# Patient Record
Sex: MALE | Race: WHITE | NOT HISPANIC OR LATINO
[De-identification: names, ages, dates, MRNs, and addresses within clinical notes are randomized per-mention and may not be internally consistent; named-entity substitution may affect disease eponyms.]

---

## 2017-06-21 ENCOUNTER — APPOINTMENT (OUTPATIENT)
Dept: ORTHOPEDIC SURGERY | Facility: CLINIC | Age: 77
End: 2017-06-21

## 2018-05-21 ENCOUNTER — INPATIENT (INPATIENT)
Facility: HOSPITAL | Age: 78
LOS: 7 days | Discharge: HOME | DRG: 264 | End: 2018-05-29
Attending: HOSPITALIST | Admitting: HOSPITALIST
Payer: MEDICARE

## 2018-05-21 VITALS
SYSTOLIC BLOOD PRESSURE: 147 MMHG | RESPIRATION RATE: 19 BRPM | DIASTOLIC BLOOD PRESSURE: 70 MMHG | HEART RATE: 72 BPM | OXYGEN SATURATION: 97 %

## 2018-05-21 DIAGNOSIS — Z29.9 ENCOUNTER FOR PROPHYLACTIC MEASURES, UNSPECIFIED: ICD-10-CM

## 2018-05-21 DIAGNOSIS — R09.02 HYPOXEMIA: ICD-10-CM

## 2018-05-21 DIAGNOSIS — I10 ESSENTIAL (PRIMARY) HYPERTENSION: ICD-10-CM

## 2018-05-21 DIAGNOSIS — Z96.643 PRESENCE OF ARTIFICIAL HIP JOINT, BILATERAL: Chronic | ICD-10-CM

## 2018-05-21 DIAGNOSIS — R06.09 OTHER FORMS OF DYSPNEA: ICD-10-CM

## 2018-05-21 DIAGNOSIS — Z98.89 OTHER SPECIFIED POSTPROCEDURAL STATES: Chronic | ICD-10-CM

## 2018-05-21 DIAGNOSIS — R06.00 DYSPNEA, UNSPECIFIED: ICD-10-CM

## 2018-05-21 DIAGNOSIS — I48.1 PERSISTENT ATRIAL FIBRILLATION: ICD-10-CM

## 2018-05-21 LAB
ALBUMIN SERPL ELPH-MCNC: 3.2 G/DL — LOW (ref 3.3–5)
ALP SERPL-CCNC: 65 U/L — SIGNIFICANT CHANGE UP (ref 40–120)
ALT FLD-CCNC: 18 U/L — SIGNIFICANT CHANGE UP (ref 10–45)
ANION GAP SERPL CALC-SCNC: 14 MMOL/L — SIGNIFICANT CHANGE UP (ref 5–17)
APTT BLD: 56.4 SEC — HIGH (ref 27.5–37.4)
AST SERPL-CCNC: 30 U/L — SIGNIFICANT CHANGE UP (ref 10–40)
BASOPHILS # BLD AUTO: 0 K/UL — SIGNIFICANT CHANGE UP (ref 0–0.2)
BASOPHILS NFR BLD AUTO: 0.1 % — SIGNIFICANT CHANGE UP (ref 0–2)
BILIRUB SERPL-MCNC: 0.7 MG/DL — SIGNIFICANT CHANGE UP (ref 0.2–1.2)
BUN SERPL-MCNC: 22 MG/DL — SIGNIFICANT CHANGE UP (ref 7–23)
CALCIUM SERPL-MCNC: 9.8 MG/DL — SIGNIFICANT CHANGE UP (ref 8.4–10.5)
CHLORIDE SERPL-SCNC: 92 MMOL/L — LOW (ref 96–108)
CK MB BLD-MCNC: 4.2 % — HIGH (ref 0–3.5)
CK MB CFR SERPL CALC: 3.8 NG/ML — SIGNIFICANT CHANGE UP (ref 0–6.7)
CK SERPL-CCNC: 90 U/L — SIGNIFICANT CHANGE UP (ref 30–200)
CO2 SERPL-SCNC: 29 MMOL/L — SIGNIFICANT CHANGE UP (ref 22–31)
CREAT SERPL-MCNC: 1.16 MG/DL — SIGNIFICANT CHANGE UP (ref 0.5–1.3)
EOSINOPHIL # BLD AUTO: 0.2 K/UL — SIGNIFICANT CHANGE UP (ref 0–0.5)
EOSINOPHIL NFR BLD AUTO: 2 % — SIGNIFICANT CHANGE UP (ref 0–6)
GAS PNL BLDV: SIGNIFICANT CHANGE UP
GLUCOSE SERPL-MCNC: 106 MG/DL — HIGH (ref 70–99)
HCT VFR BLD CALC: 40.2 % — SIGNIFICANT CHANGE UP (ref 39–50)
HGB BLD-MCNC: 13.3 G/DL — SIGNIFICANT CHANGE UP (ref 13–17)
INR BLD: 1.65 RATIO — HIGH (ref 0.88–1.16)
LYMPHOCYTES # BLD AUTO: 1.7 K/UL — SIGNIFICANT CHANGE UP (ref 1–3.3)
LYMPHOCYTES # BLD AUTO: 17.7 % — SIGNIFICANT CHANGE UP (ref 13–44)
MCHC RBC-ENTMCNC: 32.2 PG — SIGNIFICANT CHANGE UP (ref 27–34)
MCHC RBC-ENTMCNC: 33 GM/DL — SIGNIFICANT CHANGE UP (ref 32–36)
MCV RBC AUTO: 97.5 FL — SIGNIFICANT CHANGE UP (ref 80–100)
MONOCYTES # BLD AUTO: 0.9 K/UL — SIGNIFICANT CHANGE UP (ref 0–0.9)
MONOCYTES NFR BLD AUTO: 9.6 % — SIGNIFICANT CHANGE UP (ref 2–14)
NEUTROPHILS # BLD AUTO: 6.8 K/UL — SIGNIFICANT CHANGE UP (ref 1.8–7.4)
NEUTROPHILS NFR BLD AUTO: 70.5 % — SIGNIFICANT CHANGE UP (ref 43–77)
NT-PROBNP SERPL-SCNC: 7432 PG/ML — HIGH (ref 0–300)
PLATELET # BLD AUTO: 355 K/UL — SIGNIFICANT CHANGE UP (ref 150–400)
POTASSIUM SERPL-MCNC: 4.4 MMOL/L — SIGNIFICANT CHANGE UP (ref 3.5–5.3)
POTASSIUM SERPL-SCNC: 4.4 MMOL/L — SIGNIFICANT CHANGE UP (ref 3.5–5.3)
PROT SERPL-MCNC: 8.4 G/DL — HIGH (ref 6–8.3)
PROTHROM AB SERPL-ACNC: 18 SEC — HIGH (ref 9.8–12.7)
RBC # BLD: 4.12 M/UL — LOW (ref 4.2–5.8)
RBC # FLD: 12.4 % — SIGNIFICANT CHANGE UP (ref 10.3–14.5)
SODIUM SERPL-SCNC: 135 MMOL/L — SIGNIFICANT CHANGE UP (ref 135–145)
TROPONIN T SERPL-MCNC: <0.01 NG/ML — SIGNIFICANT CHANGE UP (ref 0–0.06)
WBC # BLD: 9.6 K/UL — SIGNIFICANT CHANGE UP (ref 3.8–10.5)
WBC # FLD AUTO: 9.6 K/UL — SIGNIFICANT CHANGE UP (ref 3.8–10.5)

## 2018-05-21 PROCEDURE — 71045 X-RAY EXAM CHEST 1 VIEW: CPT | Mod: 26

## 2018-05-21 PROCEDURE — 99223 1ST HOSP IP/OBS HIGH 75: CPT | Mod: AI,GC

## 2018-05-21 PROCEDURE — 93010 ELECTROCARDIOGRAM REPORT: CPT

## 2018-05-21 PROCEDURE — 99285 EMERGENCY DEPT VISIT HI MDM: CPT | Mod: 25,GC

## 2018-05-21 RX ORDER — FUROSEMIDE 40 MG
40 TABLET ORAL DAILY
Qty: 0 | Refills: 0 | Status: DISCONTINUED | OUTPATIENT
Start: 2018-05-21 | End: 2018-05-22

## 2018-05-21 RX ORDER — ATENOLOL 25 MG/1
50 TABLET ORAL DAILY
Qty: 0 | Refills: 0 | Status: DISCONTINUED | OUTPATIENT
Start: 2018-05-21 | End: 2018-05-21

## 2018-05-21 RX ORDER — FUROSEMIDE 40 MG
40 TABLET ORAL DAILY
Qty: 0 | Refills: 0 | Status: DISCONTINUED | OUTPATIENT
Start: 2018-05-21 | End: 2018-05-21

## 2018-05-21 RX ORDER — LISINOPRIL 2.5 MG/1
40 TABLET ORAL DAILY
Qty: 0 | Refills: 0 | Status: DISCONTINUED | OUTPATIENT
Start: 2018-05-21 | End: 2018-05-21

## 2018-05-21 RX ORDER — ATENOLOL 25 MG/1
50 TABLET ORAL DAILY
Qty: 0 | Refills: 0 | Status: DISCONTINUED | OUTPATIENT
Start: 2018-05-21 | End: 2018-05-24

## 2018-05-21 RX ORDER — SIMVASTATIN 20 MG/1
40 TABLET, FILM COATED ORAL AT BEDTIME
Qty: 0 | Refills: 0 | Status: DISCONTINUED | OUTPATIENT
Start: 2018-05-21 | End: 2018-05-24

## 2018-05-21 RX ORDER — ASPIRIN/CALCIUM CARB/MAGNESIUM 324 MG
81 TABLET ORAL DAILY
Qty: 0 | Refills: 0 | Status: DISCONTINUED | OUTPATIENT
Start: 2018-05-21 | End: 2018-05-24

## 2018-05-21 RX ORDER — APIXABAN 2.5 MG/1
5 TABLET, FILM COATED ORAL EVERY 12 HOURS
Qty: 0 | Refills: 0 | Status: DISCONTINUED | OUTPATIENT
Start: 2018-05-21 | End: 2018-05-22

## 2018-05-21 RX ADMIN — Medication 81 MILLIGRAM(S): at 19:53

## 2018-05-21 RX ADMIN — SIMVASTATIN 40 MILLIGRAM(S): 20 TABLET, FILM COATED ORAL at 19:53

## 2018-05-21 RX ADMIN — ATENOLOL 50 MILLIGRAM(S): 25 TABLET ORAL at 19:53

## 2018-05-21 RX ADMIN — Medication 1 TABLET(S): at 19:53

## 2018-05-21 RX ADMIN — APIXABAN 5 MILLIGRAM(S): 2.5 TABLET, FILM COATED ORAL at 19:46

## 2018-05-21 NOTE — H&P ADULT - PROBLEM SELECTOR PLAN 3
Atrial fibrillation diagnosed 2014. 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 s/p ablation 4/24/2918  -Currently in sinus rhythm  -continue atenolol  -continue Eliquis Atrial fibrillation diagnosed 2014. 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 s/p ablation 4/24/2918  -Currently in sinus rhythm  -continue atenolol  -continue Eliquis- hold prior to procedures

## 2018-05-21 NOTE — ED ADULT NURSE NOTE - OBJECTIVE STATEMENT
79 yo male sent to ED by MD for evaluation of shortness of breath. Patient recently 79 yo male sent to ED by MD for evaluation of shortness of breath for several weeks. PMH HTN, atrial fibrillation, ablation 3 weeks ago. Over the past month his shortness of breath has been worsening, worse with exertion, associated with dry cough, wheezing, orthopnea. Denies fevers, chest pain, abdominal pain. Distant smoking history. Tried lasix without response.

## 2018-05-21 NOTE — H&P ADULT - ATTENDING COMMENTS
Pt seen and examined 5/21 with medical team. Agree with above resident H&P and have edited where appropriate.

## 2018-05-21 NOTE — H&P ADULT - PROBLEM SELECTOR PLAN 1
amiodarone One month dyspnea associated with hypoxia, orthopnea, and LE edema. TTE 5/17/2018 had LVEF 55%, indeterminate diastolic function. pBNP 7400. Pt received one month of amiodarone  -DDx: organizing pneumonia, sarcoidosis, amiodarone adverse effect, vasculitis. CHF exacerbation less likely  -f/u ESR, CRP, RF, ACE One month dyspnea associated with hypoxia, orthopnea, and LE edema. TTE 5/17/2018 had LVEF 55%, indeterminate diastolic function. pBNP 7400. 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  -DDx: pneumonia, sarcoidosis, amiodarone adverse effect, metastatic disease, vasculitis, pulmonary edema 2/2 CHF exacerbation with diastolic dysfunction  -f/u ESR, CRP, RF, ACE One month dyspnea associated with hypoxia, orthopnea, and LE edema. TTE 5/17/2018 had LVEF 55%, indeterminate diastolic function. pBNP 7400. 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  -CT chest (5/10/2018) showed bilateral airspace consolidation most prominent in the upper lobe, bilateral nodules, mild mediastinal LAD, small-mod R pleural effusion, small L pleural effusion.  -DDx: pneumonia, sarcoidosis, amiodarone adverse effect, metastatic disease, vasculitis, pulmonary edema 2/2 CHF exacerbation with diastolic dysfunction  -f/u ESR, CRP, RF, ACE One month dyspnea associated with hypoxia, orthopnea, and LE edema. TTE 5/17/2018 had LVEF 55%, indeterminate diastolic function. pBNP 7400. 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  -CT chest (5/10/2018) showed bilateral airspace consolidation most prominent in the upper lobe, bilateral nodules, mild mediastinal LAD, small-mod R pleural effusion, small L pleural effusion.  -DDx: pneumonia, sarcoidosis, amiodarone adverse effect, metastatic disease, vasculitis, pulmonary edema 2/2 CHF exacerbation with diastolic dysfunction  -f/u ESR, CRP, MATEO, ACE, HIV, quant gold  -pulmonology to see pt One month dyspnea associated with hypoxia, orthopnea, and LE edema. TTE 5/17/2018 had LVEF 55%, indeterminate diastolic function. pBNP 7400. 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  -CT chest (5/10/2018) showed bilateral airspace consolidation most prominent in the upper lobe, bilateral nodules, mild mediastinal LAD, small-mod R pleural effusion, small L pleural effusion.  -DDx: pneumonia, sarcoidosis, amiodarone adverse effect, metastatic disease, vasculitis, pulmonary edema 2/2 CHF exacerbation with diastolic dysfunction  -f/u ESR, CRP, MATEO, ACE, HIV, quant gold  -pulmonology and cardiology to see pt  -consider inpatient stress test as had one scheduled outpt One month dyspnea associated with hypoxia, orthopnea, and LE edema. TTE 5/17/2018 had LVEF 55%, indeterminate diastolic function. pBNP 7400. 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 "borderline" stress test several years ago per pt.  -CT chest (5/10/2018) showed bilateral airspace consolidation most prominent in the upper lobe, bilateral nodules, mild mediastinal LAD, small-mod R pleural effusion, small L pleural effusion. PFTs with moderate restrictive disease.   -DDx: pneumonia, sarcoidosis, amiodarone adverse effect, metastatic disease, vasculitis, CAD, pulmonary edema 2/2 CHF exacerbation with diastolic dysfunction  -f/u ESR, CRP, MATEO, ACE, HIV, quant gold  -pulmonology and cardiology to see pt; may benefit from bronchoscopy  -consider inpatient stress test as had one scheduled outpt within next several weeks and has risk factors for cad which could be causing dyspnea

## 2018-05-21 NOTE — H&P ADULT - NSHPPHYSICALEXAM_GEN_ALL_CORE
GENERAL: No acute distress, well-developed  HEAD:  Atraumatic, Normocephalic  ENT: EOMI, PERRLA, conjunctiva and sclera clear, Neck supple, No JVD, moist mucosa  CHEST/LUNG: Clear to auscultation bilaterally; No wheeze, equal breath sounds bilaterally, on 2L NC  BACK: No spinal tenderness  HEART: Regular rate and rhythm; No murmurs, rubs, or gallops  ABDOMEN: Soft, Nontender, Nondistended; Bowel sounds present  EXTREMITIES:  Pedal pulses palpable, No clubbing, cyanosis, or edema  PSYCH: Nl behavior, nl affect  NEUROLOGY: AAOx3, non-focal  SKIN: Normal color, No rashes or lesions

## 2018-05-21 NOTE — H&P ADULT - PROBLEM SELECTOR PLAN 2
-continue home atenolol, ramipril, lasix 40mg po daily Presented with hypoxia sat 82% on RA. Does not use oxygen at home, no hx of pulmonary disease  -continue supplemental oxygen to maintain SpO2 >92%  -CXR showing b/l patchy opacities Presented with hypoxia sat 82% on RA. Does not use oxygen at home, no hx of pulmonary disease  -continue supplemental oxygen to maintain SpO2 >92%  -CXR showing b/l patchy opacities  -f/u pulm recs

## 2018-05-21 NOTE — H&P ADULT - PROBLEM SELECTOR PLAN 4
-continue home atenolol, ramipril, lasix 40mg po daily Kidney function at baseline 1.0 - 1.5 (5/2018)  -continue home atenolol, ramipril, lasix 40mg po daily Normotensive. Kidney function at baseline 1.0 - 1.5 (5/2018)  -continue home atenolol, lasix 40mg po daily  -BP soft, hold home ramipril Normotensive. Kidney function at baseline 1.0 - 1.5 (5/2018)  -continue home atenolol, lasix 40mg po daily  -BP borderline, hold home ramipril for now

## 2018-05-21 NOTE — ED PROVIDER NOTE - CARDIAC, MLM
Normal rate, regular rhythm.  Heart sounds S1, S2.  No murmurs, rubs or gallops. No JVD. Peripheral pulses equal throughout

## 2018-05-21 NOTE — H&P ADULT - ASSESSMENT
78 year old man PMH HTN, DLD AFib s/p ablation p/w shortness of breath. 78 year old man PMH HTN, DLD AFib s/p ablation presents with shortness of breath. 78 year old man PMH HTN, DLD AFib s/p ablation presents with shortness of breath, orthopnea, LE edema x 1 month found to have new bilateral airspace infiltrates and nodules associated with mediastinal LAD on CT chest.

## 2018-05-21 NOTE — H&P ADULT - FAMILY HISTORY
Family history of arrhythmia     Type 2 diabetes mellitus     Sibling  Still living? No  Family history of lung cancer, Age at diagnosis: Age Unknown

## 2018-05-21 NOTE — H&P ADULT - NSHPOUTPATIENTPROVIDERS_GEN_ALL_CORE
PMD/cards Dr. Hank Mei, [place]  Pul Dr. Ashish Hanley, [place] PMD/cards Dr. Hank Mei, [facility] Dr. Ashish Hanley, [place]  Pharm: [facility] PMD/cards Dr. Hank Mei, [place]  Pul Dr. Ashish Hanley, [place]  Pharm [facility]

## 2018-05-21 NOTE — H&P ADULT - NSHPREVIEWOFSYSTEMS_GEN_ALL_CORE
CONSTITUTIONAL: No weakness, fevers or chills  EYES/ENT: No visual changes;  No dysphagia  NECK: No pain or stiffness  RESPIRATORY: see HPI  CARDIOVASCULAR: No chest pain or palpitations; +intermittent LE edema  GASTROINTESTINAL: No abdominal or epigastric pain. No nausea, vomiting, or hematemesis; No diarrhea or constipation. No melena or hematochezia.  GENITOURINARY: No hematuria, +chronic intermittent dysuria and incomplete voiding  NEUROLOGICAL: No numbness or weakness  SKIN: No itching, burning, rashes, or lesions   All other review of systems is negative unless indicated above.

## 2018-05-21 NOTE — ED PROVIDER NOTE - ATTENDING CONTRIBUTION TO CARE
I performed a history and physical exam of the patient and discussed their management with the resident and /or advanced care provider. I reviewed the resident and /or ACP's note and agree with the documented findings and plan of care. My medical decision making and observations are found above.  hypoxic, afebrile lungs with rales.

## 2018-05-21 NOTE — ED PROVIDER NOTE - OBJECTIVE STATEMENT
78 year old man PMH HTN, DLD AFib s/p ablation p/w shortness of breath. Ablation 3wk ago for AFib, but says that even prior to this he had shortness of breath. Has been seeing pulm and cardiology for this. Over the past month his shortness of breath has been worsening, worse with exertion, associated with dry cough, wheezing, orthopnea. Denies fevers, chest pain, abdominal pain. Distant smoking history. Tried lasix without response.

## 2018-05-21 NOTE — H&P ADULT - NSHPLABSRESULTS_GEN_ALL_CORE
13.3   9.6   )-----------( 355      ( 21 May 2018 11:42 )             40.2     CBC Full  -  ( 21 May 2018 11:42 )  WBC Count : 9.6 K/uL  Hemoglobin : 13.3 g/dL  Hematocrit : 40.2 %  Platelet Count - Automated : 355 K/uL  Mean Cell Volume : 97.5 fl  Mean Cell Hemoglobin : 32.2 pg  Mean Cell Hemoglobin Concentration : 33.0 gm/dL  Auto Neutrophil # : 6.8 K/uL  Auto Lymphocyte # : 1.7 K/uL  Auto Monocyte # : 0.9 K/uL  Auto Eosinophil # : 0.2 K/uL  Auto Basophil # : 0.0 K/uL  Auto Neutrophil % : 70.5 %  Auto Lymphocyte % : 17.7 %  Auto Monocyte % : 9.6 %  Auto Eosinophil % : 2.0 %  Auto Basophil % : 0.1 %    05-21    135  |  92<L>  |  22  ----------------------------<  106<H>  4.4   |  29  |  1.16    Ca    9.8      21 May 2018 11:42    TPro  8.4<H>  /  Alb  3.2<L>  /  TBili  0.7  /  DBili  x   /  AST  30  /  ALT  18  /  AlkPhos  65  05-21    Creatinine Trend: 1.16<--  LIVER FUNCTIONS - ( 21 May 2018 11:42 )  Alb: 3.2 g/dL / Pro: 8.4 g/dL / ALK PHOS: 65 U/L / ALT: 18 U/L / AST: 30 U/L / GGT: x           PT/INR - ( 21 May 2018 11:42 )   PT: 18.0 sec;   INR: 1.65 ratio         PTT - ( 21 May 2018 11:42 )  PTT:56.4 sec  CARDIAC MARKERS ( 21 May 2018 11:42 )  x     / <0.01 ng/mL / 90 U/L / x     / 3.8 ng/mL            MICROBIOLOGY: 13.3   9.6   )-----------( 355      ( 21 May 2018 11:42 )             40.2     CBC Full  -  ( 21 May 2018 11:42 )  WBC Count : 9.6 K/uL  Hemoglobin : 13.3 g/dL  Hematocrit : 40.2 %  Platelet Count - Automated : 355 K/uL  Mean Cell Volume : 97.5 fl  Mean Cell Hemoglobin : 32.2 pg  Mean Cell Hemoglobin Concentration : 33.0 gm/dL  Auto Neutrophil # : 6.8 K/uL  Auto Lymphocyte # : 1.7 K/uL  Auto Monocyte # : 0.9 K/uL  Auto Eosinophil # : 0.2 K/uL  Auto Basophil # : 0.0 K/uL  Auto Neutrophil % : 70.5 %  Auto Lymphocyte % : 17.7 %  Auto Monocyte % : 9.6 %  Auto Eosinophil % : 2.0 %  Auto Basophil % : 0.1 %    05-21    135  |  92<L>  |  22  ----------------------------<  106<H>  4.4   |  29  |  1.16    Ca    9.8      21 May 2018 11:42    TPro  8.4<H>  /  Alb  3.2<L>  /  TBili  0.7  /  DBili  x   /  AST  30  /  ALT  18  /  AlkPhos  65  05-21    Creatinine Trend: 1.16<--  LIVER FUNCTIONS - ( 21 May 2018 11:42 )  Alb: 3.2 g/dL / Pro: 8.4 g/dL / ALK PHOS: 65 U/L / ALT: 18 U/L / AST: 30 U/L / GGT: x           PT/INR - ( 21 May 2018 11:42 )   PT: 18.0 sec;   INR: 1.65 ratio         PTT - ( 21 May 2018 11:42 )  PTT:56.4 sec  CARDIAC MARKERS ( 21 May 2018 11:42 )  x     / <0.01 ng/mL / 90 U/L / x     / 3.8 ng/mL      MICROBIOLOGY:    IMAGING:  < from: Xray Chest 1 View AP/PA (05.21.18 @ 11:57) >    IMPRESSION:  Patchy perihilar airspace opacities bilaterally which compatible with   pulmonary edema.  < end of copied text >

## 2018-05-21 NOTE — H&P ADULT - HISTORY OF PRESENT ILLNESS
78 year old man PMH HTN, DLD AFib s/p ablation p/w shortness of breath. 78 year old man PMH HTN, HLD, AFib s/p ablation (4/24/2018) p/w shortness of breath. Pt developed progressive dyspnea starting 1 month ago. 78 year old man PMHx HTN, HLD, AFib on Eliquis s/p ablation (4/24/2018) p/w shortness of breath. Pt developed progressive dyspnea, orthopnea, and LE edema starting 1 month ago. Pt saw his cardiologist 3/12 for a checkup who noted pt was back into atrial fibrillation who started him on amiodarone. Amiodarone was discontinued after one month as pt developed dyspnea. Pt walks with a cane at baseline, but was able to climb a flight of stairs without issue. Now he becomes SOB while at rest. Sitting up in a chair improves his SOB. Pt used to sleep flat, but over past month he now sleeps propped up or in a recliner due to difficulty breathing while flat.     Of note, pt took a road trip a couple days prior to admission that was 4 hours each way with 3-4 bathroom breaks each way. He took s trip to [place] 3/2018 where he and his wife developed sneezing and a dry cough. Pt's sneezing resolved but he continues to have daily dry cough.    Had an ablation for atrial fibrillation almost 4 weeks prior to admission.    Denies PND. 78 year old man PMHx HTN, HLD, AFib on Eliquis s/p ablation (4/24/2018) p/w shortness of breath. Pt developed progressive dyspnea, orthopnea, and LE edema starting 1 month ago. Pt saw his cardiologist 3/12 for a checkup who noted the patient was back into atrial fibrillation and started him on amiodarone. Amiodarone was discontinued after one month as pt developed dyspnea. Pt walks with a cane at baseline, but was able to climb a flight of stairs without issue. Now he becomes SOB while at rest. Sitting up in a chair improves his SOB. Pt used to sleep flat, but over past month he now sleeps propped up or in a recliner due to difficulty breathing while flat. CT chest (5/10/2018) showed bilateral airspace consolidation most prominent in the upper lobe, bilateral nodules, mild mediastinal LAD, small-mod R pleural effusion, small L pleural effusion. These are all new findings compared to CT chest from 2/2017. TTE 5 days prior to admission showed LVEF 55%, indeterminate diastolic function, mild segmental hypokinesis, mildly dilated LA, mild aortic insufficiency, mild MR. 5 days PTA, pBNP was 6643, ESR 49, no leukocytosis     Of note, pt took a road trip a couple days prior to admission that was 4 hours each way with 3-4 bathroom breaks each way. He took s trip to [place] 3/2018 where he and his wife developed sneezing and a dry cough. Pt's sneezing resolved but he continues to have daily dry cough. Denies F/C, night sweats, muscle/joint pain, nasal congestion, sore throat.    Had an ablation for atrial fibrillation almost 4 weeks prior to admission.    Denies PND, night sweats, weight loss, hemoptysis, myalgias joint pain. 78 year old man PMHx HTN, HLD, AFib on Eliquis s/p ablation (4/24/2018) p/w shortness of breath. Pt developed progressive dyspnea, orthopnea, and LE edema starting 1 month ago. Pt saw his cardiologist 3/12 for a checkup who noted the patient was back into atrial fibrillation and started him on amiodarone. Amiodarone was discontinued after one month as pt developed dyspnea. Pt walks with a cane at baseline, but was able to climb a flight of stairs without issue. Now he becomes SOB while at rest. Sitting up in a chair improves his SOB. Pt used to sleep flat, but over past month he now sleeps propped up or in a recliner due to difficulty breathing while flat. CT chest (5/10/2018) showed bilateral airspace consolidation most prominent in the upper lobe, bilateral nodules, mild mediastinal LAD, small-mod R pleural effusion, small L pleural effusion. These are all new findings compared to CT chest from 2/2017. TTE 5 days prior to admission showed LVEF 55%, indeterminate diastolic function, mild segmental hypokinesis, mildly dilated LA, mild aortic insufficiency, mild MR. 5 days PTA blood work was remarkable for pBNP 6643, ESR 49, no leukocytosis.     Of note, pt took a road trip a couple days prior to admission that was 4 hours each way with 3-4 bathroom breaks each way. He took s trip to [place] 3/2018 where he and his wife developed sneezing and a dry cough. Pt's sneezing resolved but he continues to have daily dry cough. Denies F/C, night sweats, muscle/joint pain, nasal congestion, sore throat. Denies PND, night sweats, weight loss, hemoptysis, myalgias joint pain.    ED vitals: afebrile, HR 84, /70, RR 25, sat 82% on RA improved to 96% on 2L NC. 79yo man PMHx HTN, HLD, AFib on Eliquis s/p ablation (4/24/2018) p/w shortness of breath. Pt developed progressive dyspnea, orthopnea, and LE edema starting 1 month ago. Pt saw his cardiologist 3/12 for a checkup who noted the patient was back into atrial fibrillation and started him on amiodarone. Amiodarone was discontinued after one month as pt developed dyspnea. Pt walks with a cane at baseline, but was able to climb a flight of stairs without issue. Now he becomes SOB while at rest. Sitting up in a chair improves his SOB. Pt used to sleep flat, but over past month he now sleeps propped up or in a recliner due to difficulty breathing while flat. Dyspnea associated with LE edema so pt was started on lasix 40mg daily by his cardiologist. CT chest (5/10/2018) showed bilateral airspace consolidation most prominent in the upper lobe, bilateral nodules, mild mediastinal LAD, small-mod R pleural effusion, small L pleural effusion. These are all new findings compared to CT chest from 2/2017. TTE 5 days prior to admission showed LVEF 55%, indeterminate diastolic function, mild segmental hypokinesis, mildly dilated LA, mild aortic insufficiency, mild MR. 5 days PTA blood work was remarkable for pBNP 6643, ESR 49, no leukocytosis.     Of note, pt took a road trip a couple days prior to admission that was 4 hours each way with 3-4 bathroom breaks each way. He took s trip to [place] 3/2018 where he and his wife developed sneezing and a dry cough. Pt's sneezing resolved but he continues to have daily dry cough. Denies F/C, night sweats, muscle/joint pain, nasal congestion, sore throat. Denies PND, night sweats, weight loss, hemoptysis, myalgias joint pain.    ED vitals: afebrile, HR 84, /70, RR 25, sat 82% on RA improved to 96% on 2L NC. 77yo man PMHx HTN, HLD, AFib on Eliquis s/p ablation (4/24/2018) p/w shortness of breath. Pt developed progressive dyspnea, orthopnea, and LE edema starting 1 month ago. Pt saw his cardiologist 3/12 for a checkup who noted the patient was back into atrial fibrillation and started him on amiodarone. Amiodarone was discontinued after one month as pt developed dyspnea. Pt walks with a cane at baseline, but was able to climb a flight of stairs without issue. Now he becomes SOB while at rest. Sitting up in a chair improves his SOB. Pt used to sleep flat, but over past month he now sleeps propped up or in a recliner due to difficulty breathing while flat. Dyspnea associated with LE edema so pt was started on lasix 40mg daily by his cardiologist. CT chest (5/10/2018) showed bilateral airspace consolidation most prominent in the upper lobe, bilateral nodules, mild mediastinal LAD, small-mod R pleural effusion, small L pleural effusion. These are all new findings compared to CT chest from 2/2017. TTE 5 days prior to admission showed LVEF 55%, indeterminate diastolic function, mild segmental hypokinesis, mildly dilated LA, mild aortic insufficiency, mild MR. 5 days PTA blood work was remarkable for pBNP 6643, ESR 49, no leukocytosis. Pt saw a pulmonologist for the first time one week ago; PFTs showed a moderate restrictive pattern, no bronchodilator response, mild-mod diffusion impairment that corrected to normal for lung volume. When pt went in for followup today, ox sat was 82% on RA and he was sent to the ED.    Of note, pt took a road trip a couple days prior to admission that was 4 hours each way with 3-4 bathroom breaks each way. He took s trip to [place] 3/2018 where he and his wife developed sneezing and a dry cough. Pt's sneezing resolved but he continues to have daily dry cough. Denies F/C, night sweats, muscle/joint pain, nasal congestion, sore throat. Denies PND, night sweats, weight loss, hemoptysis, myalgias joint pain.    ED vitals: afebrile, HR 84, /70, RR 25, sat 82% on RA improved to 96% on 2L NC. 77yo man PMHx HTN, HLD, AFib on Eliquis s/p ablation (4/24/2018) p/w shortness of breath. Pt developed progressive dyspnea, orthopnea, and LE edema starting 1 month ago. Pt saw his cardiologist 3/12 for a checkup who noted the patient was back into atrial fibrillation and started him on amiodarone. Amiodarone was discontinued after one month as pt developed dyspnea. Pt walks with a cane at baseline, but was able to climb a flight of stairs without issue. Now he becomes SOB while at rest. Sitting up in a chair improves his SOB. Pt used to sleep flat, but over past month he now sleeps propped up or in a recliner due to difficulty breathing while flat. Dyspnea associated with LE edema so pt was started on lasix 40mg daily by his cardiologist. CT chest (5/10/2018) showed bilateral airspace consolidation most prominent in the upper lobe, bilateral nodules, mild mediastinal LAD, small-mod R pleural effusion, small L pleural effusion. These are all new findings compared to CT chest from 2/2017. TTE 5 days prior to admission showed LVEF 55%, indeterminate diastolic function, mild segmental hypokinesis, mildly dilated LA, mild aortic insufficiency, mild MR. Was scheduled for a stress test next week to work up the dyspnea. 5 days PTA blood work was remarkable for pBNP 6643, ESR 49, no leukocytosis. Pt saw a pulmonologist for the first time one week ago; PFTs showed a moderate restrictive pattern, no bronchodilator response, mild-mod diffusion impairment that corrected to normal for lung volume. When pt went in for followup today, ox sat was 82% on RA and he was sent to the ED.    Of note, pt took a road trip a couple days prior to admission that was 4 hours each way with 3-4 bathroom breaks each way. He took s trip to [place] 3/2018 where he and his wife developed sneezing and a dry cough. Pt's sneezing resolved but he continues to have daily dry cough. Denies F/C, night sweats, muscle/joint pain, nasal congestion, sore throat. Denies PND, night sweats, weight loss, hemoptysis, myalgias joint pain.    ED vitals: afebrile, HR 84, /70, RR 25, sat 82% on RA improved to 96% on 2L NC.

## 2018-05-21 NOTE — ED ADULT NURSE NOTE - PMH
Essential hypertension    Hyperlipidemia, unspecified hyperlipidemia type    Obesity, unspecified obesity severity, unspecified obesity type    Persistent atrial fibrillation

## 2018-05-21 NOTE — ED PROVIDER NOTE - MEDICAL DECISION MAKING DETAILS
78M here for hypoxia, shortness of breath, refractory to outpt therapy - will admit for further management and workup 78M here for hypoxia, shortness of breath, refractory to outpt therapy - will admit for further management and workup  Connor: Sent in for admission, Known fluffy infiltrates bilaterally not responding to diuretics.  Will consider advanced imaging and admit.

## 2018-05-22 DIAGNOSIS — N30.90 CYSTITIS, UNSPECIFIED WITHOUT HEMATURIA: ICD-10-CM

## 2018-05-22 DIAGNOSIS — R06.02 SHORTNESS OF BREATH: ICD-10-CM

## 2018-05-22 DIAGNOSIS — I50.33 ACUTE ON CHRONIC DIASTOLIC (CONGESTIVE) HEART FAILURE: ICD-10-CM

## 2018-05-22 DIAGNOSIS — J96.01 ACUTE RESPIRATORY FAILURE WITH HYPOXIA: ICD-10-CM

## 2018-05-22 DIAGNOSIS — R93.8 ABNORMAL FINDINGS ON DIAGNOSTIC IMAGING OF OTHER SPECIFIED BODY STRUCTURES: ICD-10-CM

## 2018-05-22 DIAGNOSIS — I50.30 UNSPECIFIED DIASTOLIC (CONGESTIVE) HEART FAILURE: ICD-10-CM

## 2018-05-22 DIAGNOSIS — I48.91 UNSPECIFIED ATRIAL FIBRILLATION: ICD-10-CM

## 2018-05-22 LAB
ANION GAP SERPL CALC-SCNC: 11 MMOL/L — SIGNIFICANT CHANGE UP (ref 5–17)
APPEARANCE UR: ABNORMAL
BASOPHILS # BLD AUTO: 0.04 K/UL — SIGNIFICANT CHANGE UP (ref 0–0.2)
BASOPHILS NFR BLD AUTO: 0.4 % — SIGNIFICANT CHANGE UP (ref 0–2)
BILIRUB UR-MCNC: NEGATIVE — SIGNIFICANT CHANGE UP
BUN SERPL-MCNC: 21 MG/DL — SIGNIFICANT CHANGE UP (ref 7–23)
CALCIUM SERPL-MCNC: 9.3 MG/DL — SIGNIFICANT CHANGE UP (ref 8.4–10.5)
CHLORIDE SERPL-SCNC: 94 MMOL/L — LOW (ref 96–108)
CO2 SERPL-SCNC: 29 MMOL/L — SIGNIFICANT CHANGE UP (ref 22–31)
COLOR SPEC: YELLOW — SIGNIFICANT CHANGE UP
CREAT SERPL-MCNC: 1.14 MG/DL — SIGNIFICANT CHANGE UP (ref 0.5–1.3)
CRP SERPL-MCNC: 12.9 MG/DL — HIGH (ref 0–0.4)
DIFF PNL FLD: ABNORMAL
EOSINOPHIL # BLD AUTO: 0.31 K/UL — SIGNIFICANT CHANGE UP (ref 0–0.5)
EOSINOPHIL NFR BLD AUTO: 2.8 % — SIGNIFICANT CHANGE UP (ref 0–6)
ERYTHROCYTE [SEDIMENTATION RATE] IN BLOOD: 57 MM/HR — HIGH (ref 0–20)
GLUCOSE SERPL-MCNC: 107 MG/DL — HIGH (ref 70–99)
GLUCOSE UR QL: NEGATIVE MG/DL — SIGNIFICANT CHANGE UP
HCT VFR BLD CALC: 41.2 % — SIGNIFICANT CHANGE UP (ref 39–50)
HGB BLD-MCNC: 13.1 G/DL — SIGNIFICANT CHANGE UP (ref 13–17)
HIV 1+2 AB+HIV1 P24 AG SERPL QL IA: SIGNIFICANT CHANGE UP
IMM GRANULOCYTES NFR BLD AUTO: 0.5 % — SIGNIFICANT CHANGE UP (ref 0–1.5)
KETONES UR-MCNC: NEGATIVE — SIGNIFICANT CHANGE UP
LEUKOCYTE ESTERASE UR-ACNC: ABNORMAL
LYMPHOCYTES # BLD AUTO: 1.61 K/UL — SIGNIFICANT CHANGE UP (ref 1–3.3)
LYMPHOCYTES # BLD AUTO: 14.5 % — SIGNIFICANT CHANGE UP (ref 13–44)
MAGNESIUM SERPL-MCNC: 1.6 MG/DL — SIGNIFICANT CHANGE UP (ref 1.6–2.6)
MCHC RBC-ENTMCNC: 31 PG — SIGNIFICANT CHANGE UP (ref 27–34)
MCHC RBC-ENTMCNC: 31.8 GM/DL — LOW (ref 32–36)
MCV RBC AUTO: 97.6 FL — SIGNIFICANT CHANGE UP (ref 80–100)
MONOCYTES # BLD AUTO: 1.16 K/UL — HIGH (ref 0–0.9)
MONOCYTES NFR BLD AUTO: 10.4 % — SIGNIFICANT CHANGE UP (ref 2–14)
NEUTROPHILS # BLD AUTO: 7.93 K/UL — HIGH (ref 1.8–7.4)
NEUTROPHILS NFR BLD AUTO: 71.4 % — SIGNIFICANT CHANGE UP (ref 43–77)
NITRITE UR-MCNC: POSITIVE
PH UR: 5.5 — SIGNIFICANT CHANGE UP (ref 5–8)
PHOSPHATE SERPL-MCNC: 2.9 MG/DL — SIGNIFICANT CHANGE UP (ref 2.5–4.5)
PLATELET # BLD AUTO: 367 K/UL — SIGNIFICANT CHANGE UP (ref 150–400)
POTASSIUM SERPL-MCNC: 4 MMOL/L — SIGNIFICANT CHANGE UP (ref 3.5–5.3)
POTASSIUM SERPL-SCNC: 4 MMOL/L — SIGNIFICANT CHANGE UP (ref 3.5–5.3)
PROT UR-MCNC: ABNORMAL MG/DL
RBC # BLD: 4.22 M/UL — SIGNIFICANT CHANGE UP (ref 4.2–5.8)
RBC # FLD: 14 % — SIGNIFICANT CHANGE UP (ref 10.3–14.5)
SODIUM SERPL-SCNC: 134 MMOL/L — LOW (ref 135–145)
SP GR SPEC: 1.02 — SIGNIFICANT CHANGE UP (ref 1.01–1.02)
TSH SERPL-MCNC: 1.74 UIU/ML — SIGNIFICANT CHANGE UP (ref 0.27–4.2)
UROBILINOGEN FLD QL: 1 MG/DL — SIGNIFICANT CHANGE UP
WBC # BLD: 11.11 K/UL — HIGH (ref 3.8–10.5)
WBC # FLD AUTO: 11.11 K/UL — HIGH (ref 3.8–10.5)

## 2018-05-22 PROCEDURE — 99233 SBSQ HOSP IP/OBS HIGH 50: CPT | Mod: GC

## 2018-05-22 PROCEDURE — 99223 1ST HOSP IP/OBS HIGH 75: CPT

## 2018-05-22 RX ORDER — FUROSEMIDE 40 MG
20 TABLET ORAL ONCE
Qty: 0 | Refills: 0 | Status: COMPLETED | OUTPATIENT
Start: 2018-05-22 | End: 2018-05-22

## 2018-05-22 RX ORDER — FUROSEMIDE 40 MG
40 TABLET ORAL DAILY
Qty: 0 | Refills: 0 | Status: DISCONTINUED | OUTPATIENT
Start: 2018-05-22 | End: 2018-05-22

## 2018-05-22 RX ORDER — CEFTRIAXONE 500 MG/1
1 INJECTION, POWDER, FOR SOLUTION INTRAMUSCULAR; INTRAVENOUS EVERY 24 HOURS
Qty: 0 | Refills: 0 | Status: DISCONTINUED | OUTPATIENT
Start: 2018-05-22 | End: 2018-05-24

## 2018-05-22 RX ORDER — TAMSULOSIN HYDROCHLORIDE 0.4 MG/1
0.4 CAPSULE ORAL AT BEDTIME
Qty: 0 | Refills: 0 | Status: DISCONTINUED | OUTPATIENT
Start: 2018-05-22 | End: 2018-05-24

## 2018-05-22 RX ORDER — CEFTRIAXONE 500 MG/1
1 INJECTION, POWDER, FOR SOLUTION INTRAMUSCULAR; INTRAVENOUS EVERY 24 HOURS
Qty: 0 | Refills: 0 | Status: DISCONTINUED | OUTPATIENT
Start: 2018-05-22 | End: 2018-05-22

## 2018-05-22 RX ORDER — FUROSEMIDE 40 MG
40 TABLET ORAL DAILY
Qty: 0 | Refills: 0 | Status: DISCONTINUED | OUTPATIENT
Start: 2018-05-23 | End: 2018-05-24

## 2018-05-22 RX ADMIN — TAMSULOSIN HYDROCHLORIDE 0.4 MILLIGRAM(S): 0.4 CAPSULE ORAL at 21:05

## 2018-05-22 RX ADMIN — ATENOLOL 50 MILLIGRAM(S): 25 TABLET ORAL at 14:21

## 2018-05-22 RX ADMIN — Medication 81 MILLIGRAM(S): at 11:08

## 2018-05-22 RX ADMIN — CEFTRIAXONE 100 GRAM(S): 500 INJECTION, POWDER, FOR SOLUTION INTRAMUSCULAR; INTRAVENOUS at 15:41

## 2018-05-22 RX ADMIN — SIMVASTATIN 40 MILLIGRAM(S): 20 TABLET, FILM COATED ORAL at 21:06

## 2018-05-22 RX ADMIN — Medication 40 MILLIGRAM(S): at 07:26

## 2018-05-22 RX ADMIN — Medication 1 TABLET(S): at 11:08

## 2018-05-22 RX ADMIN — Medication 20 MILLIGRAM(S): at 15:41

## 2018-05-22 NOTE — PROGRESS NOTE ADULT - ASSESSMENT
78 year old man PMH HTN, DLD AFib s/p ablation presents with shortness of breath, orthopnea, LE edema x 1 month found to have new bilateral airspace infiltrates and nodules associated with mediastinal LAD on CT chest. 78 year old man PMH HTN, DLD AFib s/p ablation presents with shortness of breath, orthopnea, LE edema x 1 month found to have new bilateral airspace infiltrates and nodules associated with mediastinal LAD on CT chest. Found to have UTI. 78 year old man PMH HTN, DLD AFib s/p ablation presents with shortness of breath, orthopnea, LE edema x 1 month found to have new bilateral airspace infiltrates and nodules associated with mediastinal LAD on CT chest. Found to have UTI

## 2018-05-22 NOTE — PROGRESS NOTE ADULT - ATTENDING COMMENTS
agree with above resident note. transfer to tele, IV diuresis, bronchoscopy planned for tomorrow with Dr. Shukla. Family personally requested and called a second pulmonary opinion (Dr. Saul) without knowledge of team. Recs appreciated, everyone in agreement to pursue bronchoscopy.   +UA and vague UTI symptoms, culture sent. Will treat for now. Concerning for underlying urinary obstruction, ?BPH. Check PVR today.

## 2018-05-22 NOTE — CONSULT NOTE ADULT - ASSESSMENT
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

## 2018-05-22 NOTE — CONSULT NOTE ADULT - PROBLEM SELECTOR RECOMMENDATION 2
-Maintain O2 sat > 90%  -Would check RVP and Legionella  -Bronchoscopy planned for tomorrow - patient off AC and NPO past midnight  -Incentive spirometer and Acapella  -Ambulation/PT

## 2018-05-22 NOTE — CONSULT NOTE ADULT - PROBLEM SELECTOR RECOMMENDATION 5
-this is a diagnosis of exclusion. Although the time frame is a little atypical the acuity of patients symptoms are concerning. Agree with treating for HF exacerbation as patient with LE edema, orthopnea, and HJR and ruling out infection with bronchoscopy. However, if all this is negative would move Amiodarone toxicity up the differential diagnosis and consider treatment with systemic corticosteroids  -Thyroid function tests  -LFTs

## 2018-05-22 NOTE — PROGRESS NOTE ADULT - SUBJECTIVE AND OBJECTIVE BOX
PULMONARY PROGRESS NOTE    CHINA NESBITT  MRN-[id number]    Patient is a 78y old  Male who presents with a chief complaint of SOB (21 May 2018 13:43)      HPI:  -SOB since end of march, prior to march pt was not sob, et was unlimited.  since then his functional status has declined, sob at rest, has occasional dry cough denies sputum/hemoptysis.  About 5 lb weight loss.  Denies fever/chills/night sweats.  No N/V/D.  He believes symptoms started when he was put on amiodarone (only received 1 month of therapy).  No history of TB or TB exposure.  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  Brother  of lung ca (was a heavy smoker).  ROS:   -otherwise negative.    ACTIVE MEDICATION LIST:  MEDICATIONS  (STANDING):  apixaban 5 milliGRAM(s) Oral every 12 hours  aspirin enteric coated 81 milliGRAM(s) Oral daily  ATENolol  Tablet 50 milliGRAM(s) Oral daily  calcium carbonate 1250 mG + Vitamin D (OsCal 500 + D) 1 Tablet(s) Oral daily  furosemide    Tablet 40 milliGRAM(s) Oral daily  simvastatin 40 milliGRAM(s) Oral at bedtime    MEDICATIONS  (PRN):      EXAM:  Vital Signs Last 24 Hrs  T(C): 36.7 (22 May 2018 07:18), Max: 36.8 (21 May 2018 16:21)  T(F): 98 (22 May 2018 07:18), Max: 98.3 (22 May 2018 04:01)  HR: 72 (22 May 2018 07:18) (67 - 84)  BP: 122/68 (22 May 2018 07:18) (100/57 - 147/70)  BP(mean): --  RR: 18 (22 May 2018 07:18) (18 - 25)  SpO2: 93% (22 May 2018 07:18) (82% - 98%)    GENERAL: The patient is awake and alert in no apparent distress.     SKIN: Warm, dry, no rashes    LUNGS: Clear to auscultation without wheezing, rales or rhonchi; respirations unlabored    HEART:S1/S2    ABDOMEN: +BS, Soft, Nontender    EXTREMITIES: No clubbing, cyanosis, edema    LABS/IMGAING: reviewed                        13.1   11.11 )-----------( 367      ( 22 May 2018 09:11 )             41.2   -    134<L>  |  94<L>  |  21  ----------------------------<  107<H>  4.0   |  29  |  1.14    Ca    9.3      22 May 2018 07:11  Phos  2.9       Mg     1.6         TPro  8.4<H>  /  Alb  3.2<L>  /  TBili  0.7  /  DBili  x   /  AST  30  /  ALT  18  /  AlkPhos  65  -    < from: Xray Chest 1 View AP/PA (18 @ 11:57) >  IMPRESSION:    Patchy perihilar airspace opacities bilaterally which compatible with   pulmonary edema.    < end of copied text >    PROBLEM LIST:  78y Male with HEALTH ISSUES - PROBLEM Dx:  Hypoxia  Abnormal chest CT  Persistent atrial fibrillation  Essential hypertension      RECS:  -unclear etiology of findings on CT.  Does sound like a large component of fluid (bnp 7000), would increase lasix today to 40 IV.  Seems early to be amiodarone toxicity.  will plan bronchoscopy for wednesday morning at 10:00.  Please make NPO after midnight, hold eliquis now.  -discussed at length with family, patient and .        Radha Shukla MD  [phone]

## 2018-05-22 NOTE — CONSULT NOTE ADULT - PROBLEM SELECTOR RECOMMENDATION 4
-echocardiogram  -aggressive diuresis with goal to get net negative 1-2 L over next 24 hours  -cardiology followup

## 2018-05-22 NOTE — PROGRESS NOTE ADULT - PROBLEM SELECTOR PLAN 3
Atrial fibrillation diagnosed 2014. 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 s/p ablation 4/24/2918  -Currently in sinus rhythm  -continue atenolol  -continue Eliquis Grossly positive UA. C/o intermittent dysuria and incomplete voiding  -obtain UCx and will start CTX Grossly positive UA. C/o intermittent dysuria and incomplete voiding  -f/u UCx  -start ceftriaxone (5/22 - ) Grossly positive UA. C/o intermittent dysuria and incomplete voiding  -f/u UCx  -start ceftriaxone (5/22 - )  -check POV to rule out obstruction Grossly positive UA. C/o intermittent dysuria and incomplete voiding  -f/u UCx  -start ceftriaxone (5/22 - )  -check POV to rule out obstruction; consider bph

## 2018-05-22 NOTE — PROGRESS NOTE ADULT - PROBLEM SELECTOR PLAN 6
DVT: Eliquis full A/C  Dispo: PT eval, walks with cane Normotensive. Kidney function at baseline 1.0 - 1.5 (5/2018). At home takes lasix 40mg daily, BP soft hold home ramipril  -continue home atenolol  -start lasix 40mg IV daily

## 2018-05-22 NOTE — PROGRESS NOTE ADULT - SUBJECTIVE AND OBJECTIVE BOX
Internal Medicine Progress Note    Linnea Butt, PGY1  Internal Medicine, team 1  Pager [phone] / [phone]  After 7PM on weekdays and 12PM on weekends, please page #[phone]    Patient is a 78y old  Male who presents with a chief complaint of SOB (21 May 2018 13:43)      SUBJECTIVE / OVERNIGHT EVENTS: MARI overnight.     MEDICATIONS  (STANDING):  apixaban 5 milliGRAM(s) Oral every 12 hours  aspirin enteric coated 81 milliGRAM(s) Oral daily  ATENolol  Tablet 50 milliGRAM(s) Oral daily  calcium carbonate 1250 mG + Vitamin D (OsCal 500 + D) 1 Tablet(s) Oral daily  furosemide    Tablet 40 milliGRAM(s) Oral daily  simvastatin 40 milliGRAM(s) Oral at bedtime    MEDICATIONS  (PRN):    Vital Signs Last 24 Hrs  T(C): 36.8 (22 May 2018 04:01), Max: 36.8 (21 May 2018 16:21)  T(F): 98.3 (22 May 2018 04:01), Max: 98.3 (22 May 2018 04:01)  HR: 73 (22 May 2018 04:01) (67 - 84)  BP: 120/75 (22 May 2018 04:01) (100/57 - 147/70)  BP(mean): --  RR: 18 (22 May 2018 04:01) (18 - 25)  SpO2: 95% (22 May 2018 04:01) (82% - 98%)    CAPILLARY BLOOD GLUCOSE        I&O's Summary    21 May 2018 07:01  -  22 May 2018 06:55  --------------------------------------------------------  IN: 240 mL / OUT: 0 mL / NET: 240 mL      PHYSICAL EXAM  GENERAL: NAD, well-developed  HEAD:  Atraumatic, Normocephalic  EYES: EOMI, PERRLA, conjunctiva and sclera clear  NECK: Supple, No JVD  CHEST/LUNG: Clear to auscultation bilaterally; No wheeze, respirations nonlabored  HEART: Regular rate and rhythm; No murmurs, rubs, or gallops  ABDOMEN: Soft, Nontender, Nondistended; Bowel sounds present  EXTREMITIES:  2+ Peripheral Pulses, No clubbing, cyanosis, or edema  NEURO/PSYCH: AAOx3, nonfocal  SKIN: No rashes or lesions      LABS:                        13.3   9.6   )-----------( 355      ( 21 May 2018 11:42 )             40.2     CBC Full  -  ( 21 May 2018 11:42 )  WBC Count : 9.6 K/uL  Hemoglobin : 13.3 g/dL  Hematocrit : 40.2 %  Platelet Count - Automated : 355 K/uL  Mean Cell Volume : 97.5 fl  Mean Cell Hemoglobin : 32.2 pg  Mean Cell Hemoglobin Concentration : 33.0 gm/dL  Auto Neutrophil # : 6.8 K/uL  Auto Lymphocyte # : 1.7 K/uL  Auto Monocyte # : 0.9 K/uL  Auto Eosinophil # : 0.2 K/uL  Auto Basophil # : 0.0 K/uL  Auto Neutrophil % : 70.5 %  Auto Lymphocyte % : 17.7 %  Auto Monocyte % : 9.6 %  Auto Eosinophil % : 2.0 %  Auto Basophil % : 0.1 %        135  |  92<L>  |  22  ----------------------------<  106<H>  4.4   |  29  |  1.16    Ca    9.8      21 May 2018 11:42    TPro  8.4<H>  /  Alb  3.2<L>  /  TBili  0.7  /  DBili  x   /  AST  30  /  ALT  18  /  AlkPhos  65      Creatinine Trend: 1.16<--  LIVER FUNCTIONS - ( 21 May 2018 11:42 )  Alb: 3.2 g/dL / Pro: 8.4 g/dL / ALK PHOS: 65 U/L / ALT: 18 U/L / AST: 30 U/L / GGT: x           PT/INR - ( 21 May 2018 11:42 )   PT: 18.0 sec;   INR: 1.65 ratio         PTT - ( 21 May 2018 11:42 )  PTT:56.4 sec  CARDIAC MARKERS ( 21 May 2018 11:42 )  x     / <0.01 ng/mL / 90 U/L / x     / 3.8 ng/mL        Urinalysis Basic - ( 21 May 2018 23:59 )    Color: Yellow / Appearance: Turbid / S.016 / pH: x  Gluc: x / Ketone: Negative  / Bili: Negative / Urobili: 1.0 mg/dL   Blood: x / Protein: Trace mg/dL / Nitrite: Positive   Leuk Esterase: Large / RBC: 4 /HPF / WBC >720 /HPF   Sq Epi: x / Non Sq Epi: 0 /HPF / Bacteria: TNTC        MICROBIOLOGY:      RADIOLOGY & ADDITIONAL TESTS:     CONSULTS: pulmonology, cards Internal Medicine Progress Note    Linnea Butt, PGY1  Internal Medicine, team 1  Pager [phone] / [phone]  After 7PM on weekdays and 12PM on weekends, please page #[phone]    Patient is a 78y old  Male who presents with a chief complaint of SOB (21 May 2018 13:43)      SUBJECTIVE / OVERNIGHT EVENTS: MARI overnight. UA grossly positive.    MEDICATIONS  (STANDING):  apixaban 5 milliGRAM(s) Oral every 12 hours  aspirin enteric coated 81 milliGRAM(s) Oral daily  ATENolol  Tablet 50 milliGRAM(s) Oral daily  calcium carbonate 1250 mG + Vitamin D (OsCal 500 + D) 1 Tablet(s) Oral daily  furosemide    Tablet 40 milliGRAM(s) Oral daily  simvastatin 40 milliGRAM(s) Oral at bedtime    MEDICATIONS  (PRN):    Vital Signs Last 24 Hrs  T(C): 36.8 (22 May 2018 04:01), Max: 36.8 (21 May 2018 16:21)  T(F): 98.3 (22 May 2018 04:01), Max: 98.3 (22 May 2018 04:01)  HR: 73 (22 May 2018 04:01) (67 - 84)  BP: 120/75 (22 May 2018 04:01) (100/57 - 147/70)  BP(mean): --  RR: 18 (22 May 2018 04:01) (18 - 25)  SpO2: 95% (22 May 2018 04:01) (82% - 98%)    CAPILLARY BLOOD GLUCOSE        I&O's Summary    21 May 2018 07:01  -  22 May 2018 06:55  --------------------------------------------------------  IN: 240 mL / OUT: 0 mL / NET: 240 mL      PHYSICAL EXAM  GENERAL: NAD, well-developed  HEAD:  Atraumatic, Normocephalic  EYES: EOMI, PERRLA, conjunctiva and sclera clear  NECK: Supple, No JVD  CHEST/LUNG: Clear to auscultation bilaterally; No wheeze, respirations nonlabored  HEART: Regular rate and rhythm; No murmurs, rubs, or gallops  ABDOMEN: Soft, Nontender, Nondistended; Bowel sounds present  EXTREMITIES:  2+ Peripheral Pulses, No clubbing, cyanosis, or edema  NEURO/PSYCH: AAOx3, nonfocal  SKIN: No rashes or lesions      LABS:                        13.3   9.6   )-----------( 355      ( 21 May 2018 11:42 )             40.2     CBC Full  -  ( 21 May 2018 11:42 )  WBC Count : 9.6 K/uL  Hemoglobin : 13.3 g/dL  Hematocrit : 40.2 %  Platelet Count - Automated : 355 K/uL  Mean Cell Volume : 97.5 fl  Mean Cell Hemoglobin : 32.2 pg  Mean Cell Hemoglobin Concentration : 33.0 gm/dL  Auto Neutrophil # : 6.8 K/uL  Auto Lymphocyte # : 1.7 K/uL  Auto Monocyte # : 0.9 K/uL  Auto Eosinophil # : 0.2 K/uL  Auto Basophil # : 0.0 K/uL  Auto Neutrophil % : 70.5 %  Auto Lymphocyte % : 17.7 %  Auto Monocyte % : 9.6 %  Auto Eosinophil % : 2.0 %  Auto Basophil % : 0.1 %        135  |  92<L>  |  22  ----------------------------<  106<H>  4.4   |  29  |  1.16    Ca    9.8      21 May 2018 11:42    TPro  8.4<H>  /  Alb  3.2<L>  /  TBili  0.7  /  DBili  x   /  AST  30  /  ALT  18  /  AlkPhos  65      Creatinine Trend: 1.16<--  LIVER FUNCTIONS - ( 21 May 2018 11:42 )  Alb: 3.2 g/dL / Pro: 8.4 g/dL / ALK PHOS: 65 U/L / ALT: 18 U/L / AST: 30 U/L / GGT: x           PT/INR - ( 21 May 2018 11:42 )   PT: 18.0 sec;   INR: 1.65 ratio         PTT - ( 21 May 2018 11:42 )  PTT:56.4 sec  CARDIAC MARKERS ( 21 May 2018 11:42 )  x     / <0.01 ng/mL / 90 U/L / x     / 3.8 ng/mL        Urinalysis Basic - ( 21 May 2018 23:59 )    Color: Yellow / Appearance: Turbid / S.016 / pH: x  Gluc: x / Ketone: Negative  / Bili: Negative / Urobili: 1.0 mg/dL   Blood: x / Protein: Trace mg/dL / Nitrite: Positive   Leuk Esterase: Large / RBC: 4 /HPF / WBC >720 /HPF   Sq Epi: x / Non Sq Epi: 0 /HPF / Bacteria: TNTC        MICROBIOLOGY:      RADIOLOGY & ADDITIONAL TESTS:     CONSULTS: pulmonology, cards Internal Medicine Progress Note    Linnea Butt, PGY1  Internal Medicine, team 1  Pager [phone] / [phone]  After 7PM on weekdays and 12PM on weekends, please page #[phone]    Patient is a 78y old  Male who presents with a chief complaint of SOB (21 May 2018 13:43)      SUBJECTIVE / OVERNIGHT EVENTS: MARI overnight. UA grossly positive. Still requiring O2 NC, denies CP.    MEDICATIONS  (STANDING):  apixaban 5 milliGRAM(s) Oral every 12 hours  aspirin enteric coated 81 milliGRAM(s) Oral daily  ATENolol  Tablet 50 milliGRAM(s) Oral daily  calcium carbonate 1250 mG + Vitamin D (OsCal 500 + D) 1 Tablet(s) Oral daily  furosemide    Tablet 40 milliGRAM(s) Oral daily  simvastatin 40 milliGRAM(s) Oral at bedtime    MEDICATIONS  (PRN):    Vital Signs Last 24 Hrs  T(C): 36.8 (22 May 2018 04:01), Max: 36.8 (21 May 2018 16:21)  T(F): 98.3 (22 May 2018 04:01), Max: 98.3 (22 May 2018 04:01)  HR: 73 (22 May 2018 04:01) (67 - 84)  BP: 120/75 (22 May 2018 04:01) (100/57 - 147/70)  BP(mean): --  RR: 18 (22 May 2018 04:01) (18 - 25)  SpO2: 95% (22 May 2018 04:01) (82% - 98%)    CAPILLARY BLOOD GLUCOSE        I&O's Summary    21 May 2018 07:01  -  22 May 2018 06:55  --------------------------------------------------------  IN: 240 mL / OUT: 0 mL / NET: 240 mL      PHYSICAL EXAM  GENERAL: NAD, well-developed  HEAD:  Atraumatic, Normocephalic  EYES: EOMI, PERRLA, conjunctiva and sclera clear  NECK: Supple, No JVD  CHEST/LUNG: Clear to auscultation bilaterally; No wheeze, respirations nonlabored  HEART: Regular rate and rhythm; No murmurs, rubs, or gallops  ABDOMEN: Soft, Nontender, Nondistended; Bowel sounds present  EXTREMITIES:  2+ Peripheral Pulses, No clubbing, cyanosis, or edema  NEURO/PSYCH: AAOx3, nonfocal  SKIN: No rashes or lesions      LABS:                        13.1   11.11 )-----------( 367      ( 22 May 2018 09:11 )             41.2     CBC Full  -  ( 22 May 2018 09:11 )  WBC Count : 11.11 K/uL  Hemoglobin : 13.1 g/dL  Hematocrit : 41.2 %  Platelet Count - Automated : 367 K/uL  Mean Cell Volume : 97.6 fl  Mean Cell Hemoglobin : 31.0 pg  Mean Cell Hemoglobin Concentration : 31.8 gm/dL  Auto Neutrophil # : 7.93 K/uL  Auto Lymphocyte # : 1.61 K/uL  Auto Monocyte # : 1.16 K/uL  Auto Eosinophil # : 0.31 K/uL  Auto Basophil # : 0.04 K/uL  Auto Neutrophil % : 71.4 %  Auto Lymphocyte % : 14.5 %  Auto Monocyte % : 10.4 %  Auto Eosinophil % : 2.8 %  Auto Basophil % : 0.4 %    05-22    134<L>  |  94<L>  |    ----------------------------<  107<H>  4.0   |  29  |  1.14    Ca    9.3      22 May 2018 07:11  Phos  2.9     05-22  Mg     1.6     -22    TPro  8.4<H>  /  Alb  3.2<L>  /  TBili  0.7  /  DBili  x   /  AST  30  /  ALT  18  /  AlkPhos  65  05-21    Creatinine Trend: 1.14<--, 1.16<--  LIVER FUNCTIONS - ( 21 May 2018 11:42 )  Alb: 3.2 g/dL / Pro: 8.4 g/dL / ALK PHOS: 65 U/L / ALT: 18 U/L / AST: 30 U/L / GGT: x           PT/INR - ( 21 May 2018 11:42 )   PT: 18.0 sec;   INR: 1.65 ratio         PTT - ( 21 May 2018 11:42 )  PTT:56.4 sec  CARDIAC MARKERS ( 21 May 2018 11:42 )  x     / <0.01 ng/mL / 90 U/L / x     / 3.8 ng/mL        Urinalysis Basic - ( 21 May 2018 23:59 )    Color: Yellow / Appearance: Turbid / S.016 / pH: x  Gluc: x / Ketone: Negative  / Bili: Negative / Urobili: 1.0 mg/dL   Blood: x / Protein: Trace mg/dL / Nitrite: Positive   Leuk Esterase: Large / RBC: 4 /HPF / WBC >720 /HPF   Sq Epi: x / Non Sq Epi: 0 /HPF / Bacteria: TNTC      MICROBIOLOGY:  UCx pending    RADIOLOGY & ADDITIONAL TESTS:     CONSULTS: pulmonology, cards Internal Medicine Progress Note    Linnea Butt, PGY1  Internal Medicine, team 1  Pager [phone] / [phone]  After 7PM on weekdays and 12PM on weekends, please page #[phone]    Patient is a 78y old  Male who presents with a chief complaint of SOB (21 May 2018 13:43)      SUBJECTIVE / OVERNIGHT EVENTS: MARI overnight. UA grossly positive. Still requiring O2 NC, denies CP.  denies current dysuria but reports urinary frequency and feeling of incomplete emptying     MEDICATIONS  (STANDING):  apixaban 5 milliGRAM(s) Oral every 12 hours  aspirin enteric coated 81 milliGRAM(s) Oral daily  ATENolol  Tablet 50 milliGRAM(s) Oral daily  calcium carbonate 1250 mG + Vitamin D (OsCal 500 + D) 1 Tablet(s) Oral daily  furosemide    Tablet 40 milliGRAM(s) Oral daily  simvastatin 40 milliGRAM(s) Oral at bedtime    MEDICATIONS  (PRN):    Vital Signs Last 24 Hrs  T(C): 36.8 (22 May 2018 04:01), Max: 36.8 (21 May 2018 16:21)  T(F): 98.3 (22 May 2018 04:01), Max: 98.3 (22 May 2018 04:01)  HR: 73 (22 May 2018 04:01) (67 - 84)  BP: 120/75 (22 May 2018 04:01) (100/57 - 147/70)  BP(mean): --  RR: 18 (22 May 2018 04:01) (18 - 25)  SpO2: 95% (22 May 2018 04:01) (82% - 98%)    CAPILLARY BLOOD GLUCOSE        I&O's Summary    21 May 2018 07:01  -  22 May 2018 06:55  --------------------------------------------------------  IN: 240 mL / OUT: 0 mL / NET: 240 mL      PHYSICAL EXAM  GENERAL: NAD, well-developed  HEAD:  Atraumatic, Normocephalic  EYES: EOMI, PERRLA, conjunctiva and sclera clear  NECK: Supple, No JVD  CHEST/LUNG: Clear to auscultation bilaterally; No wheeze, respirations nonlabored  HEART: Regular rate and rhythm; No murmurs, rubs, or gallops  ABDOMEN: Soft, Nontender, Nondistended; Bowel sounds present  EXTREMITIES:  2+ Peripheral Pulses, No clubbing, cyanosis, or edema  NEURO/PSYCH: AAOx3, nonfocal  SKIN: No rashes or lesions      LABS:                        13.1   11.11 )-----------( 367      ( 22 May 2018 09:11 )             41.2     CBC Full  -  ( 22 May 2018 09:11 )  WBC Count : 11.11 K/uL  Hemoglobin : 13.1 g/dL  Hematocrit : 41.2 %  Platelet Count - Automated : 367 K/uL  Mean Cell Volume : 97.6 fl  Mean Cell Hemoglobin : 31.0 pg  Mean Cell Hemoglobin Concentration : 31.8 gm/dL  Auto Neutrophil # : 7.93 K/uL  Auto Lymphocyte # : 1.61 K/uL  Auto Monocyte # : 1.16 K/uL  Auto Eosinophil # : 0.31 K/uL  Auto Basophil # : 0.04 K/uL  Auto Neutrophil % : 71.4 %  Auto Lymphocyte % : 14.5 %  Auto Monocyte % : 10.4 %  Auto Eosinophil % : 2.8 %  Auto Basophil % : 0.4 %    05-22    134<L>  |  94<L>  |  21  ----------------------------<  107<H>  4.0   |  29  |  1.14    Ca    9.3      22 May 2018 07:11  Phos  2.9     05-22  Mg     1.6     05-22    TPro  8.4<H>  /  Alb  3.2<L>  /  TBili  0.7  /  DBili  x   /  AST  30  /  ALT  18  /  AlkPhos  65  05-21    Creatinine Trend: 1.14<--, 1.16<--  LIVER FUNCTIONS - ( 21 May 2018 11:42 )  Alb: 3.2 g/dL / Pro: 8.4 g/dL / ALK PHOS: 65 U/L / ALT: 18 U/L / AST: 30 U/L / GGT: x           PT/INR - ( 21 May 2018 11:42 )   PT: 18.0 sec;   INR: 1.65 ratio         PTT - ( 21 May 2018 11:42 )  PTT:56.4 sec  CARDIAC MARKERS ( 21 May 2018 11:42 )  x     / <0.01 ng/mL / 90 U/L / x     / 3.8 ng/mL        Urinalysis Basic - ( 21 May 2018 23:59 )    Color: Yellow / Appearance: Turbid / S.016 / pH: x  Gluc: x / Ketone: Negative  / Bili: Negative / Urobili: 1.0 mg/dL   Blood: x / Protein: Trace mg/dL / Nitrite: Positive   Leuk Esterase: Large / RBC: 4 /HPF / WBC >720 /HPF   Sq Epi: x / Non Sq Epi: 0 /HPF / Bacteria: TNTC      MICROBIOLOGY:  UCx pending    RADIOLOGY & ADDITIONAL TESTS:     CONSULTS: pulmonology, cards

## 2018-05-22 NOTE — CONSULT NOTE ADULT - PROBLEM SELECTOR RECOMMENDATION 7
-DVT proph - off therapeutic AC for planned procedure  -GI proph   -Maintain O2 sat > 90%  -Incentive spirometer  -Acapella  -PT

## 2018-05-22 NOTE — PROGRESS NOTE ADULT - PROBLEM SELECTOR PLAN 1
One month dyspnea associated with hypoxia, orthopnea, and LE edema. TTE 5/17/2018 had LVEF 55%, indeterminate diastolic function. pBNP 7400. 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  -CT chest (5/10/2018) showed bilateral airspace consolidation most prominent in the upper lobe, bilateral nodules, mild mediastinal LAD, small-mod R pleural effusion, small L pleural effusion.  -DDx: pneumonia, sarcoidosis, amiodarone adverse effect, metastatic disease, vasculitis, pulmonary edema 2/2 CHF exacerbation with diastolic dysfunction  -f/u ESR, CRP, MATEO, ACE, HIV, quant gold  -pulmonology and cardiology to see pt  -consider inpatient stress test as had one scheduled outpt  -If spikes fever or develops leukocytosis, will initiate antibiotics One month dyspnea associated with hypoxia, orthopnea, and LE edema. TTE 5/17/2018 had LVEF 55%, indeterminate diastolic function. pBNP 7400. 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  -CT chest (5/10/2018) showed bilateral airspace consolidation most prominent in the upper lobe, bilateral nodules, mild mediastinal LAD, small-mod R pleural effusion, small L pleural effusion.  -DDx: pneumonia, sarcoidosis, amiodarone adverse effect, metastatic disease, vasculitis, pulmonary edema 2/2 CHF exacerbation with diastolic dysfunction  -f/u ESR, CRP, MATEO, ACE, HIV, quant gold  -Appreciate pulmonology and cardiology recs  -Bronchoscopy scheduled for 5/23, NPO at midnight, holding Eliquis  -Will obtain stess test after bronchoscopy  -If spikes fever or develops leukocytosis, will initiate antibiotics One month dyspnea associated with hypoxia, orthopnea, and LE edema. TTE 5/17/2018 had LVEF 55%, with diastolic dysfunction. PFTs showed restrictive pattern. pBNP 7400. 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  -CT chest (5/10/2018) showed bilateral airspace consolidation most prominent in the upper lobe, bilateral nodules, mild mediastinal LAD, small-mod R pleural effusion, small L pleural effusion.  -DDx: pneumonia, sarcoidosis, amiodarone adverse effect, metastatic disease, vasculitis, pulmonary edema 2/2 CHF exacerbation with diastolic dysfunction  -f/u ESR, CRP, MATEO, ACE, HIV, quant gold  -Appreciate pulmonology and cardiology recs  -Bronchoscopy scheduled for 5/23, NPO at midnight, holding Eliquis  -Will obtain stess test after bronchoscopy  -If spikes fever or develops leukocytosis, will initiate antibiotics One month dyspnea associated with hypoxia, orthopnea, and LE edema. TTE 5/17/2018 had LVEF 55%, with diastolic dysfunction. PFTs showed restrictive pattern. pBNP 7400. 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  -CT chest (5/10/2018) showed bilateral airspace consolidation most prominent in the upper lobe, bilateral nodules, mild mediastinal LAD, small-mod R pleural effusion, small L pleural effusion.  -DDx: pneumonia, sarcoidosis, amiodarone adverse effect, metastatic disease, vasculitis, pulmonary edema 2/2 CHF exacerbation with diastolic dysfunction. dyspea from interrmitent arrythmia also a possibility but less likely given NSR on EKG and recent afib ablation. consider occupational exposures as well   -f/u ESR, CRP, MATEO, ACE, HIV, quant gold  -Appreciate pulmonology and cardiology recs  -Bronchoscopy scheduled for 5/23, NPO at midnight, holding Eliquis  -Will obtain stess test after bronchoscopy; cad on differential but less likely  -If spikes fever or develops leukocytosis, will initiate antibiotics  -transfer to [facility] for monitoring   -IV lasix 40 daily

## 2018-05-22 NOTE — PROGRESS NOTE ADULT - PROBLEM SELECTOR PLAN 4
Normotensive. Kidney function at baseline 1.0 - 1.5 (5/2018)  -continue home atenolol, lasix 40mg po daily  -BP soft, hold home ramipril Atrial fibrillation diagnosed 2014. 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 s/p ablation 4/24/2918  -Currently in sinus rhythm  -continue atenolol  -continue Eliquis Atrial fibrillation diagnosed 2014. 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 s/p ablation 4/24/2918. Pt has loop recorder and went into paroxysmal AF after ablation.  -Currently in sinus rhythm  -continue atenolol  -continue Eliquis  -monitor on telemetry TTE 5/2018 showed LVEF 55%, diastolic dysfunction  -continue atenolol, holding ACEI for soft BP  -lasix 40mg IV daily

## 2018-05-22 NOTE — PROGRESS NOTE ADULT - PROBLEM SELECTOR PLAN 5
DVT: Eliquis full A/C  Dispo: PT eval, walks with cane Normotensive. Kidney function at baseline 1.0 - 1.5 (5/2018)  -continue home atenolol, lasix 40mg po daily  -BP soft, hold home ramipril Atrial fibrillation diagnosed 2014. 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 s/p ablation 4/24/2918. Pt has loop recorder and went into paroxysmal AF after ablation.  -Currently in sinus rhythm  -continue atenolol, holding Eliquis for bronch  -monitor on telemetry

## 2018-05-22 NOTE — PROGRESS NOTE ADULT - PROBLEM SELECTOR PLAN 2
Presented with hypoxia sat 82% on RA. Does not use oxygen at home, no hx of pulmonary disease  -continue supplemental oxygen to maintain SpO2 >92%  -CXR showing b/l patchy opacities  -f/u pulm recs Presented with hypoxia sat 82% on RA. Does not use oxygen at home, no hx of pulmonary disease except 60 pack year history and restrictive PFTs.  -continue supplemental oxygen to maintain SpO2 >92%  -CXR showing b/l patchy opacities  -bronchoscopy tomorrow Presented with hypoxia sat 82% on RA. Does not use oxygen at home, no hx of pulmonary disease except 60 pack year history and restrictive PFTs.  -continue supplemental oxygen to maintain SpO2 >92%  -CXR showing b/l patchy opacities  -bronchoscopy tomorrow  diuresis

## 2018-05-22 NOTE — CONSULT NOTE ADULT - PROBLEM SELECTOR RECOMMENDATION 9
-requiring supplemental oxygen - maintain O2 sat > 90%  -evaluation of hypoxemic respiratory failure underway   -agree with Dr. Shukla's assessment - would continue aggressive IV diuresis with goal to have patient net negative 1-2L and concurrent workup for other causes of dyspnea including planned bronchoscopy with biopsy

## 2018-05-23 ENCOUNTER — TRANSCRIPTION ENCOUNTER (OUTPATIENT)
Age: 78
End: 2018-05-23

## 2018-05-23 DIAGNOSIS — R33.9 RETENTION OF URINE, UNSPECIFIED: ICD-10-CM

## 2018-05-23 LAB
ACE SERPL-CCNC: 16 U/L — SIGNIFICANT CHANGE UP (ref 14–82)
ALBUMIN SERPL ELPH-MCNC: 2.9 G/DL — LOW (ref 3.3–5)
ALP SERPL-CCNC: 56 U/L — SIGNIFICANT CHANGE UP (ref 40–120)
ALT FLD-CCNC: 16 U/L — SIGNIFICANT CHANGE UP (ref 10–45)
ANA PAT FLD IF-IMP: ABNORMAL
ANA TITR SER: ABNORMAL
ANION GAP SERPL CALC-SCNC: 13 MMOL/L — SIGNIFICANT CHANGE UP (ref 5–17)
APTT BLD: 44.2 SEC — HIGH (ref 27.5–37.4)
AST SERPL-CCNC: 23 U/L — SIGNIFICANT CHANGE UP (ref 10–40)
BASOPHILS # BLD AUTO: 0.02 K/UL — SIGNIFICANT CHANGE UP (ref 0–0.2)
BASOPHILS NFR BLD AUTO: 0.2 % — SIGNIFICANT CHANGE UP (ref 0–2)
BILIRUB DIRECT SERPL-MCNC: 0.1 MG/DL — SIGNIFICANT CHANGE UP (ref 0–0.2)
BILIRUB INDIRECT FLD-MCNC: 0.5 MG/DL — SIGNIFICANT CHANGE UP (ref 0.2–1)
BILIRUB SERPL-MCNC: 0.6 MG/DL — SIGNIFICANT CHANGE UP (ref 0.2–1.2)
BUN SERPL-MCNC: 19 MG/DL — SIGNIFICANT CHANGE UP (ref 7–23)
CALCIUM SERPL-MCNC: 9.2 MG/DL — SIGNIFICANT CHANGE UP (ref 8.4–10.5)
CHLORIDE SERPL-SCNC: 91 MMOL/L — LOW (ref 96–108)
CK MB CFR SERPL CALC: 2 NG/ML — SIGNIFICANT CHANGE UP (ref 0–6.7)
CK SERPL-CCNC: 53 U/L — SIGNIFICANT CHANGE UP (ref 30–200)
CO2 SERPL-SCNC: 29 MMOL/L — SIGNIFICANT CHANGE UP (ref 22–31)
CREAT SERPL-MCNC: 1 MG/DL — SIGNIFICANT CHANGE UP (ref 0.5–1.3)
EOSINOPHIL # BLD AUTO: 0.27 K/UL — SIGNIFICANT CHANGE UP (ref 0–0.5)
EOSINOPHIL NFR BLD AUTO: 2.8 % — SIGNIFICANT CHANGE UP (ref 0–6)
GLUCOSE SERPL-MCNC: 113 MG/DL — HIGH (ref 70–99)
HCT VFR BLD CALC: 37.6 % — LOW (ref 39–50)
HGB BLD-MCNC: 12.5 G/DL — LOW (ref 13–17)
IMM GRANULOCYTES NFR BLD AUTO: 0.5 % — SIGNIFICANT CHANGE UP (ref 0–1.5)
INR BLD: 1.22 RATIO — HIGH (ref 0.88–1.16)
LYMPHOCYTES # BLD AUTO: 1.58 K/UL — SIGNIFICANT CHANGE UP (ref 1–3.3)
LYMPHOCYTES # BLD AUTO: 16.3 % — SIGNIFICANT CHANGE UP (ref 13–44)
M TB TUBERC IFN-G BLD QL: 0 IU/ML — SIGNIFICANT CHANGE UP
M TB TUBERC IFN-G BLD QL: 0.04 IU/ML — SIGNIFICANT CHANGE UP
M TB TUBERC IFN-G BLD QL: NEGATIVE — SIGNIFICANT CHANGE UP
MAGNESIUM SERPL-MCNC: 1.7 MG/DL — SIGNIFICANT CHANGE UP (ref 1.6–2.6)
MCHC RBC-ENTMCNC: 31.9 PG — SIGNIFICANT CHANGE UP (ref 27–34)
MCHC RBC-ENTMCNC: 33.2 GM/DL — SIGNIFICANT CHANGE UP (ref 32–36)
MCV RBC AUTO: 95.9 FL — SIGNIFICANT CHANGE UP (ref 80–100)
MITOGEN IGNF BCKGRD COR BLD-ACNC: 0.66 IU/ML — SIGNIFICANT CHANGE UP
MONOCYTES # BLD AUTO: 1.23 K/UL — HIGH (ref 0–0.9)
MONOCYTES NFR BLD AUTO: 12.7 % — SIGNIFICANT CHANGE UP (ref 2–14)
NEUTROPHILS # BLD AUTO: 6.57 K/UL — SIGNIFICANT CHANGE UP (ref 1.8–7.4)
NEUTROPHILS NFR BLD AUTO: 67.5 % — SIGNIFICANT CHANGE UP (ref 43–77)
PHOSPHATE SERPL-MCNC: 2.7 MG/DL — SIGNIFICANT CHANGE UP (ref 2.5–4.5)
PLATELET # BLD AUTO: 311 K/UL — SIGNIFICANT CHANGE UP (ref 150–400)
POTASSIUM SERPL-MCNC: 3.5 MMOL/L — SIGNIFICANT CHANGE UP (ref 3.5–5.3)
POTASSIUM SERPL-SCNC: 3.5 MMOL/L — SIGNIFICANT CHANGE UP (ref 3.5–5.3)
PROT SERPL-MCNC: 7.6 G/DL — SIGNIFICANT CHANGE UP (ref 6–8.3)
PROTHROM AB SERPL-ACNC: 13.8 SEC — HIGH (ref 10–13.1)
RBC # BLD: 3.92 M/UL — LOW (ref 4.2–5.8)
RBC # FLD: 14.1 % — SIGNIFICANT CHANGE UP (ref 10.3–14.5)
SODIUM SERPL-SCNC: 133 MMOL/L — LOW (ref 135–145)
TROPONIN T SERPL-MCNC: <0.01 NG/ML — SIGNIFICANT CHANGE UP (ref 0–0.06)
WBC # BLD: 9.72 K/UL — SIGNIFICANT CHANGE UP (ref 3.8–10.5)
WBC # FLD AUTO: 9.72 K/UL — SIGNIFICANT CHANGE UP (ref 3.8–10.5)

## 2018-05-23 PROCEDURE — 93456 R HRT CORONARY ARTERY ANGIO: CPT | Mod: 26

## 2018-05-23 PROCEDURE — 99152 MOD SED SAME PHYS/QHP 5/>YRS: CPT

## 2018-05-23 PROCEDURE — 99222 1ST HOSP IP/OBS MODERATE 55: CPT

## 2018-05-23 PROCEDURE — 99233 SBSQ HOSP IP/OBS HIGH 50: CPT

## 2018-05-23 PROCEDURE — 76937 US GUIDE VASCULAR ACCESS: CPT | Mod: 26

## 2018-05-23 PROCEDURE — 99233 SBSQ HOSP IP/OBS HIGH 50: CPT | Mod: GC

## 2018-05-23 RX ORDER — LISINOPRIL 2.5 MG/1
10 TABLET ORAL DAILY
Qty: 0 | Refills: 0 | Status: DISCONTINUED | OUTPATIENT
Start: 2018-05-23 | End: 2018-05-24

## 2018-05-23 RX ADMIN — SIMVASTATIN 40 MILLIGRAM(S): 20 TABLET, FILM COATED ORAL at 21:20

## 2018-05-23 RX ADMIN — Medication 1 TABLET(S): at 12:23

## 2018-05-23 RX ADMIN — TAMSULOSIN HYDROCHLORIDE 0.4 MILLIGRAM(S): 0.4 CAPSULE ORAL at 21:20

## 2018-05-23 RX ADMIN — ATENOLOL 50 MILLIGRAM(S): 25 TABLET ORAL at 12:23

## 2018-05-23 RX ADMIN — CEFTRIAXONE 100 GRAM(S): 500 INJECTION, POWDER, FOR SOLUTION INTRAMUSCULAR; INTRAVENOUS at 17:06

## 2018-05-23 RX ADMIN — Medication 81 MILLIGRAM(S): at 12:23

## 2018-05-23 NOTE — PROGRESS NOTE ADULT - PROBLEM SELECTOR PLAN 3
Grossly positive UA. C/o intermittent dysuria, urinary frequency, and incomplete voiding  -f/u UCx  -continue ceftriaxone (5/22 - ) E coli UTI. C/o intermittent dysuria, urinary frequency, and incomplete voiding  -f/u sensitivities  -continue ceftriaxone (5/22 - )

## 2018-05-23 NOTE — PROGRESS NOTE ADULT - PROBLEM SELECTOR PLAN 8
DVT: Eliquis full A/C  Dispo: PT eval, walks with cane DVT: Eliquis full A/C on hold for heart cath  Dispo: PT trina ongoing, walks with cane

## 2018-05-23 NOTE — PROGRESS NOTE ADULT - ASSESSMENT
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

## 2018-05-23 NOTE — PROGRESS NOTE ADULT - SUBJECTIVE AND OBJECTIVE BOX
Internal Medicine Progress Note    Linnea Butt, PGY1  Internal Medicine, team 1  Pager [phone] / [phone]  After 7PM on weekdays and 12PM on weekends, please page #[phone]    Patient is a 78y old  Male who presents with a chief complaint of SOB (21 May 2018 13:43)      SUBJECTIVE / OVERNIGHT EVENTS:  bladder scan, straight cath 400cc removed. On RA, SpO2 78% improved to high 90s on NC.    MEDICATIONS  (STANDING):  aspirin enteric coated 81 milliGRAM(s) Oral daily  ATENolol  Tablet 50 milliGRAM(s) Oral daily  calcium carbonate 1250 mG + Vitamin D (OsCal 500 + D) 1 Tablet(s) Oral daily  cefTRIAXone   IVPB 1 Gram(s) IV Intermittent every 24 hours  furosemide   Injectable 40 milliGRAM(s) IV Push daily  simvastatin 40 milliGRAM(s) Oral at bedtime  tamsulosin 0.4 milliGRAM(s) Oral at bedtime    MEDICATIONS  (PRN):      Vital Signs Last 24 Hrs  T(C): 36.8 (23 May 2018 04:58), Max: 37.2 (22 May 2018 20:27)  T(F): 98.2 (23 May 2018 04:58), Max: 99 (22 May 2018 20:27)  HR: 70 (23 May 2018 04:58) (70 - 88)  BP: 115/63 (23 May 2018 04:58) (106/54 - 130/90)  BP(mean): --  RR: 22 (23 May 2018 04:58) (18 - 22)  SpO2: 95% (23 May 2018 04:58) (78% - 95%)    CAPILLARY BLOOD GLUCOSE      I&O's Summary    21 May 2018 07:01  -  22 May 2018 07:00  --------------------------------------------------------  IN: 240 mL / OUT: 0 mL / NET: 240 mL    22 May 2018 07:01  -  23 May 2018 06:41  --------------------------------------------------------  IN: 480 mL / OUT: 2400 mL / NET: -1920 mL        PHYSICAL EXAM  GENERAL: NAD, well-developed  HEAD:  Atraumatic, Normocephalic  EYES: EOMI, PERRLA, conjunctiva and sclera clear  NECK: Supple, No JVD  CHEST/LUNG: Clear to auscultation bilaterally; No wheeze, respirations nonlabored  HEART: Regular rate and rhythm; No murmurs, rubs, or gallops  ABDOMEN: Soft, Nontender, Nondistended; Bowel sounds present  EXTREMITIES:  2+ Peripheral Pulses, No clubbing, cyanosis, or edema  NEURO/PSYCH: AAOx3, nonfocal  SKIN: No rashes or lesions      LABS:       Urinalysis Basic - ( 21 May 2018 23:59 )    Color: Yellow / Appearance: Turbid / S.016 / pH: x  Gluc: x / Ketone: Negative  / Bili: Negative / Urobili: 1.0 mg/dL   Blood: x / Protein: Trace mg/dL / Nitrite: Positive   Leuk Esterase: Large / RBC: 4 /HPF / WBC >720 /HPF   Sq Epi: x / Non Sq Epi: 0 /HPF / Bacteria: TNTC        MICROBIOLOGY:    RADIOLOGY & ADDITIONAL TESTS:     CONSULTS: pulmonology, cards Internal Medicine Progress Note    Linnea Butt, PGY1  Internal Medicine, team 1  Pager [phone] / [phone]  After 7PM on weekdays and 12PM on weekends, please page #[phone]    Patient is a 78y old  Male who presents with a chief complaint of SOB (21 May 2018 13:43)      SUBJECTIVE / OVERNIGHT EVENTS:  bladder scan, straight cath 400cc removed, started on Flomax. On RA, SpO2 78% improved to high 90s on NC. Pt denies SOB, CP, abd pain, F/C.    MEDICATIONS  (STANDING):  aspirin enteric coated 81 milliGRAM(s) Oral daily  ATENolol  Tablet 50 milliGRAM(s) Oral daily  calcium carbonate 1250 mG + Vitamin D (OsCal 500 + D) 1 Tablet(s) Oral daily  cefTRIAXone   IVPB 1 Gram(s) IV Intermittent every 24 hours  furosemide   Injectable 40 milliGRAM(s) IV Push daily  lisinopril 10 milliGRAM(s) Oral daily  simvastatin 40 milliGRAM(s) Oral at bedtime  tamsulosin 0.4 milliGRAM(s) Oral at bedtime    MEDICATIONS  (PRN):      Vital Signs Last 24 Hrs  T(C): 36.7 (23 May 2018 13:55), Max: 37.2 (22 May 2018 20:27)  T(F): 98 (23 May 2018 13:55), Max: 99 (22 May 2018 20:27)  HR: 63 (23 May 2018 13:55) (56 - 82)  BP: 108/67 (23 May 2018 13:55) (93/56 - 130/90)  BP(mean): --  RR: 18 (23 May 2018 13:55) (18 - 22)  SpO2: 98% (23 May 2018 13:55) (78% - 98%)    CAPILLARY BLOOD GLUCOSE          I&O's Summary    22 May 2018 07:  -  23 May 2018 07:00  --------------------------------------------------------  IN: 480 mL / OUT: 2400 mL / NET: -1920 mL    23 May 2018 07:01  -  23 May 2018 14:42  --------------------------------------------------------  IN: 360 mL / OUT: 325 mL / NET: 35 mL      PHYSICAL EXAM  GENERAL: NAD, well-developed  HEAD:  Atraumatic, Normocephalic  EYES: EOMI, PERRLA, conjunctiva and sclera clear  NECK: Supple, No JVD  CHEST/LUNG: Clear to auscultation bilaterally; No wheeze, respirations nonlabored, nasal cannula   HEART: Regular rate and rhythm; No murmurs, rubs, or gallops  ABDOMEN: Soft, Nontender, Nondistended; Bowel sounds present, no suprapubic tenderness  EXTREMITIES:  2+ Peripheral Pulses, No clubbing, cyanosis, or edema  NEURO/PSYCH: AAOx3, nonfocal  SKIN: No rashes or lesions      LABS:                         12.5   9.72  )-----------( 311      ( 23 May 2018 08:19 )             37.6     CBC Full  -  ( 23 May 2018 08:19 )  WBC Count : 9.72 K/uL  Hemoglobin : 12.5 g/dL  Hematocrit : 37.6 %  Platelet Count - Automated : 311 K/uL  Mean Cell Volume : 95.9 fl  Mean Cell Hemoglobin : 31.9 pg  Mean Cell Hemoglobin Concentration : 33.2 gm/dL  Auto Neutrophil # : 6.57 K/uL  Auto Lymphocyte # : 1.58 K/uL  Auto Monocyte # : 1.23 K/uL  Auto Eosinophil # : 0.27 K/uL  Auto Basophil # : 0.02 K/uL  Auto Neutrophil % : 67.5 %  Auto Lymphocyte % : 16.3 %  Auto Monocyte % : 12.7 %  Auto Eosinophil % : 2.8 %  Auto Basophil % : 0.2 %    05-23    133<L>  |  91<L>  |  19  ----------------------------<  113<H>  3.5   |  29  |  1.00    Ca    9.2      23 May 2018 07:09  Phos  2.7     05-23  Mg     1.7     05-    TPro  7.6  /  Alb  2.9<L>  /  TBili  0.6  /  DBili  0.1  /  AST  23  /  ALT  16  /  AlkPhos  56  05-23    Creatinine Trend: 1.00<--, 1.14<--, 1.16<--  LIVER FUNCTIONS - ( 23 May 2018 07:09 )  Alb: 2.9 g/dL / Pro: 7.6 g/dL / ALK PHOS: 56 U/L / ALT: 16 U/L / AST: 23 U/L / GGT: x           PT/INR - ( 23 May 2018 09:42 )   PT: 13.8 sec;   INR: 1.22 ratio         PTT - ( 23 May 2018 09:42 )  PTT:44.2 sec  CARDIAC MARKERS ( 23 May 2018 07:09 )  x     / <0.01 ng/mL / 53 U/L / x     / 2.0 ng/mL        Urinalysis Basic - ( 21 May 2018 23:59 )    Color: Yellow / Appearance: Turbid / S.016 / pH: x  Gluc: x / Ketone: Negative  / Bili: Negative / Urobili: 1.0 mg/dL   Blood: x / Protein: Trace mg/dL / Nitrite: Positive   Leuk Esterase: Large / RBC: 4 /HPF / WBC >720 /HPF   Sq Epi: x / Non Sq Epi: 0 /HPF / Bacteria: TNTC        MICROBIOLOGY:    Culture - Urine (collected 22 May 2018 13:52)  Source: .Urine Clean Catch (Midstream)  Preliminary Report (23 May 2018 08:22):    >100,000 CFU/ml Escherichia coli      RADIOLOGY & ADDITIONAL TESTS:     CONSULTS: pulmonology, cards Internal Medicine Progress Note    Linnea Butt, PGY1  Internal Medicine, team 1  Pager [phone] / [phone]  After 7PM on weekdays and 12PM on weekends, please page #[phone]    Patient is a 78y old  Male who presents with a chief complaint of SOB (21 May 2018 13:43)      SUBJECTIVE / OVERNIGHT EVENTS:  bladder scan, straight cath 400cc removed, started on Flomax. On RA, SpO2 78% improved to 95%s on NC. Pt denies SOB, CP, abd pain, F/C.    MEDICATIONS  (STANDING):  aspirin enteric coated 81 milliGRAM(s) Oral daily  ATENolol  Tablet 50 milliGRAM(s) Oral daily  calcium carbonate 1250 mG + Vitamin D (OsCal 500 + D) 1 Tablet(s) Oral daily  cefTRIAXone   IVPB 1 Gram(s) IV Intermittent every 24 hours  furosemide   Injectable 40 milliGRAM(s) IV Push daily  lisinopril 10 milliGRAM(s) Oral daily  simvastatin 40 milliGRAM(s) Oral at bedtime  tamsulosin 0.4 milliGRAM(s) Oral at bedtime    MEDICATIONS  (PRN):      Vital Signs Last 24 Hrs  T(C): 36.7 (23 May 2018 13:55), Max: 37.2 (22 May 2018 20:27)  T(F): 98 (23 May 2018 13:55), Max: 99 (22 May 2018 20:27)  HR: 63 (23 May 2018 13:55) (56 - 82)  BP: 108/67 (23 May 2018 13:55) (93/56 - 130/90)  BP(mean): --  RR: 18 (23 May 2018 13:55) (18 - 22)  SpO2: 98% (23 May 2018 13:55) (78% - 98%)    CAPILLARY BLOOD GLUCOSE          I&O's Summary    22 May 2018 07:  -  23 May 2018 07:00  --------------------------------------------------------  IN: 480 mL / OUT: 2400 mL / NET: -1920 mL    23 May 2018 07:01  -  23 May 2018 14:42  --------------------------------------------------------  IN: 360 mL / OUT: 325 mL / NET: 35 mL      PHYSICAL EXAM  GENERAL: NAD, well-developed  HEAD:  Atraumatic, Normocephalic  EYES: EOMI, PERRLA, conjunctiva and sclera clear  NECK: Supple, No JVD  CHEST/LUNG: Clear to auscultation bilaterally; No wheeze, respirations nonlabored, nasal cannula   HEART: Regular rate and rhythm; No murmurs, rubs, or gallops  ABDOMEN: Soft, Nontender, Nondistended; Bowel sounds present, no suprapubic tenderness  EXTREMITIES:  2+ Peripheral Pulses, No clubbing, cyanosis, or edema  NEURO/PSYCH: AAOx3, nonfocal  SKIN: No rashes or lesions      LABS:                         12.5   9.72  )-----------( 311      ( 23 May 2018 08:19 )             37.6     CBC Full  -  ( 23 May 2018 08:19 )  WBC Count : 9.72 K/uL  Hemoglobin : 12.5 g/dL  Hematocrit : 37.6 %  Platelet Count - Automated : 311 K/uL  Mean Cell Volume : 95.9 fl  Mean Cell Hemoglobin : 31.9 pg  Mean Cell Hemoglobin Concentration : 33.2 gm/dL  Auto Neutrophil # : 6.57 K/uL  Auto Lymphocyte # : 1.58 K/uL  Auto Monocyte # : 1.23 K/uL  Auto Eosinophil # : 0.27 K/uL  Auto Basophil # : 0.02 K/uL  Auto Neutrophil % : 67.5 %  Auto Lymphocyte % : 16.3 %  Auto Monocyte % : 12.7 %  Auto Eosinophil % : 2.8 %  Auto Basophil % : 0.2 %    05-23    133<L>  |  91<L>  |  19  ----------------------------<  113<H>  3.5   |  29  |  1.00    Ca    9.2      23 May 2018 07:09  Phos  2.7     05-23  Mg     1.7     05-    TPro  7.6  /  Alb  2.9<L>  /  TBili  0.6  /  DBili  0.1  /  AST  23  /  ALT  16  /  AlkPhos  56  05-23    Creatinine Trend: 1.00<--, 1.14<--, 1.16<--  LIVER FUNCTIONS - ( 23 May 2018 07:09 )  Alb: 2.9 g/dL / Pro: 7.6 g/dL / ALK PHOS: 56 U/L / ALT: 16 U/L / AST: 23 U/L / GGT: x           PT/INR - ( 23 May 2018 09:42 )   PT: 13.8 sec;   INR: 1.22 ratio         PTT - ( 23 May 2018 09:42 )  PTT:44.2 sec  CARDIAC MARKERS ( 23 May 2018 07:09 )  x     / <0.01 ng/mL / 53 U/L / x     / 2.0 ng/mL        Urinalysis Basic - ( 21 May 2018 23:59 )    Color: Yellow / Appearance: Turbid / S.016 / pH: x  Gluc: x / Ketone: Negative  / Bili: Negative / Urobili: 1.0 mg/dL   Blood: x / Protein: Trace mg/dL / Nitrite: Positive   Leuk Esterase: Large / RBC: 4 /HPF / WBC >720 /HPF   Sq Epi: x / Non Sq Epi: 0 /HPF / Bacteria: TNTC        MICROBIOLOGY:    Culture - Urine (collected 22 May 2018 13:52)  Source: .Urine Clean Catch (Midstream)  Preliminary Report (23 May 2018 08:22):    >100,000 CFU/ml Escherichia coli      RADIOLOGY & ADDITIONAL TESTS:     CONSULTS: pulmonology, cards

## 2018-05-23 NOTE — CONSULT NOTE ADULT - ASSESSMENT
78M scheduled for Left VATS and wedge biopsy tomorrow    -Discussed extensively with patient and wife  -agreeable to surgery tomorrow  -son will be here early, wife will arrive at 10:30am  -consent obtained  -NPO pMN  -full set of labs in am  -discussed with Dr. Cazares

## 2018-05-23 NOTE — PROGRESS NOTE ADULT - PROBLEM SELECTOR PLAN 1
unclear absolute etiology here--could still represent CHF vs amio toxicity--less likely PNA (non immunocompromised host)

## 2018-05-23 NOTE — CONSULT NOTE ADULT - ATTENDING COMMENTS
CT scan reviewed from [facility] - bibasilar consolidation - on left worse in left upper lobe and sup segment. will plan for biopsy of left upper and  lower lobe. patient aware. will have chest tube post procedure. specimens will be sent for path/micro. update within 5-7 days.
I agree with Dr. Shukla's management plan and approach. Patient for bronchoscopy with biopsy tomorrow.    Dr. Saul aware.

## 2018-05-23 NOTE — PROGRESS NOTE ADULT - SUBJECTIVE AND OBJECTIVE BOX
CHIEF COMPLAINT:    Interval Events:    REVIEW OF SYSTEMS:  Constitutional: No fevers or chills. No weight loss. No fatigue or generalized malaise.  Eyes: No itching or discharge from the eyes  ENT: No ear pain. No ear discharge. No nasal congestion. No post nasal drip. No epistaxis. No throat pain. No sore throat. No difficulty swallowing.   CV: No chest pain. No palpitations. No lightheadedness or dizziness.   Resp: No dyspnea at rest. No dyspnea on exertion. No orthopnea. No wheezing. No cough. No stridor. No sputum production. No chest pain with respiration.  GI: No nausea. No vomiting. No diarrhea.  MSK: No joint pain or pain in any extremities  Integumentary: No skin lesions. No pedal edema.  Neurological: No gross motor weakness. No sensory changes.  [ ] All other systems negative  [ ] Unable to assess ROS because ________    OBJECTIVE:  ICU Vital Signs Last 24 Hrs  T(C): 37.2 (22 May 2018 20:27), Max: 37.2 (22 May 2018 20:27)  T(F): 99 (22 May 2018 20:27), Max: 99 (22 May 2018 20:27)  HR: 82 (22 May 2018 20:27) (70 - 88)  BP: 130/90 (22 May 2018 20:27) (106/54 - 130/90)  BP(mean): --  ABP: --  ABP(mean): --  RR: 22 (23 May 2018 04:58) (18 - 22)  SpO2: 94% (22 May 2018 20:27) (78% - 95%)         @ :  -   @ 07:00  --------------------------------------------------------  IN: 240 mL / OUT: 0 mL / NET: 240 mL     @ 07:01  -   @ 05:13  --------------------------------------------------------  IN: 480 mL / OUT: 1900 mL / NET: -1420 mL      CAPILLARY BLOOD GLUCOSE          PHYSICAL EXAM:  General: Awake, alert, oriented X 3.   HEENT: Atraumatic, normocephalic.                 Mallampatti Grade                 No nasal congestion.                No tonsillar or pharyngeal exudates.  Lymph Nodes: No palpable lymphadenopathy  Neck: No JVD. No carotid bruit.   Respiratory: Normal chest expansion                         Normal percussion                         Normal and equal air entry                         No wheeze, rhonchi or rales.  Cardiovascular: S1 S2 normal. No murmurs, rubs or gallops.   Abdomen: Soft, non-tender, non-distended. No organomegaly.  Extremities: Warm to touch. Peripheral pulse palpable. No pedal edema.   Skin: No rashes or skin lesions  Neurological: Motor and sensory examination equal and normal in all four extremities.  Psychiatry: Appropriate mood and affect.    HOSPITAL MEDICATIONS:  MEDICATIONS  (STANDING):  aspirin enteric coated 81 milliGRAM(s) Oral daily  ATENolol  Tablet 50 milliGRAM(s) Oral daily  calcium carbonate 1250 mG + Vitamin D (OsCal 500 + D) 1 Tablet(s) Oral daily  cefTRIAXone   IVPB 1 Gram(s) IV Intermittent every 24 hours  furosemide   Injectable 40 milliGRAM(s) IV Push daily  simvastatin 40 milliGRAM(s) Oral at bedtime  tamsulosin 0.4 milliGRAM(s) Oral at bedtime    MEDICATIONS  (PRN):      LABS:                        13.1   11.11 )-----------( 367      ( 22 May 2018 09:11 )             41.2     05-    134<L>  |  94<L>  |  21  ----------------------------<  107<H>  4.0   |  29  |  1.14    Ca    9.3      22 May 2018 07:11  Phos  2.9     -  Mg     1.6     -    TPro  8.4<H>  /  Alb  3.2<L>  /  TBili  0.7  /  DBili  x   /  AST  30  /  ALT  18  /  AlkPhos  65  05-21    PT/INR - ( 21 May 2018 11:42 )   PT: 18.0 sec;   INR: 1.65 ratio         PTT - ( 21 May 2018 11:42 )  PTT:56.4 sec  Urinalysis Basic - ( 21 May 2018 23:59 )    Color: Yellow / Appearance: Turbid / S.016 / pH: x  Gluc: x / Ketone: Negative  / Bili: Negative / Urobili: 1.0 mg/dL   Blood: x / Protein: Trace mg/dL / Nitrite: Positive   Leuk Esterase: Large / RBC: 4 /HPF / WBC >720 /HPF   Sq Epi: x / Non Sq Epi: 0 /HPF / Bacteria: TNTC        Venous Blood Gas:   @ 11:42  7.44/47///  VBG Lactate: 1.3    < from: Xray Chest 1 View AP/PA (18 @ 11:57) >  INTERPRETATION:  CLINICAL INFORMATION: Shortness of breath.    TECHNIQUE: AP view of the chest from 2018 11:57 AM.    COMPARISON: None available.    FINDINGS:     A loop recorder overlies the left chest.    Patchy perihilar airspace opacities bilaterally. No pleural effusions or   pneumothoraces.    Cardiomegaly.    Degenerative changes of the spine.    IMPRESSION:    Patchy perihilar airspace opacities bilaterally which compatible with   pulmonary edema.    < end of copied text >    MICROBIOLOGY:     RADIOLOGY:  [ ] Reviewed and interpreted by me    Point of Care Ultrasound Findings:    PFT:    EKG: CHIEF COMPLAINT: generally ok--no cough--poor sleep---sob on exertion    Interval Events: 2nd opinion pulmonary    REVIEW OF SYSTEMS:  Constitutional: No fevers or chills. No weight loss. No fatigue or generalized malaise.  Eyes: No itching or discharge from the eyes  ENT: No ear pain. No ear discharge. No nasal congestion. No post nasal drip. No epistaxis. No throat pain. No sore throat. No difficulty swallowing.   CV: No chest pain. No palpitations. No lightheadedness or dizziness.   Resp: No dyspnea at rest. + dyspnea on exertion. No orthopnea. No wheezing. No cough. No stridor. No sputum production. No chest pain with respiration.  GI: No nausea. No vomiting. No diarrhea.  MSK: No joint pain or pain in any extremities  Integumentary: No skin lesions. + pedal edema.  Neurological: No gross motor weakness. No sensory changes.  [+ ] All other systems negative  [ ] Unable to assess ROS because ________    OBJECTIVE:  ICU Vital Signs Last 24 Hrs  T(C): 37.2 (22 May 2018 20:27), Max: 37.2 (22 May 2018 20:27)  T(F): 99 (22 May 2018 20:27), Max: 99 (22 May 2018 20:27)  HR: 82 (22 May 2018 20:27) (70 - 88)  BP: 130/90 (22 May 2018 20:27) (106/54 - 130/90)  BP(mean): --  ABP: --  ABP(mean): --  RR: 22 (23 May 2018 04:58) (18 - 22)  SpO2: 94% (22 May 2018 20:27) (78% - 95%)         @ 07: @ 07:00  --------------------------------------------------------  IN: 240 mL / OUT: 0 mL / NET: 240 mL     @ 07:01  23 @ 05:13  --------------------------------------------------------  IN: 480 mL / OUT: 1900 mL / NET: -1420 mL      CAPILLARY BLOOD GLUCOSE          PHYSICAL EXAM: NAD in chair  General: Awake, alert, oriented X 3.   HEENT: Atraumatic, normocephalic.                 Mallampatti Grade 3                No nasal congestion.                No tonsillar or pharyngeal exudates.  Lymph Nodes: No palpable lymphadenopathy  Neck: No JVD. No carotid bruit.   Respiratory: Normal chest expansion                         Normal percussion                         Normal and equal air entry                         No wheeze, rhonchi except inspiratory bibasilar rales.  Cardiovascular: S1 S2 normal. No murmurs, rubs or gallops.   Abdomen: Soft, non-tender, non-distended. No organomegaly.  Extremities: Warm to touch. Peripheral pulse palpable. + RLE pedal edema.   Skin: No rashes or skin lesions  Neurological: Motor and sensory examination equal and normal in all four extremities.  Psychiatry: Appropriate mood and affect.    HOSPITAL MEDICATIONS:  MEDICATIONS  (STANDING):  aspirin enteric coated 81 milliGRAM(s) Oral daily  ATENolol  Tablet 50 milliGRAM(s) Oral daily  calcium carbonate 1250 mG + Vitamin D (OsCal 500 + D) 1 Tablet(s) Oral daily  cefTRIAXone   IVPB 1 Gram(s) IV Intermittent every 24 hours  furosemide   Injectable 40 milliGRAM(s) IV Push daily  simvastatin 40 milliGRAM(s) Oral at bedtime  tamsulosin 0.4 milliGRAM(s) Oral at bedtime    MEDICATIONS  (PRN):      LABS:                        13.1   11.11 )-----------( 367      ( 22 May 2018 09:11 )             41.2     -    134<L>  |  94<L>  |  21  ----------------------------<  107<H>  4.0   |  29  |  1.14    Ca    9.3      22 May 2018 07:11  Phos  2.9       Mg     1.6         TPro  8.4<H>  /  Alb  3.2<L>  /  TBili  0.7  /  DBili  x   /  AST  30  /  ALT  18  /  AlkPhos  65      PT/INR - ( 21 May 2018 11:42 )   PT: 18.0 sec;   INR: 1.65 ratio         PTT - ( 21 May 2018 11:42 )  PTT:56.4 sec  Urinalysis Basic - ( 21 May 2018 23:59 )    Color: Yellow / Appearance: Turbid / S.016 / pH: x  Gluc: x / Ketone: Negative  / Bili: Negative / Urobili: 1.0 mg/dL   Blood: x / Protein: Trace mg/dL / Nitrite: Positive   Leuk Esterase: Large / RBC: 4 /HPF / WBC >720 /HPF   Sq Epi: x / Non Sq Epi: 0 /HPF / Bacteria: TNTC        Venous Blood Gas:   @ 11:42  7.44/47/17/32/18  VBG Lactate: 1.3    < from: Xray Chest 1 View AP/PA (18 @ 11:57) >  INTERPRETATION:  CLINICAL INFORMATION: Shortness of breath.    TECHNIQUE: AP view of the chest from 2018 11:57 AM.    COMPARISON: None available.    FINDINGS:     A loop recorder overlies the left chest.    Patchy perihilar airspace opacities bilaterally. No pleural effusions or   pneumothoraces.    Cardiomegaly.    Degenerative changes of the spine.    IMPRESSION:    Patchy perihilar airspace opacities bilaterally which compatible with   pulmonary edema.    < end of copied text >    MICROBIOLOGY:     RADIOLOGY:  [ ] Reviewed and interpreted by me    Point of Care Ultrasound Findings:    PFT:    EKG:

## 2018-05-23 NOTE — PROGRESS NOTE ADULT - PROBLEM SELECTOR PLAN 1
One month dyspnea associated with hypoxia, orthopnea, and LE edema. TTE 5/17/2018 had LVEF 55%, with diastolic dysfunction. PFTs showed restrictive pattern. pBNP 7400. 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  -CT chest (5/10/2018) showed bilateral airspace consolidation most prominent in the upper lobe, bilateral nodules, mild mediastinal LAD, small-mod R pleural effusion, small L pleural effusion.  -DDx: pneumonia, sarcoidosis, amiodarone adverse effect, metastatic disease, vasculitis, pulmonary edema 2/2 CHF exacerbation with diastolic dysfunction. dyspnea from intermittent arrythmia also a possibility but less likely given NSR on EKG and recent afib ablation. consider occupational exposures as well   -f/u [facility]ATE[facility], ACE, quant gold  -Appreciate pulmonology and cardiology recs  -Bronchoscopy scheduled for today, NPO at midnight, holding Eliquis  -Will obtain stess test after bronchoscopy; cad on differential but less likely  -If spikes fever or develops leukocytosis, will consider initiating antibiotics  -transfer to [facility] for monitoring   -IV lasix 40 daily One month dyspnea associated with hypoxia, orthopnea, and LE edema. TTE 5/17/2018 had LVEF 55%, with diastolic dysfunction. PFTs showed restrictive pattern. pBNP 7400. 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  -CT chest (5/10/2018) showed bilateral airspace consolidation most prominent in the upper lobe, bilateral nodules, mild mediastinal LAD, small-mod R pleural effusion, small L pleural effusion.  -DDx: pneumonia, sarcoidosis, amiodarone adverse effect, metastatic disease, vasculitis, pulmonary edema 2/2 CHF exacerbation with diastolic dysfunction. dyspnea from intermittent arrythmia also a possibility but less likely given NSR on EKG and recent afib ablation. consider occupational exposures as well   -f/u [facility], ACE, quant gold  -Appreciate pulmonology and cardiology recs  -Bronchoscopy with biopsy scheduled for today, NPO at midnight, holding Eliquis  -Will obtain stress test after bronchoscopy; CAD on differential but less likely  -If spikes fever or develops leukocytosis, will consider initiating antibiotics  -transfer to [facility] for monitoring   -IV lasix 40 daily One month dyspnea associated with hypoxia, orthopnea, and LE edema. TTE 5/17/2018 had LVEF 55%, with diastolic dysfunction. PFTs showed restrictive pattern. pBNP 7400. 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  -CT chest (5/10/2018) showed bilateral airspace consolidation most prominent in the upper lobe, bilateral nodules, mild mediastinal LAD, small-mod R pleural effusion, small L pleural effusion.  -DDx: pneumonia, sarcoidosis, amiodarone adverse effect, metastatic disease, vasculitis, pulmonary edema 2/2 CHF exacerbation with diastolic dysfunction. dyspnea from intermittent arrythmia also a possibility but less likely given NSR on EKG and recent afib ablation. consider occupational exposures as well   -f/u [facility], ACE, quant gold  -Appreciate pulmonology and cardiology recs  -Bronchoscopy with biopsy scheduled for today, NPO at midnight, holding Eliquis  -Today LHC/RHC to evaluate pulmonary pressures (will delay PCI unless necessary so pt can have lung biopsy). Pulmonary pressures to dictate bronchoscopy vs VATs lung biopsy  -Eliquis last given 5/21 evening  -If spikes fever or develops leukocytosis, will consider initiating antibiotics  -Monitor on tele  -IV lasix 40 daily One month dyspnea associated with hypoxia, orthopnea, and LE edema. TTE 5/17/2018 had LVEF 55%, with diastolic dysfunction. PFTs showed restrictive pattern. pBNP 7400. 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  -CT chest (5/10/2018) showed bilateral airspace consolidation most prominent in the upper lobe, bilateral nodules, mild mediastinal LAD, small-mod R pleural effusion, small L pleural effusion.  -DDx: pneumonia, sarcoidosis, amiodarone adverse effect, metastatic disease, vasculitis, pulmonary edema 2/2 CHF exacerbation with diastolic dysfunction. dyspnea from intermittent arrythmia also a possibility but less likely given NSR on EKG and recent afib ablation. consider occupational exposures as well   -f/u MATEO, ACE, quant gold  -Appreciate pulmonology and cardiology recs  -Today LHC/RHC to evaluate pulmonary pressures (will delay PCI unless necessary so pt can have lung biopsy). Pulmonary pressures to dictate bronchoscopy vs VATs lung biopsy  -Eliquis last given 5/21 evening  -If spikes fever or develops leukocytosis, will consider initiating antibiotics (on ceftriaxone already for uti)  -Monitor on tele  -IV lasix 40 daily One month dyspnea associated with hypoxia, orthopnea, and LE edema. TTE 5/17/2018 had LVEF 55%, with diastolic dysfunction. PFTs showed restrictive pattern. pBNP 7400. 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  -CT chest (5/10/2018) showed bilateral airspace consolidation most prominent in the upper lobe, bilateral nodules, mild mediastinal LAD, small-mod R pleural effusion, small L pleural effusion.  -DDx: ILD, pneumonia, sarcoidosis, amiodarone toxicity, metastatic disease, vasculitis, pulmonary edema 2/2 CHF exacerbation with diastolic dysfunction. dyspnea from intermittent arrythmia also a possibility but less likely given NSR on EKG and recent afib ablation. Consider occupational exposures as well   -f/u MATE[facility], ACE, quant gold  -Appreciate pulmonology and cardiology recs  -Today LHC/RHC to evaluate pulmonary pressures (will delay PCI unless necessary so pt can have lung biopsy). Pulmonary pressures to dictate bronchoscopy vs VATs lung biopsy  -Eliquis last given 5/21 evening  -If spikes fever or develops leukocytosis, will consider initiating antibiotics (on ceftriaxone already for uti)  -Monitor on tele  -IV lasix 40 daily

## 2018-05-23 NOTE — PROGRESS NOTE ADULT - ATTENDING COMMENTS
agree with above resident note. family called second pulmonary opinion Dr. Saul- planning for RHC/LHC today to assess pulmonary pressures, evidence of CHF as well as CAD prior to proceeding with bronchoscopy or open lung biopsy to diagnose pulmonary disease. 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  pt now on telemetry, diuresing well, remaining in sinus rhythm. continue nasal cannula as needed  mild hyponatremia, may be 2/2 volume overload. cont to monitor on diuresis.   Ecoli UTI, cont ceftriaxone, and monitor PVR. if recurrent urinary retention will place hess and check renal sono. started flomax in pt. will refer to urology on discharge.

## 2018-05-23 NOTE — PROGRESS NOTE ADULT - PROBLEM SELECTOR PLAN 7
Normotensive. Kidney function at baseline 1.0 - 1.5 (5/2018). At home takes lasix 40mg daily  -continue home atenolol  -lasix 40mg IV daily  -start lisinopril at reduced dose 10mg daily Normotensive. Kidney function at baseline 1.0 - 1.5 (5/2018). At home takes lasix 40mg daily  -continue home atenolol  -lasix 40mg IV daily  -lisinopril 10mg daily, home equivalent would be 40mg daily

## 2018-05-23 NOTE — PROGRESS NOTE ADULT - ASSESSMENT
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

## 2018-05-23 NOTE — PROGRESS NOTE ADULT - SUBJECTIVE AND OBJECTIVE BOX
Patient is a 78y old  Male who presents with a chief complaint of SOB (21 May 2018 13:43)    Pt resting comfortably S/P cardiac cath   NO CP or SOB lying flat  Family at bedside    Allergies:   No Known Allergies      MEDICATIONS  (STANDING):  aspirin enteric coated 81 milliGRAM(s) Oral daily  ATENolol  Tablet 50 milliGRAM(s) Oral daily  calcium carbonate 1250 mG + Vitamin D (OsCal 500 + D) 1 Tablet(s) Oral daily  cefTRIAXone   IVPB 1 Gram(s) IV Intermittent every 24 hours  furosemide   Injectable 40 milliGRAM(s) IV Push daily  lisinopril 10 milliGRAM(s) Oral daily  simvastatin 40 milliGRAM(s) Oral at bedtime  tamsulosin 0.4 milliGRAM(s) Oral at bedtime    MEDICATIONS  (PRN):      ROS:   N/C    Daily     Daily Weight in k.6 (23 May 2018 08:38)  Drug Dosing Weight  Height (cm): 177.8 (21 May 2018 16:21)  Weight (kg): 97 (21 May 2018 16:21)  BMI (kg/m2): 30.7 (21 May 2018 16:21)  BSA (m2): 2.15 (21 May 2018 16:21)  Vital Signs Last 24 Hrs  T(C): 36.7 (23 May 2018 13:55), Max: 37.2 (22 May 2018 20:27)  T(F): 98 (23 May 2018 13:55), Max: 99 (22 May 2018 20:27)  HR: 63 (23 May 2018 13:55) (56 - 82)  BP: 108/67 (23 May 2018 13:55) (93/56 - 130/90)  BP(mean): --  RR: 18 (23 May 2018 13:55) (18 - 22)  SpO2: 98% (23 May 2018 13:55) (94% - 98%)    I&O's Summary    22 May 2018 07:  -  23 May 2018 07:00  --------------------------------------------------------  IN: 480 mL / OUT: 2400 mL / NET: -1920 mL    23 May 2018 07:01  -  23 May 2018 17:20  --------------------------------------------------------  IN: 360 mL / OUT: 325 mL / NET: 35 mL        PHYSICAL EXAM:    General Appearance:    Comfortable, AAO X 3, in no distress.   HEENT:                       Atraumatic, PERRLA, EOMI, conjunctiva clear.   Neck:                          Supple, no adenopathy, no thyromegaly, no JVD, no carotid bruit  Back:                          Symmetric, no CV angle fullness or tenderness, no soft tissue tenderness.  Chest:                         Clear to auscultation, no wheezes, rales or rhonchi, symmetric air entry, no tachypnea, retractions or cyanosis  Heart:                          S1, S2 normal, no gallop, no murmur.  Abdomen:                    Soft, non-tender, bowel sounds active. No palpable masses.   Extremities:                 no cyanosis, no edema, no joint swelling. Peripheral pulses normal  Skin:                           Skin color, texture normal. No rashes   Neurologic:                  Alert and oriented X3, cranial nerves intact, sensory and motor normal.      INTERPRETATION OF TELEMETRY:    ECG:< from: 12 Lead ECG (18 @ 11:31) >  Ventricular Rate 75 BPM    Atrial Rate 75 BPM    P-R Interval 178 ms    QRS Duration 104 ms     ms    QTc 471 ms    R Axis 9 degrees    T Axis 86 degrees    Diagnosis Line SINUS RHYTHM WITH PREMATURE ATRIAL COMPLEXES  NONSPECIFIC ST ABNORMALITY  ABNORMAL ECG    < end of copied text >      < from: Xray Chest 1 View AP/PA (18 @ 11:57) >  EXAM:  XR CHEST AP OR PA 1V                            PROCEDURE DATE:  2018            INTERPRETATION:  CLINICAL INFORMATION: Shortness of breath.    TECHNIQUE: AP view of the chest from 2018 11:57 AM.    COMPARISON: None available.    FINDINGS:     A loop recorder overlies the left chest.    Patchy perihilar airspace opacities bilaterally. No pleural effusions or   pneumothoraces.    Cardiomegaly.    Degenerative changes of the spine.    IMPRESSION:    Patchy perihilar airspace opacities bilaterally which compatible with   pulmonary edema.                CÉSAR ESPINOSA M.D., RADIOLOGY RESIDENT  This document has been electronically signed.  LUIS M MOTTA M.D., ATTENDING RADIOLOGIST  This document has been electronically signed. May 21 2018 12:27PM    < end of copied text >      LABS:                        12.5   9.72  )-----------( 311      ( 23 May 2018 08:19 )             37.6         133<L>  |  91<L>  |  19  ----------------------------<  113<H>  3.5   |  29  |  1.00    Ca    9.2      23 May 2018 07:09  Phos  2.7       Mg     1.7         TPro  7.6  /  Alb  2.9<L>  /  TBili  0.6  /  DBili  0.1  /  AST  23  /  ALT  16  /  AlkPhos  56   @ 07:09  Trop-I --  CK     53  CK MB  --     @ 11:42  Trop-I --  CK     90  CK MB  --    Pro BNP  7432  @ 11:42  D Dimer  --  @ 11:42    PT/INR - ( 23 May 2018 09:42 )   PT: 13.8 sec;   INR: 1.22 ratio         PTT - ( 23 May 2018 09:42 )  PTT:44.2 sec  Urinalysis Basic - ( 21 May 2018 23:59 )    Color: Yellow / Appearance: Turbid / S.016 / pH: x  Gluc: x / Ketone: Negative  / Bili: Negative / Urobili: 1.0 mg/dL   Blood: x / Protein: Trace mg/dL / Nitrite: Positive   Leuk Esterase: Large / RBC: 4 /HPF / WBC >720 /HPF   Sq Epi: x / Non Sq Epi: 0 /HPF / Bacteria: TNTC            RADIOLOGY & ADDITIONAL STUDIES:    HEALTH ISSUES - PROBLEM Dx:  Urinary retention: Urinary retention  Atrial fibrillation, unspecified type: Atrial fibrillation, unspecified type  Acute on chronic diastolic congestive heart failure: Acute on chronic diastolic congestive heart failure  Abnormal CT of the chest: Abnormal CT of the chest  Shortness of breath: Shortness of breath  Acute hypoxemic respiratory failure: Acute hypoxemic respiratory failure  Diastolic CHF: Diastolic CHF  Cystitis: Cystitis  Dyspnea: Dyspnea  Prophylactic measure: Prophylactic measure  Hypoxia: Hypoxia  Persistent atrial fibrillation: Persistent atrial fibrillation  Essential hypertension: Essential hypertension  Dyspnea on exertion: Dyspnea on exertion

## 2018-05-23 NOTE — PROGRESS NOTE ADULT - PROBLEM SELECTOR PLAN 5
TTE 5/2018 showed LVEF 55%, diastolic dysfunction. Presented with pBNP 7400 so possible fluid overload component to presenting symptoms.  -continue atenolol, holding ACEI for soft BP  -lasix 40mg IV daily TTE 5/2018 showed LVEF 55%, diastolic dysfunction. Presented with pBNP 7400 so possible mild diastolic HF exacerbation contributing to presenting symptoms.  -Home dose ramipril equivalent lisinopril 40mg daily  -lasix 40mg IV daily, lisinopril 10mg daily, atenolol

## 2018-05-23 NOTE — PROGRESS NOTE ADULT - SUBJECTIVE AND OBJECTIVE BOX
PULMONARY PROGRESS NOTE    CHINA NESBITT  MRN-[id number]    Patient is a 78y old  Male who presents with a chief complaint of SOB (21 May 2018 13:43)      HPI:  appreciate second pulmonary opinion (called by family).  Plan has changed to cardiac cath prior to bronchoscopy or open lung biopsy.  patient feeling well this am, son and wife at bedside.  just walked with PT and desat to 83% on O2.  no cough/cp.    MEDICATIONS  (STANDING):  aspirin enteric coated 81 milliGRAM(s) Oral daily  ATENolol  Tablet 50 milliGRAM(s) Oral daily  calcium carbonate 1250 mG + Vitamin D (OsCal 500 + D) 1 Tablet(s) Oral daily  cefTRIAXone   IVPB 1 Gram(s) IV Intermittent every 24 hours  furosemide   Injectable 40 milliGRAM(s) IV Push daily  lisinopril 10 milliGRAM(s) Oral daily  simvastatin 40 milliGRAM(s) Oral at bedtime  tamsulosin 0.4 milliGRAM(s) Oral at bedtime    MEDICATIONS  (PRN):        EXAM:  Vital Signs Last 24 Hrs  T(C): 36.8 (23 May 2018 04:58), Max: 37.2 (22 May 2018 20:27)  T(F): 98.2 (23 May 2018 04:58), Max: 99 (22 May 2018 20:27)  HR: 56 (23 May 2018 11:03) (56 - 82)  BP: 93/56 (23 May 2018 11:03) (93/56 - 130/90)  BP(mean): --  RR: 22 (23 May 2018 04:58) (18 - 22)  SpO2: 98% (23 May 2018 10:37) (78% - 98%)    GENERAL: The patient is awake and alert in no apparent distress.     SKIN: Warm, dry,    LUNGS: bilat exp wheeze.    HEART:S1/S2    ABDOMEN: +BS, Soft, Nontender      LABS/IMGAING: reviewed                                   12.5   9.72  )-----------( 311      ( 23 May 2018 08:19 )             37.6   05-23    133<L>  |  91<L>  |  19  ----------------------------<  113<H>  3.5   |  29  |  1.00    Ca    9.2      23 May 2018 07:09  Phos  2.7     05-23  Mg     1.7     05-23    TPro  7.6  /  Alb  2.9<L>  /  TBili  0.6  /  DBili  0.1  /  AST  23  /  ALT  16  /  AlkPhos  56  05-23    < from: Xray Chest 1 View AP/PA (05.21.18 @ 11:57) >  IMPRESSION:    Patchy perihilar airspace opacities bilaterally which compatible with   pulmonary edema.    < end of copied text >    PROBLEM LIST:  78y Male with HEALTH ISSUES - PROBLEM Dx:  Hypoxia  Abnormal chest CT  Persistent atrial fibrillation  Essential hypertension      RECS:  -unclear etiology of findings on CT.  fluid? amio toxicity? other ILD? infection?  -continue diuresis, cardiac cath first then may proceed with either bronch or open lung biopsy depending on findings.    - had very lengthy discussion with son this morning about above plan.      Radha Shukla MD  [phone]

## 2018-05-23 NOTE — PROGRESS NOTE ADULT - ASSESSMENT
78 year old man PMH HTN, DLD AFib s/p ablation presents with shortness of breath, orthopnea, LE edema x 1 month found to have new bilateral airspace infiltrates and nodules associated with mediastinal LAD on CT chest. Found to have UTI 78 year old man PMH HTN, HLD, AFib s/p ablation (4/24/2018) presents with shortness of breath, orthopnea, LE edema x 1 month found to have new bilateral airspace infiltrates and nodules associated with mediastinal LAD on CT chest. Found to have UTI 78 year old man PMH HTN, HLD, AFib s/p ablation (4/24/2018) presents with shortness of breath, orthopnea, LE edema x 1 month found to have new bilateral airspace infiltrates and nodules associated with mediastinal LAD on CT chest. Found to have E Coli UTI.

## 2018-05-23 NOTE — PHYSICAL THERAPY INITIAL EVALUATION ADULT - PLANNED THERAPY INTERVENTIONS, PT EVAL
gait training/balance training/GOAL: Stair Negotiation Training: Patient will be able to negotiate up & down 1 flight of stairs with bilateral rails, step to gait pattern, in 2 weeks./strengthening/transfer training/bed mobility training

## 2018-05-23 NOTE — PROGRESS NOTE ADULT - PROBLEM SELECTOR PLAN 2
Presented with hypoxia sat 82% on RA, still hypoxic 78% on RA. Does not use oxygen at home, no hx of pulmonary disease except 60 pack year history and restrictive PFTs.  -continue supplemental oxygen to maintain SpO2 >92%  -CXR showing b/l patchy opacities  -bronchoscopy, diuresis Presented with hypoxia sat 82% on RA, still hypoxic 78% on RA. Does not use oxygen at home, no hx of pulmonary disease except 60 pack year history and restrictive PFTs.  -continue supplemental oxygen to maintain SpO2 >92%  -bronchoscopy, diuresis Presented with hypoxia sat 82% on RA, still hypoxic 78% on RA. Does not use oxygen at home, no hx of pulmonary disease except 60 pack year history and restrictive PFTs.  -continue supplemental oxygen to maintain SpO2 >92%  -diuresis  -RHC today Presented with hypoxia sat 82% on RA, still hypoxic 78% on RA. Does not use oxygen at home, no hx of pulmonary disease except 60 pack year history and restrictive PFTs.  -continue supplemental oxygen to maintain SpO2 >92%  -diuresis  -RHC/LHC today

## 2018-05-23 NOTE — PROGRESS NOTE ADULT - PROBLEM SELECTOR PLAN 4
can consider RHC even to preceed bronch in effort to differentiate between CHF and other etiologies.

## 2018-05-23 NOTE — PROGRESS NOTE ADULT - PROBLEM SELECTOR PLAN 6
Atrial fibrillation diagnosed 2014. 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 s/p ablation (4/24). Pt has loop recorder and has gone into paroxysmal AF after ablation.  -Currently in sinus rhythm  -continue atenolol, holding Eliquis for bronch  -monitor on telemetry Atrial fibrillation diagnosed 2014. 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 s/p ablation (4/24). Pt has loop recorder and has gone into paroxysmal AF after ablation.  -Currently in sinus rhythm, monitor on tele  -continue atenolol, holding Eliquis for heart cath

## 2018-05-23 NOTE — PROGRESS NOTE ADULT - ATTENDING COMMENTS
I agree with Dr. Shukla's management plan and approach. Patient for bronchoscopy with biopsy tomorrow.    Dr. Saul aware. as above-  Though I agree w/ dx bronch here I would 1st perform a RHC to r/o CHF here; If pressures are normal then can comfortably proceed w/ bronch w/ less risk of bleeding etc.   goal is not to commit pt to long course of prednisone if can avoid it.      Lele Saul MD-Pulmonary   [phone]

## 2018-05-23 NOTE — PROGRESS NOTE ADULT - PROBLEM SELECTOR PLAN 4
PVR straight cath 400cc. Suspect BPH in elderly man which likely caused acute cystitis.  -continue flomax  -renal US  -consider urology consult PVR straight cath 400cc. Suspect BPH in elderly man which likely caused acute cystitis.  -continue flomax  -consider renal US  -consider urology consult PVR straight cath 400cc. Suspect BPH in elderly man which likely caused acute cystitis. Cystitis likely exacerbating urinary retention  -continue flomax  -consider renal US if develops RASHEEDA or retention does not improve  -PVR today

## 2018-05-23 NOTE — PROGRESS NOTE ADULT - PROBLEM SELECTOR PLAN 5
need to r/o CHF before committing to long course of steroids--would proceed w/ RHC then bronch -?infection

## 2018-05-24 ENCOUNTER — RESULT REVIEW (OUTPATIENT)
Age: 78
End: 2018-05-24

## 2018-05-24 LAB
-  AMIKACIN: SIGNIFICANT CHANGE UP
-  AMOXICILLIN/CLAVULANIC ACID: SIGNIFICANT CHANGE UP
-  AMPICILLIN/SULBACTAM: SIGNIFICANT CHANGE UP
-  AMPICILLIN: SIGNIFICANT CHANGE UP
-  AZTREONAM: SIGNIFICANT CHANGE UP
-  CEFAZOLIN: SIGNIFICANT CHANGE UP
-  CEFEPIME: SIGNIFICANT CHANGE UP
-  CEFOXITIN: SIGNIFICANT CHANGE UP
-  CEFTRIAXONE: SIGNIFICANT CHANGE UP
-  CIPROFLOXACIN: SIGNIFICANT CHANGE UP
-  ERTAPENEM: SIGNIFICANT CHANGE UP
-  GENTAMICIN: SIGNIFICANT CHANGE UP
-  IMIPENEM: SIGNIFICANT CHANGE UP
-  LEVOFLOXACIN: SIGNIFICANT CHANGE UP
-  MEROPENEM: SIGNIFICANT CHANGE UP
-  NITROFURANTOIN: SIGNIFICANT CHANGE UP
-  PIPERACILLIN/TAZOBACTAM: SIGNIFICANT CHANGE UP
-  TIGECYCLINE: SIGNIFICANT CHANGE UP
-  TOBRAMYCIN: SIGNIFICANT CHANGE UP
-  TRIMETHOPRIM/SULFAMETHOXAZOLE: SIGNIFICANT CHANGE UP
ANION GAP SERPL CALC-SCNC: 11 MMOL/L — SIGNIFICANT CHANGE UP (ref 5–17)
APTT BLD: 45.9 SEC — HIGH (ref 27.5–37.4)
BASOPHILS # BLD AUTO: 0.02 K/UL — SIGNIFICANT CHANGE UP (ref 0–0.2)
BASOPHILS NFR BLD AUTO: 0.2 % — SIGNIFICANT CHANGE UP (ref 0–2)
BLD GP AB SCN SERPL QL: NEGATIVE — SIGNIFICANT CHANGE UP
BUN SERPL-MCNC: 17 MG/DL — SIGNIFICANT CHANGE UP (ref 7–23)
CALCIUM SERPL-MCNC: 8.7 MG/DL — SIGNIFICANT CHANGE UP (ref 8.4–10.5)
CHLORIDE SERPL-SCNC: 92 MMOL/L — LOW (ref 96–108)
CO2 SERPL-SCNC: 32 MMOL/L — HIGH (ref 22–31)
CREAT SERPL-MCNC: 0.95 MG/DL — SIGNIFICANT CHANGE UP (ref 0.5–1.3)
CULTURE RESULTS: SIGNIFICANT CHANGE UP
EOSINOPHIL # BLD AUTO: 0.42 K/UL — SIGNIFICANT CHANGE UP (ref 0–0.5)
EOSINOPHIL NFR BLD AUTO: 4.6 % — SIGNIFICANT CHANGE UP (ref 0–6)
GAS PNL BLDA: SIGNIFICANT CHANGE UP
GLUCOSE SERPL-MCNC: 111 MG/DL — HIGH (ref 70–99)
HCT VFR BLD CALC: 37.5 % — LOW (ref 39–50)
HGB BLD-MCNC: 12 G/DL — LOW (ref 13–17)
IMM GRANULOCYTES NFR BLD AUTO: 0.6 % — SIGNIFICANT CHANGE UP (ref 0–1.5)
INR BLD: 1.11 RATIO — SIGNIFICANT CHANGE UP (ref 0.88–1.16)
LYMPHOCYTES # BLD AUTO: 1.62 K/UL — SIGNIFICANT CHANGE UP (ref 1–3.3)
LYMPHOCYTES # BLD AUTO: 17.9 % — SIGNIFICANT CHANGE UP (ref 13–44)
MAGNESIUM SERPL-MCNC: 1.9 MG/DL — SIGNIFICANT CHANGE UP (ref 1.6–2.6)
MCHC RBC-ENTMCNC: 31.1 PG — SIGNIFICANT CHANGE UP (ref 27–34)
MCHC RBC-ENTMCNC: 32 GM/DL — SIGNIFICANT CHANGE UP (ref 32–36)
MCV RBC AUTO: 97.2 FL — SIGNIFICANT CHANGE UP (ref 80–100)
METHOD TYPE: SIGNIFICANT CHANGE UP
MONOCYTES # BLD AUTO: 1.07 K/UL — HIGH (ref 0–0.9)
MONOCYTES NFR BLD AUTO: 11.8 % — SIGNIFICANT CHANGE UP (ref 2–14)
NEUTROPHILS # BLD AUTO: 5.89 K/UL — SIGNIFICANT CHANGE UP (ref 1.8–7.4)
NEUTROPHILS NFR BLD AUTO: 64.9 % — SIGNIFICANT CHANGE UP (ref 43–77)
ORGANISM # SPEC MICROSCOPIC CNT: SIGNIFICANT CHANGE UP
ORGANISM # SPEC MICROSCOPIC CNT: SIGNIFICANT CHANGE UP
PHOSPHATE SERPL-MCNC: 2.8 MG/DL — SIGNIFICANT CHANGE UP (ref 2.5–4.5)
PLATELET # BLD AUTO: 330 K/UL — SIGNIFICANT CHANGE UP (ref 150–400)
POTASSIUM SERPL-MCNC: 3.3 MMOL/L — LOW (ref 3.5–5.3)
POTASSIUM SERPL-SCNC: 3.3 MMOL/L — LOW (ref 3.5–5.3)
PROTHROM AB SERPL-ACNC: 12.6 SEC — SIGNIFICANT CHANGE UP (ref 10–13.1)
RBC # BLD: 3.86 M/UL — LOW (ref 4.2–5.8)
RBC # FLD: 14 % — SIGNIFICANT CHANGE UP (ref 10.3–14.5)
RH IG SCN BLD-IMP: POSITIVE — SIGNIFICANT CHANGE UP
SODIUM SERPL-SCNC: 135 MMOL/L — SIGNIFICANT CHANGE UP (ref 135–145)
SPECIMEN SOURCE: SIGNIFICANT CHANGE UP
WBC # BLD: 9.07 K/UL — SIGNIFICANT CHANGE UP (ref 3.8–10.5)
WBC # FLD AUTO: 9.07 K/UL — SIGNIFICANT CHANGE UP (ref 3.8–10.5)

## 2018-05-24 PROCEDURE — 32667 THORACOSCOPY W/W RESECT ADDL: CPT

## 2018-05-24 PROCEDURE — 71045 X-RAY EXAM CHEST 1 VIEW: CPT | Mod: 26

## 2018-05-24 PROCEDURE — 88312 SPECIAL STAINS GROUP 1: CPT | Mod: 26

## 2018-05-24 PROCEDURE — 88305 TISSUE EXAM BY PATHOLOGIST: CPT | Mod: 26

## 2018-05-24 PROCEDURE — 32666 THORACOSCOPY W/WEDGE RESECT: CPT

## 2018-05-24 PROCEDURE — 88112 CYTOPATH CELL ENHANCE TECH: CPT | Mod: 26

## 2018-05-24 PROCEDURE — 32551 INSERTION OF CHEST TUBE: CPT | Mod: 59

## 2018-05-24 PROCEDURE — 99233 SBSQ HOSP IP/OBS HIGH 50: CPT | Mod: GC

## 2018-05-24 PROCEDURE — 88307 TISSUE EXAM BY PATHOLOGIST: CPT | Mod: 26

## 2018-05-24 PROCEDURE — 99232 SBSQ HOSP IP/OBS MODERATE 35: CPT

## 2018-05-24 RX ORDER — ACETAMINOPHEN 500 MG
650 TABLET ORAL EVERY 6 HOURS
Qty: 0 | Refills: 0 | Status: DISCONTINUED | OUTPATIENT
Start: 2018-05-24 | End: 2018-05-29

## 2018-05-24 RX ORDER — ATENOLOL 25 MG/1
50 TABLET ORAL DAILY
Qty: 0 | Refills: 0 | Status: DISCONTINUED | OUTPATIENT
Start: 2018-05-24 | End: 2018-05-29

## 2018-05-24 RX ORDER — HYDROMORPHONE HYDROCHLORIDE 2 MG/ML
0.25 INJECTION INTRAMUSCULAR; INTRAVENOUS; SUBCUTANEOUS
Qty: 0 | Refills: 0 | Status: DISCONTINUED | OUTPATIENT
Start: 2018-05-24 | End: 2018-05-24

## 2018-05-24 RX ORDER — LISINOPRIL 2.5 MG/1
10 TABLET ORAL DAILY
Qty: 0 | Refills: 0 | Status: DISCONTINUED | OUTPATIENT
Start: 2018-05-24 | End: 2018-05-29

## 2018-05-24 RX ORDER — FUROSEMIDE 40 MG
40 TABLET ORAL DAILY
Qty: 0 | Refills: 0 | Status: CANCELLED | OUTPATIENT
Start: 2018-05-25 | End: 2018-05-24

## 2018-05-24 RX ORDER — SIMVASTATIN 20 MG/1
40 TABLET, FILM COATED ORAL AT BEDTIME
Qty: 0 | Refills: 0 | Status: DISCONTINUED | OUTPATIENT
Start: 2018-05-24 | End: 2018-05-29

## 2018-05-24 RX ORDER — SODIUM CHLORIDE 9 MG/ML
1000 INJECTION INTRAMUSCULAR; INTRAVENOUS; SUBCUTANEOUS
Qty: 0 | Refills: 0 | Status: COMPLETED | OUTPATIENT
Start: 2018-05-24 | End: 2018-05-25

## 2018-05-24 RX ORDER — ASPIRIN/CALCIUM CARB/MAGNESIUM 324 MG
81 TABLET ORAL DAILY
Qty: 0 | Refills: 0 | Status: DISCONTINUED | OUTPATIENT
Start: 2018-05-24 | End: 2018-05-26

## 2018-05-24 RX ORDER — OXYCODONE AND ACETAMINOPHEN 5; 325 MG/1; MG/1
1 TABLET ORAL EVERY 4 HOURS
Qty: 0 | Refills: 0 | Status: DISCONTINUED | OUTPATIENT
Start: 2018-05-24 | End: 2018-05-29

## 2018-05-24 RX ORDER — OXYCODONE AND ACETAMINOPHEN 5; 325 MG/1; MG/1
2 TABLET ORAL EVERY 4 HOURS
Qty: 0 | Refills: 0 | Status: DISCONTINUED | OUTPATIENT
Start: 2018-05-24 | End: 2018-05-29

## 2018-05-24 RX ORDER — POTASSIUM CHLORIDE 20 MEQ
40 PACKET (EA) ORAL EVERY 4 HOURS
Qty: 0 | Refills: 0 | Status: DISCONTINUED | OUTPATIENT
Start: 2018-05-24 | End: 2018-05-24

## 2018-05-24 RX ORDER — TAMSULOSIN HYDROCHLORIDE 0.4 MG/1
0.4 CAPSULE ORAL AT BEDTIME
Qty: 0 | Refills: 0 | Status: DISCONTINUED | OUTPATIENT
Start: 2018-05-24 | End: 2018-05-29

## 2018-05-24 RX ORDER — CIPROFLOXACIN LACTATE 400MG/40ML
500 VIAL (ML) INTRAVENOUS EVERY 12 HOURS
Qty: 0 | Refills: 0 | Status: DISCONTINUED | OUTPATIENT
Start: 2018-05-24 | End: 2018-05-29

## 2018-05-24 RX ADMIN — SIMVASTATIN 40 MILLIGRAM(S): 20 TABLET, FILM COATED ORAL at 21:40

## 2018-05-24 RX ADMIN — Medication 1 TABLET(S): at 21:40

## 2018-05-24 RX ADMIN — Medication 40 MILLIEQUIVALENT(S): at 09:24

## 2018-05-24 RX ADMIN — ATENOLOL 50 MILLIGRAM(S): 25 TABLET ORAL at 05:41

## 2018-05-24 RX ADMIN — OXYCODONE AND ACETAMINOPHEN 1 TABLET(S): 5; 325 TABLET ORAL at 22:19

## 2018-05-24 RX ADMIN — HYDROMORPHONE HYDROCHLORIDE 0.25 MILLIGRAM(S): 2 INJECTION INTRAMUSCULAR; INTRAVENOUS; SUBCUTANEOUS at 18:54

## 2018-05-24 RX ADMIN — TAMSULOSIN HYDROCHLORIDE 0.4 MILLIGRAM(S): 0.4 CAPSULE ORAL at 21:40

## 2018-05-24 RX ADMIN — Medication 40 MILLIGRAM(S): at 05:41

## 2018-05-24 RX ADMIN — OXYCODONE AND ACETAMINOPHEN 1 TABLET(S): 5; 325 TABLET ORAL at 23:00

## 2018-05-24 RX ADMIN — HYDROMORPHONE HYDROCHLORIDE 0.25 MILLIGRAM(S): 2 INJECTION INTRAMUSCULAR; INTRAVENOUS; SUBCUTANEOUS at 19:15

## 2018-05-24 RX ADMIN — Medication 500 MILLIGRAM(S): at 21:40

## 2018-05-24 NOTE — PROGRESS NOTE ADULT - SUBJECTIVE AND OBJECTIVE BOX
CHIEF COMPLAINT:    Interval Events:    REVIEW OF SYSTEMS:  Constitutional: No fevers or chills. No weight loss. No fatigue or generalized malaise.  Eyes: No itching or discharge from the eyes  ENT: No ear pain. No ear discharge. No nasal congestion. No post nasal drip. No epistaxis. No throat pain. No sore throat. No difficulty swallowing.   CV: No chest pain. No palpitations. No lightheadedness or dizziness.   Resp: No dyspnea at rest. No dyspnea on exertion. No orthopnea. No wheezing. No cough. No stridor. No sputum production. No chest pain with respiration.  GI: No nausea. No vomiting. No diarrhea.  MSK: No joint pain or pain in any extremities  Integumentary: No skin lesions. No pedal edema.  Neurological: No gross motor weakness. No sensory changes.  [ ] All other systems negative  [ ] Unable to assess ROS because ________    OBJECTIVE:  ICU Vital Signs Last 24 Hrs  T(C): 36.6 (24 May 2018 04:42), Max: 36.7 (23 May 2018 13:55)  T(F): 97.9 (24 May 2018 04:42), Max: 98.1 (23 May 2018 21:06)  HR: 75 (24 May 2018 04:42) (56 - 86)  BP: 115/59 (24 May 2018 04:42) (93/56 - 122/71)  BP(mean): --  ABP: --  ABP(mean): --  RR: 20 (24 May 2018 04:42) (18 - 20)  SpO2: 96% (24 May 2018 04:42) (91% - 98%)        05-22 @ 07:01  -  05-23 @ 07:00  --------------------------------------------------------  IN: 480 mL / OUT: 2400 mL / NET: -1920 mL    05-23 @ 07:01  -  05-24 @ 05:03  --------------------------------------------------------  IN: 660 mL / OUT: 1295 mL / NET: -635 mL      CAPILLARY BLOOD GLUCOSE          PHYSICAL EXAM:  General: Awake, alert, oriented X 3.   HEENT: Atraumatic, normocephalic.                 Mallampatti Grade                 No nasal congestion.                No tonsillar or pharyngeal exudates.  Lymph Nodes: No palpable lymphadenopathy  Neck: No JVD. No carotid bruit.   Respiratory: Normal chest expansion                         Normal percussion                         Normal and equal air entry                         No wheeze, rhonchi or rales.  Cardiovascular: S1 S2 normal. No murmurs, rubs or gallops.   Abdomen: Soft, non-tender, non-distended. No organomegaly.  Extremities: Warm to touch. Peripheral pulse palpable. No pedal edema.   Skin: No rashes or skin lesions  Neurological: Motor and sensory examination equal and normal in all four extremities.  Psychiatry: Appropriate mood and affect.    HOSPITAL MEDICATIONS:  MEDICATIONS  (STANDING):  aspirin enteric coated 81 milliGRAM(s) Oral daily  ATENolol  Tablet 50 milliGRAM(s) Oral daily  calcium carbonate 1250 mG + Vitamin D (OsCal 500 + D) 1 Tablet(s) Oral daily  cefTRIAXone   IVPB 1 Gram(s) IV Intermittent every 24 hours  furosemide   Injectable 40 milliGRAM(s) IV Push daily  lisinopril 10 milliGRAM(s) Oral daily  simvastatin 40 milliGRAM(s) Oral at bedtime  tamsulosin 0.4 milliGRAM(s) Oral at bedtime    MEDICATIONS  (PRN):      LABS:                        12.5   9.72  )-----------( 311      ( 23 May 2018 08:19 )             37.6     05-23    133<L>  |  91<L>  |  19  ----------------------------<  113<H>  3.5   |  29  |  1.00    Ca    9.2      23 May 2018 07:09  Phos  2.7     05-23  Mg     1.7     05-23    TPro  7.6  /  Alb  2.9<L>  /  TBili  0.6  /  DBili  0.1  /  AST  23  /  ALT  16  /  AlkPhos  56  05-23    PT/INR - ( 23 May 2018 09:42 )   PT: 13.8 sec;   INR: 1.22 ratio         PTT - ( 23 May 2018 09:42 )  PTT:44.2 sec          MICROBIOLOGY:     RADIOLOGY:  [ ] Reviewed and interpreted by me    Point of Care Ultrasound Findings:    PFT:    EKG: CHIEF COMPLAINT: fatigued--mild cough; mild sob on exertion    Interval Events: R/L card cath done    REVIEW OF SYSTEMS:  Constitutional: No fevers or chills. No weight loss. + fatigue or generalized malaise.  Eyes: No itching or discharge from the eyes  ENT: No ear pain. No ear discharge. No nasal congestion. No post nasal drip. No epistaxis. No throat pain. No sore throat. No difficulty swallowing.   CV: No chest pain. No palpitations. No lightheadedness or dizziness.   Resp: No dyspnea at rest. + dyspnea on exertion. No orthopnea. No wheezing. No cough. No stridor. No sputum production. No chest pain with respiration.  GI: No nausea. No vomiting. No diarrhea.  MSK: No joint pain or pain in any extremities  Integumentary: No skin lesions. No pedal edema.  Neurological: No gross motor weakness. No sensory changes.  [+ ] All other systems negative  [ ] Unable to assess ROS because ________    OBJECTIVE:  ICU Vital Signs Last 24 Hrs  T(C): 36.6 (24 May 2018 04:42), Max: 36.7 (23 May 2018 13:55)  T(F): 97.9 (24 May 2018 04:42), Max: 98.1 (23 May 2018 21:06)  HR: 75 (24 May 2018 04:42) (56 - 86)  BP: 115/59 (24 May 2018 04:42) (93/56 - 122/71)  BP(mean): --  ABP: --  ABP(mean): --  RR: 20 (24 May 2018 04:42) (18 - 20)  SpO2: 96% (24 May 2018 04:42) (91% - 98%)        05-22 @ 07:01  -  05-23 @ 07:00  --------------------------------------------------------  IN: 480 mL / OUT: 2400 mL / NET: -1920 mL    05-23 @ 07:01  -  05-24 @ 05:03  --------------------------------------------------------  IN: 660 mL / OUT: 1295 mL / NET: -635 mL      CAPILLARY BLOOD GLUCOSE          PHYSICAL EXAM: NAD in bed on O2  General: Awake, alert, oriented X 3.   HEENT: Atraumatic, normocephalic.                 Mallampatti Grade 3                No nasal congestion.                No tonsillar or pharyngeal exudates.  Lymph Nodes: No palpable lymphadenopathy  Neck: No JVD. No carotid bruit.   Respiratory: Normal chest expansion                         Normal percussion                         Normal and equal air entry                         mild exp wheeze,no rhonchi but + basilar rales.  Cardiovascular: S1 S2 normal. No murmurs, rubs or gallops.   Abdomen: Soft, non-tender, non-distended. No organomegaly.  Extremities: Warm to touch. Peripheral pulse palpable. No pedal edema. RUE lipoma  Skin: No rashes or skin lesions  Neurological: Motor and sensory examination equal and normal in all four extremities.  Psychiatry: Appropriate mood and affect.    HOSPITAL MEDICATIONS:  MEDICATIONS  (STANDING):  aspirin enteric coated 81 milliGRAM(s) Oral daily  ATENolol  Tablet 50 milliGRAM(s) Oral daily  calcium carbonate 1250 mG + Vitamin D (OsCal 500 + D) 1 Tablet(s) Oral daily  cefTRIAXone   IVPB 1 Gram(s) IV Intermittent every 24 hours  furosemide   Injectable 40 milliGRAM(s) IV Push daily  lisinopril 10 milliGRAM(s) Oral daily  simvastatin 40 milliGRAM(s) Oral at bedtime  tamsulosin 0.4 milliGRAM(s) Oral at bedtime    MEDICATIONS  (PRN):      LABS:                        12.5   9.72  )-----------( 311      ( 23 May 2018 08:19 )             37.6     05-23    133<L>  |  91<L>  |  19  ----------------------------<  113<H>  3.5   |  29  |  1.00    Ca    9.2      23 May 2018 07:09  Phos  2.7     05-23  Mg     1.7     05-23    TPro  7.6  /  Alb  2.9<L>  /  TBili  0.6  /  DBili  0.1  /  AST  23  /  ALT  16  /  AlkPhos  56  05-23    PT/INR - ( 23 May 2018 09:42 )   PT: 13.8 sec;   INR: 1.22 ratio         PTT - ( 23 May 2018 09:42 )  PTT:44.2 sec          MICROBIOLOGY:     RADIOLOGY:  [ ] Reviewed and interpreted by me    Point of Care Ultrasound Findings:    PFT:    EKG:

## 2018-05-24 NOTE — PROGRESS NOTE ADULT - PROBLEM SELECTOR PLAN 4
can consider RHC even to preceed bronch in effort to differentiate between CHF and other etiologies. s/p  R/L card cath--negative at this point

## 2018-05-24 NOTE — PROGRESS NOTE ADULT - PROBLEM SELECTOR PLAN 5
TTE 5/2018 showed LVEF 55%, diastolic dysfunction. Presented with pBNP 7400 so possible mild diastolic HF exacerbation contributing to presenting symptoms.  -Home dose ramipril equivalent lisinopril 40mg daily  -lasix 40mg IV daily, lisinopril 10mg daily, atenolol TTE 5/2018 showed LVEF 55%, diastolic dysfunction. Presented with pBNP 7400 so possible mild diastolic HF exacerbation contributing to presenting symptoms.  -Home dose ramipril equivalent lisinopril 40mg daily  -lasix 40mg po daily, lisinopril 10mg, atenolol. Spaced out in day TTE 5/2018 showed LVEF 55%, diastolic dysfunction. Presented with pBNP 7400 so possible mild diastolic HF exacerbation contributing to presenting symptoms.  -Home dose ramipril equivalent lisinopril 10mg daily  -lasix 40mg po daily, lisinopril 10mg, atenolol. Spaced out in day

## 2018-05-24 NOTE — CONSULT NOTE ADULT - SUBJECTIVE AND OBJECTIVE BOX
Patient is a 78y old  Male who presents with a chief complaint of SOB (21 May 2018 13:43)      HPI:    History of Present Illness: 	  79yo man PMHx HTN, HLD, AFib on Eliquis s/p ablation (2018) p/w shortness of breath. Pt developed progressive dyspnea, orthopnea, and LE edema starting 1 month ago. Pt saw his cardiologist 3/12 for a checkup who noted the patient was back into atrial fibrillation and started him on amiodarone. Amiodarone was discontinued after one month as pt developed dyspnea. Pt walks with a cane at baseline, but was able to climb a flight of stairs without issue. Now he becomes SOB while at rest. Sitting up in a chair improves his SOB. Pt used to sleep flat, but over past month he now sleeps propped up or in a recliner due to difficulty breathing while flat. Dyspnea associated with LE edema so pt was started on lasix 40mg daily by his cardiologist. CT chest (5/10/2018) showed bilateral airspace consolidation most prominent in the upper lobe, bilateral nodules, mild mediastinal LAD, small-mod R pleural effusion, small L pleural effusion. These are all new findings compared to CT chest from 2017. TTE 5 days prior to admission showed LVEF 55%, indeterminate diastolic function, mild segmental hypokinesis, mildly dilated LA, mild aortic insufficiency, mild MR. Was scheduled for a stress test next week to work up the dyspnea. 5 days PTA blood work was remarkable for pBNP 6643, ESR 49, no leukocytosis. Pt saw a pulmonologist for the first time one week ago; PFTs showed a moderate restrictive pattern, no bronchodilator response, mild-mod diffusion impairment that corrected to normal for lung volume. When pt went in for followup today, ox sat was 82% on RA and he was sent to the ED. EP interrogation only 38 minutes of afib since ablation  PAST MEDICAL & SURGICAL HISTORY:  Obesity, unspecified obesity severity, unspecified obesity type  Hyperlipidemia, unspecified hyperlipidemia type  Essential hypertension  Persistent atrial fibrillation  H/O bilateral hip replacements  H/O arthroscopy of knee      Allergies    No Known Allergies    Intolerances          MEDICATIONS  (STANDING):  apixaban 5 milliGRAM(s) Oral every 12 hours  aspirin enteric coated 81 milliGRAM(s) Oral daily  ATENolol  Tablet 50 milliGRAM(s) Oral daily  calcium carbonate 1250 mG + Vitamin D (OsCal 500 + D) 1 Tablet(s) Oral daily  furosemide    Tablet 40 milliGRAM(s) Oral daily  simvastatin 40 milliGRAM(s) Oral at bedtime    MEDICATIONS  (PRN):      SH    FAMILY HISTORY:  Family history of lung cancer (Sibling)  Type 2 diabetes mellitus  Family history of arrhythmia            Vital Signs Last 24 Hrs  T(C): 36.7 (22 May 2018 07:18), Max: 36.8 (21 May 2018 16:21)  T(F): 98 (22 May 2018 07:18), Max: 98.3 (22 May 2018 04:01)  HR: 72 (22 May 2018 07:18) (67 - 84)  BP: 122/68 (22 May 2018 07:18) (100/57 - 147/70)  BP(mean): --  RR: 18 (22 May 2018 07:18) (18 - 25)  SpO2: 93% (22 May 2018 07:18) (82% - 98%)  Daily Height in cm: 177.8 (21 May 2018 16:21)    Daily   I&O's Detail    21 May 2018 07:01  -  22 May 2018 07:00  --------------------------------------------------------  IN:    Oral Fluid: 240 mL  Total IN: 240 mL    OUT:  Total OUT: 0 mL    Total NET: 240 mL          Physical Exam  Neck without JVD at 60  Lungs dec bs at bases otherwise clear  cor s1s2 2/6 sys m llsb  Abd soft  Ext 2 + edema  LABS  PT/INR - ( 21 May 2018 11:42 )   PT: 18.0 sec;   INR: 1.65 ratio         PTT - ( 21 May 2018 11:42 )  PTT:56.4 sec  Urinalysis Basic - ( 21 May 2018 23:59 )    Color: Yellow / Appearance: Turbid / S.016 / pH: x  Gluc: x / Ketone: Negative  / Bili: Negative / Urobili: 1.0 mg/dL   Blood: x / Protein: Trace mg/dL / Nitrite: Positive   Leuk Esterase: Large / RBC: 4 /HPF / WBC >720 /HPF   Sq Epi: x / Non Sq Epi: 0 /HPF / Bacteria: TNTC      CARDIAC MARKERS ( 21 May 2018 11:42 )  x     / <0.01 ng/mL / 90 U/L / x     / 3.8 ng/mL      CBC Full  -  ( 22 May 2018 09:11 )  WBC Count : 11.11 K/uL  Hemoglobin : 13.1 g/dL  Hematocrit : 41.2 %  Platelet Count - Automated : 367 K/uL  Mean Cell Volume : 97.6 fl  Mean Cell Hemoglobin : 31.0 pg  Mean Cell Hemoglobin Concentration : 31.8 gm/dL  Auto Neutrophil # : 7.93 K/uL  Auto Lymphocyte # : 1.61 K/uL  Auto Monocyte # : 1.16 K/uL  Auto Eosinophil # : 0.31 K/uL  Auto Basophil # : 0.04 K/uL  Auto Neutrophil % : 71.4 %  Auto Lymphocyte % : 14.5 %  Auto Monocyte % : 10.4 %  Auto Eosinophil % : 2.8 %  Auto Basophil % : 0.4 %        134<L>  |  94<L>  |  21  ----------------------------<  107<H>  4.0   |  29  |  1.14    Ca    9.3      22 May 2018 07:11  Phos  2.9     -  Mg     1.6     -    TPro  8.4<H>  /  Alb  3.2<L>  /  TBili  0.7  /  DBili  x   /  AST  30  /  ALT  18  /  AlkPhos  65  -    ECG:  < from: 12 Lead ECG (18 @ 11:31) >    Ventricular Rate 75 BPM    Atrial Rate 75 BPM    P-R Interval 178 ms    QRS Duration 104 ms     ms    QTc 471 ms    R Axis 9 degrees    T Axis 86 degrees    Diagnosis Line SINUS RHYTHM WITH PREMATURE ATRIAL COMPLEXES  NONSPECIFIC ST ABNORMALITY  ABNORMAL ECG        RADIOLOGY & ADDITIONAL STUDIES:  < from: Xray Chest 1 View AP/PA (18 @ 11:57) >    EXAM:  XR CHEST AP OR PA 1V                            PROCEDURE DATE:  2018            INTERPRETATION:  CLINICAL INFORMATION: Shortness of breath.    TECHNIQUE: AP view of the chest from 2018 11:57 AM.    COMPARISON: None available.    FINDINGS:     A loop recorder overlies the left chest.    Patchy perihilar airspace opacities bilaterally. No pleural effusions or   pneumothoraces.    Cardiomegaly.    Degenerative changes of the spine.    IMPRESSION:    Patchy perihilar airspace opacities bilaterally which compatible with   pulmonary edema.            Assessment and Plan  79 yo male HTN, HFpEF, PAF s/p recent afib ablation , had been on low dose amiodarone for one month prior presents with progressive sob and hypoxia.  CT chest revealed bialateral consolidations most prominent in upper lobes.  ISAURA nl overall systolic fx with mild segmental hypokinesis.  SOB likely multifactorial from acute diastolic heart failure and a concomitant  pulmonary etiology  Hold eliquis prior to scheduled bronchoscopy  change lasix to 40mg IVP daily and reevaluate daily  Transfer to telemetry to evaluate if patient having recurrent afib or other arrhythmias  serial troponins  Pharm stress test when stable
Patient is a 78y old  Male who presents with a chief complaint of SOB (21 May 2018 13:43)      HPI:  Patient is a 78y Male with history of HTN, HLD, PAF S/P Ablation admitted with increasing SOB. He was found to have abnormal CXR, CT chest. TTE as outpatient with regional A. Underwent cath yesterday with non-obstructive CAD and normal right heart pressures by report. He feels less SOB today. Had LE swelling now resolved. Denies increasing cough or fevers.  Had been on amiodarone for 3 weeks per patient.     PAST MEDICAL & SURGICAL HISTORY:  Obesity, unspecified obesity severity, unspecified obesity type  Hyperlipidemia, unspecified hyperlipidemia type  Essential hypertension  Persistent atrial fibrillation  H/O bilateral hip replacements  H/O arthroscopy of knee    Allergies    No Known Allergies    Intolerances      MEDICATIONS  (STANDING):  aspirin enteric coated 81 milliGRAM(s) Oral daily  ATENolol  Tablet 50 milliGRAM(s) Oral daily  calcium carbonate 1250 mG + Vitamin D (OsCal 500 + D) 1 Tablet(s) Oral daily  cefTRIAXone   IVPB 1 Gram(s) IV Intermittent every 24 hours  furosemide   Injectable 40 milliGRAM(s) IV Push daily  lisinopril 10 milliGRAM(s) Oral daily  simvastatin 40 milliGRAM(s) Oral at bedtime  tamsulosin 0.4 milliGRAM(s) Oral at bedtime    MEDICATIONS  (PRN):    FAMILY HISTORY:  Family history of lung cancer (Sibling)  Type 2 diabetes mellitus  Family history of arrhythmia      ROS:  General: Denies weight loss, fevers, rash, decreased hearing  Cardiac: Denies chest pain,  orthopnea, PND, claudication, , snoring, daytime somnolence, palpitations, syncope  Resp: Denies , sputum, wheezing, hemoptysis  GI: Denies change in bowel habits, diarrhea, weight loss, melena, tarry stools, nausea, vomiting, jaundice, abdominal pain, dysphagia  : Denies dysuria, nocturia, hematuria  Neuro: Denies tinnitus, headache, visual changes, weakness, dizziness or vertigo  Musculoskeletal: Denies neck pain back pain joint pain.  Skin: Denies rash, itching, dryness.  Endocrine: Denies polydipsia, polyuria  Psychiatric: Denies depression, anxiety  All other review of systems is negative unless indicated above.    PHYSICAL EXAM:  Vital Signs Last 24 Hrs  T(C): 36.6 (24 May 2018 04:42), Max: 36.7 (23 May 2018 13:55)  T(F): 97.9 (24 May 2018 04:42), Max: 98.1 (23 May 2018 21:06)  HR: 75 (24 May 2018 04:42) (56 - 86)  BP: 115/59 (24 May 2018 04:42) (93/56 - 122/71)  BP(mean): --  RR: 20 (24 May 2018 04:42) (18 - 20)  SpO2: 96% (24 May 2018 04:42) (91% - 98%)  I&O's Summary    22 May 2018 07:01  -  23 May 2018 07:00  --------------------------------------------------------  IN: 480 mL / OUT: 2400 mL / NET: -1920 mL    23 May 2018 07:01  -  24 May 2018 06:44  --------------------------------------------------------  IN: 660 mL / OUT: 1495 mL / NET: -835 mL        General Appearance: 	 Alert, cooperative, no distress  HEENT: normocephalic, atraumatic  Neck: no JVD,  carotid 2+  bilaterally without bruits  Lungs:  crackles bilaterally 1/2  Cor:  pmi 5th ICS MCL, regular rate and rhythm, S1 normal intensity, S2 normal intensity, no gallops, murmurs or rubs  Abdomen: soft, non-tender; bowel sounds normal; no masses,  no organomegaly  Extremities: without cyanosis, clubbing or edema  Vasc: 2-+ PT and DP pulses; LE varicosities  Neurologic: A&O x 3 (time, place, person). Symmetric strength; limited exam    Telemetry: NSR with PACs and PVCs    LABS:                        12.5   9.72  )-----------( 311      ( 23 May 2018 08:19 )             37.6     05-23    133<L>  |  91<L>  |  19  ----------------------------<  113<H>  3.5   |  29  |  1.00    Ca    9.2      23 May 2018 07:09  Phos  2.7       Mg     1.7         TPro  7.6  /  Alb  2.9<L>  /  TBili  0.6  /  DBili  0.1  /  AST  23  /  ALT  16  /  AlkPhos  56          CAPILLARY BLOOD GLUCOSE        PT/INR - ( 23 May 2018 09:42 )   PT: 13.8 sec;   INR: 1.22 ratio         PTT - ( 23 May 2018 09:42 )  PTT:44.2 sec      Radiology< from: Xray Chest 1 View AP/PA (18 @ 11:57) >    EXAM:  XR CHEST AP OR PA 1V                            PROCEDURE DATE:  2018            INTERPRETATION:  CLINICAL INFORMATION: Shortness of breath.    TECHNIQUE: AP view of the chest from 2018 11:57 AM.    COMPARISON: None available.    FINDINGS:     A loop recorder overlies the left chest.    Patchy perihilar airspace opacities bilaterally. No pleural effusions or   pneumothoraces.    Cardiomegaly.    Degenerative changes of the spine.    IMPRESSION:    Patchy perihilar airspace opacities bilaterally which compatible with   pulmonary edema.    < end of copied text >  /Imaging:      Felice SERRANO< from: Cardiac Cath Lab - Adult (18 @ 15:37) >  [facility]  Department of Cardiology  [address] [place] [address]  [phone]  Cath Lab Report -- Comprehensive Report  Patient: CHINA NESBITT  Study date: 2018  Account number: [id number]  MR number: [id number]  : 1940  Gender: Male  Race: W  Case Physician(s):  Iain Call M.D.  Fellow:  KLEBER Alston D.O.  Referring Physician:  Hank Mei M.D.  INDICATIONS: Acute systolic congestive heart failure.  HISTORY: Historyincludes nonischemic cardiomyopathy. The patient has  hypertension and medication-treated dyslipidemia.  PROCEDURE:  --  Right heart catheterization.  --  Left coronary angiography.  --  Right coronary angiography.  --  Sonosite - Diagnostic.  TECHNIQUE: The risks and alternatives of the procedures and conscious  sedation were explained to the patient and informed consent was obtained.  Cardiac catheterization performed electively.  Local anesthetic given. Right femoral artery access. Right femoral vein  access. Right heart catheterization. The procedure was performed utilizing  a catheter. Left coronary artery angiography. The vessel was injected  utilizing a catheter. Right coronary artery angiography. The vessel was  injected utilizing a catheter. Sonosite - Diagnostic. RADIATION EXPOSURE:  5.7 min.  CONTRAST GIVEN: Omnipaque 35 ml.  MEDICATIONS GIVEN: Fentanyl, 25 mcg, IV. Midazolam, 1 mg, IV.  CORONARY VESSELS: The coronary circulation is left dominant.  LM:   --  LM: Angiography showed minor luminal irregularities with no flow  limiting lesions.  LAD:   --  LAD: Angiography showed minor luminal irregularities with no  flow limiting lesions.  CX:   --  Circumflex: Angiography showed minor luminal irregularities with  no flow limiting lesions.  RCA:   --  RCA: Angiography showed minor luminal irregularities with no  flow limiting lesions.  COMPLICATIONS: There were no complications.  DIAGNOSTIC RECOMMENDATIONS: The patient should continue with the present  medications.  Prepared and signed by  Iain Call M.D.  Signed 2018 17:35:01  HEMODYNAMIC TABLES  Pressures:  Baseline  Pressures:  - HR: 67  Pressures:  - Rhythm:  Pressures:  -- Pulmonary Artery (S/D/M): 49/16/28  Pressures:  -- Pulmonary Capillary Wedge: 18/13  Pressures:  -- Right Atrium (a/v/M): 11/7/6  Pressures:  -- Right Ventricle (s/edp): 47/10/--  O2 Sats:  Baseline  O2 Sats:  - HR: 67  O2 Sats:  - Rhythm:  O2 Sats:  -- AO: 11.1/92.1/13.9  O2 Sats:  -- PA: 11.1/16.5/2.49  Outputs:  Baseline  Outputs:  -- CALCULATIONS[age]  Outputs:  -- CALCULATIONS: Body Surface Area: 2.15  Outputs:  -- CALCULATIONS: Height in cm: 178.00  Outputs:  -- CALCULATIONS: Sex: Male  Outputs:  -- CALCULATIONS: Weight in k.00  Outputs:  -- OUTPUTS: Blood Oxygen Difference: 11.41  Outputs:  -- OUTPUTS: CO by Yung: 2.52  Outputs:  -- OUTPUTS: Yung cardiac index: 1.17  Outputs:  -- OUTPUTS: O2 consumption: 288.10  Outputs:  -- OUTPUTS: Vo2 Indexed: 134.04  Outputs:  -- RESISTANCES: Pulmonary vascular index (dsc): 1021.44  Outputs:  -- RESISTANCES: Pulmonary vascular index (Wood Units): 12.77  Outputs:  -- RESISTANCES: Pulmonary vascular resistance (dsc): 475.24  Outputs:  -- RESISTANCES: Pulmonary vascular resistance (Wood Units): 5.94  Outputs:  -- RESISTANCES: Right ventricular stroke work: 11.27  Outputs:  -- RESISTANCES: Right ventricular stroke work index: 5.25  Outputs:  -- RESISTANCES: Total pulmonary index (dsc): 1906.68  Outputs:  -- RESISTANCES: Total pulmonary index (Wood Units): 23.84  Outputs:  -- RESISTANCES: Total pulmonary resistance (dsc): 887.11  Outputs:  -- RESISTANCES: Total pulmonary resistance (Wood Units): 11.09  Outputs:  -- SHUNTS: Qs Indexed: 1.17  Outputs:  -- SHUNTS: Systemic flow: 2.52    < end of copied text >  [facility]  [phone]
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 He has had persistent Afib for which he was on multiple different medications and then had an attempted cardioversion. He was then placed on Amiodarone 200mg daily. About 2 days after starting the Amiodarone he started developing progressive dyspnea. He stopped the Amio after about 3 weeks total. He has recently traveled to [place] (by plane) and had a car ride to [place] (3-4 hours each way). He denies any fevers or chills.    He has been evaluated by Dr. Hanley and Dr. Shukla for his pulmonary symptoms. He had a CT chest done at [facility] earlier this month that had significant consolidations and did not look like heart failure by report. (I have not been able to see these images to this point). He has also had orthopnea, LE edema, palpitations, and increased difficulty with his respiratory status to the point that he is SOB after speaking a few words. He has been placed on diuretics with minimal improvement.     In the hospital he has had a CXR which shows bilateral infiltrates. He has been noted to be hypoxemic 80s requiring supplemental oxygen. He is urinating frequently. He is not walking much.    He is now on IV diuretics. He is scheduled for a bronchoscopy tomorrow with Dr. Shukla.      ====================BACKGROUND INFORMATION============================    FAMILY HISTORY: FAMILY HISTORY:  Family history of lung cancer (Sibling)  Type 2 diabetes mellitus  Family history of arrhythmia      PAST MEDICAL AND SURGICAL HISTORY: PAST MEDICAL & SURGICAL HISTORY:  Obesity, unspecified obesity severity, unspecified obesity type  Hyperlipidemia, unspecified hyperlipidemia type  Essential hypertension  Persistent atrial fibrillation  H/O bilateral hip replacements  H/O arthroscopy of knee      ALLERGIES:Allergies:  No Known Allergies    HOME MEDICATIONS: reviewed     OUTPATIENT PULMONARY DOCTOR: Dr. Hanley    SOCIAL HISTORY  TOBACCO USE: Former   ALCOHOL: Social  DRUGS: Denied   MARITAL STATUS:    EMPLOYMENT: Retired - contractor  EXPOSURES: Limited dust and asbestos exposure  RECENT TRAVELS: [place] by flight, [id number] by car  CODE STATUS: Full code     ====================REVIEW OF SYSTEMS=====================================  CONSTITUTIONAL: Weak, SOB  CARDIOVASCULAR: Denies chest pain, intermittent palpitations  PULMONARY: Dry cough, SOB, No hemoptysis  GASTROINTESTINAL: Denies nausea, vomiting, or abdominal pain  [x] ALL OTHER REVIEW OF SYSTEMS ARE NEGATIVE   [] UNABLE TO OBTAIN REVIEW OF SYSTEMS DUE TO ______________      ====================PHYSICAL EXAM=========================================    VITALS: ICU Vital Signs Last 24 Hrs  T(C): 36.3 (22 May 2018 15:54), Max: 36.8 (22 May 2018 04:01)  T(F): 97.4 (22 May 2018 15:54), Max: 98.3 (22 May 2018 04:01)  HR: 70 (22 May 2018 15:54) (70 - 88)  BP: 115/70 (22 May 2018 15:54) (106/54 - 130/69)  RR: 18 (22 May 2018 14:17) (18 - 18)  SpO2: 95% (22 May 2018 15:55) (78% - 95%)      INTAKE and OUTPUT: I&O's Summary    21 May 2018 07:01  -  22 May 2018 07:00  --------------------------------------------------------  IN: 240 mL / OUT: 0 mL / NET: 240 mL    22 May 2018 07:01  -  22 May 2018 16:28  --------------------------------------------------------  IN: 240 mL / OUT: 0 mL / NET: 240 mL        WEIGHT: Daily     Daily     GLUCOSE: CAPILLARY BLOOD GLUCOSE      VENTILATOR SETTINGS:  N/A    GENERAL: AAOx3, Moderate dyspnea, sitting at incline  HEENT: Normocephalic, atraumatic, MMM, nares clear, trachea midline  NECK: large neck, +HJR  LYMPH NODES: no palpable supraclavicular LAD  CARDIOVASCULAR: RRR, s1S2  PULMONARY: Mild tachypnea, grossly CTA bilaterally, no wheeze or rhonchi  ABDOMEN: soft, obese, NT, ND, +BS  SKIN: warm and dry  EXTREMITIES: LE edema, no clubbing or cyanosis  NEUROLOGIC: nonfocal exam, moves all extremities  PSYCHIATRIC: calm    ====================MEDICATIONS===========================================  MEDICATIONS  (STANDING):  aspirin enteric coated 81 milliGRAM(s) Oral daily  ATENolol  Tablet 50 milliGRAM(s) Oral daily  calcium carbonate 1250 mG + Vitamin D (OsCal 500 + D) 1 Tablet(s) Oral daily  cefTRIAXone   IVPB 1 Gram(s) IV Intermittent every 24 hours  simvastatin 40 milliGRAM(s) Oral at bedtime      MEDICATIONS  (PRN):      ====================LABORATORY RESULTS====================================  CBC Full  -  ( 22 May 2018 09:11 )  WBC Count : 11.11 K/uL  Hemoglobin : 13.1 g/dL  Hematocrit : 41.2 %  Platelet Count - Automated : 367 K/uL  Mean Cell Volume : 97.6 fl  Mean Cell Hemoglobin : 31.0 pg  Mean Cell Hemoglobin Concentration : 31.8 gm/dL  Auto Neutrophil # : 7.93 K/uL  Auto Lymphocyte # : 1.61 K/uL  Auto Monocyte # : 1.16 K/uL  Auto Eosinophil # : 0.31 K/uL  Auto Basophil # : 0.04 K/uL  Auto Neutrophil % : 71.4 %  Auto Lymphocyte % : 14.5 %  Auto Monocyte % : 10.4 %  Auto Eosinophil % : 2.8 %  Auto Basophil % : 0.4 %                                    05-22    134<L>  |  94<L>  |  21  ----------------------------<  107<H>  4.0   |  29  |  1.14    Ca    9.3      22 May 2018 07:11  Phos  2.9     05-22  Mg     1.6     05-22    TPro  8.4<H>  /  Alb  3.2<L>  /  TBili  0.7  /  DBili  x   /  AST  30  /  ALT  18  /  AlkPhos  65  05-21        PT/INR - ( 21 May 2018 11:42 )   PT: 18.0 sec;   INR: 1.65 ratio         PTT - ( 21 May 2018 11:42 )  PTT:56.4 sec    Creatinine Trend: 1.14<--, 1.16<--      ====================RADIOLOGY and ECHOCARDIOGRAPHY=======================    CXR: < from: Xray Chest 1 View AP/PA (05.21.18 @ 11:57) >  IMPRESSION:  Patchy perihilar airspace opacities bilaterally which compatible with pulmonary edema.    < end of copied text >    CT CHEST: BY REPORT - IMAGES NOT REVIEWED - CT chest (5/10/2018) showed bilateral airspace consolidation most prominent in the upper lobe, bilateral nodules, mild mediastinal LAD, small-mod R pleural effusion, small L pleural effusion.    ECHOCARDIOGRAPHY: < from: Transesophageal Echocardiogram (12.09.16 @ 10:54) >  Conclusions:  1. Moderately dilated left atrium.  LA volume index = 42 cc/m2.   Spontaneous echo contrast seen.   No left atrial  or left atrial appendage thrombus.   Decreased left atrial appendage velocities noted.  2. Mild concentric left ventricular hypertrophy.  3. Normal left ventricular systolic function. No segmental wall motion abnormalities.  4. Agitated saline injection and color flow Doppler demonstrate no evidence of a patent foramen ovale.  < end of copied text >
Thoracic Surgery Consult  Consulting surgical team: Thoracic [id number]  Consulting attending: Dr. Cazares    HPI:  77yo man PMHx HTN, HLD, AFib on Eliquis s/p ablation (2018) p/w shortness of breath. Pt developed progressive dyspnea, orthopnea, and LE edema starting 1 month ago. Pt saw his cardiologist 3/12 for a checkup who noted the patient was back into atrial fibrillation and started him on amiodarone. Amiodarone was discontinued after one month as pt developed dyspnea. Pt walks with a cane at baseline, but was able to climb a flight of stairs without issue. Now he becomes SOB while at rest. Sitting up in a chair improves his SOB. Pt used to sleep flat, but over past month he now sleeps propped up or in a recliner due to difficulty breathing while flat. Dyspnea associated with LE edema so pt was started on lasix 40mg daily by his cardiologist. CT chest (5/10/2018) showed bilateral airspace consolidation most prominent in the upper lobe, bilateral nodules, mild mediastinal LAD, small-mod R pleural effusion, small L pleural effusion. These are all new findings compared to CT chest from 2017. TTE 5 days prior to admission showed LVEF 55%, indeterminate diastolic function, mild segmental hypokinesis, mildly dilated LA, mild aortic insufficiency, mild MR. Was scheduled for a stress test next week to work up the dyspnea. 5 days PTA blood work was remarkable for pBNP 6643, ESR 49, no leukocytosis. Pt saw a pulmonologist for the first time one week ago; PFTs showed a moderate restrictive pattern, no bronchodilator response, mild-mod diffusion impairment that corrected to normal for lung volume. When pt went in for followup today, ox sat was 82% on RA and he was sent to the ED.    Of note, pt took a road trip a couple days prior to admission that was 4 hours each way with 3-4 bathroom breaks each way. He took s trip to [place] 3/2018 where he and his wife developed sneezing and a dry cough. Pt's sneezing resolved but he continues to have daily dry cough. Denies F/C, night sweats, muscle/joint pain, nasal congestion, sore throat. Denies PND, night sweats, weight loss, hemoptysis, myalgias joint pain.    ED vitals: afebrile, HR 84, /70, RR 25, sat 82% on RA improved to 96% on 2L NC. (21 May 2018 13:43)      PAST MEDICAL HISTORY:  Obesity, unspecified obesity severity, unspecified obesity type  Hyperlipidemia, unspecified hyperlipidemia type  Essential hypertension  Persistent atrial fibrillation      PAST SURGICAL HISTORY:  H/O bilateral hip replacements  H/O arthroscopy of knee      MEDICATIONS:  aspirin enteric coated 81 milliGRAM(s) Oral daily  ATENolol  Tablet 50 milliGRAM(s) Oral daily  calcium carbonate 1250 mG + Vitamin D (OsCal 500 + D) 1 Tablet(s) Oral daily  cefTRIAXone   IVPB 1 Gram(s) IV Intermittent every 24 hours  furosemide   Injectable 40 milliGRAM(s) IV Push daily  lisinopril 10 milliGRAM(s) Oral daily  simvastatin 40 milliGRAM(s) Oral at bedtime  tamsulosin 0.4 milliGRAM(s) Oral at bedtime      ALLERGIES:  No Known Allergies      VITALS & I/Os:  Vital Signs Last 24 Hrs  T(C): 36.7 (23 May 2018 13:55), Max: 37.2 (22 May 2018 20:27)  T(F): 98 (23 May 2018 13:55), Max: 99 (22 May 2018 20:27)  HR: 76 (23 May 2018 18:03) (56 - 82)  BP: 119/72 (23 May 2018 18:03) (93/56 - 130/90)  BP(mean): --  RR: 18 (23 May 2018 13:55) (18 - 22)  SpO2: 98% (23 May 2018 13:55) (94% - 98%)    I&O's Summary    22 May 2018 07:01  -  23 May 2018 07:00  --------------------------------------------------------  IN: 480 mL / OUT: 2400 mL / NET: -1920 mL    23 May 2018 07:01  -  23 May 2018 19:55  --------------------------------------------------------  IN: 660 mL / OUT: 325 mL / NET: 335 mL        PHYSICAL EXAM:  General: No acute distress  Respiratory: Nonlabored  Cardiovascular: RRR  Abdominal: Soft, nondistended, nontender. No rebound or guarding. No organomegaly, no palpable mass.  Extremities: Warm    LABS:                        12.5   9.72  )-----------( 311      ( 23 May 2018 08:19 )             37.6     05-    133<L>  |  91<L>  |  19  ----------------------------<  113<H>  3.5   |  29  |  1.00    Ca    9.2      23 May 2018 07:09  Phos  2.7       Mg     1.7         TPro  7.6  /  Alb  2.9<L>  /  TBili  0.6  /  DBili  0.1  /  AST  23  /  ALT  16  /  AlkPhos  56  05    Lactate:    PT/INR - ( 23 May 2018 09:42 )   PT: 13.8 sec;   INR: 1.22 ratio         PTT - ( 23 May 2018 09:42 )  PTT:44.2 sec    CARDIAC MARKERS ( 23 May 2018 07:09 )  x     / <0.01 ng/mL / 53 U/L / x     / 2.0 ng/mL        Urinalysis Basic - ( 21 May 2018 23:59 )    Color: Yellow / Appearance: Turbid / S.016 / pH: x  Gluc: x / Ketone: Negative  / Bili: Negative / Urobili: 1.0 mg/dL   Blood: x / Protein: Trace mg/dL / Nitrite: Positive   Leuk Esterase: Large / RBC: 4 /HPF / WBC >720 /HPF   Sq Epi: x / Non Sq Epi: 0 /HPF / Bacteria: TNTC        IMAGING:

## 2018-05-24 NOTE — PROGRESS NOTE ADULT - PROBLEM SELECTOR PLAN 6
Atrial fibrillation diagnosed 2014. 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 s/p ablation (4/24). Pt has loop recorder and has gone into paroxysmal AF after ablation.  -Currently in sinus rhythm, monitor on tele  -continue atenolol, holding Eliquis for heart cath

## 2018-05-24 NOTE — BRIEF OPERATIVE NOTE - OPERATION/FINDINGS
Left VATS with wedge resection x 2 (1 upper lobe and 1 lower lobe), Specimens sent for culture and pathology.  28 Brazilian chest tube left in place

## 2018-05-24 NOTE — PROGRESS NOTE ADULT - PROBLEM SELECTOR PLAN 2
Presented with hypoxia sat 82% on RA, still hypoxic 78% on RA. Does not use oxygen at home, no hx of pulmonary disease except 60 pack year history and restrictive PFTs.  -continue supplemental oxygen to maintain SpO2 >92%  -diuresis  -RHC/LHC today Presented with hypoxia sat 82% on RA, still hypoxic 78% on RA. Does not use oxygen at home, no hx of pulmonary disease except 60 pack year history and restrictive PFTs.  -continue supplemental oxygen to maintain SpO2 >92%  -diuresis  -lung biopsy today Presented with hypoxia sat 82% on RA, still hypoxic 78% on RA. Does not use oxygen at home, no hx of pulmonary disease except 60 pack year history and restrictive PFTs.  -continue supplemental oxygen to maintain SpO2 >92%  -s/p diuresis  -lung biopsy today

## 2018-05-24 NOTE — PROGRESS NOTE ADULT - ATTENDING COMMENTS
as above-  Though I agree w/ dx bronch here I would 1st perform a RHC to r/o CHF here; If pressures are normal then can comfortably proceed w/ bronch w/ less risk of bleeding etc.   goal is not to commit pt to long course of prednisone if can avoid it.      Lele Saul MD-Pulmonary   [phone] as above-  s/p both LHC and RHC to no evidence of CHF here; case d/w Dr. Mei and Son and Dr. Hanley--will need VATS tissue bx for definitive DX prior to initiating immunosuppressive rx.  CTS evaln this am (family to bring in CT scan)        Lele Saul MD-Pulmonary   [phone]

## 2018-05-24 NOTE — PROGRESS NOTE ADULT - PROBLEM SELECTOR PLAN 3
E coli UTI. C/o intermittent dysuria, urinary frequency, and incomplete voiding  -f/u sensitivities  -continue ceftriaxone (5/22 - ) C/o intermittent dysuria, urinary frequency, and incomplete voiding  -pansensitive E coli UTI  -continue ceftriaxone (5/22 - ) C/o intermittent dysuria, urinary frequency, and incomplete voiding  -pansensitive E coli UTI  -continue ceftriaxone (5/22 - 5/23), switch to ciprofloxacin

## 2018-05-24 NOTE — CONSULT NOTE ADULT - ASSESSMENT
78y Male with history of HTN, HLD, PAF S/P Ablation admitted with increasing SOB. Cardiac cath findings noted. Suspect component of acute on chronic diastolic CHF. Improved with lasix. No significant CAD and remains NSR post ablation off amiodarone for now. CT and CXR findings may be more c/w primary pulmonary process. Amiodarone use has been short term but can be associated with acute toxicity as well.     Rec:  Agree with plans for lung biopsy.  Hold eliquis for biopsy  Continue atenolol and lisinopril.  Can change lasix to po  Follow up TTE    Will discuss with son.

## 2018-05-24 NOTE — PROGRESS NOTE ADULT - PROBLEM SELECTOR PLAN 7
Normotensive. Kidney function at baseline 1.0 - 1.5 (5/2018). At home takes lasix 40mg daily  -continue home atenolol  -lasix 40mg IV daily  -lisinopril 10mg daily, home equivalent would be 40mg daily Normotensive. Kidney function at baseline 1.0 - 1.5 (5/2018). At home takes lasix 40mg daily  -atenolol  -lasix 40mg IV daily  -lisinopril 10mg daily, home equivalent would be 40mg daily Normotensive. Kidney function at baseline 1.0 - 1.5 (5/2018). Home equivalent ramipril is lisinopril 40mg daily  -atenolol, lasix 40 po, lisinopril 10mg

## 2018-05-24 NOTE — CHART NOTE - NSCHARTNOTEFT_GEN_A_CORE
Informed that patient's pulmonary care will be managed by  going forward.  will sign off the case.  Please reconsult as needed.

## 2018-05-24 NOTE — PROGRESS NOTE ADULT - SUBJECTIVE AND OBJECTIVE BOX
Internal Medicine Progress Note    Linnea Btut, PGY1  Internal Medicine, team 1  Pager [phone] / [phone]  After 7PM on weekdays and 12PM on weekends, please page #[phone]    Patient is a 78y old  Male who presents with a chief complaint of SOB (21 May 2018 13:43)      SUBJECTIVE / OVERNIGHT EVENTS:     MEDICATIONS  (STANDING):  aspirin enteric coated 81 milliGRAM(s) Oral daily  ATENolol  Tablet 50 milliGRAM(s) Oral daily  calcium carbonate 1250 mG + Vitamin D (OsCal 500 + D) 1 Tablet(s) Oral daily  cefTRIAXone   IVPB 1 Gram(s) IV Intermittent every 24 hours  furosemide   Injectable 40 milliGRAM(s) IV Push daily  lisinopril 10 milliGRAM(s) Oral daily  simvastatin 40 milliGRAM(s) Oral at bedtime  tamsulosin 0.4 milliGRAM(s) Oral at bedtime    MEDICATIONS  (PRN):      Vital Signs Last 24 Hrs  T(C): 36.6 (24 May 2018 04:42), Max: 36.7 (23 May 2018 13:55)  T(F): 97.9 (24 May 2018 04:42), Max: 98.1 (23 May 2018 21:06)  HR: 75 (24 May 2018 04:42) (56 - 86)  BP: 115/59 (24 May 2018 04:42) (93/56 - 122/71)  BP(mean): --  RR: 20 (24 May 2018 04:42) (18 - 20)  SpO2: 96% (24 May 2018 04:42) (91% - 98%)    CAPILLARY BLOOD GLUCOSE          I&O's Summary    23 May 2018 07:01  -  24 May 2018 07:00  --------------------------------------------------------  IN: 660 mL / OUT: 1495 mL / NET: -835 mL      PHYSICAL EXAM  GENERAL: NAD, well-developed  HEAD:  Atraumatic, Normocephalic  EYES: EOMI, PERRLA, conjunctiva and sclera clear  NECK: Supple, No JVD  CHEST/LUNG: Clear to auscultation bilaterally; No wheeze, respirations nonlabored, nasal cannula   HEART: Regular rate and rhythm; No murmurs, rubs, or gallops  ABDOMEN: Soft, Nontender, Nondistended; Bowel sounds present, no suprapubic tenderness  EXTREMITIES:  2+ Peripheral Pulses, No clubbing, cyanosis, or edema  NEURO/PSYCH: AAOx3, nonfocal  SKIN: No rashes or lesions      LABS:                        12.5   9.72  )-----------( 311      ( 23 May 2018 08:19 )             37.6     CBC Full  -  ( 23 May 2018 08:19 )  WBC Count : 9.72 K/uL  Hemoglobin : 12.5 g/dL  Hematocrit : 37.6 %  Platelet Count - Automated : 311 K/uL  Mean Cell Volume : 95.9 fl  Mean Cell Hemoglobin : 31.9 pg  Mean Cell Hemoglobin Concentration : 33.2 gm/dL  Auto Neutrophil # : 6.57 K/uL  Auto Lymphocyte # : 1.58 K/uL  Auto Monocyte # : 1.23 K/uL  Auto Eosinophil # : 0.27 K/uL  Auto Basophil # : 0.02 K/uL  Auto Neutrophil % : 67.5 %  Auto Lymphocyte % : 16.3 %  Auto Monocyte % : 12.7 %  Auto Eosinophil % : 2.8 %  Auto Basophil % : 0.2 %    05-23    133<L>  |  91<L>  |  19  ----------------------------<  113<H>  3.5   |  29  |  1.00    Ca    9.2      23 May 2018 07:09  Phos  2.7     05-23  Mg     1.7     05-23    TPro  7.6  /  Alb  2.9<L>  /  TBili  0.6  /  DBili  0.1  /  AST  23  /  ALT  16  /  AlkPhos  56  05-23    Creatinine Trend: 1.00<--, 1.14<--, 1.16<--  LIVER FUNCTIONS - ( 23 May 2018 07:09 )  Alb: 2.9 g/dL / Pro: 7.6 g/dL / ALK PHOS: 56 U/L / ALT: 16 U/L / AST: 23 U/L / GGT: x           PT/INR - ( 23 May 2018 09:42 )   PT: 13.8 sec;   INR: 1.22 ratio         PTT - ( 23 May 2018 09:42 )  PTT:44.2 sec  CARDIAC MARKERS ( 23 May 2018 07:09 )  x     / <0.01 ng/mL / 53 U/L / x     / 2.0 ng/mL        MICROBIOLOGY:    Culture - Urine (collected 22 May 2018 13:52)  Source: .Urine Clean Catch (Midstream)  Preliminary Report (23 May 2018 08:22):    >100,000 CFU/ml Escherichia coli        RADIOLOGY & ADDITIONAL TESTS:  < from: Cardiac Cath Lab - Adult (05.23.18 @ 15:37) >  CORONARY VESSELS: The coronary circulation is left dominant.  LM:   --  LM: Angiography showed minor luminal irregularities with no flow  limiting lesions.  LAD:   --  LAD: Angiography showed minor luminal irregularities with no  flow limiting lesions.  CX:   --  Circumflex: Angiography showed minor luminal irregularities with  no flow limiting lesions.  RCA:   --  RCA: Angiography showed minor luminal irregularities with no  flow limiting lesions.    HEMODYNAMIC TABLES  Pressures:  Baseline  Pressures:  - HR: 67  Pressures:  - Rhythm:  Pressures:  -- Pulmonary Artery (S/D/M): 49/16/28  Pressures:  -- Pulmonary Capillary Wedge: 18/23/13  Pressures:  -- Right Atrium (a/v/M): 11/7/6  Pressures:  -- Right Ventricle (s/edp): 47/10/--         CONSULTS: pulmonology, cards Internal Medicine Progress Note    Linnea Butt, PGY1  Internal Medicine, team 1  Pager [phone] / [phone]  After 7PM on weekdays and 12PM on weekends, please page #[phone]    Patient is a 78y old  Male who presents with a chief complaint of SOB (21 May 2018 13:43)      SUBJECTIVE / OVERNIGHT EVENTS: PVR this AM 19. Pt has no complaints. Had a normal BM this morning. Still has feeling of incomplete emptying, but that is chronic. Denies dysuria, CP, SOB, GRIMALDO. Wife brought CD of CT chest. Heart cath yesterday, no flow limiting lesions.    MEDICATIONS  (STANDING):  aspirin enteric coated 81 milliGRAM(s) Oral daily  ATENolol  Tablet 50 milliGRAM(s) Oral daily  calcium carbonate 1250 mG + Vitamin D (OsCal 500 + D) 1 Tablet(s) Oral daily  cefTRIAXone   IVPB 1 Gram(s) IV Intermittent every 24 hours  furosemide   Injectable 40 milliGRAM(s) IV Push daily  lisinopril 10 milliGRAM(s) Oral daily  simvastatin 40 milliGRAM(s) Oral at bedtime  tamsulosin 0.4 milliGRAM(s) Oral at bedtime    MEDICATIONS  (PRN):      Vital Signs Last 24 Hrs  T(C): 36.6 (24 May 2018 04:42), Max: 36.7 (23 May 2018 13:55)  T(F): 97.9 (24 May 2018 04:42), Max: 98.1 (23 May 2018 21:06)  HR: 75 (24 May 2018 04:42) (56 - 86)  BP: 115/59 (24 May 2018 04:42) (93/56 - 122/71)  BP(mean): --  RR: 20 (24 May 2018 04:42) (18 - 20)  SpO2: 96% (24 May 2018 04:42) (91% - 98%)    CAPILLARY BLOOD GLUCOSE        I&O's Summary    23 May 2018 07:01  -  24 May 2018 07:00  --------------------------------------------------------  IN: 660 mL / OUT: 1495 mL / NET: -835 mL      PHYSICAL EXAM  GENERAL: NAD, well-developed  HEAD:  Atraumatic, Normocephalic  EYES: EOMI, PERRLA, conjunctiva and sclera clear  NECK: Supple, No JVD  CHEST/LUNG: Clear to auscultation bilaterally; No wheeze, respirations nonlabored, nasal cannula   HEART: Regular rate and rhythm; No murmurs, rubs, or gallops  ABDOMEN: Soft, Nontender, Nondistended; Bowel sounds present  EXTREMITIES:  2+ Peripheral Pulses, No clubbing, cyanosis, or edema  NEURO/PSYCH: AAOx3, nonfocal  SKIN: No rashes or lesions      LABS:                        12.0   9.07  )-----------( 330      ( 24 May 2018 07:52 )             37.5     CBC Full  -  ( 24 May 2018 07:52 )  WBC Count : 9.07 K/uL  Hemoglobin : 12.0 g/dL  Hematocrit : 37.5 %  Platelet Count - Automated : 330 K/uL  Mean Cell Volume : 97.2 fl  Mean Cell Hemoglobin : 31.1 pg  Mean Cell Hemoglobin Concentration : 32.0 gm/dL  Auto Neutrophil # : 5.89 K/uL  Auto Lymphocyte # : 1.62 K/uL  Auto Monocyte # : 1.07 K/uL  Auto Eosinophil # : 0.42 K/uL  Auto Basophil # : 0.02 K/uL  Auto Neutrophil % : 64.9 %  Auto Lymphocyte % : 17.9 %  Auto Monocyte % : 11.8 %  Auto Eosinophil % : 4.6 %  Auto Basophil % : 0.2 %    05-24    135  |  92<L>  |  17  ----------------------------<  111<H>  3.3<L>   |  32<H>  |  0.95    Ca    8.7      24 May 2018 06:41  Phos  2.8     05-24  Mg     1.9     05-24    TPro  7.6  /  Alb  2.9<L>  /  TBili  0.6  /  DBili  0.1  /  AST  23  /  ALT  16  /  AlkPhos  56  05-23    Creatinine Trend: 0.95<--, 1.00<--, 1.14<--, 1.16<--  LIVER FUNCTIONS - ( 23 May 2018 07:09 )  Alb: 2.9 g/dL / Pro: 7.6 g/dL / ALK PHOS: 56 U/L / ALT: 16 U/L / AST: 23 U/L / GGT: x           PT/INR - ( 24 May 2018 07:58 )   PT: 12.6 sec;   INR: 1.11 ratio         PTT - ( 24 May 2018 07:58 )  PTT:45.9 sec  CARDIAC MARKERS ( 23 May 2018 07:09 )  x     / <0.01 ng/mL / 53 U/L / x     / 2.0 ng/mL        MICROBIOLOGY:    Culture - Urine (collected 22 May 2018 13:52)  Source: .Urine Clean Catch (Midstream)  Final Report (24 May 2018 08:53):    >100,000 CFU/ml Escherichia coli  Organism: Escherichia coli (24 May 2018 08:53)      RADIOLOGY & ADDITIONAL TESTS:  < from: Cardiac Cath Lab - Adult (05.23.18 @ 15:37) >  CORONARY VESSELS: The coronary circulation is left dominant.  LM:   --  LM: Angiography showed minor luminal irregularities with no flow  limiting lesions.  LAD:   --  LAD: Angiography showed minor luminal irregularities with no  flow limiting lesions.  CX:   --  Circumflex: Angiography showed minor luminal irregularities with  no flow limiting lesions.  RCA:   --  RCA: Angiography showed minor luminal irregularities with no  flow limiting lesions.    HEMODYNAMIC TABLES  Pressures:  Baseline  Pressures:  - HR: 67  Pressures:  - Rhythm:  Pressures:  -- Pulmonary Artery (S/D/M): 49/16/28  Pressures:  -- Pulmonary Capillary Wedge: 18/23/13  Pressures:  -- Right Atrium (a/v/M): 11/7/6  Pressures:  -- Right Ventricle (s/edp): 47/10/--         CONSULTS: pulmonology, cards Internal Medicine Progress Note    Linnea Butt, PGY1  Internal Medicine, team 1  Pager [phone] / [phone]  After 7PM on weekdays and 12PM on weekends, please page #[phone]    Patient is a 78y old  Male who presents with a chief complaint of SOB (21 May 2018 13:43)      SUBJECTIVE / OVERNIGHT EVENTS: PVR this AM 19. Pt has no complaints. Had a normal BM this morning. Still has feeling of incomplete emptying, but that is chronic. Denies dysuria, CP, SOB, GRIMALDO. Wife brought CD of CT chest. Heart cath yesterday, no flow limiting lesions, no pulmonary HTN.    MEDICATIONS  (STANDING):  aspirin enteric coated 81 milliGRAM(s) Oral daily  ATENolol  Tablet 50 milliGRAM(s) Oral daily  calcium carbonate 1250 mG + Vitamin D (OsCal 500 + D) 1 Tablet(s) Oral daily  cefTRIAXone   IVPB 1 Gram(s) IV Intermittent every 24 hours  furosemide   Injectable 40 milliGRAM(s) IV Push daily  lisinopril 10 milliGRAM(s) Oral daily  simvastatin 40 milliGRAM(s) Oral at bedtime  tamsulosin 0.4 milliGRAM(s) Oral at bedtime    MEDICATIONS  (PRN):      Vital Signs Last 24 Hrs  T(C): 36.6 (24 May 2018 04:42), Max: 36.7 (23 May 2018 13:55)  T(F): 97.9 (24 May 2018 04:42), Max: 98.1 (23 May 2018 21:06)  HR: 75 (24 May 2018 04:42) (56 - 86)  BP: 115/59 (24 May 2018 04:42) (93/56 - 122/71)  BP(mean): --  RR: 20 (24 May 2018 04:42) (18 - 20)  SpO2: 96% (24 May 2018 04:42) (91% - 98%)    CAPILLARY BLOOD GLUCOSE        I&O's Summary    23 May 2018 07:01  -  24 May 2018 07:00  --------------------------------------------------------  IN: 660 mL / OUT: 1495 mL / NET: -835 mL      PHYSICAL EXAM  GENERAL: NAD, well-developed  HEAD:  Atraumatic, Normocephalic  EYES: EOMI, PERRLA, conjunctiva and sclera clear  NECK: Supple, No JVD  CHEST/LUNG: Clear to auscultation bilaterally; No wheeze, respirations nonlabored, nasal cannula   HEART: Regular rate and rhythm; No murmurs, rubs, or gallops  ABDOMEN: Soft, Nontender, Nondistended; Bowel sounds present  EXTREMITIES:  2+ Peripheral Pulses, No clubbing, cyanosis, or edema  NEURO/PSYCH: AAOx3, nonfocal  SKIN: No rashes or lesions      LABS:                        12.0   9.07  )-----------( 330      ( 24 May 2018 07:52 )             37.5     CBC Full  -  ( 24 May 2018 07:52 )  WBC Count : 9.07 K/uL  Hemoglobin : 12.0 g/dL  Hematocrit : 37.5 %  Platelet Count - Automated : 330 K/uL  Mean Cell Volume : 97.2 fl  Mean Cell Hemoglobin : 31.1 pg  Mean Cell Hemoglobin Concentration : 32.0 gm/dL  Auto Neutrophil # : 5.89 K/uL  Auto Lymphocyte # : 1.62 K/uL  Auto Monocyte # : 1.07 K/uL  Auto Eosinophil # : 0.42 K/uL  Auto Basophil # : 0.02 K/uL  Auto Neutrophil % : 64.9 %  Auto Lymphocyte % : 17.9 %  Auto Monocyte % : 11.8 %  Auto Eosinophil % : 4.6 %  Auto Basophil % : 0.2 %    05-24    135  |  92<L>  |  17  ----------------------------<  111<H>  3.3<L>   |  32<H>  |  0.95    Ca    8.7      24 May 2018 06:41  Phos  2.8     05-24  Mg     1.9     05-24    TPro  7.6  /  Alb  2.9<L>  /  TBili  0.6  /  DBili  0.1  /  AST  23  /  ALT  16  /  AlkPhos  56  05-23    Creatinine Trend: 0.95<--, 1.00<--, 1.14<--, 1.16<--  LIVER FUNCTIONS - ( 23 May 2018 07:09 )  Alb: 2.9 g/dL / Pro: 7.6 g/dL / ALK PHOS: 56 U/L / ALT: 16 U/L / AST: 23 U/L / GGT: x           PT/INR - ( 24 May 2018 07:58 )   PT: 12.6 sec;   INR: 1.11 ratio         PTT - ( 24 May 2018 07:58 )  PTT:45.9 sec  CARDIAC MARKERS ( 23 May 2018 07:09 )  x     / <0.01 ng/mL / 53 U/L / x     / 2.0 ng/mL        MICROBIOLOGY:    Culture - Urine (collected 22 May 2018 13:52)  Source: .Urine Clean Catch (Midstream)  Final Report (24 May 2018 08:53):    >100,000 CFU/ml Escherichia coli  Organism: Escherichia coli (24 May 2018 08:53)      RADIOLOGY & ADDITIONAL TESTS:  < from: Cardiac Cath Lab - Adult (05.23.18 @ 15:37) >  CORONARY VESSELS: The coronary circulation is left dominant.  LM:   --  LM: Angiography showed minor luminal irregularities with no flow  limiting lesions.  LAD:   --  LAD: Angiography showed minor luminal irregularities with no  flow limiting lesions.  CX:   --  Circumflex: Angiography showed minor luminal irregularities with  no flow limiting lesions.  RCA:   --  RCA: Angiography showed minor luminal irregularities with no  flow limiting lesions.    HEMODYNAMIC TABLES  Pressures:  Baseline  Pressures:  - HR: 67  Pressures:  - Rhythm:  Pressures:  -- Pulmonary Artery (S/D/M): 49/16/28  Pressures:  -- Pulmonary Capillary Wedge: 18/23/13  Pressures:  -- Right Atrium (a/v/M): 11/7/6  Pressures:  -- Right Ventricle (s/edp): 47/10/--         CONSULTS: pulmonology, cards Internal Medicine Progress Note    Linnea Butt, PGY1  Internal Medicine, team 1  Pager [phone] / [phone]  After 7PM on weekdays and 12PM on weekends, please page #[phone]    Patient is a 78y old  Male who presents with a chief complaint of SOB (21 May 2018 13:43)      SUBJECTIVE / OVERNIGHT EVENTS: PVR this AM 19. Pt has no complaints. Had a normal BM this morning. Still has feeling of incomplete emptying, but that is chronic. Denies dysuria, CP, SOB, GRIMALDO. Wife brought CD of CT chest. Heart cath yesterday, no flow limiting lesions, no pulmonary HTN.    MEDICATIONS  (STANDING):  aspirin enteric coated 81 milliGRAM(s) Oral daily  ATENolol  Tablet 50 milliGRAM(s) Oral daily  calcium carbonate 1250 mG + Vitamin D (OsCal 500 + D) 1 Tablet(s) Oral daily  cefTRIAXone   IVPB 1 Gram(s) IV Intermittent every 24 hours  furosemide   Injectable 40 milliGRAM(s) IV Push daily  lisinopril 10 milliGRAM(s) Oral daily  simvastatin 40 milliGRAM(s) Oral at bedtime  tamsulosin 0.4 milliGRAM(s) Oral at bedtime    MEDICATIONS  (PRN):    Vital Signs Last 24 Hrs  T(C): 36.6 (24 May 2018 04:42), Max: 36.7 (23 May 2018 13:55)  T(F): 97.9 (24 May 2018 04:42), Max: 98.1 (23 May 2018 21:06)  HR: 75 (24 May 2018 04:42) (56 - 86)  BP: 115/59 (24 May 2018 04:42) (93/56 - 122/71)  BP(mean): --  RR: 20 (24 May 2018 04:42) (18 - 20)  SpO2: 96% (24 May 2018 04:42) (91% - 98%)    CAPILLARY BLOOD GLUCOSE        I&O's Summary    23 May 2018 07:01  -  24 May 2018 07:00  --------------------------------------------------------  IN: 660 mL / OUT: 1495 mL / NET: -835 mL      PHYSICAL EXAM  GENERAL: NAD, well-developed  HEAD:  Atraumatic, Normocephalic  EYES: EOMI, PERRLA, conjunctiva and sclera clear  NECK: Supple, No JVD  CHEST/LUNG: Clear to auscultation bilaterally; No wheeze, respirations nonlabored, nasal cannula   HEART: Regular rate and rhythm; No murmurs, rubs, or gallops  ABDOMEN: Soft, Nontender, Nondistended; Bowel sounds present  EXTREMITIES:  2+ Peripheral Pulses, No clubbing, cyanosis, or edema  NEURO/PSYCH: AAOx3, nonfocal  SKIN: No rashes or lesions      LABS:                        12.0   9.07  )-----------( 330      ( 24 May 2018 07:52 )             37.5     CBC Full  -  ( 24 May 2018 07:52 )  WBC Count : 9.07 K/uL  Hemoglobin : 12.0 g/dL  Hematocrit : 37.5 %  Platelet Count - Automated : 330 K/uL  Mean Cell Volume : 97.2 fl  Mean Cell Hemoglobin : 31.1 pg  Mean Cell Hemoglobin Concentration : 32.0 gm/dL  Auto Neutrophil # : 5.89 K/uL  Auto Lymphocyte # : 1.62 K/uL  Auto Monocyte # : 1.07 K/uL  Auto Eosinophil # : 0.42 K/uL  Auto Basophil # : 0.02 K/uL  Auto Neutrophil % : 64.9 %  Auto Lymphocyte % : 17.9 %  Auto Monocyte % : 11.8 %  Auto Eosinophil % : 4.6 %  Auto Basophil % : 0.2 %    05-24    135  |  92<L>  |  17  ----------------------------<  111<H>  3.3<L>   |  32<H>  |  0.95    Ca    8.7      24 May 2018 06:41  Phos  2.8     05-24  Mg     1.9     05-24    TPro  7.6  /  Alb  2.9<L>  /  TBili  0.6  /  DBili  0.1  /  AST  23  /  ALT  16  /  AlkPhos  56  05-23    Creatinine Trend: 0.95<--, 1.00<--, 1.14<--, 1.16<--  LIVER FUNCTIONS - ( 23 May 2018 07:09 )  Alb: 2.9 g/dL / Pro: 7.6 g/dL / ALK PHOS: 56 U/L / ALT: 16 U/L / AST: 23 U/L / GGT: x           PT/INR - ( 24 May 2018 07:58 )   PT: 12.6 sec;   INR: 1.11 ratio         PTT - ( 24 May 2018 07:58 )  PTT:45.9 sec  CARDIAC MARKERS ( 23 May 2018 07:09 )  x     / <0.01 ng/mL / 53 U/L / x     / 2.0 ng/mL        MICROBIOLOGY:    Culture - Urine (collected 22 May 2018 13:52)  Source: .Urine Clean Catch (Midstream)  Final Report (24 May 2018 08:53):    >100,000 CFU/ml Escherichia coli  Organism: Escherichia coli (24 May 2018 08:53)      RADIOLOGY & ADDITIONAL TESTS:  < from: Cardiac Cath Lab - Adult (05.23.18 @ 15:37) >  CORONARY VESSELS: The coronary circulation is left dominant.  LM:   --  LM: Angiography showed minor luminal irregularities with no flow  limiting lesions.  LAD:   --  LAD: Angiography showed minor luminal irregularities with no  flow limiting lesions.  CX:   --  Circumflex: Angiography showed minor luminal irregularities with  no flow limiting lesions.  RCA:   --  RCA: Angiography showed minor luminal irregularities with no  flow limiting lesions.    HEMODYNAMIC TABLES  Pressures:  Baseline  Pressures:  - HR: 67  Pressures:  - Rhythm:  Pressures:  -- Pulmonary Artery (S/D/M): 49/16/28  Pressures:  -- Pulmonary Capillary Wedge: 18/23/13  Pressures:  -- Right Atrium (a/v/M): 11/7/6  Pressures:  -- Right Ventricle (s/edp): 47/10/--         CONSULTS: pulmonology, cards, CT surgery

## 2018-05-24 NOTE — PROGRESS NOTE ADULT - PROBLEM SELECTOR PLAN 2
CHF/?interstitial process-?amio toxicity CHF/?interstitial process-?amio toxicity--cards evaln negative--for CTS evaln today

## 2018-05-24 NOTE — CONSULT NOTE ADULT - CONSULT REASON
Cardiology consult
Pulmonary Evaluation - 2nd Opinion    (Evaluation on behalf of Dr. Lele Saul)
abnormal CT
s/p afib ablation HFpEF, pulmonary consolidation

## 2018-05-24 NOTE — PROGRESS NOTE ADULT - ASSESSMENT
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

## 2018-05-24 NOTE — PROGRESS NOTE ADULT - ASSESSMENT
Pt with pulmonary process accounting for his SOB and hypoxia - ? related to AMIO exposure  HFPEF - mild  Parox AFIB in RSR  Mild luminal CAD    Stable cardiac status with RX    For CT guided lung BX today  Off OAC for procedure

## 2018-05-24 NOTE — PROGRESS NOTE ADULT - PROBLEM SELECTOR PLAN 5
need to r/o CHF before committing to long course of steroids--would proceed w/ RHC then bronch -?infection card cath to r/o CHF negative and will need VATS bx to confirm DX before committing to long course of steroids--

## 2018-05-24 NOTE — PROGRESS NOTE ADULT - PROBLEM SELECTOR PLAN 4
PVR straight cath 400cc. Suspect BPH in elderly man which likely caused acute cystitis. Cystitis likely exacerbating urinary retention  -continue flomax  -consider renal US if develops RASHEEDA or retention does not improve  -PVR today PVR straight cath 400cc. Suspect BPH in elderly man which likely caused acute cystitis. Cystitis likely exacerbating urinary retention  -continue flomax  -consider renal US if develops RASHEEDA or retention does not improve  -PVR today 19 PVR straight cath 400cc. Suspect BPH in elderly man which likely caused acute cystitis.  -continue flomax  -consider renal US if develops RASHEEDA or retention does not improve  -PVR today 19. Cystitis exacerbating likely underlying BPH

## 2018-05-24 NOTE — BRIEF OPERATIVE NOTE - PROCEDURE
<<-----Click on this checkbox to enter Procedure VATS (video-assisted thoracoscopic surgery)  05/24/2018  wedge resection x2  Active  AMATTIA

## 2018-05-24 NOTE — PROGRESS NOTE ADULT - PROBLEM SELECTOR PLAN 1
One month dyspnea associated with hypoxia, orthopnea, and LE edema. TTE 5/17/2018 had LVEF 55%, with diastolic dysfunction. PFTs showed restrictive pattern. pBNP 7400. 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  -CT chest (5/10/2018) showed bilateral airspace consolidation most prominent in the upper lobe, bilateral nodules, mild mediastinal LAD, small-mod R pleural effusion, small L pleural effusion.  -DDx: ILD, pneumonia, sarcoidosis, amiodarone toxicity, metastatic disease, vasculitis, pulmonary edema 2/2 CHF exacerbation with diastolic dysfunction. dyspnea from intermittent arrythmia also a possibility but less likely given NSR on EKG and recent afib ablation. Consider occupational exposures as well   -f/u MATEO, ACE, quant gold  -Appreciate pulmonology and cardiology recs  -Today LHC/RHC to evaluate pulmonary pressures (will delay PCI unless necessary so pt can have lung biopsy). Pulmonary pressures to dictate bronchoscopy vs VATs lung biopsy  -Eliquis last given 5/21 evening  -If spikes fever or develops leukocytosis, will consider initiating antibiotics (on ceftriaxone already for uti)  -Monitor on tele  -IV lasix 40 daily One month dyspnea associated with hypoxia, orthopnea, and LE edema. TTE 5/17/2018 had LVEF 55%, with diastolic dysfunction. PFTs showed restrictive pattern. pBNP 7400. 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  -CT chest (5/10/2018) showed bilateral airspace consolidation most prominent in the upper lobe, bilateral nodules, enlarged right paratracheal, small-mod R pleural effusion, small L pleural effusion.  -DDx: ILD, pneumonia, sarcoidosis, amiodarone toxicity, metastatic disease, vasculitis, pulmonary edema 2/2 CHF exacerbation with diastolic dysfunction. dyspnea from intermittent arrythmia also a possibility but less likely given NSR on EKG and recent afib ablation. Consider occupational exposures as well   -f/u MATEO, ACE, quant gold  -Appreciate pulmonology and cardiology recs  -Today LHC/RHC to evaluate pulmonary pressures (will delay PCI unless necessary so pt can have lung biopsy). Pulmonary pressures to dictate bronchoscopy vs VATs lung biopsy  -Eliquis last given 5/21 evening  -If spikes fever or develops leukocytosis, will consider initiating antibiotics (on ceftriaxone already for uti)  -Monitor on tele  -IV lasix 40 daily One month dyspnea associated with hypoxia, orthopnea, and LE edema. TTE 5/17/2018 had LVEF 55%, with diastolic dysfunction, no pulm HTN. PFTs showed restrictive pattern. pBNP 7400. 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  -CT chest (5/10/2018) showed bilateral airspace consolidation most prominent in the upper lobe, bilateral nodules, enlarged right paratracheal, small-mod R pleural effusion, small L pleural effusion.  -DDx: ILD, pneumonia, sarcoidosis, amiodarone toxicity, metastatic disease, vasculitis, pulmonary edema 2/2 CHF exacerbation with diastolic dysfunction. dyspnea from intermittent arrythmia also a possibility but less likely given NSR on EKG and recent afib ablation. Consider occupational exposures as well   -MATEO, HIV, quant gold negative. MATEO positive  -Appreciate pulmonology and cardiology recs  -LHC/RHC nonobstructing lesions, no pulmonary HTN. CT-guided lung biopsy today  -Eliquis last given 5/21 evening  -If spikes fever or develops leukocytosis, will consider broadening from CTX  -Monitor on tele  -IV lasix 40 daily, maintain net -1 to 2 L One month dyspnea associated with hypoxia, orthopnea, and LE edema. TTE 5/17/2018 had LVEF 55%, with diastolic dysfunction, no pulm HTN. PFTs showed restrictive pattern. pBNP 7400. 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  -CT chest (5/10/2018) showed bilateral airspace consolidation most prominent in the upper lobe, bilateral nodules, enlarged right paratracheal, small-mod R pleural effusion, small L pleural effusion.  -DDx: ILD, pneumonia, sarcoidosis, amiodarone toxicity, metastatic disease, vasculitis, pulmonary edema 2/2 CHF exacerbation with diastolic dysfunction. dyspnea from intermittent arrythmia also a possibility but less likely given NSR on EKG and recent afib ablation. Consider occupational exposures as well   -MATEO, HIV, quant gold negative. MATEO positive  -Appreciate pulmonology and cardiology recs  -LHC/RHC nonobstructing lesions, no pulmonary HTN. CT-guided lung biopsy today  -Eliquis last given 5/21 evening  -If spikes fever or develops leukocytosis, will consider broadening from CTX  -Monitor on tele  -s/p lasix IV diuresis One month dyspnea associated with hypoxia, orthopnea, and LE edema. TTE 5/17/2018 had LVEF 55%, with diastolic dysfunction, no pulm HTN. PFTs showed restrictive pattern. pBNP 7400. 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  -CT chest (5/10/2018) showed bilateral airspace consolidation most prominent in the upper lobe, bilateral nodules, enlarged right paratracheal, small-mod R pleural effusion, small L pleural effusion.  -DDx: ILD, pneumonia, sarcoidosis, amiodarone toxicity, metastatic disease, vasculitis, pulmonary edema 2/2 CHF exacerbation with diastolic dysfunction. dyspnea from intermittent arrythmia also a possibility but less likely given NSR on EKG and recent afib ablation. Consider occupational exposures as well   -MATEO, HIV, quant gold negative. MATEO positive  -Appreciate pulmonology and cardiology recs  -LHC/RHC nonobstructing lesions, no pulmonary HTN. CT-guided lung biopsy today  -Eliquis last given 5/21 evening  -If spikes fever or develops leukocytosis, will consider broadening from CTX  -Monitor on tele  -s/p lasix IV diuresis  -f/u lung biopsy path One month dyspnea associated with hypoxia, orthopnea, and LE edema. TTE 5/17/2018 had LVEF 55%, with diastolic dysfunction, no pulm HTN. PFTs showed restrictive pattern. pBNP 7400. 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  -CT chest (5/10/2018) showed bilateral airspace consolidation most prominent in the upper lobe, bilateral nodules, mild mediastinal LAD, small-mod R pleural effusion, small L pleural effusion.  -DDx: ILD, pneumonia, sarcoidosis, amiodarone toxicity, metastatic disease, vasculitis, pulmonary edema 2/2 CHF exacerbation with diastolic dysfunction. dyspnea from intermittent arrythmia also a possibility but less likely given NSR on EKG and recent afib ablation. Consider occupational exposures as well   -MATEO, HIV, quant gold negative. MATEO positive  -Appreciate pulmonology and cardiology recs  -LHC/RHC nonobstructing lesions, no pulmonary HTN. CT-guided lung biopsy today  -Eliquis last given 5/21 evening  -If spikes fever or develops leukocytosis, will consider broadening from CTX  -Monitor on tele  -s/p lasix IV diuresis  -f/u lung biopsy path One month dyspnea associated with hypoxia, orthopnea, and LE edema. TTE 5/17/2018 had LVEF 55%, with diastolic dysfunction, no pulm HTN. PFTs showed restrictive pattern. pBNP 7400. 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  -CT chest (5/10/2018) showed bilateral airspace consolidation most prominent in the upper lobe, bilateral nodules, mild mediastinal LAD, small-mod R pleural effusion, small L pleural effusion.  -DDx: ILD, pneumonia, sarcoidosis, amiodarone toxicity, metastatic disease, vasculitis, pulmonary edema 2/2 CHF exacerbation with diastolic dysfunction. dyspnea from intermittent arrythmia also a possibility but less likely given NSR on EKG and recent afib ablation. Consider occupational exposures as well   -MATEO, HIV, quant gold negative. MATEO positive  -Appreciate pulmonology and cardiology recs  -LHC/RHC nonobstructing lesions, no pulmonary HTN. VATs today for lung biopsy  -Eliquis last given 5/21 evening  -Monitor on tele  -s/p lasix IV diuresis, transition back to po today  -f/u lung biopsy path

## 2018-05-24 NOTE — PROGRESS NOTE ADULT - ATTENDING COMMENTS
s/p VATs today with wedge resection x 2. Will follow up final operative reports and pathology taken in OR. Pt now with chest tube.  d/c iv lasix today. cont tele. follow up cards, pulm, CT surg recs.

## 2018-05-24 NOTE — PROGRESS NOTE ADULT - PROBLEM SELECTOR PLAN 8
DVT: Eliquis full A/C on hold for heart cath  Dispo: PT trina ongoing, walks with cane DVT: Eliquis full A/C on hold for lung biopsy  Dispo: PT eval ongoing, walks with cane

## 2018-05-24 NOTE — PROGRESS NOTE ADULT - SUBJECTIVE AND OBJECTIVE BOX
Patient is a 78y old  Male who presents with a chief complaint of SOB (21 May 2018 13:43)    Pt feels well today  NO CP or SOB  Lying flat without distress    Dr. Carlos's consult is appreciated  Pulmonary follow up is appreciated    CURRENT CARDIAC WORKUP:       Cardiac Cath:  < from: Cardiac Cath Lab - Adult (18 @ 15:37) >  [facility]  Department of Cardiology  [address] [place] [address]  [phone]  Cath Lab Report -- Comprehensive Report  Patient: CHINA NESBITT  Study date: 2018  Account number: [id number]  MR number: [id number]  : 1940  Gender: Male  Race: W  Case Physician(s):  Iain Call M.D.  Fellow:  KLEBER Alston D.O.  Referring Physician:  Hank Mei M.D.  INDICATIONS: Acute systolic congestive heart failure.  HISTORY: Historyincludes nonischemic cardiomyopathy. The patient has  hypertension and medication-treated dyslipidemia.  PROCEDURE:  --  Right heart catheterization.  --  Left coronary angiography.  --  Right coronary angiography.  --  Sonosite - Diagnostic.  TECHNIQUE: The risks and alternatives of the procedures and conscious  sedation were explained to the patient and informed consent was obtained.  Cardiac catheterization performed electively.  Local anesthetic given. Right femoral artery access. Right femoral vein  access. Right heart catheterization. The procedure was performed utilizing  a catheter. Left coronary artery angiography. The vessel was injected  utilizing a catheter. Right coronary artery angiography. The vessel was  injected utilizing a catheter. Sonosite - Diagnostic. RADIATION EXPOSURE:  5.7 min.  CONTRAST GIVEN: Omnipaque 35 ml.  MEDICATIONS GIVEN: Fentanyl, 25 mcg, IV. Midazolam, 1 mg, IV.  CORONARY VESSELS: The coronary circulation is left dominant.  LM:   --  LM: Angiography showed minor luminal irregularities with no flow  limiting lesions.  LAD:   --  LAD: Angiography showed minor luminal irregularities with no  flow limiting lesions.  CX:   --  Circumflex: Angiography showed minor luminal irregularities with  no flow limiting lesions.  RCA:   --  RCA: Angiography showed minor luminal irregularities with no  flow limiting lesions.  COMPLICATIONS: There were no complications.  DIAGNOSTIC RECOMMENDATIONS: The patient should continue with the present  medications.  Prepared and signed by  Iain Call M.D.  Signed 2018 17:35:01  HEMODYNAMIC TABLES  Pressures:  Baseline  Pressures:  - HR: 67  Pressures:  - Rhythm:  Pressures:  -- Pulmonary Artery (S/D/M): 49/28  Pressures:  -- Pulmonary Capillary Wedge:   Pressures:  -- Right Atrium (a/v/M): /  Pressures:  -- Right Ventricle (s/edp): 47/10/--  O2 Sats:  Baseline  O2 Sats:  - HR: 67  O2 Sats:  - Rhythm:  O2 Sats:  -- AO: 11.1/92.1/13.9  O2 Sats:  -- PA: 11.1/16.5/2.49  Outputs:  Baseline  Outputs:  -- CALCULATIONS[age]  Outputs:  -- CALCULATIONS: Body Surface Area: 2.15  Outputs:  -- CALCULATIONS: Height in cm: 178.00  Outputs:  -- CALCULATIONS: Sex: Male  Outputs:  -- CALCULATIONS: Weight in k.00  Outputs:  -- OUTPUTS: Blood Oxygen Difference: 11.41  Outputs:  -- OUTPUTS: CO by Yung: 2.52  Outputs:  -- OUTPUTS: Yung cardiac index: 1.17  Outputs:  -- OUTPUTS: O2 consumption: 288.10  Outputs:  -- OUTPUTS: Vo2 Indexed: 134.04  Outputs:  -- RESISTANCES: Pulmonary vascular index (dsc): 1021.44  Outputs:  -- RESISTANCES: Pulmonary vascular index (Wood Units): 12.77  Outputs:  -- RESISTANCES: Pulmonary vascular resistance (dsc): 475.24  Outputs:  -- RESISTANCES: Pulmonary vascular resistance (Wood Units): 5.94  Outputs:  -- RESISTANCES: Right ventricular stroke work: 11.27  Outputs:  -- RESISTANCES: Right ventricular stroke work index: 5.25  Outputs:  -- RESISTANCES: Total pulmonary index (dsc): 1906.68  Outputs:  -- RESISTANCES: Total pulmonary index (Wood Units): 23.84  Outputs:  -- RESISTANCES: Total pulmonary resistance (dsc): 887.11  Outputs:  -- RESISTANCES: Total pulmonary resistance (Wood Units): 11.09  Outputs:  -- SHUNTS: Qs Indexed: 1.17  Outputs:  -- SHUNTS: Systemic flow: 2.52    < end of copied text >    Allergies:   No Known Allergies      MEDICATIONS  (STANDING):  aspirin enteric coated 81 milliGRAM(s) Oral daily  ATENolol  Tablet 50 milliGRAM(s) Oral daily  calcium carbonate 1250 mG + Vitamin D (OsCal 500 + D) 1 Tablet(s) Oral daily  cefTRIAXone   IVPB 1 Gram(s) IV Intermittent every 24 hours  lisinopril 10 milliGRAM(s) Oral daily  potassium chloride    Tablet ER 40 milliEquivalent(s) Oral every 4 hours  simvastatin 40 milliGRAM(s) Oral at bedtime  tamsulosin 0.4 milliGRAM(s) Oral at bedtime    MEDICATIONS  (PRN):      ROS:     N/C    Daily     Daily Weight in k (24 May 2018 07:44)  Drug Dosing Weight  Height (cm): 177.8 (21 May 2018 16:21)  Weight (kg): 97 (21 May 2018 16:21)  BMI (kg/m2): 30.7 (21 May 2018 16:21)  BSA (m2): 2.15 (21 May 2018 16:21)  Vital Signs Last 24 Hrs  T(C): 36.6 (24 May 2018 04:42), Max: 36.7 (23 May 2018 13:55)  T(F): 97.9 (24 May 2018 04:42), Max: 98.1 (23 May 2018 21:06)  HR: 75 (24 May 2018 04:42) (56 - 86)  BP: 90/52 (24 May 2018 09:32) (88/48 - 122/71)  BP(mean): --  RR: 20 (24 May 2018 04:42) (18 - 20)  SpO2: 96% (24 May 2018 04:42) (91% - 98%)    I&O's Summary    23 May 2018 07:  -  24 May 2018 07:00  --------------------------------------------------------  IN: 660 mL / OUT: 1495 mL / NET: -835 mL    24 May 2018 07:01  -  24 May 2018 10:35  --------------------------------------------------------  IN: 0 mL / OUT: 0 mL / NET: 0 mL        PHYSICAL EXAM:    General Appearance:    Comfortable, AAO X 3, in no distress.   HEENT:                       Atraumatic, PERRLA, EOMI, conjunctiva clear.   Neck:                          Supple, no adenopathy, no thyromegaly, no JVD, no carotid bruit  Back:                          Symmetric, no CV angle fullness or tenderness, no soft tissue tenderness.  Chest:                         Clear to auscultation, no wheezes, rales or rhonchi, symmetric air entry, no tachypnea, retractions or cyanosis  Heart:                          S1, S2 normal, no gallop, no murmur.  Abdomen:                    Soft, non-tender, bowel sounds active. No palpable masses.   Extremities:                 no cyanosis, no edema, no joint swelling. Peripheral pulses normal  Skin:                           Skin color, texture normal. No rashes   Neurologic:                  Alert and oriented X3, cranial nerves intact, sensory and motor normal.      INTERPRETATION OF TELEMETRY:    ECG:  < from: 12 Lead ECG (18 @ 11:31) >  Ventricular Rate 75 BPM    Atrial Rate 75 BPM    P-R Interval 178 ms    QRS Duration 104 ms     ms    QTc 471 ms    R Axis 9 degrees    T Axis 86 degrees    Diagnosis Line SINUS RHYTHM WITH PREMATURE ATRIAL COMPLEXES  NONSPECIFIC ST ABNORMALITY  ABNORMAL ECG    < end of copied text >      LABS:                        12.0   9.07  )-----------( 330      ( 24 May 2018 07:52 )             37.5         135  |  92<L>  |  17  ----------------------------<  111<H>  3.3<L>   |  32<H>  |  0.95    Ca    8.7      24 May 2018 06:41  Phos  2.8       Mg     1.9         TPro  7.6  /  Alb  2.9<L>  /  TBili  0.6  /  DBili  0.1  /  AST  23  /  ALT  16  /  AlkPhos  56   @ 07:09  Trop-I --  CK     53  CK MB  --     @ 11:42  Trop-I --  CK     90  CK MB  --    Pro BNP  7432  @ 11:42  D Dimer  --  @ 11:42    PT/INR - ( 24 May 2018 07:58 )   PT: 12.6 sec;   INR: 1.11 ratio         PTT - ( 24 May 2018 07:58 )  PTT:45.9 sec          RADIOLOGY & ADDITIONAL STUDIES:    HEALTH ISSUES - PROBLEM Dx:  Urinary retention: Urinary retention  Atrial fibrillation, unspecified type: Atrial fibrillation, unspecified type  Acute on chronic diastolic congestive heart failure: Acute on chronic diastolic congestive heart failure  Abnormal CT of the chest: Abnormal CT of the chest  Shortness of breath: Shortness of breath  Acute hypoxemic respiratory failure: Acute hypoxemic respiratory failure  Diastolic CHF: Diastolic CHF  Cystitis: Cystitis  Dyspnea: Dyspnea  Prophylactic measure: Prophylactic measure  Hypoxia: Hypoxia  Persistent atrial fibrillation: Persistent atrial fibrillation  Essential hypertension: Essential hypertension  Dyspnea on exertion: Dyspnea on exertion

## 2018-05-25 DIAGNOSIS — J90 PLEURAL EFFUSION, NOT ELSEWHERE CLASSIFIED: ICD-10-CM

## 2018-05-25 LAB
ANION GAP SERPL CALC-SCNC: 11 MMOL/L — SIGNIFICANT CHANGE UP (ref 5–17)
BASOPHILS # BLD AUTO: 0.01 K/UL — SIGNIFICANT CHANGE UP (ref 0–0.2)
BASOPHILS NFR BLD AUTO: 0.1 % — SIGNIFICANT CHANGE UP (ref 0–2)
BUN SERPL-MCNC: 19 MG/DL — SIGNIFICANT CHANGE UP (ref 7–23)
CALCIUM SERPL-MCNC: 8.7 MG/DL — SIGNIFICANT CHANGE UP (ref 8.4–10.5)
CHLORIDE SERPL-SCNC: 93 MMOL/L — LOW (ref 96–108)
CO2 SERPL-SCNC: 32 MMOL/L — HIGH (ref 22–31)
CREAT SERPL-MCNC: 0.89 MG/DL — SIGNIFICANT CHANGE UP (ref 0.5–1.3)
EOSINOPHIL # BLD AUTO: 0.15 K/UL — SIGNIFICANT CHANGE UP (ref 0–0.5)
EOSINOPHIL NFR BLD AUTO: 1.6 % — SIGNIFICANT CHANGE UP (ref 0–6)
GLUCOSE SERPL-MCNC: 118 MG/DL — HIGH (ref 70–99)
GRAM STN FLD: SIGNIFICANT CHANGE UP
GRAM STN FLD: SIGNIFICANT CHANGE UP
HCT VFR BLD CALC: 37.9 % — LOW (ref 39–50)
HGB BLD-MCNC: 12 G/DL — LOW (ref 13–17)
IMM GRANULOCYTES NFR BLD AUTO: 0.3 % — SIGNIFICANT CHANGE UP (ref 0–1.5)
LYMPHOCYTES # BLD AUTO: 1.17 K/UL — SIGNIFICANT CHANGE UP (ref 1–3.3)
LYMPHOCYTES # BLD AUTO: 12.8 % — LOW (ref 13–44)
MAGNESIUM SERPL-MCNC: 2 MG/DL — SIGNIFICANT CHANGE UP (ref 1.6–2.6)
MCHC RBC-ENTMCNC: 30.8 PG — SIGNIFICANT CHANGE UP (ref 27–34)
MCHC RBC-ENTMCNC: 31.7 GM/DL — LOW (ref 32–36)
MCV RBC AUTO: 97.4 FL — SIGNIFICANT CHANGE UP (ref 80–100)
MONOCYTES # BLD AUTO: 0.98 K/UL — HIGH (ref 0–0.9)
MONOCYTES NFR BLD AUTO: 10.7 % — SIGNIFICANT CHANGE UP (ref 2–14)
NEUTROPHILS # BLD AUTO: 6.81 K/UL — SIGNIFICANT CHANGE UP (ref 1.8–7.4)
NEUTROPHILS NFR BLD AUTO: 74.5 % — SIGNIFICANT CHANGE UP (ref 43–77)
PHOSPHATE SERPL-MCNC: 3 MG/DL — SIGNIFICANT CHANGE UP (ref 2.5–4.5)
PLATELET # BLD AUTO: 301 K/UL — SIGNIFICANT CHANGE UP (ref 150–400)
POTASSIUM SERPL-MCNC: 3.9 MMOL/L — SIGNIFICANT CHANGE UP (ref 3.5–5.3)
POTASSIUM SERPL-SCNC: 3.9 MMOL/L — SIGNIFICANT CHANGE UP (ref 3.5–5.3)
RBC # BLD: 3.89 M/UL — LOW (ref 4.2–5.8)
RBC # FLD: 14.1 % — SIGNIFICANT CHANGE UP (ref 10.3–14.5)
SODIUM SERPL-SCNC: 136 MMOL/L — SIGNIFICANT CHANGE UP (ref 135–145)
SPECIMEN SOURCE: SIGNIFICANT CHANGE UP
SPECIMEN SOURCE: SIGNIFICANT CHANGE UP
WBC # BLD: 9.15 K/UL — SIGNIFICANT CHANGE UP (ref 3.8–10.5)
WBC # FLD AUTO: 9.15 K/UL — SIGNIFICANT CHANGE UP (ref 3.8–10.5)

## 2018-05-25 PROCEDURE — 99232 SBSQ HOSP IP/OBS MODERATE 35: CPT

## 2018-05-25 PROCEDURE — 71045 X-RAY EXAM CHEST 1 VIEW: CPT | Mod: 26,77

## 2018-05-25 PROCEDURE — 99233 SBSQ HOSP IP/OBS HIGH 50: CPT | Mod: GC

## 2018-05-25 PROCEDURE — 99231 SBSQ HOSP IP/OBS SF/LOW 25: CPT

## 2018-05-25 PROCEDURE — 71045 X-RAY EXAM CHEST 1 VIEW: CPT | Mod: 26

## 2018-05-25 RX ORDER — SENNA PLUS 8.6 MG/1
2 TABLET ORAL AT BEDTIME
Qty: 0 | Refills: 0 | Status: DISCONTINUED | OUTPATIENT
Start: 2018-05-25 | End: 2018-05-29

## 2018-05-25 RX ORDER — DOCUSATE SODIUM 100 MG
100 CAPSULE ORAL THREE TIMES A DAY
Qty: 0 | Refills: 0 | Status: DISCONTINUED | OUTPATIENT
Start: 2018-05-25 | End: 2018-05-29

## 2018-05-25 RX ORDER — FUROSEMIDE 40 MG
40 TABLET ORAL DAILY
Qty: 0 | Refills: 0 | Status: DISCONTINUED | OUTPATIENT
Start: 2018-05-25 | End: 2018-05-29

## 2018-05-25 RX ORDER — PANTOPRAZOLE SODIUM 20 MG/1
40 TABLET, DELAYED RELEASE ORAL
Qty: 0 | Refills: 0 | Status: DISCONTINUED | OUTPATIENT
Start: 2018-05-25 | End: 2018-05-29

## 2018-05-25 RX ORDER — FUROSEMIDE 40 MG
40 TABLET ORAL DAILY
Qty: 0 | Refills: 0 | Status: DISCONTINUED | OUTPATIENT
Start: 2018-05-25 | End: 2018-05-25

## 2018-05-25 RX ORDER — CALCIUM GLUCONATE 100 MG/ML
1 VIAL (ML) INTRAVENOUS ONCE
Qty: 0 | Refills: 0 | Status: DISCONTINUED | OUTPATIENT
Start: 2018-05-25 | End: 2018-05-25

## 2018-05-25 RX ORDER — ENOXAPARIN SODIUM 100 MG/ML
40 INJECTION SUBCUTANEOUS EVERY 24 HOURS
Qty: 0 | Refills: 0 | Status: DISCONTINUED | OUTPATIENT
Start: 2018-05-25 | End: 2018-05-26

## 2018-05-25 RX ADMIN — LISINOPRIL 10 MILLIGRAM(S): 2.5 TABLET ORAL at 05:50

## 2018-05-25 RX ADMIN — SIMVASTATIN 40 MILLIGRAM(S): 20 TABLET, FILM COATED ORAL at 20:19

## 2018-05-25 RX ADMIN — Medication 1 TABLET(S): at 13:35

## 2018-05-25 RX ADMIN — TAMSULOSIN HYDROCHLORIDE 0.4 MILLIGRAM(S): 0.4 CAPSULE ORAL at 20:19

## 2018-05-25 RX ADMIN — ATENOLOL 50 MILLIGRAM(S): 25 TABLET ORAL at 05:50

## 2018-05-25 RX ADMIN — SENNA PLUS 2 TABLET(S): 8.6 TABLET ORAL at 22:51

## 2018-05-25 RX ADMIN — Medication 81 MILLIGRAM(S): at 13:35

## 2018-05-25 RX ADMIN — SODIUM CHLORIDE 50 MILLILITER(S): 9 INJECTION INTRAMUSCULAR; INTRAVENOUS; SUBCUTANEOUS at 03:00

## 2018-05-25 RX ADMIN — Medication 650 MILLIGRAM(S): at 13:34

## 2018-05-25 RX ADMIN — Medication 60 MILLIGRAM(S): at 13:35

## 2018-05-25 RX ADMIN — Medication 500 MILLIGRAM(S): at 20:19

## 2018-05-25 RX ADMIN — PANTOPRAZOLE SODIUM 40 MILLIGRAM(S): 20 TABLET, DELAYED RELEASE ORAL at 13:35

## 2018-05-25 RX ADMIN — ENOXAPARIN SODIUM 40 MILLIGRAM(S): 100 INJECTION SUBCUTANEOUS at 20:19

## 2018-05-25 RX ADMIN — Medication 500 MILLIGRAM(S): at 05:50

## 2018-05-25 RX ADMIN — Medication 100 MILLIGRAM(S): at 22:51

## 2018-05-25 RX ADMIN — Medication 650 MILLIGRAM(S): at 20:21

## 2018-05-25 RX ADMIN — Medication 650 MILLIGRAM(S): at 21:09

## 2018-05-25 NOTE — PROGRESS NOTE ADULT - SUBJECTIVE AND OBJECTIVE BOX
Patient is a 78y old  Male who presents with a chief complaint of SOB (21 May 2018 13:43)    He underwent VATS yesterday. Ames placed yesterday as well.  He denies c/o chest pain or palpitations. Less SOB overall.     Allergies    No Known Allergies    Intolerances      MEDICATIONS  (STANDING):  aspirin enteric coated 81 milliGRAM(s) Oral daily  ATENolol  Tablet 50 milliGRAM(s) Oral daily  calcium carbonate 1250 mG + Vitamin D (OsCal 500 + D) 1 Tablet(s) Oral daily  ciprofloxacin     Tablet 500 milliGRAM(s) Oral every 12 hours  lisinopril 10 milliGRAM(s) Oral daily  simvastatin 40 milliGRAM(s) Oral at bedtime  tamsulosin 0.4 milliGRAM(s) Oral at bedtime    MEDICATIONS  (PRN):  acetaminophen   Tablet. 650 milliGRAM(s) Oral every 6 hours PRN Mild Pain (1 - 3)  oxyCODONE    5 mG/acetaminophen 325 mG 1 Tablet(s) Oral every 4 hours PRN Moderate Pain (4 - 6)  oxyCODONE    5 mG/acetaminophen 325 mG 2 Tablet(s) Oral every 4 hours PRN Severe Pain (7 - 10)      PHYSICAL EXAM:  Vital Signs Last 24 Hrs  T(C): 36.5 (25 May 2018 04:46), Max: 36.7 (24 May 2018 20:38)  T(F): 97.7 (25 May 2018 04:46), Max: 98 (24 May 2018 20:38)  HR: 75 (25 May 2018 04:46) (56 - 78)  BP: 133/67 (25 May 2018 04:46) (88/48 - 133/67)  BP(mean): 74 (24 May 2018 20:00) (69 - 87)  RR: 20 (25 May 2018 04:46) (16 - 28)  SpO2: 98% (25 May 2018 04:46) (94% - 100%)  Daily     Daily Weight in k.3 (25 May 2018 04:46)  I&O's Summary    24 May 2018 07:01  -  25 May 2018 07:00  --------------------------------------------------------  IN: 520 mL / OUT: 890 mL / NET: -370 mL    General Appearance: 	 Alert, cooperative, no distress  HEENT: normocephalic, atraumatic  Neck: no JVD,  carotid 2+  bilaterally without bruits  Lungs:  crackles bilaterally 1/2; decreased BS left base  Cor:  pmi 5th ICS MCL, regular rate and rhythm, S1 normal intensity, S2 normal intensity, no gallops, murmurs or rubs  Abdomen: soft, non-tender; bowel sounds normal; no masses,  no organomegaly  Extremities: without cyanosis, clubbing or edema  Vasc: 2-+ PT and DP pulses; LE varicosities  Neurologic: A&O x 3 (time, place, person). Symmetric strength; limited exam      Labs:  CBC Full  -  ( 24 May 2018 07:52 )  WBC Count : 9.07 K/uL  Hemoglobin : 12.0 g/dL  Hematocrit : 37.5 %  Platelet Count - Automated : 330 K/uL  Mean Cell Volume : 97.2 fl  Mean Cell Hemoglobin : 31.1 pg  Mean Cell Hemoglobin Concentration : 32.0 gm/dL  Auto Neutrophil # : 5.89 K/uL  Auto Lymphocyte # : 1.62 K/uL  Auto Monocyte # : 1.07 K/uL  Auto Eosinophil # : 0.42 K/uL  Auto Basophil # : 0.02 K/uL  Auto Neutrophil % : 64.9 %  Auto Lymphocyte % : 17.9 %  Auto Monocyte % : 11.8 %  Auto Eosinophil % : 4.6 %  Auto Basophil % : 0.2 %        135  |  92<L>  |  17  ----------------------------<  111<H>  3.3<L>   |  32<H>  |  0.95    Ca    8.7      24 May 2018 06:41  Phos  2.8     -  Mg     1.9     -    TPro  7.6  /  Alb  2.9<L>  /  TBili  0.6  /  DBili  0.1  /  AST  23  /  ALT  16  /  AlkPhos  56  05-23    CARDIAC MARKERS ( 23 May 2018 07:09 )  x     / <0.01 ng/mL / 53 U/L / x     / 2.0 ng/mL      PT/INR - ( 24 May 2018 07:58 )   PT: 12.6 sec;   INR: 1.11 ratio         PTT - ( 24 May 2018 07:58 )  PTT:45.9 sec                  Felice Carlos MD [facility]  [phone]

## 2018-05-25 NOTE — PROGRESS NOTE ADULT - PROBLEM SELECTOR PLAN 7
Normotensive. Kidney function at baseline 1.0 - 1.5 (5/2018). Home equivalent ramipril is lisinopril 40mg daily  -atenolol, lasix 40 po, lisinopril 10mg

## 2018-05-25 NOTE — PROGRESS NOTE ADULT - SUBJECTIVE AND OBJECTIVE BOX
CHIEF COMPLAINT:    Interval Events:    REVIEW OF SYSTEMS:  Constitutional: No fevers or chills. No weight loss. No fatigue or generalized malaise.  Eyes: No itching or discharge from the eyes  ENT: No ear pain. No ear discharge. No nasal congestion. No post nasal drip. No epistaxis. No throat pain. No sore throat. No difficulty swallowing.   CV: No chest pain. No palpitations. No lightheadedness or dizziness.   Resp: No dyspnea at rest. No dyspnea on exertion. No orthopnea. No wheezing. No cough. No stridor. No sputum production. No chest pain with respiration.  GI: No nausea. No vomiting. No diarrhea.  MSK: No joint pain or pain in any extremities  Integumentary: No skin lesions. No pedal edema.  Neurological: No gross motor weakness. No sensory changes.  [ ] All other systems negative  [ ] Unable to assess ROS because ________    OBJECTIVE:  ICU Vital Signs Last 24 Hrs  T(C): 36.7 (24 May 2018 20:38), Max: 36.7 (24 May 2018 20:38)  T(F): 98 (24 May 2018 20:38), Max: 98 (24 May 2018 20:38)  HR: 78 (24 May 2018 20:38) (56 - 78)  BP: 118/60 (24 May 2018 20:38) (88/48 - 130/62)  BP(mean): 74 (24 May 2018 20:00) (69 - 87)  ABP: 122/62 (24 May 2018 19:30) (97/37 - 130/69)  ABP(mean): 78 (24 May 2018 19:30) (57 - 83)  RR: 19 (24 May 2018 20:38) (16 - 28)  SpO2: 97% (24 May 2018 20:38) (94% - 100%)        05-23 @ 07:01  -  05-24 @ 07:00  --------------------------------------------------------  IN: 660 mL / OUT: 1495 mL / NET: -835 mL    05-24 @ 07:01  -  05-25 @ 04:53  --------------------------------------------------------  IN: 520 mL / OUT: 660 mL / NET: -140 mL      CAPILLARY BLOOD GLUCOSE          PHYSICAL EXAM:  General: Awake, alert, oriented X 3.   HEENT: Atraumatic, normocephalic.                 Mallampatti Grade                 No nasal congestion.                No tonsillar or pharyngeal exudates.  Lymph Nodes: No palpable lymphadenopathy  Neck: No JVD. No carotid bruit.   Respiratory: Normal chest expansion                         Normal percussion                         Normal and equal air entry                         No wheeze, rhonchi or rales.  Cardiovascular: S1 S2 normal. No murmurs, rubs or gallops.   Abdomen: Soft, non-tender, non-distended. No organomegaly.  Extremities: Warm to touch. Peripheral pulse palpable. No pedal edema.   Skin: No rashes or skin lesions  Neurological: Motor and sensory examination equal and normal in all four extremities.  Psychiatry: Appropriate mood and affect.    HOSPITAL MEDICATIONS:  MEDICATIONS  (STANDING):  aspirin enteric coated 81 milliGRAM(s) Oral daily  ATENolol  Tablet 50 milliGRAM(s) Oral daily  calcium carbonate 1250 mG + Vitamin D (OsCal 500 + D) 1 Tablet(s) Oral daily  ciprofloxacin     Tablet 500 milliGRAM(s) Oral every 12 hours  lisinopril 10 milliGRAM(s) Oral daily  simvastatin 40 milliGRAM(s) Oral at bedtime  tamsulosin 0.4 milliGRAM(s) Oral at bedtime    MEDICATIONS  (PRN):  acetaminophen   Tablet. 650 milliGRAM(s) Oral every 6 hours PRN Mild Pain (1 - 3)  oxyCODONE    5 mG/acetaminophen 325 mG 1 Tablet(s) Oral every 4 hours PRN Moderate Pain (4 - 6)  oxyCODONE    5 mG/acetaminophen 325 mG 2 Tablet(s) Oral every 4 hours PRN Severe Pain (7 - 10)      LABS:                        12.0   9.07  )-----------( 330      ( 24 May 2018 07:52 )             37.5     05-24    135  |  92<L>  |  17  ----------------------------<  111<H>  3.3<L>   |  32<H>  |  0.95    Ca    8.7      24 May 2018 06:41  Phos  2.8     05-24  Mg     1.9     05-24    TPro  7.6  /  Alb  2.9<L>  /  TBili  0.6  /  DBili  0.1  /  AST  23  /  ALT  16  /  AlkPhos  56  05-23    PT/INR - ( 24 May 2018 07:58 )   PT: 12.6 sec;   INR: 1.11 ratio         PTT - ( 24 May 2018 07:58 )  PTT:45.9 sec    Arterial Blood Gas:  05-24 @ 14:51  7.36/58/77/32/93/5.7  ABG lactate: --        MICROBIOLOGY:     RADIOLOGY:  [ ] Reviewed and interpreted by me    Point of Care Ultrasound Findings:    PFT:    EKG: CHIEF COMPLAINT: f/up for sob/pneumonitis-post op; pain at surg site-some sob--minimal cough-no hemoptysis    Interval Events: vats-bx    REVIEW OF SYSTEMS:  Constitutional: No fevers or chills. No weight loss. + fatigue or generalized malaise.  Eyes: No itching or discharge from the eyes  ENT: No ear pain. No ear discharge. No nasal congestion. No post nasal drip. No epistaxis. No throat pain. No sore throat. No difficulty swallowing.   CV: No chest pain. No palpitations. No lightheadedness or dizziness.   Resp: No dyspnea at rest. + dyspnea on exertion. No orthopnea. No wheezing. No cough. No stridor. No sputum production. + left chest pain with respiration.  GI: No nausea. No vomiting. No diarrhea.  MSK: No joint pain or pain in any extremities  Integumentary: No skin lesions. No pedal edema.  Neurological: No gross motor weakness. No sensory changes.  [+ ] All other systems negative  [ ] Unable to assess ROS because ________    OBJECTIVE:  ICU Vital Signs Last 24 Hrs  T(C): 36.7 (24 May 2018 20:38), Max: 36.7 (24 May 2018 20:38)  T(F): 98 (24 May 2018 20:38), Max: 98 (24 May 2018 20:38)  HR: 78 (24 May 2018 20:38) (56 - 78)  BP: 118/60 (24 May 2018 20:38) (88/48 - 130/62)  BP(mean): 74 (24 May 2018 20:00) (69 - 87)  ABP: 122/62 (24 May 2018 19:30) (97/37 - 130/69)  ABP(mean): 78 (24 May 2018 19:30) (57 - 83)  RR: 19 (24 May 2018 20:38) (16 - 28)  SpO2: 97% (24 May 2018 20:38) (94% - 100%)        05-23 @ 07:01  -  05-24 @ 07:00  --------------------------------------------------------  IN: 660 mL / OUT: 1495 mL / NET: -835 mL    05-24 @ 07:01  - 05-25 @ 04:53  --------------------------------------------------------  IN: 520 mL / OUT: 660 mL / NET: -140 mL      CAPILLARY BLOOD GLUCOSE          PHYSICAL EXAM: NAD in bed-on O2  General: Awake, alert, oriented X 3.   HEENT: Atraumatic, normocephalic.                 Mallampatti Grade 3                No nasal congestion.                No tonsillar or pharyngeal exudates.  Lymph Nodes: No palpable lymphadenopathy  Neck: No JVD. No carotid bruit.   Respiratory: abnormal chest expansion-left side (left CT)                         Normal percussion                         Normal and equal air entry                         No wheeze, rhonchi or rales.  Cardiovascular: S1 S2 normal. + murmurs,no rubs or gallops.   Abdomen: Soft, non-tender, non-distended. No organomegaly.  Extremities: Warm to touch. Peripheral pulse palpable. No pedal edema.   Skin: No rashes or skin lesions  Neurological: Motor and sensory examination equal and normal in all four extremities.  Psychiatry: Appropriate mood and affect.    HOSPITAL MEDICATIONS:  MEDICATIONS  (STANDING):  aspirin enteric coated 81 milliGRAM(s) Oral daily  ATENolol  Tablet 50 milliGRAM(s) Oral daily  calcium carbonate 1250 mG + Vitamin D (OsCal 500 + D) 1 Tablet(s) Oral daily  ciprofloxacin     Tablet 500 milliGRAM(s) Oral every 12 hours  lisinopril 10 milliGRAM(s) Oral daily  simvastatin 40 milliGRAM(s) Oral at bedtime  tamsulosin 0.4 milliGRAM(s) Oral at bedtime    MEDICATIONS  (PRN):  acetaminophen   Tablet. 650 milliGRAM(s) Oral every 6 hours PRN Mild Pain (1 - 3)  oxyCODONE    5 mG/acetaminophen 325 mG 1 Tablet(s) Oral every 4 hours PRN Moderate Pain (4 - 6)  oxyCODONE    5 mG/acetaminophen 325 mG 2 Tablet(s) Oral every 4 hours PRN Severe Pain (7 - 10)      LABS:                        12.0   9.07  )-----------( 330      ( 24 May 2018 07:52 )             37.5     05-24    135  |  92<L>  |  17  ----------------------------<  111<H>  3.3<L>   |  32<H>  |  0.95    Ca    8.7      24 May 2018 06:41  Phos  2.8     05-24  Mg     1.9     05-24    TPro  7.6  /  Alb  2.9<L>  /  TBili  0.6  /  DBili  0.1  /  AST  23  /  ALT  16  /  AlkPhos  56  05-23    PT/INR - ( 24 May 2018 07:58 )   PT: 12.6 sec;   INR: 1.11 ratio         PTT - ( 24 May 2018 07:58 )  PTT:45.9 sec    Arterial Blood Gas:  05-24 @ 14:51  7.36/58/77/32/93/5.7  ABG lactate: --        MICROBIOLOGY:     RADIOLOGY:  [ ] Reviewed and interpreted by me    Point of Care Ultrasound Findings:    PFT:    EKG:

## 2018-05-25 NOTE — PROGRESS NOTE ADULT - ASSESSMENT
78 year old man PMH HTN, HLD, AFib s/p ablation (4/24/2018) presents with shortness of breath, orthopnea, LE edema x 1 month found to have new bilateral airspace infiltrates and nodules most prominent in the upper lobes, associated with mediastinal LAD on CT chest. Found to have E Coli UTI. 78 year old man PMH HTN, HLD, AFib s/p ablation (4/24/2018) presents with shortness of breath, orthopnea, LE edema x 1 month found to have new bilateral airspace infiltrates and nodules most prominent in the upper lobes, associated with mediastinal LAD on CT chest s/p L VATS with wedge resection (5/24, upper and lower lobes), left chest tube in place. Found to have E Coli UTI.

## 2018-05-25 NOTE — PROGRESS NOTE ADULT - SUBJECTIVE AND OBJECTIVE BOX
Called by primary team to straight cath pt due to bladder scan of 450cc.  Pt refusing straight cath at this time, stating that he had just voided and does not need it.  Primary team aware.

## 2018-05-25 NOTE — PROGRESS NOTE ADULT - SUBJECTIVE AND OBJECTIVE BOX
Subjective: Pt. had VATS procedure yesterday without complications.  Initial tissue results with many PMN's and no organisms. Pt. feels well  and is not SOB at rest. He actually had been on amiodarone since Nov/Dec   of 2016 and was stopped in early March of 2018 when he had recurrent AF.  He was then sent for ablation at [facility].    MEDICATIONS  (STANDING):  aspirin enteric coated 81 milliGRAM(s) Oral daily  ATENolol  Tablet 50 milliGRAM(s) Oral daily  calcium carbonate 1250 mG + Vitamin D (OsCal 500 + D) 1 Tablet(s) Oral daily  calcium gluconate IVPB 1 Gram(s) IV Intermittent once  ciprofloxacin     Tablet 500 milliGRAM(s) Oral every 12 hours  furosemide    Tablet 40 milliGRAM(s) Oral daily  lisinopril 10 milliGRAM(s) Oral daily  predniSONE   Tablet 60 milliGRAM(s) Oral daily  simvastatin 40 milliGRAM(s) Oral at bedtime  tamsulosin 0.4 milliGRAM(s) Oral at bedtime  trimethoprim  160 mG/sulfamethoxazole 800 mG 1 Tablet(s) Oral <User Schedule>    MEDICATIONS  (PRN):  acetaminophen   Tablet. 650 milliGRAM(s) Oral every 6 hours PRN Mild Pain (1 - 3)  oxyCODONE    5 mG/acetaminophen 325 mG 1 Tablet(s) Oral every 4 hours PRN Moderate Pain (4 - 6)  oxyCODONE    5 mG/acetaminophen 325 mG 2 Tablet(s) Oral every 4 hours PRN Severe Pain (7 - 10)      Vital Signs Last 24 Hrs  T(C): 36.5 (25 May 2018 04:46), Max: 36.7 (24 May 2018 20:38)  T(F): 97.7 (25 May 2018 04:46), Max: 98 (24 May 2018 20:38)  HR: 75 (25 May 2018 04:46) (56 - 78)  BP: 133/67 (25 May 2018 04:46) (88/48 - 133/67)  BP(mean): 74 (24 May 2018 20:00) (69 - 87)  RR: 20 (25 May 2018 04:46) (16 - 28)  SpO2: 98% (25 May 2018 04:46) (94% - 100%)    PHYSICAL EXAM:    Constitutional: WD, WN in NAD  Neck: no JVD  Respiratory: gerhard ronchi, CT in place draining serosanguinous fluid  Cardiovascular: RRR, 1/6 CHLOE  Extremities: trace gerhard edema  Neurological: grossly nonfocal    TELEMETRY: RSR with PVC's and short runs of bigeminy    ECG:      LABS:                        12.0   9.15  )-----------( 301      ( 25 May 2018 07:46 )             37.9     05-25    136  |  93<L>  |  19  ----------------------------<  118<H>  3.9   |  32<H>  |  0.89    Ca    8.7      25 May 2018 06:42  Phos  3.0     05-25  Mg     2.0     05-25          PT/INR - ( 24 May 2018 07:58 )   PT: 12.6 sec;   INR: 1.11 ratio         PTT - ( 24 May 2018 07:58 )  PTT:45.9 sec    I&O's Summary    24 May 2018 07:01  -  25 May 2018 07:00  --------------------------------------------------------  IN: 520 mL / OUT: 890 mL / NET: -370 mL    25 May 2018 07:01  -  25 May 2018 09:02  --------------------------------------------------------  IN: 240 mL / OUT: 0 mL / NET: 240 mL      BNP  RADIOLOGY & ADDITIONAL STUDIES:    IMPRESSION:  Dyspnea likely related to pulmonary process- ? amio toxicity  PAF  HTN  HLD  BPH    RECOMMENDATIONS:  Continue current meds- now on high dose prednisone  f/u results of tissue biopsies  Thoracic surgery f/u regarding CT removal  Pulm f/u with Dr. Saul Subjective: Pt. had VATS procedure yesterday without complications.  Initial tissue results with many PMN's and no organisms. Pt. feels well  and is not SOB at rest. He actually had been on amiodarone since Nov/Dec   of 2016 and was stopped in early March of 2018 when he had recurrent AF.  He was then sent for ablation at [facility].    MEDICATIONS  (STANDING):  aspirin enteric coated 81 milliGRAM(s) Oral daily  ATENolol  Tablet 50 milliGRAM(s) Oral daily  calcium carbonate 1250 mG + Vitamin D (OsCal 500 + D) 1 Tablet(s) Oral daily  calcium gluconate IVPB 1 Gram(s) IV Intermittent once  ciprofloxacin     Tablet 500 milliGRAM(s) Oral every 12 hours  furosemide    Tablet 40 milliGRAM(s) Oral daily  lisinopril 10 milliGRAM(s) Oral daily  predniSONE   Tablet 60 milliGRAM(s) Oral daily  simvastatin 40 milliGRAM(s) Oral at bedtime  tamsulosin 0.4 milliGRAM(s) Oral at bedtime  trimethoprim  160 mG/sulfamethoxazole 800 mG 1 Tablet(s) Oral <User Schedule>    MEDICATIONS  (PRN):  acetaminophen   Tablet. 650 milliGRAM(s) Oral every 6 hours PRN Mild Pain (1 - 3)  oxyCODONE    5 mG/acetaminophen 325 mG 1 Tablet(s) Oral every 4 hours PRN Moderate Pain (4 - 6)  oxyCODONE    5 mG/acetaminophen 325 mG 2 Tablet(s) Oral every 4 hours PRN Severe Pain (7 - 10)      Vital Signs Last 24 Hrs  T(C): 36.5 (25 May 2018 04:46), Max: 36.7 (24 May 2018 20:38)  T(F): 97.7 (25 May 2018 04:46), Max: 98 (24 May 2018 20:38)  HR: 75 (25 May 2018 04:46) (56 - 78)  BP: 133/67 (25 May 2018 04:46) (88/48 - 133/67)  BP(mean): 74 (24 May 2018 20:00) (69 - 87)  RR: 20 (25 May 2018 04:46) (16 - 28)  SpO2: 98% (25 May 2018 04:46) (94% - 100%)    PHYSICAL EXAM:    Constitutional: WD, WN in NAD  Neck: no JVD  Respiratory: gerhard ronchi, CT in place draining serosanguinous fluid  Cardiovascular: RRR, 1/6 CHLOE  Extremities: trace gerhard edema  Neurological: grossly nonfocal    TELEMETRY: RSR with PVC's and short runs of bigeminy    ECG:      LABS:                        12.0   9.15  )-----------( 301      ( 25 May 2018 07:46 )             37.9     05-25    136  |  93<L>  |  19  ----------------------------<  118<H>  3.9   |  32<H>  |  0.89    Ca    8.7      25 May 2018 06:42  Phos  3.0     05-25  Mg     2.0     05-25          PT/INR - ( 24 May 2018 07:58 )   PT: 12.6 sec;   INR: 1.11 ratio         PTT - ( 24 May 2018 07:58 )  PTT:45.9 sec    I&O's Summary    24 May 2018 07:01  -  25 May 2018 07:00  --------------------------------------------------------  IN: 520 mL / OUT: 890 mL / NET: -370 mL    25 May 2018 07:01  -  25 May 2018 09:02  --------------------------------------------------------  IN: 240 mL / OUT: 0 mL / NET: 240 mL      BNP  RADIOLOGY & ADDITIONAL STUDIES:    IMPRESSION:  Dyspnea likely related to pulmonary process- ? amio toxicity  PAF  HTN  HLD  BPH    RECOMMENDATIONS:  Continue current meds- now on high dose prednisone  f/u results of tissue biopsies  Thoracic surgery f/u regarding CT removal  Pulm f/u with Dr. Saul  f/u TTE results  Restart Eliquis when ok with thoracic surgery

## 2018-05-25 NOTE — PROGRESS NOTE ADULT - SUBJECTIVE AND OBJECTIVE BOX
Seen and examined. Resting comfortable. No acute events overnight. Denies short of breath.   VITAL SIGNS      Vital Signs Last 24 Hrs  T(C): 36.3 (-25-18 @ 13:20), Max: 36.7 (--18 @ 20:38)  T(F): 97.4 (-25-18 @ 13:20), Max: 98 (05-24-18 @ 20:38)  HR: 63 (-18 @ 13:20) (63 - 78)  BP: 91/48 (-25-18 @ 13:20) (91/48 - 133/67)  RR: 19 (-18 @ 13:20) (19 - 25)  SpO2: 95% (-18 @ 13:20) (95% - 100%)             @ 07:01  -   @ 07:00  --------------------------------------------------------  IN: 520 mL / OUT: 890 mL / NET: -370 mL     @ 07:01  -   @ 17:55  --------------------------------------------------------  IN: 240 mL / OUT: 475 mL / NET: -235 mL       Daily     Daily Weight in k.3 (25 May 2018 04:46)  Admit Wt: Drug Dosing Weight  Height (cm): 177.8 (21 May 2018 16:21)  Weight (kg): 97 (21 May 2018 16:21)  BMI (kg/m2): 30.7 (21 May 2018 16:21)  BSA (m2): 2.15 (21 May 2018 16:21)      CAPILLARY BLOOD GLUCOSE              MEDICATIONS  acetaminophen   Tablet. 650 milliGRAM(s) Oral every 6 hours PRN  aspirin enteric coated 81 milliGRAM(s) Oral daily  ATENolol  Tablet 50 milliGRAM(s) Oral daily  calcium carbonate 1250 mG + Vitamin D (OsCal 500 + D) 1 Tablet(s) Oral daily  ciprofloxacin     Tablet 500 milliGRAM(s) Oral every 12 hours  docusate sodium 100 milliGRAM(s) Oral three times a day  enoxaparin Injectable 40 milliGRAM(s) SubCutaneous every 24 hours  furosemide    Tablet 40 milliGRAM(s) Oral daily  lisinopril 10 milliGRAM(s) Oral daily  oxyCODONE    5 mG/acetaminophen 325 mG 1 Tablet(s) Oral every 4 hours PRN  oxyCODONE    5 mG/acetaminophen 325 mG 2 Tablet(s) Oral every 4 hours PRN  pantoprazole    Tablet 40 milliGRAM(s) Oral before breakfast  predniSONE   Tablet 60 milliGRAM(s) Oral daily  senna 2 Tablet(s) Oral at bedtime  simvastatin 40 milliGRAM(s) Oral at bedtime  tamsulosin 0.4 milliGRAM(s) Oral at bedtime  trimethoprim  160 mG/sulfamethoxazole 800 mG 1 Tablet(s) Oral <User Schedule>      PHYSICAL EXAM    Subjective:   Neurology: alert and oriented x 3, nonfocal, no gross deficits  CV : s1s1 reg no MRGS  Lungs: CTA, equal chest expansion  Drains: left pl chest tube to clws no leak no crepitus  output 400ml/24h serous sang drainage   Abdomen: soft, nontender, nondistended, positive bowel sounds,   :    voids  Extremities:    noedema,  +distal pulses present  b/l , no calf tenderness b/l , warm and perfused b/l    CXR: < from: Xray Chest 1 View- PORTABLE-Urgent (18 @ 11:20) >  Diffuse lung opacities. Left chest tube. Trace left apical pneumothorax.    < end of copied text >  < from: Xray Chest 1 View- PORTABLE-Urgent (18 @ 11:20) >  Diffuse lung opacities. Left chest tube. Trace left apical pneumothorax.    < end of copied text >      LABS      136  |  93<L>  |  19  ----------------------------<  118<H>  3.9   |  32<H>  |  0.89    Ca    8.7      25 May 2018 06:42  Phos  3.0       Mg     2.0                                        12.0   9.15  )-----------( 301      ( 25 May 2018 07:46 )             37.9          PT/INR - ( 24 May 2018 07:58 )   PT: 12.6 sec;   INR: 1.11 ratio         PTT - ( 24 May 2018 07:58 )  PTT:45.9 sec         PAST MEDICAL & SURGICAL HISTORY:  Obesity, unspecified obesity severity, unspecified obesity type  Hyperlipidemia, unspecified hyperlipidemia type  Essential hypertension  Persistent atrial fibrillation  H/O bilateral hip replacements  H/O arthroscopy of knee        Outpatient follow up: Dr Cazares

## 2018-05-25 NOTE — PROGRESS NOTE ADULT - PROBLEM SELECTOR PLAN 1
unclear absolute etiology here--could still represent CHF vs amio toxicity--less likely PNA (non immunocompromised host) unclear absolute etiology here--could pneumonitis possibly amio toxicity--less likely PNA (non immunocompromised host)

## 2018-05-25 NOTE — PROGRESS NOTE ADULT - PROBLEM SELECTOR PLAN 1
One month dyspnea associated with hypoxia, orthopnea, and LE edema. TTE 5/17/2018 had LVEF 55%, with diastolic dysfunction, no pulm HTN. PFTs showed restrictive pattern. pBNP 7400. 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  -CT chest (5/10/2018) showed bilateral airspace consolidation most prominent in the upper lobe, bilateral nodules, mild mediastinal LAD, small-mod R pleural effusion, small L pleural effusion.  -DDx: ILD, pneumonia, sarcoidosis, amiodarone toxicity, metastatic disease, vasculitis, pulmonary edema 2/2 CHF exacerbation with diastolic dysfunction. dyspnea from intermittent arrythmia also a possibility but less likely given NSR on EKG and recent afib ablation. Consider occupational exposures as well   -MATEO, HIV, quant gold negative. MATEO positive  -Appreciate pulmonology and cardiology recs  -LHC/RHC nonobstructing lesions, no pulmonary HTN. VATs today for lung biopsy  -Eliquis last given 5/21 evening  -Monitor on tele  -s/p lasix IV diuresis, transition back to po today  -f/u lung biopsy path One month dyspnea associated with hypoxia, orthopnea, and LE edema. TTE 5/17/2018 had LVEF 55%, with diastolic dysfunction, no pulm HTN. PFTs showed restrictive pattern. pBNP 7400. 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 DDx: ILD, pneumonia, sarcoidosis, amiodarone toxicity, metastatic disease, vasculitis, pulmonary edema 2/2 CHF exacerbation with diastolic dysfunction. dyspnea from intermittent arrythmia also a possibility but less likely given NSR on EKG and recent afib ablation. Consider occupational exposures as well  -CT chest (5/10/2018) showed bilateral airspace consolidation most prominent in the upper lobe, bilateral nodules, mild mediastinal LAD, small-mod R pleural effusion, small L pleural effusion.  -s/p L VATS with wedge resection (5/24, upper and lower lobes), left chest tube in place to low suction  -MATEO, HIV, quant gold negative. MATEO positive  -Appreciate pulmonology and cardiology recs  -f/u when to restart Eliquis  -Monitor on tele, lasix 40po daily  -f/u lung biopsy path/cytology. Many PMNs, no organisms seen  -start prednisone 60mg daily, Bactrim ppx One month dyspnea associated with hypoxia, orthopnea, and LE edema. TTE 5/17/2018 had LVEF 55%, with diastolic dysfunction, no pulm HTN. PFTs showed restrictive pattern. pBNP 7400. 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 DDx: ILD, pneumonia, sarcoidosis, amiodarone toxicity, metastatic disease, vasculitis, pulmonary edema 2/2 CHF exacerbation with diastolic dysfunction. dyspnea from intermittent arrythmia also a possibility but less likely given NSR on EKG and recent afib ablation. Consider occupational exposures as well  -CT chest (5/10/2018) showed bilateral airspace consolidation most prominent in the upper lobe, bilateral nodules, mild mediastinal LAD, small-mod R pleural effusion, small L pleural effusion.  -s/p L VATS with wedge resection (5/24, upper and lower lobes), left chest tube in place to low suction  -HIV, quant gold negative. MATEO positive  -Appreciate pulmonology and cardiology recs  -f/u when to restart Eliquis  -Monitor on tele, lasix 40po daily  -f/u lung biopsy path/cytology. Many PMNs, no organisms seen  -start prednisone 60mg daily, Bactrim ppx

## 2018-05-25 NOTE — PROGRESS NOTE ADULT - PROBLEM SELECTOR PLAN 2
Presented with hypoxia sat 82% on RA, still hypoxic 78% on RA. Does not use oxygen at home, no hx of pulmonary disease except 60 pack year history and restrictive PFTs.  -continue supplemental oxygen to maintain SpO2 >92%  -s/p diuresis  -lung biopsy today Presented with hypoxia sat 82% on RA, still hypoxic 78% on RA. Does not use oxygen at home, no hx of pulmonary disease except 60 pack year history and restrictive PFTs.  -continue supplemental oxygen to maintain SpO2 >92%  -lasix oral  -f/u lung biopsy path

## 2018-05-25 NOTE — PROGRESS NOTE ADULT - PROBLEM SELECTOR PLAN 4
PVR straight cath 400cc. Suspect BPH in elderly man which likely caused acute cystitis.  -continue flomax  -consider renal US if develops RASHEEDA or retention does not improve  -PVR today 19. Cystitis exacerbating likely underlying BPH PVR straight cath 400cc. Suspect BPH in elderly man which likely caused acute cystitis.  -continue flomax  -consider renal US if develops RASHEEDA or retention does not improve  -Bladder scan elevated, will attempt straight cath

## 2018-05-25 NOTE — PROGRESS NOTE ADULT - PROBLEM SELECTOR PLAN 5
card cath to r/o CHF negative and will need VATS bx to confirm DX before committing to long course of steroids-- card cath to r/o CHF negative and now s/p VATS bx to confirm DX before committing to long course of steroids--await path.

## 2018-05-25 NOTE — PROVIDER CONTACT NOTE (CHANGE IN STATUS NOTIFICATION) - SITUATION
Pt. was due to void s/p VATS but is unable to void after multiple tries. Bladder scanner shows ~450cc urine in the bladder.

## 2018-05-25 NOTE — PROGRESS NOTE ADULT - ASSESSMENT
78y Male with history of HTN, HLD, PAF S/P Ablation admitted with increasing SOB. Cardiac cath findings mild CAD. Component of acute on chronic diastolic CHF improved with lasix. Remains NSR post ablation off amiodarone for now. CT and CXR findings more c/w primary pulmonary process. Amiodarone use has been short term but can be associated with acute toxicity as well. He is S/P VATS  left wedge resection with Dr Cazares  on 5/24/18 5/25  left ct placed on water seal

## 2018-05-25 NOTE — PROGRESS NOTE ADULT - SUBJECTIVE AND OBJECTIVE BOX
Internal Medicine Progress Note    Linnea Butt, PGY1  Internal Medicine, team 1  Pager [phone] / [phone]  After 7PM on weekdays and 12PM on weekends, please page #[phone]    Patient is a 78y old  Male who presents with a chief complaint of SOB (21 May 2018 13:43)      SUBJECTIVE / OVERNIGHT EVENTS: Underwent left VATS with wedge resection x 2 (upper and lower lobe) by CT surgery. Pt felt SOB after, CXR no pneumothorax, dyspnea now resolved. Left chest tube to low suction, 500cc serosanguinous fluid overnight output. Bladder scan overnight 925. Pt would urinate 100cc at a time. Multiple attempts to straight cath unsuccessful so urology PA called, but pt refused his assistance for coude catheter. Repeat bladder scan 450. Sat 90s on 4L NC. Pt denies SOB, CP, abd pain, F/C.    MEDICATIONS  (STANDING):  aspirin enteric coated 81 milliGRAM(s) Oral daily  ATENolol  Tablet 50 milliGRAM(s) Oral daily  calcium carbonate 1250 mG + Vitamin D (OsCal 500 + D) 1 Tablet(s) Oral daily  ciprofloxacin     Tablet 500 milliGRAM(s) Oral every 12 hours  furosemide    Tablet 40 milliGRAM(s) Oral daily  lisinopril 10 milliGRAM(s) Oral daily  simvastatin 40 milliGRAM(s) Oral at bedtime  tamsulosin 0.4 milliGRAM(s) Oral at bedtime    MEDICATIONS  (PRN):  acetaminophen   Tablet. 650 milliGRAM(s) Oral every 6 hours PRN Mild Pain (1 - 3)  oxyCODONE    5 mG/acetaminophen 325 mG 1 Tablet(s) Oral every 4 hours PRN Moderate Pain (4 - 6)  oxyCODONE    5 mG/acetaminophen 325 mG 2 Tablet(s) Oral every 4 hours PRN Severe Pain (7 - 10)      Vital Signs Last 24 Hrs  T(C): 36.5 (25 May 2018 04:46), Max: 36.7 (24 May 2018 20:38)  T(F): 97.7 (25 May 2018 04:46), Max: 98 (24 May 2018 20:38)  HR: 75 (25 May 2018 04:46) (56 - 78)  BP: 133/67 (25 May 2018 04:46) (88/48 - 133/67)  BP(mean): 74 (24 May 2018 20:00) (69 - 87)  RR: 20 (25 May 2018 04:46) (16 - 28)  SpO2: 98% (25 May 2018 04:46) (94% - 100%)    CAPILLARY BLOOD GLUCOSE          I&O's Summary    24 May 2018 07:01  -  25 May 2018 07:00  --------------------------------------------------------  IN: 520 mL / OUT: 890 mL / NET: -370 mL      PHYSICAL EXAM  GENERAL: NAD, well-developed  HEAD:  Atraumatic, Normocephalic  EYES: EOMI, PERRLA, conjunctiva and sclera clear  NECK: Supple, No JVD  CHEST/LUNG: Breath sounds symmetric, respirations nonlabored. Clear to auscultation except left middle lobe a rub is present and coarse breath sounds JEREMY.  HEART: Regular rate and rhythm; No murmurs, rubs, or gallops  ABDOMEN: Soft, Nontender, Nondistended; Bowel sounds present  EXTREMITIES:  2+ Peripheral Pulses, No clubbing, cyanosis, or edema  NEURO/PSYCH: AAOx3, nonfocal  SKIN: No rashes or lesions      LABS:                          12.0   9.07  )-----------( 330      ( 24 May 2018 07:52 )             37.5     CBC Full  -  ( 24 May 2018 07:52 )  WBC Count : 9.07 K/uL  Hemoglobin : 12.0 g/dL  Hematocrit : 37.5 %  Platelet Count - Automated : 330 K/uL  Mean Cell Volume : 97.2 fl  Mean Cell Hemoglobin : 31.1 pg  Mean Cell Hemoglobin Concentration : 32.0 gm/dL  Auto Neutrophil # : 5.89 K/uL  Auto Lymphocyte # : 1.62 K/uL  Auto Monocyte # : 1.07 K/uL  Auto Eosinophil # : 0.42 K/uL  Auto Basophil # : 0.02 K/uL  Auto Neutrophil % : 64.9 %  Auto Lymphocyte % : 17.9 %  Auto Monocyte % : 11.8 %  Auto Eosinophil % : 4.6 %  Auto Basophil % : 0.2 %    05-25    136  |  93<L>  |  19  ----------------------------<  118<H>  3.9   |  32<H>  |  0.89    Ca    8.7      25 May 2018 06:42  Phos  3.0     05-25  Mg     2.0     05-25      Creatinine Trend: 0.89<--, 0.95<--, 1.00<--, 1.14<--, 1.16<--    PT/INR - ( 24 May 2018 07:58 )   PT: 12.6 sec;   INR: 1.11 ratio         PTT - ( 24 May 2018 07:58 )  PTT:45.9 sec      ABG - ( 24 May 2018 14:51 )  pH, Arterial: 7.36  pH, Blood: x     /  pCO2: 58    /  pO2: 77    / HCO3: 32    / Base Excess: 5.7   /  SaO2: 93              MICROBIOLOGY:    Culture - Tissue with Gram Stain (collected 25 May 2018 00:50)  Source: .Tissue Other, left upper lobe wedge  Gram Stain (25 May 2018 02:55):    Numerous polymorphonuclear leukocytes seen per low power field    No organisms seen    Culture - Tissue with Gram Stain (collected 25 May 2018 00:50)  Source: .Tissue Other, #2 left lower lobe  Gram Stain (25 May 2018 02:55):    Moderate polymorphonuclear leukocytes seen per low power field    No organisms seen    Culture - Urine (collected 22 May 2018 13:52)  Source: .Urine Clean Catch (Midstream)  Final Report (24 May 2018 08:53):    >100,000 CFU/ml Escherichia coli  Organism: Escherichia coli (24 May 2018 08:53)  Organism: Escherichia coli (24 May 2018 08:53)      RADIOLOGY & ADDITIONAL TESTS:  < from: Cardiac Cath Lab - Adult (05.23.18 @ 15:37) >  CORONARY VESSELS: The coronary circulation is left dominant.  LM:   --  LM: Angiography showed minor luminal irregularities with no flow  limiting lesions.  LAD:   --  LAD: Angiography showed minor luminal irregularities with no  flow limiting lesions.  CX:   --  Circumflex: Angiography showed minor luminal irregularities with  no flow limiting lesions.  RCA:   --  RCA: Angiography showed minor luminal irregularities with no  flow limiting lesions.    HEMODYNAMIC TABLES  Pressures:  Baseline  Pressures:  - HR: 67  Pressures:  - Rhythm:  Pressures:  -- Pulmonary Artery (S/D/M): 49/16/28  Pressures:  -- Pulmonary Capillary Wedge: 18/23/13  Pressures:  -- Right Atrium (a/v/M): 11/7/6  Pressures:  -- Right Ventricle (s/edp): 47/10/--         CONSULTS: pulmonology, cards, CT surgery Internal Medicine Progress Note    Linnea Butt, PGY1  Internal Medicine, team 1  Pager [phone] / [phone]  After 7PM on weekdays and 12PM on weekends, please page #[phone]    Patient is a 78y old  Male who presents with a chief complaint of SOB (21 May 2018 13:43)      SUBJECTIVE / OVERNIGHT EVENTS: Underwent left VATS with wedge resection x 2 (upper and lower lobe) by CT surgery. Pt felt SOB after, CXR no pneumothorax, dyspnea now resolved. Left chest tube to low suction, 500cc serosanguinous fluid overnight output. Bladder scan overnight 925. Pt would urinate 100cc at a time. Multiple attempts to straight cath unsuccessful so urology PA called, but pt refused his assistance for coude catheter. Repeat bladder scan 450. Sat 90s on 4L NC. Pt denies SOB, CP, abd pain, F/C. On tele SR 70-90, PVCx, some trigeminy.    MEDICATIONS  (STANDING):  aspirin enteric coated 81 milliGRAM(s) Oral daily  ATENolol  Tablet 50 milliGRAM(s) Oral daily  calcium carbonate 1250 mG + Vitamin D (OsCal 500 + D) 1 Tablet(s) Oral daily  ciprofloxacin     Tablet 500 milliGRAM(s) Oral every 12 hours  furosemide    Tablet 40 milliGRAM(s) Oral daily  lisinopril 10 milliGRAM(s) Oral daily  simvastatin 40 milliGRAM(s) Oral at bedtime  tamsulosin 0.4 milliGRAM(s) Oral at bedtime    MEDICATIONS  (PRN):  acetaminophen   Tablet. 650 milliGRAM(s) Oral every 6 hours PRN Mild Pain (1 - 3)  oxyCODONE    5 mG/acetaminophen 325 mG 1 Tablet(s) Oral every 4 hours PRN Moderate Pain (4 - 6)  oxyCODONE    5 mG/acetaminophen 325 mG 2 Tablet(s) Oral every 4 hours PRN Severe Pain (7 - 10)      Vital Signs Last 24 Hrs  T(C): 36.5 (25 May 2018 04:46), Max: 36.7 (24 May 2018 20:38)  T(F): 97.7 (25 May 2018 04:46), Max: 98 (24 May 2018 20:38)  HR: 75 (25 May 2018 04:46) (56 - 78)  BP: 133/67 (25 May 2018 04:46) (88/48 - 133/67)  BP(mean): 74 (24 May 2018 20:00) (69 - 87)  RR: 20 (25 May 2018 04:46) (16 - 28)  SpO2: 98% (25 May 2018 04:46) (94% - 100%)    CAPILLARY BLOOD GLUCOSE          I&O's Summary    24 May 2018 07:01  -  25 May 2018 07:00  --------------------------------------------------------  IN: 520 mL / OUT: 890 mL / NET: -370 mL      PHYSICAL EXAM  GENERAL: NAD, well-developed  HEAD:  Atraumatic, Normocephalic  EYES: EOMI, PERRLA, conjunctiva and sclera clear  NECK: Supple, No JVD  CHEST/LUNG: Breath sounds symmetric, respirations nonlabored. Clear to auscultation except left middle lobe a rub is present and coarse breath sounds JEREMY.  HEART: Regular rate and rhythm; No murmurs, rubs, or gallops  ABDOMEN: Soft, Nontender, Nondistended; Bowel sounds present  EXTREMITIES:  2+ Peripheral Pulses, No clubbing, cyanosis, or edema  NEURO/PSYCH: AAOx3, nonfocal  SKIN: No rashes or lesions      LABS:                          12.0   9.07  )-----------( 330      ( 24 May 2018 07:52 )             37.5     CBC Full  -  ( 24 May 2018 07:52 )  WBC Count : 9.07 K/uL  Hemoglobin : 12.0 g/dL  Hematocrit : 37.5 %  Platelet Count - Automated : 330 K/uL  Mean Cell Volume : 97.2 fl  Mean Cell Hemoglobin : 31.1 pg  Mean Cell Hemoglobin Concentration : 32.0 gm/dL  Auto Neutrophil # : 5.89 K/uL  Auto Lymphocyte # : 1.62 K/uL  Auto Monocyte # : 1.07 K/uL  Auto Eosinophil # : 0.42 K/uL  Auto Basophil # : 0.02 K/uL  Auto Neutrophil % : 64.9 %  Auto Lymphocyte % : 17.9 %  Auto Monocyte % : 11.8 %  Auto Eosinophil % : 4.6 %  Auto Basophil % : 0.2 %    05-25    136  |  93<L>  |  19  ----------------------------<  118<H>  3.9   |  32<H>  |  0.89    Ca    8.7      25 May 2018 06:42  Phos  3.0     05-25  Mg     2.0     05-25      Creatinine Trend: 0.89<--, 0.95<--, 1.00<--, 1.14<--, 1.16<--    PT/INR - ( 24 May 2018 07:58 )   PT: 12.6 sec;   INR: 1.11 ratio         PTT - ( 24 May 2018 07:58 )  PTT:45.9 sec      ABG - ( 24 May 2018 14:51 )  pH, Arterial: 7.36  pH, Blood: x     /  pCO2: 58    /  pO2: 77    / HCO3: 32    / Base Excess: 5.7   /  SaO2: 93              MICROBIOLOGY:    Culture - Tissue with Gram Stain (collected 25 May 2018 00:50)  Source: .Tissue Other, left upper lobe wedge  Gram Stain (25 May 2018 02:55):    Numerous polymorphonuclear leukocytes seen per low power field    No organisms seen    Culture - Tissue with Gram Stain (collected 25 May 2018 00:50)  Source: .Tissue Other, #2 left lower lobe  Gram Stain (25 May 2018 02:55):    Moderate polymorphonuclear leukocytes seen per low power field    No organisms seen    Culture - Urine (collected 22 May 2018 13:52)  Source: .Urine Clean Catch (Midstream)  Final Report (24 May 2018 08:53):    >100,000 CFU/ml Escherichia coli  Organism: Escherichia coli (24 May 2018 08:53)  Organism: Escherichia coli (24 May 2018 08:53)      RADIOLOGY & ADDITIONAL TESTS:  < from: Cardiac Cath Lab - Adult (05.23.18 @ 15:37) >  CORONARY VESSELS: The coronary circulation is left dominant.  LM:   --  LM: Angiography showed minor luminal irregularities with no flow  limiting lesions.  LAD:   --  LAD: Angiography showed minor luminal irregularities with no  flow limiting lesions.  CX:   --  Circumflex: Angiography showed minor luminal irregularities with  no flow limiting lesions.  RCA:   --  RCA: Angiography showed minor luminal irregularities with no  flow limiting lesions.    HEMODYNAMIC TABLES  Pressures:  Baseline  Pressures:  - HR: 67  Pressures:  - Rhythm:  Pressures:  -- Pulmonary Artery (S/D/M): 49/16/28  Pressures:  -- Pulmonary Capillary Wedge: 18/23/13  Pressures:  -- Right Atrium (a/v/M): 11/7/6  Pressures:  -- Right Ventricle (s/edp): 47/10/--         CONSULTS: pulmonology, cards, CT surgery Internal Medicine Progress Note    Linnea Butt, PGY1  Internal Medicine, team 1  Pager [phone] / [phone]  After 7PM on weekdays and 12PM on weekends, please page #[phone]    Patient is a 78y old  Male who presents with a chief complaint of SOB (21 May 2018 13:43)      SUBJECTIVE / OVERNIGHT EVENTS: Underwent left VATS with wedge resection x 2 (upper and lower lobe) by CT surgery. Pt felt SOB after, CXR no pneumothorax, dyspnea now resolved. Left chest tube to low suction, 500cc serosanguinous fluid overnight output. Bladder scan overnight 925. Pt would urinate 100cc at a time. Multiple attempts to straight cath unsuccessful so urology PA called, but pt refused his assistance for coude catheter. Repeat bladder scan 450. Sat 90s on 4L NC. Pt denies SOB, CP, abd pain, F/C. On tele SR 70-90, PVCx, some trigeminy. CT chest from OSH uploaded to PACs and CD given back to wife today.    MEDICATIONS  (STANDING):  aspirin enteric coated 81 milliGRAM(s) Oral daily  ATENolol  Tablet 50 milliGRAM(s) Oral daily  calcium carbonate 1250 mG + Vitamin D (OsCal 500 + D) 1 Tablet(s) Oral daily  ciprofloxacin     Tablet 500 milliGRAM(s) Oral every 12 hours  furosemide    Tablet 40 milliGRAM(s) Oral daily  lisinopril 10 milliGRAM(s) Oral daily  simvastatin 40 milliGRAM(s) Oral at bedtime  tamsulosin 0.4 milliGRAM(s) Oral at bedtime    MEDICATIONS  (PRN):  acetaminophen   Tablet. 650 milliGRAM(s) Oral every 6 hours PRN Mild Pain (1 - 3)  oxyCODONE    5 mG/acetaminophen 325 mG 1 Tablet(s) Oral every 4 hours PRN Moderate Pain (4 - 6)  oxyCODONE    5 mG/acetaminophen 325 mG 2 Tablet(s) Oral every 4 hours PRN Severe Pain (7 - 10)      Vital Signs Last 24 Hrs  T(C): 36.5 (25 May 2018 04:46), Max: 36.7 (24 May 2018 20:38)  T(F): 97.7 (25 May 2018 04:46), Max: 98 (24 May 2018 20:38)  HR: 75 (25 May 2018 04:46) (56 - 78)  BP: 133/67 (25 May 2018 04:46) (88/48 - 133/67)  BP(mean): 74 (24 May 2018 20:00) (69 - 87)  RR: 20 (25 May 2018 04:46) (16 - 28)  SpO2: 98% (25 May 2018 04:46) (94% - 100%)    CAPILLARY BLOOD GLUCOSE          I&O's Summary    24 May 2018 07:01  -  25 May 2018 07:00  --------------------------------------------------------  IN: 520 mL / OUT: 890 mL / NET: -370 mL      PHYSICAL EXAM  GENERAL: NAD, well-developed  HEAD:  Atraumatic, Normocephalic  EYES: EOMI, PERRLA, conjunctiva and sclera clear  NECK: Supple, No JVD  CHEST/LUNG: Breath sounds symmetric, respirations nonlabored. Clear to auscultation except left middle lobe a rub is present and coarse breath sounds JEREMY.  HEART: Regular rate and rhythm; No murmurs, rubs, or gallops  ABDOMEN: Soft, Nontender, Nondistended; Bowel sounds present  EXTREMITIES:  2+ Peripheral Pulses, No clubbing, cyanosis, or edema  NEURO/PSYCH: AAOx3, nonfocal  SKIN: No rashes or lesions      LABS:                          12.0   9.07  )-----------( 330      ( 24 May 2018 07:52 )             37.5     CBC Full  -  ( 24 May 2018 07:52 )  WBC Count : 9.07 K/uL  Hemoglobin : 12.0 g/dL  Hematocrit : 37.5 %  Platelet Count - Automated : 330 K/uL  Mean Cell Volume : 97.2 fl  Mean Cell Hemoglobin : 31.1 pg  Mean Cell Hemoglobin Concentration : 32.0 gm/dL  Auto Neutrophil # : 5.89 K/uL  Auto Lymphocyte # : 1.62 K/uL  Auto Monocyte # : 1.07 K/uL  Auto Eosinophil # : 0.42 K/uL  Auto Basophil # : 0.02 K/uL  Auto Neutrophil % : 64.9 %  Auto Lymphocyte % : 17.9 %  Auto Monocyte % : 11.8 %  Auto Eosinophil % : 4.6 %  Auto Basophil % : 0.2 %    05-25    136  |  93<L>  |  19  ----------------------------<  118<H>  3.9   |  32<H>  |  0.89    Ca    8.7      25 May 2018 06:42  Phos  3.0     05-25  Mg     2.0     05-25      Creatinine Trend: 0.89<--, 0.95<--, 1.00<--, 1.14<--, 1.16<--    PT/INR - ( 24 May 2018 07:58 )   PT: 12.6 sec;   INR: 1.11 ratio         PTT - ( 24 May 2018 07:58 )  PTT:45.9 sec      ABG - ( 24 May 2018 14:51 )  pH, Arterial: 7.36  pH, Blood: x     /  pCO2: 58    /  pO2: 77    / HCO3: 32    / Base Excess: 5.7   /  SaO2: 93              MICROBIOLOGY:    Culture - Tissue with Gram Stain (collected 25 May 2018 00:50)  Source: .Tissue Other, left upper lobe wedge  Gram Stain (25 May 2018 02:55):    Numerous polymorphonuclear leukocytes seen per low power field    No organisms seen    Culture - Tissue with Gram Stain (collected 25 May 2018 00:50)  Source: .Tissue Other, #2 left lower lobe  Gram Stain (25 May 2018 02:55):    Moderate polymorphonuclear leukocytes seen per low power field    No organisms seen    Culture - Urine (collected 22 May 2018 13:52)  Source: .Urine Clean Catch (Midstream)  Final Report (24 May 2018 08:53):    >100,000 CFU/ml Escherichia coli  Organism: Escherichia coli (24 May 2018 08:53)  Organism: Escherichia coli (24 May 2018 08:53)      RADIOLOGY & ADDITIONAL TESTS:  < from: Cardiac Cath Lab - Adult (05.23.18 @ 15:37) >  CORONARY VESSELS: The coronary circulation is left dominant.  LM:   --  LM: Angiography showed minor luminal irregularities with no flow  limiting lesions.  LAD:   --  LAD: Angiography showed minor luminal irregularities with no  flow limiting lesions.  CX:   --  Circumflex: Angiography showed minor luminal irregularities with  no flow limiting lesions.  RCA:   --  RCA: Angiography showed minor luminal irregularities with no  flow limiting lesions.    HEMODYNAMIC TABLES  Pressures:  Baseline  Pressures:  - HR: 67  Pressures:  - Rhythm:  Pressures:  -- Pulmonary Artery (S/D/M): 49/16/28  Pressures:  -- Pulmonary Capillary Wedge: 18/23/13  Pressures:  -- Right Atrium (a/v/M): 11/7/6  Pressures:  -- Right Ventricle (s/edp): 47/10/--         CONSULTS: pulmonology, cards, CT surgery

## 2018-05-25 NOTE — PROGRESS NOTE ADULT - PROBLEM SELECTOR PLAN 2
CHF/?interstitial process-?amio toxicity--cards evaln negative--for CTS evaln today CHF/?interstitial process-?amio toxicity--cards evaln negative--for CTS bx 5/24  path pending

## 2018-05-25 NOTE — PROGRESS NOTE ADULT - PROBLEM SELECTOR PLAN 6
Atrial fibrillation diagnosed 2014. 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 s/p ablation (4/24). Pt has loop recorder and has gone into paroxysmal AF after ablation.  -Currently in sinus rhythm, monitor on tele  -continue atenolol, holding Eliquis for heart cath Atrial fibrillation diagnosed 2014. 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 s/p ablation (4/24). Pt has loop recorder and has gone into paroxysmal AF after ablation.  -Currently in sinus rhythm, monitor on tele  -continue atenolol, holding Eliquis

## 2018-05-25 NOTE — PROGRESS NOTE ADULT - ASSESSMENT
78y Male with history of HTN, HLD, PAF S/P Ablation admitted with increasing SOB. Cardiac cath findings mild CAD. Component of acute on chronic diastolic CHF improved with lasix. Remains NSR post ablation off amiodarone for now. CT and CXR findings more c/w primary pulmonary process. Amiodarone use has been short term but can be associated with acute toxicity as well. He is S/P VATS.     Rec:  Chest tube as per thoracic surgery.  Ames and voiding trial per urology  Continue atenolol and lisinopril.  Resume lasix po  Follow up TTE  DVT prophylaxis and increase activity as able.  Resume eliquis with recent AF ablation 4/2018 when OK with surgery. Can stop aspirin.

## 2018-05-25 NOTE — PROGRESS NOTE ADULT - SUBJECTIVE AND OBJECTIVE BOX
ANESTHESIA POSTOP CHECK    78y Male POSTOP DAY 1 S/P     Vital Signs Last 24 Hrs  T(C): 36.3 (25 May 2018 13:20), Max: 36.7 (24 May 2018 20:38)  T(F): 97.4 (25 May 2018 13:20), Max: 98 (24 May 2018 20:38)  HR: 63 (25 May 2018 13:20) (56 - 78)  BP: 91/48 (25 May 2018 13:20) (91/48 - 133/67)  BP(mean): 74 (24 May 2018 20:00) (69 - 87)  RR: 19 (25 May 2018 13:20) (16 - 28)  SpO2: 95% (25 May 2018 13:20) (94% - 100%)  I&O's Summary    24 May 2018 07:01  -  25 May 2018 07:00  --------------------------------------------------------  IN: 520 mL / OUT: 890 mL / NET: -370 mL    25 May 2018 07:01  -  25 May 2018 13:49  --------------------------------------------------------  IN: 240 mL / OUT: 75 mL / NET: 165 mL        [x ] NO APPARENT ANESTHESIA COMPLICATIONS      Comments:

## 2018-05-25 NOTE — PROGRESS NOTE ADULT - PROBLEM SELECTOR PLAN 5
TTE 5/2018 showed LVEF 55%, diastolic dysfunction. Presented with pBNP 7400 so possible mild diastolic HF exacerbation contributing to presenting symptoms.  -Home dose ramipril equivalent lisinopril 10mg daily  -lasix 40mg po daily, lisinopril 10mg, atenolol. Spaced out in day TTE 5/2018 showed LVEF 55%, diastolic dysfunction. Presented with pBNP 7400 so possible mild diastolic HF exacerbation contributing to presenting symptoms. Home dose ramipril equivalent lisinopril 40mg daily  -lasix 40mg po daily, lisinopril 10mg, atenolol. Spaced out in day

## 2018-05-25 NOTE — PROGRESS NOTE ADULT - ATTENDING COMMENTS
as above-  s/p both LHC and RHC to no evidence of CHF here; case d/w Dr. Mei and Son and Dr. Hanley--will need VATS tissue bx for definitive DX prior to initiating immunosuppressive rx.  CTS evaln this am (family to bring in CT scan)        Lele Saul MD-Pulmonary   [phone] as above-  s/p both LHC and RHC to no evidence of CHF here; now s/p VATS tissue bx for definitive DX prior to initiating immunosuppressive rx.  Await pathology --can initiate steroids at 60mg per day along w/bactrim ds 1 tab M/W/F (no allergy noted)  CTS follow up for chest tube management        Lele Saul MD-Pulmonary   [phone]

## 2018-05-25 NOTE — PROVIDER CONTACT NOTE (CHANGE IN STATUS NOTIFICATION) - ACTION/TREATMENT ORDERED:
Md ordered to Straight cath patient.   Update: Unable to straight cath x2 attempts due to resistance. MD updated regarding issue, urology called. Pt. urinated about ~100cc urine on own.

## 2018-05-25 NOTE — PROGRESS NOTE ADULT - PROBLEM SELECTOR PLAN 8
DVT: Eliquis full A/C on hold for lung biopsy  Dispo: PT eval ongoing, walks with cane DVT: holding Eliquis  Dispo: PT eval ongoing, walks with cane DVT: holding Eliquis until CT surgery follow up  Dispo: PT eval ongoing, walks with cane

## 2018-05-25 NOTE — PROGRESS NOTE ADULT - PROBLEM SELECTOR PLAN 3
C/o intermittent dysuria, urinary frequency, and incomplete voiding  -pansensitive E coli UTI  -continue ceftriaxone (5/22 - 5/23), switch to ciprofloxacin C/o intermittent dysuria, urinary frequency, and incomplete voiding  -pansensitive E coli UTI  -continue ceftriaxone (5/22 - 5/23), switch to ciprofloxacin (5/24 - )

## 2018-05-25 NOTE — PROGRESS NOTE ADULT - ASSESSMENT
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

## 2018-05-26 PROCEDURE — 99233 SBSQ HOSP IP/OBS HIGH 50: CPT | Mod: GC

## 2018-05-26 PROCEDURE — 71045 X-RAY EXAM CHEST 1 VIEW: CPT | Mod: 26

## 2018-05-26 PROCEDURE — 71045 X-RAY EXAM CHEST 1 VIEW: CPT | Mod: 26,77

## 2018-05-26 RX ORDER — POLYETHYLENE GLYCOL 3350 17 G/17G
17 POWDER, FOR SOLUTION ORAL DAILY
Qty: 0 | Refills: 0 | Status: DISCONTINUED | OUTPATIENT
Start: 2018-05-26 | End: 2018-05-29

## 2018-05-26 RX ORDER — ASPIRIN/CALCIUM CARB/MAGNESIUM 324 MG
81 TABLET ORAL DAILY
Qty: 0 | Refills: 0 | Status: DISCONTINUED | OUTPATIENT
Start: 2018-05-26 | End: 2018-05-26

## 2018-05-26 RX ORDER — APIXABAN 2.5 MG/1
5 TABLET, FILM COATED ORAL EVERY 12 HOURS
Qty: 0 | Refills: 0 | Status: DISCONTINUED | OUTPATIENT
Start: 2018-05-26 | End: 2018-05-29

## 2018-05-26 RX ADMIN — SENNA PLUS 2 TABLET(S): 8.6 TABLET ORAL at 21:20

## 2018-05-26 RX ADMIN — Medication 60 MILLIGRAM(S): at 05:31

## 2018-05-26 RX ADMIN — Medication 500 MILLIGRAM(S): at 18:24

## 2018-05-26 RX ADMIN — SIMVASTATIN 40 MILLIGRAM(S): 20 TABLET, FILM COATED ORAL at 21:20

## 2018-05-26 RX ADMIN — Medication 500 MILLIGRAM(S): at 05:31

## 2018-05-26 RX ADMIN — LISINOPRIL 10 MILLIGRAM(S): 2.5 TABLET ORAL at 05:32

## 2018-05-26 RX ADMIN — TAMSULOSIN HYDROCHLORIDE 0.4 MILLIGRAM(S): 0.4 CAPSULE ORAL at 21:20

## 2018-05-26 RX ADMIN — Medication 81 MILLIGRAM(S): at 12:39

## 2018-05-26 RX ADMIN — POLYETHYLENE GLYCOL 3350 17 GRAM(S): 17 POWDER, FOR SOLUTION ORAL at 12:38

## 2018-05-26 RX ADMIN — Medication 1 TABLET(S): at 12:39

## 2018-05-26 RX ADMIN — Medication 100 MILLIGRAM(S): at 12:39

## 2018-05-26 RX ADMIN — PANTOPRAZOLE SODIUM 40 MILLIGRAM(S): 20 TABLET, DELAYED RELEASE ORAL at 05:31

## 2018-05-26 RX ADMIN — Medication 100 MILLIGRAM(S): at 05:31

## 2018-05-26 RX ADMIN — Medication 40 MILLIGRAM(S): at 05:31

## 2018-05-26 RX ADMIN — Medication 100 MILLIGRAM(S): at 21:20

## 2018-05-26 RX ADMIN — ATENOLOL 50 MILLIGRAM(S): 25 TABLET ORAL at 05:31

## 2018-05-26 RX ADMIN — APIXABAN 5 MILLIGRAM(S): 2.5 TABLET, FILM COATED ORAL at 18:24

## 2018-05-26 NOTE — PROGRESS NOTE ADULT - SUBJECTIVE AND OBJECTIVE BOX
Internal Medicine Progress Note    Linnea Butt, PGY1  Internal Medicine, team 1  Pager [phone] / [phone]  After 7PM on weekdays and 12PM on weekends, please page #[phone]    Patient is a 78y old  Male who presents with a chief complaint of SOB (21 May 2018 13:43)      SUBJECTIVE / OVERNIGHT EVENTS: Pt has no complaints. On RA for few minutes, desat to 85%, improved to 94% on 3L NC. Pt reports he's been using the incentive spirometer throughout the day. Denies CP, wheeze, abd pain.    MEDICATIONS  (STANDING):  aspirin enteric coated 81 milliGRAM(s) Oral daily  ATENolol  Tablet 50 milliGRAM(s) Oral daily  calcium carbonate 1250 mG + Vitamin D (OsCal 500 + D) 1 Tablet(s) Oral daily  ciprofloxacin     Tablet 500 milliGRAM(s) Oral every 12 hours  docusate sodium 100 milliGRAM(s) Oral three times a day  enoxaparin Injectable 40 milliGRAM(s) SubCutaneous every 24 hours  furosemide    Tablet 40 milliGRAM(s) Oral daily  lisinopril 10 milliGRAM(s) Oral daily  pantoprazole    Tablet 40 milliGRAM(s) Oral before breakfast  predniSONE   Tablet 60 milliGRAM(s) Oral daily  senna 2 Tablet(s) Oral at bedtime  simvastatin 40 milliGRAM(s) Oral at bedtime  tamsulosin 0.4 milliGRAM(s) Oral at bedtime  trimethoprim  160 mG/sulfamethoxazole 800 mG 1 Tablet(s) Oral <User Schedule>    MEDICATIONS  (PRN):  acetaminophen   Tablet. 650 milliGRAM(s) Oral every 6 hours PRN Mild Pain (1 - 3)  oxyCODONE    5 mG/acetaminophen 325 mG 1 Tablet(s) Oral every 4 hours PRN Moderate Pain (4 - 6)  oxyCODONE    5 mG/acetaminophen 325 mG 2 Tablet(s) Oral every 4 hours PRN Severe Pain (7 - 10)      Vital Signs Last 24 Hrs  T(C): 36.8 (25 May 2018 21:05), Max: 36.8 (25 May 2018 21:05)  T(F): 98.3 (25 May 2018 21:05), Max: 98.3 (25 May 2018 21:05)  HR: 72 (25 May 2018 21:05) (63 - 72)  BP: 124/73 (25 May 2018 21:05) (91/48 - 124/73)  BP(mean): --  RR: 19 (25 May 2018 21:05) (19 - 19)  SpO2: 95% (25 May 2018 21:05) (95% - 95%)    CAPILLARY BLOOD GLUCOSE    I&O's Summary    25 May 2018 07:01  -  26 May 2018 07:00  --------------------------------------------------------  IN: 600 mL / OUT: 2480 mL / NET: -1880 mL      PHYSICAL EXAM  GENERAL: NAD, well-developed  HEAD:  Atraumatic  EYES: EOMI, PERRLA, conjunctiva and sclera clear  NECK: Supple, No JVD  CHEST/LUNG: Breath sounds symmetric, respirations nonlabored, CTAB b/l. Left chest tube with serosanguinous fluid, no surrounding erythema, mildly tender  HEART: Regular rate and rhythm; No murmurs, rubs, or gallops  ABDOMEN: Soft, Nontender, Nondistended; Bowel sounds present  EXTREMITIES:  2+ Peripheral Pulses, No clubbing, cyanosis, or edema  NEURO/PSYCH: AAOx3, nonfocal  SKIN: No rashes or lesions      LABS:  no AM labs        MICROBIOLOGY:  Culture - Tissue with Gram Stain (collected 25 May 2018 00:50)  Source: .Tissue Other, left upper lobe wedge  Gram Stain (25 May 2018 02:55):    Numerous polymorphonuclear leukocytes seen per low power field    No organisms seen  Preliminary Report (25 May 2018 20:32):    No growth    Culture - Tissue with Gram Stain (collected 25 May 2018 00:50)  Source: .Tissue Other, #2 left lower lobe  Gram Stain (25 May 2018 02:55):    Moderate polymorphonuclear leukocytes seen per low power field    No organisms seen  Preliminary Report (25 May 2018 20:32):    No growth      Culture - Urine (collected 22 May 2018 13:52)  Source: .Urine Clean Catch (Midstream)  Final Report (24 May 2018 08:53):    >100,000 CFU/ml Escherichia coli  Organism: Escherichia coli (24 May 2018 08:53)  Organism: Escherichia coli (24 May 2018 08:53)      RADIOLOGY & ADDITIONAL TESTS:  < from: Cardiac Cath Lab - Adult (05.23.18 @ 15:37) >  CORONARY VESSELS: The coronary circulation is left dominant.  LM:   --  LM: Angiography showed minor luminal irregularities with no flow  limiting lesions.  LAD:   --  LAD: Angiography showed minor luminal irregularities with no  flow limiting lesions.  CX:   --  Circumflex: Angiography showed minor luminal irregularities with  no flow limiting lesions.  RCA:   --  RCA: Angiography showed minor luminal irregularities with no  flow limiting lesions.    HEMODYNAMIC TABLES  Pressures:  Baseline  Pressures:  - HR: 67  Pressures:  - Rhythm:  Pressures:  -- Pulmonary Artery (S/D/M): 49/16/28  Pressures:  -- Pulmonary Capillary Wedge: 18/23/13  Pressures:  -- Right Atrium (a/v/M): 11/7/6  Pressures:  -- Right Ventricle (s/edp): 47/10/--         CONSULTS: pulmonology, cards, CT surgery Internal Medicine Progress Note    Linnea Butt, PGY1  Internal Medicine, team 1  Pager [phone] / [phone]  After 7PM on weekdays and 12PM on weekends, please page #[phone]    Patient is a 78y old  Male who presents with a chief complaint of SOB (21 May 2018 13:43)      SUBJECTIVE / OVERNIGHT EVENTS: Pt has no complaints. On RA for few minutes, desat to 85%, improved to 94% on 3L NC. Pt reports he's been using the incentive spirometer throughout the day. Denies CP, wheeze, abd pain.    MEDICATIONS  (STANDING):  aspirin enteric coated 81 milliGRAM(s) Oral daily  ATENolol  Tablet 50 milliGRAM(s) Oral daily  calcium carbonate 1250 mG + Vitamin D (OsCal 500 + D) 1 Tablet(s) Oral daily  ciprofloxacin     Tablet 500 milliGRAM(s) Oral every 12 hours  docusate sodium 100 milliGRAM(s) Oral three times a day  enoxaparin Injectable 40 milliGRAM(s) SubCutaneous every 24 hours  furosemide    Tablet 40 milliGRAM(s) Oral daily  lisinopril 10 milliGRAM(s) Oral daily  pantoprazole    Tablet 40 milliGRAM(s) Oral before breakfast  predniSONE   Tablet 60 milliGRAM(s) Oral daily  senna 2 Tablet(s) Oral at bedtime  simvastatin 40 milliGRAM(s) Oral at bedtime  tamsulosin 0.4 milliGRAM(s) Oral at bedtime  trimethoprim  160 mG/sulfamethoxazole 800 mG 1 Tablet(s) Oral <User Schedule>    MEDICATIONS  (PRN):  acetaminophen   Tablet. 650 milliGRAM(s) Oral every 6 hours PRN Mild Pain (1 - 3)  oxyCODONE    5 mG/acetaminophen 325 mG 1 Tablet(s) Oral every 4 hours PRN Moderate Pain (4 - 6)  oxyCODONE    5 mG/acetaminophen 325 mG 2 Tablet(s) Oral every 4 hours PRN Severe Pain (7 - 10)      Vital Signs Last 24 Hrs  T(C): 36.8 (25 May 2018 21:05), Max: 36.8 (25 May 2018 21:05)  T(F): 98.3 (25 May 2018 21:05), Max: 98.3 (25 May 2018 21:05)  HR: 72 (25 May 2018 21:05) (63 - 72)  BP: 124/73 (25 May 2018 21:05) (91/48 - 124/73)  BP(mean): --  RR: 19 (25 May 2018 21:05) (19 - 19)  SpO2: 95% (25 May 2018 21:05) (95% - 95%)    CAPILLARY BLOOD GLUCOSE    I&O's Summary    25 May 2018 07:01  -  26 May 2018 07:00  --------------------------------------------------------  IN: 600 mL / OUT: 2480 mL / NET: -1880 mL      PHYSICAL EXAM  GENERAL: NAD, well-developed  HEAD:  Atraumatic  EYES: EOMI, PERRLA, conjunctiva and sclera clear  NECK: Supple, No JVD  CHEST/LUNG: Breath sounds symmetric, respirations nonlabored, CTAB b/l. Left chest tube with serosanguinous fluid, no surrounding erythema, mildly tender  HEART: Regular rate and rhythm; No murmurs, rubs, or gallops  ABDOMEN: Soft, Nontender, Nondistended; Bowel sounds present  EXTREMITIES:  2+ Peripheral Pulses, No clubbing, cyanosis, or edema  NEURO/PSYCH: AAOx3, nonfocal  SKIN: No rashes or lesions      LABS:  no AM labs        MICROBIOLOGY:  Culture - Tissue with Gram Stain (collected 25 May 2018 00:50)  Source: .Tissue Other, left upper lobe wedge  Gram Stain (25 May 2018 02:55):    Numerous polymorphonuclear leukocytes seen per low power field    No organisms seen  Preliminary Report (25 May 2018 20:32):    No growth    Culture - Tissue with Gram Stain (collected 25 May 2018 00:50)  Source: .Tissue Other, #2 left lower lobe  Gram Stain (25 May 2018 02:55):    Moderate polymorphonuclear leukocytes seen per low power field    No organisms seen  Preliminary Report (25 May 2018 20:32):    No growth      Culture - Urine (collected 22 May 2018 13:52)  Source: .Urine Clean Catch (Midstream)  Final Report (24 May 2018 08:53):    >100,000 CFU/ml Escherichia coli  Organism: Escherichia coli (24 May 2018 08:53)  Organism: Escherichia coli (24 May 2018 08:53)      RADIOLOGY & ADDITIONAL TESTS:  < from: Xray Chest 1 View- PORTABLE-Urgent (05.25.18 @ 11:20) >  IMPRESSION:  Diffuse lung opacities. Left chest tube. Trace left apical pneumothorax.  < end of copied text >      < from: Cardiac Cath Lab - Adult (05.23.18 @ 15:37) >  CORONARY VESSELS: The coronary circulation is left dominant.  LM:   --  LM: Angiography showed minor luminal irregularities with no flow  limiting lesions.  LAD:   --  LAD: Angiography showed minor luminal irregularities with no  flow limiting lesions.  CX:   --  Circumflex: Angiography showed minor luminal irregularities with  no flow limiting lesions.  RCA:   --  RCA: Angiography showed minor luminal irregularities with no  flow limiting lesions.    HEMODYNAMIC TABLES  Pressures:  Baseline  Pressures:  - HR: 67  Pressures:  - Rhythm:  Pressures:  -- Pulmonary Artery (S/D/M): 49/16/28  Pressures:  -- Pulmonary Capillary Wedge: 18/23/13  Pressures:  -- Right Atrium (a/v/M): 11/7/6  Pressures:  -- Right Ventricle (s/edp): 47/10/--         CONSULTS: pulmonology, cards, CT surgery Internal Medicine Progress Note    Linnea Butt, PGY1  Internal Medicine, team 1  Pager [phone] / [phone]  After 7PM on weekdays and 12PM on weekends, please page #[phone]    Patient is a 78y old  Male who presents with a chief complaint of SOB (21 May 2018 13:43)      SUBJECTIVE / OVERNIGHT EVENTS: Pt has no complaints. On RA for few minutes, desat to 85%, improved to 94% on 3L NC. Pt reports he's been using the incentive spirometer throughout the day. Denies CP, wheeze, abd pain. On tele: SR, no ectopy.    MEDICATIONS  (STANDING):  aspirin enteric coated 81 milliGRAM(s) Oral daily  ATENolol  Tablet 50 milliGRAM(s) Oral daily  calcium carbonate 1250 mG + Vitamin D (OsCal 500 + D) 1 Tablet(s) Oral daily  ciprofloxacin     Tablet 500 milliGRAM(s) Oral every 12 hours  docusate sodium 100 milliGRAM(s) Oral three times a day  enoxaparin Injectable 40 milliGRAM(s) SubCutaneous every 24 hours  furosemide    Tablet 40 milliGRAM(s) Oral daily  lisinopril 10 milliGRAM(s) Oral daily  pantoprazole    Tablet 40 milliGRAM(s) Oral before breakfast  predniSONE   Tablet 60 milliGRAM(s) Oral daily  senna 2 Tablet(s) Oral at bedtime  simvastatin 40 milliGRAM(s) Oral at bedtime  tamsulosin 0.4 milliGRAM(s) Oral at bedtime  trimethoprim  160 mG/sulfamethoxazole 800 mG 1 Tablet(s) Oral <User Schedule>    MEDICATIONS  (PRN):  acetaminophen   Tablet. 650 milliGRAM(s) Oral every 6 hours PRN Mild Pain (1 - 3)  oxyCODONE    5 mG/acetaminophen 325 mG 1 Tablet(s) Oral every 4 hours PRN Moderate Pain (4 - 6)  oxyCODONE    5 mG/acetaminophen 325 mG 2 Tablet(s) Oral every 4 hours PRN Severe Pain (7 - 10)      Vital Signs Last 24 Hrs  T(C): 36.8 (25 May 2018 21:05), Max: 36.8 (25 May 2018 21:05)  T(F): 98.3 (25 May 2018 21:05), Max: 98.3 (25 May 2018 21:05)  HR: 72 (25 May 2018 21:05) (63 - 72)  BP: 124/73 (25 May 2018 21:05) (91/48 - 124/73)  BP(mean): --  RR: 19 (25 May 2018 21:05) (19 - 19)  SpO2: 95% (25 May 2018 21:05) (95% - 95%)    CAPILLARY BLOOD GLUCOSE    I&O's Summary    25 May 2018 07:01  -  26 May 2018 07:00  --------------------------------------------------------  IN: 600 mL / OUT: 2480 mL / NET: -1880 mL      PHYSICAL EXAM  GENERAL: NAD, well-developed  HEAD:  Atraumatic  EYES: EOMI, PERRLA, conjunctiva and sclera clear  NECK: Supple, No JVD  CHEST/LUNG: Breath sounds symmetric, respirations nonlabored, CTAB b/l. Left chest tube with serosanguinous fluid, no surrounding erythema, mildly tender  HEART: Regular rate and rhythm; No murmurs, rubs, or gallops  ABDOMEN: Soft, Nontender, Nondistended; Bowel sounds present  EXTREMITIES:  2+ Peripheral Pulses, No clubbing, cyanosis, or edema  NEURO/PSYCH: AAOx3, nonfocal  SKIN: No rashes or lesions      LABS:  no AM labs        MICROBIOLOGY:  Culture - Tissue with Gram Stain (collected 25 May 2018 00:50)  Source: .Tissue Other, left upper lobe wedge  Gram Stain (25 May 2018 02:55):    Numerous polymorphonuclear leukocytes seen per low power field    No organisms seen  Preliminary Report (25 May 2018 20:32):    No growth    Culture - Tissue with Gram Stain (collected 25 May 2018 00:50)  Source: .Tissue Other, #2 left lower lobe  Gram Stain (25 May 2018 02:55):    Moderate polymorphonuclear leukocytes seen per low power field    No organisms seen  Preliminary Report (25 May 2018 20:32):    No growth      Culture - Urine (collected 22 May 2018 13:52)  Source: .Urine Clean Catch (Midstream)  Final Report (24 May 2018 08:53):    >100,000 CFU/ml Escherichia coli  Organism: Escherichia coli (24 May 2018 08:53)  Organism: Escherichia coli (24 May 2018 08:53)      RADIOLOGY & ADDITIONAL TESTS:  < from: Xray Chest 1 View- PORTABLE-Urgent (05.25.18 @ 11:20) >  IMPRESSION:  Diffuse lung opacities. Left chest tube. Trace left apical pneumothorax.  < end of copied text >      < from: Cardiac Cath Lab - Adult (05.23.18 @ 15:37) >  CORONARY VESSELS: The coronary circulation is left dominant.  LM:   --  LM: Angiography showed minor luminal irregularities with no flow  limiting lesions.  LAD:   --  LAD: Angiography showed minor luminal irregularities with no  flow limiting lesions.  CX:   --  Circumflex: Angiography showed minor luminal irregularities with  no flow limiting lesions.  RCA:   --  RCA: Angiography showed minor luminal irregularities with no  flow limiting lesions.    HEMODYNAMIC TABLES  Pressures:  Baseline  Pressures:  - HR: 67  Pressures:  - Rhythm:  Pressures:  -- Pulmonary Artery (S/D/M): 49/16/28  Pressures:  -- Pulmonary Capillary Wedge: 18/23/13  Pressures:  -- Right Atrium (a/v/M): 11/7/6  Pressures:  -- Right Ventricle (s/edp): 47/10/--         CONSULTS: pulmonology, cards, CT surgery Internal Medicine Progress Note    Linnea Butt, PGY1  Internal Medicine, team 1  Pager [phone] / [phone]  After 7PM on weekdays and 12PM on weekends, please page #[phone]    Patient is a 78y old  Male who presents with a chief complaint of SOB (21 May 2018 13:43)      SUBJECTIVE / OVERNIGHT EVENTS: Pt has no complaints. On RA for few minutes, desat to 85%, improved to 94% on 3L NC. Pt reports he's been using the incentive spirometer throughout the day. Denies CP, wheeze, abd pain. On tele: SR, no ectopy. Left chest tube minimal output, removed today.    MEDICATIONS  (STANDING):  aspirin enteric coated 81 milliGRAM(s) Oral daily  ATENolol  Tablet 50 milliGRAM(s) Oral daily  calcium carbonate 1250 mG + Vitamin D (OsCal 500 + D) 1 Tablet(s) Oral daily  ciprofloxacin     Tablet 500 milliGRAM(s) Oral every 12 hours  docusate sodium 100 milliGRAM(s) Oral three times a day  enoxaparin Injectable 40 milliGRAM(s) SubCutaneous every 24 hours  furosemide    Tablet 40 milliGRAM(s) Oral daily  lisinopril 10 milliGRAM(s) Oral daily  pantoprazole    Tablet 40 milliGRAM(s) Oral before breakfast  predniSONE   Tablet 60 milliGRAM(s) Oral daily  senna 2 Tablet(s) Oral at bedtime  simvastatin 40 milliGRAM(s) Oral at bedtime  tamsulosin 0.4 milliGRAM(s) Oral at bedtime  trimethoprim  160 mG/sulfamethoxazole 800 mG 1 Tablet(s) Oral <User Schedule>    MEDICATIONS  (PRN):  acetaminophen   Tablet. 650 milliGRAM(s) Oral every 6 hours PRN Mild Pain (1 - 3)  oxyCODONE    5 mG/acetaminophen 325 mG 1 Tablet(s) Oral every 4 hours PRN Moderate Pain (4 - 6)  oxyCODONE    5 mG/acetaminophen 325 mG 2 Tablet(s) Oral every 4 hours PRN Severe Pain (7 - 10)      Vital Signs Last 24 Hrs  T(C): 36.8 (25 May 2018 21:05), Max: 36.8 (25 May 2018 21:05)  T(F): 98.3 (25 May 2018 21:05), Max: 98.3 (25 May 2018 21:05)  HR: 72 (25 May 2018 21:05) (63 - 72)  BP: 124/73 (25 May 2018 21:05) (91/48 - 124/73)  BP(mean): --  RR: 19 (25 May 2018 21:05) (19 - 19)  SpO2: 95% (25 May 2018 21:05) (95% - 95%)    CAPILLARY BLOOD GLUCOSE    I&O's Summary    25 May 2018 07:01  -  26 May 2018 07:00  --------------------------------------------------------  IN: 600 mL / OUT: 2480 mL / NET: -1880 mL      PHYSICAL EXAM  GENERAL: NAD, well-developed  HEAD:  Atraumatic  EYES: EOMI, PERRLA, conjunctiva and sclera clear  NECK: Supple, No JVD  CHEST/LUNG: Breath sounds symmetric, respirations nonlabored, CTAB b/l. Left chest tube with serosanguinous fluid, no surrounding erythema, mildly tender  HEART: Regular rate and rhythm; No murmurs, rubs, or gallops  ABDOMEN: Soft, Nontender, Nondistended; Bowel sounds present  EXTREMITIES:  2+ Peripheral Pulses, No clubbing, cyanosis, or edema  NEURO/PSYCH: AAOx3, nonfocal  SKIN: No rashes or lesions      LABS:  no AM labs        MICROBIOLOGY:  Culture - Tissue with Gram Stain (collected 25 May 2018 00:50)  Source: .Tissue Other, left upper lobe wedge  Gram Stain (25 May 2018 02:55):    Numerous polymorphonuclear leukocytes seen per low power field    No organisms seen  Preliminary Report (25 May 2018 20:32):    No growth    Culture - Tissue with Gram Stain (collected 25 May 2018 00:50)  Source: .Tissue Other, #2 left lower lobe  Gram Stain (25 May 2018 02:55):    Moderate polymorphonuclear leukocytes seen per low power field    No organisms seen  Preliminary Report (25 May 2018 20:32):    No growth      Culture - Urine (collected 22 May 2018 13:52)  Source: .Urine Clean Catch (Midstream)  Final Report (24 May 2018 08:53):    >100,000 CFU/ml Escherichia coli  Organism: Escherichia coli (24 May 2018 08:53)  Organism: Escherichia coli (24 May 2018 08:53)      RADIOLOGY & ADDITIONAL TESTS:  < from: Xray Chest 1 View- PORTABLE-Urgent (05.25.18 @ 11:20) >  IMPRESSION:  Diffuse lung opacities. Left chest tube. Trace left apical pneumothorax.  < end of copied text >      < from: Cardiac Cath Lab - Adult (05.23.18 @ 15:37) >  CORONARY VESSELS: The coronary circulation is left dominant.  LM:   --  LM: Angiography showed minor luminal irregularities with no flow  limiting lesions.  LAD:   --  LAD: Angiography showed minor luminal irregularities with no  flow limiting lesions.  CX:   --  Circumflex: Angiography showed minor luminal irregularities with  no flow limiting lesions.  RCA:   --  RCA: Angiography showed minor luminal irregularities with no  flow limiting lesions.    HEMODYNAMIC TABLES  Pressures:  Baseline  Pressures:  - HR: 67  Pressures:  - Rhythm:  Pressures:  -- Pulmonary Artery (S/D/M): 49/16/28  Pressures:  -- Pulmonary Capillary Wedge: 18/23/13  Pressures:  -- Right Atrium (a/v/M): 11/7/6  Pressures:  -- Right Ventricle (s/edp): 47/10/--         CONSULTS: pulmonology, cards, CT surgery

## 2018-05-26 NOTE — PROGRESS NOTE ADULT - ATTENDING COMMENTS
PT SEEN AND EXAMINED ---AS ABOVE      s/p both LHC and RHC to no evidence of CHF here; now s/p VATS tissue bx for definitive DX prior to initiating immunosuppressive rx.  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

## 2018-05-26 NOTE — PROGRESS NOTE ADULT - PROBLEM SELECTOR PLAN 1
One month dyspnea associated with hypoxia, orthopnea, and LE edema. TTE 5/17/2018 had LVEF 55%, with diastolic dysfunction, no pulm HTN. PFTs showed restrictive pattern. pBNP 7400. 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 DDx: ILD, pneumonia, sarcoidosis, amiodarone toxicity, metastatic disease, vasculitis, pulmonary edema 2/2 CHF exacerbation with diastolic dysfunction. dyspnea from intermittent arrythmia also a possibility but less likely given NSR on EKG and recent afib ablation. Consider occupational exposures as well  -CT chest (5/10/2018) showed bilateral airspace consolidation most prominent in the upper lobe, bilateral nodules, mild mediastinal LAD, small-mod R pleural effusion, small L pleural effusion.  -s/p L VATS with wedge resection (5/24, upper and lower lobes), left chest tube in place to low suction  -HIV, quant gold negative. MATEO positive  -Appreciate pulmonology and cardiology recs  -f/u when to restart Eliquis  -Monitor on tele, lasix 40po daily  -f/u lung biopsy path/cytology. Many PMNs, no organisms seen  -start prednisone 60mg daily, Bactrim ppx One month dyspnea associated with hypoxia, orthopnea, and LE edema. TTE 5/17/2018 had LVEF 55%, with diastolic dysfunction, no pulm HTN. PFTs showed restrictive pattern. pBNP 7400. 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 60 pack year smoking history. DDx: Amiodarone toxicity, ILD, sarcoidosis, metastatic disease, vasculitis, pulmonary edema 2/2 CHF exacerbation with diastolic dysfunction. Dyspnea from intermittent arrythmia also a possibility but less likely given NSR on EKG and recent afib ablation. Consider occupational exposures as well.  -CT chest (5/10/2018) showed bilateral airspace consolidation most prominent in the upper lobe, bilateral nodules, mild mediastinal LAD, small-mod R pleural effusion, small L pleural effusion.  -s/p L VATS with wedge resection (5/24, upper and lower lobes), left chest tube in place to low suction  -HIV, quant gold negative. MATEO positive  -Appreciate pulmonology and cardiology recs  -Hold Eliquis while chest tube, once restart will d/c ASA. Daily CXR  -Monitor on tele, lasix 40po daily  -f/u lung biopsy path/cytology. Many PMNs, no organisms seen  -prednisone 60mg daily, Bactrim ppx One month dyspnea associated with hypoxia, orthopnea, and LE edema. TTE 5/17/2018 had LVEF 55%, with diastolic dysfunction, no pulm HTN. PFTs showed restrictive pattern. pBNP 7400. 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 60 pack year smoking history. DDx: Amiodarone toxicity, ILD, sarcoidosis, metastatic disease, vasculitis, pulmonary edema 2/2 CHF exacerbation with diastolic dysfunction. Dyspnea from intermittent arrythmia also a possibility but less likely given NSR on EKG and recent afib ablation. Consider occupational exposures as well.  -CT chest (5/10/2018) showed bilateral airspace consolidation most prominent in the upper lobe, bilateral nodules, mild mediastinal LAD, small-mod R pleural effusion, small L pleural effusion.  -s/p L VATS with wedge resection (5/24, upper and lower lobes), left chest tube in place to low suction  -HIV, quant gold negative. MATEO positive  -Appreciate pulmonology and cardiology recs  -Hold Eliquis while chest tube, once restart will d/c ASA. Daily CXR  -Monitor on tele, lasix 40po daily  -prednisone 60mg daily, Bactrim ppx  -f/u lung biopsy path/cytology. Many PMNs, no organisms seen  -f/u when chest tube out One month dyspnea associated with hypoxia, orthopnea, and LE edema. TTE 5/17/2018 had LVEF 55%, with diastolic dysfunction, no pulm HTN. PFTs showed restrictive pattern. pBNP 7400. 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 60 pack year smoking history. DDx: Amiodarone toxicity, ILD, sarcoidosis, metastatic disease, vasculitis, pulmonary edema 2/2 CHF exacerbation with diastolic dysfunction. Dyspnea from intermittent arrythmia also a possibility but less likely given NSR on EKG and recent afib ablation. Consider occupational exposures as well.  -CT chest (5/10/2018) showed bilateral airspace consolidation most prominent in the upper lobe, bilateral nodules, mild mediastinal LAD, small-mod R pleural effusion, small L pleural effusion.  -Cardiac cath (5/23) no flow limiting lesions, right sided pressures unremarkable  -s/p L VATS with wedge resection (5/24, upper and lower lobes), left chest tube with minimal output, removed today  -HIV, quant gold negative. MATEO positive  -Appreciate pulmonology, cardiology, CT surgery recs  -Monitor on tele, lasix 40po daily  -prednisone 60mg daily, Bactrim ppx  -f/u lung biopsy path/cytology. Many PMNs, no organisms seen  -f/u CXR tmrw

## 2018-05-26 NOTE — PROGRESS NOTE ADULT - PROBLEM SELECTOR PLAN 4
PVR straight cath 400cc. Suspect BPH in elderly man which likely caused acute cystitis.  -continue flomax  -consider renal US if develops RASHEEDA or retention does not improve  -Bladder scan elevated, will attempt straight cath PVR straight cath 400cc. Suspect BPH in elderly man which likely caused acute cystitis.  -continue flomax  -consider renal US if develops RASHEEDA or retention does not improve  -hess in place PVR straight cath 400cc. Suspect BPH in elderly man which likely caused acute cystitis.  -continue flomax  -hess in place, will attempt TOV tomorrow 5am hess out. Replace hess if fails

## 2018-05-26 NOTE — PROGRESS NOTE ADULT - ASSESSMENT
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      Problem/Plan - 1:  ·  Problem: Acute hypoxemic respiratory failure.  Plan: unclear absolute etiology here--could pneumonitis possibly amio toxicity--less likely PNA (non immunocompromised host).     Problem/Plan - 2:  ·  Problem: Shortness of breath.  Plan: CHF/?interstitial process-?amio toxicity-/ ?  HYPERSENSITIVITY PNEUMONITIS -cards evaln negative--for CTS bx 5/24  path pending.     Problem/Plan - 3:  ·  Problem: Abnormal CT of the chest.  Plan: CT SHOWS ILD  -.      Problem/Plan - 4:  ·  Problem: Acute on chronic diastolic congestive heart failure.  Plan: s/p  R/L card cath--negative at this point.      Problem/Plan - 5:  ·  Problem: R/O Amiodarone toxicity, accidental or unintentional, initial encounter.VS HYPERSENSITIVITY PNEUMONITIS    Plan:      Problem/Plan - 6:  Problem: Atrial fibrillation, unspecified type. Plan: ? EPS evaln as per Sigifredo et al.     Problem/Plan - 7:  ·  Problem: Prophylactic measure.  Plan: GI prophylaxis:  PPI pre breakfast  aspiration precautions-all meals are to OOB as able.

## 2018-05-26 NOTE — PROGRESS NOTE ADULT - PROBLEM SELECTOR PLAN 2
Presented with hypoxia sat 82% on RA, still hypoxic 78% on RA. Does not use oxygen at home, no hx of pulmonary disease except 60 pack year history and restrictive PFTs.  -continue supplemental oxygen to maintain SpO2 >92%  -lasix oral  -f/u lung biopsy path Presented with hypoxia sat 82% on RA, still hypoxic 78% on RA. Does not use oxygen at home, no hx of pulmonary disease except 60 pack year history and restrictive PFTs.  -continue supplemental oxygen to maintain SpO2 >92%. Will likely need home O2  -lasix oral  -f/u lung biopsy path

## 2018-05-26 NOTE — PROGRESS NOTE ADULT - PROBLEM SELECTOR PLAN 6
Atrial fibrillation diagnosed 2014. 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 s/p ablation (4/24). Pt has loop recorder and has gone into paroxysmal AF after ablation.  -Currently in sinus rhythm, monitor on tele  -continue atenolol, holding Eliquis Atrial fibrillation diagnosed 2014. 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 s/p ablation (4/24). Pt has loop recorder and has gone into paroxysmal AF after ablation.  -Currently in sinus rhythm, monitor on tele  -continue atenolol  -chest tube out today, restart Eliquis

## 2018-05-26 NOTE — PROGRESS NOTE ADULT - PROBLEM SELECTOR PLAN 3
C/o intermittent dysuria, urinary frequency, and incomplete voiding  -pansensitive E coli UTI  -continue ceftriaxone (5/22 - 5/23), switch to ciprofloxacin (5/24 - ) C/o intermittent dysuria, urinary frequency, and incomplete voiding. Pansensitive E coli UTI  -continue ceftriaxone (5/22 - 5/23), ciprofloxacin (5/24 - )  -hess C/o intermittent dysuria, urinary frequency, and incomplete voiding. Pansensitive E coli UTI  -continue ceftriaxone (5/22 - 5/23), ciprofloxacin (5/24 - )

## 2018-05-26 NOTE — PROGRESS NOTE ADULT - ASSESSMENT
78y Male with history of HTN, HLD, PAF S/P Ablation admitted with increasing SOB. Cardiac cath findings mild CAD. Component of acute on chronic diastolic CHF improved with lasix. Remains NSR post ablation off amiodarone for now. CT and CXR findings more c/w primary pulmonary process. Amiodarone use has been short term but can be associated with acute toxicity as well. He is S/P VATS  left wedge resection with Dr Cazares  on 5/24/18 5/25  left ct placed on water seal 5/26 chest tube removed. pending cxr

## 2018-05-26 NOTE — PROGRESS NOTE ADULT - ASSESSMENT
78 year old man PMH HTN, HLD, AFib s/p ablation (4/24/2018) presents with shortness of breath, orthopnea, LE edema x 1 month found to have new bilateral airspace infiltrates and nodules most prominent in the upper lobes, associated with mediastinal LAD on CT chest s/p L VATS with wedge resection (5/24, upper and lower lobes), left chest tube in place. Found to have E Coli UTI. 78 year old man PMH HTN, HLD, HFpEF, AFib s/p ablation (4/24/2018) presents with shortness of breath, orthopnea, LE edema x 1 month found to have new bilateral airspace infiltrates and nodules most prominent in the upper lobes, associated with mediastinal LAD on CT chest s/p L VATS with wedge resection (5/24, upper and lower lobes), left chest tube in place. Found to have E Coli UTI.

## 2018-05-26 NOTE — PROGRESS NOTE ADULT - PROBLEM SELECTOR PLAN 8
DVT: holding Eliquis until CT surgery follow up  Dispo: PT eval ongoing, walks with cane DVT: holding Eliquis, lovenox sq  Dispo: PT eval ongoing, walks with cane, will likely need home O2 DVT: Eliquis  Dispo: PT eval ongoing, walks with cane, will likely need home O2

## 2018-05-26 NOTE — PROGRESS NOTE ADULT - ATTENDING COMMENTS
left sided chest tube s/p biopsy for unexplained patchy pulmonary infiltrate suspicious for amiodarone related toxicity.  await path  will d/w CTS regarding tube removal  continue prednisone   TOV in AM   monitor oxygenation   d/w patient's wife.

## 2018-05-26 NOTE — PROGRESS NOTE ADULT - SUBJECTIVE AND OBJECTIVE BOX
VITAL SIGNS    Telemetry: SR 80-90   Vital Signs Last 24 Hrs  T(C): 36.2 (18 @ 14:34), Max: 36.8 (18 @ 21:05)  T(F): 97.1 (18 @ 14:34), Max: 98.3 (18 @ 21:05)  HR: 63 (18 @ 14:34) (63 - 72)  BP: 96/57 (18 @ 14:34) (96/57 - 124/73)  RR: 19 (18 @ 14:34) (19 - 19)  SpO2: 93% (18 @ 15:28) (93% - 95%)             @ 07:01  -   @ 07:00  --------------------------------------------------------  IN: 600 mL / OUT: 2480 mL / NET: -1880 mL     @ 07:01  -   @ 15:44  --------------------------------------------------------  IN: 1130 mL / OUT: 450 mL / NET: 680 mL       Daily     Daily Weight in k.8 (26 May 2018 14:34)  Admit Wt: Drug Dosing Weight  Height (cm): 177.8 (21 May 2018 16:21)  Weight (kg): 97 (21 May 2018 16:21)  BMI (kg/m2): 30.7 (21 May 2018 16:21)  BSA (m2): 2.15 (21 May 2018 16:21)      CAPILLARY BLOOD GLUCOSE              MEDICATIONS  acetaminophen   Tablet. 650 milliGRAM(s) Oral every 6 hours PRN  aspirin enteric coated 81 milliGRAM(s) Oral daily  ATENolol  Tablet 50 milliGRAM(s) Oral daily  calcium carbonate 1250 mG + Vitamin D (OsCal 500 + D) 1 Tablet(s) Oral daily  ciprofloxacin     Tablet 500 milliGRAM(s) Oral every 12 hours  docusate sodium 100 milliGRAM(s) Oral three times a day  furosemide    Tablet 40 milliGRAM(s) Oral daily  lisinopril 10 milliGRAM(s) Oral daily  oxyCODONE    5 mG/acetaminophen 325 mG 1 Tablet(s) Oral every 4 hours PRN  oxyCODONE    5 mG/acetaminophen 325 mG 2 Tablet(s) Oral every 4 hours PRN  pantoprazole    Tablet 40 milliGRAM(s) Oral before breakfast  polyethylene glycol 3350 17 Gram(s) Oral daily PRN  predniSONE   Tablet 60 milliGRAM(s) Oral daily  senna 2 Tablet(s) Oral at bedtime  simvastatin 40 milliGRAM(s) Oral at bedtime  tamsulosin 0.4 milliGRAM(s) Oral at bedtime  trimethoprim  160 mG/sulfamethoxazole 800 mG 1 Tablet(s) Oral <User Schedule>      PHYSICAL EXAM  Subjective: NAD OOB to chair  Neurology: alert and oriented x 3, nonfocal, no gross deficits  CV : S1S2  Lungs: CTA b/l left chest tube to waater seal no airleak -90cc/24 hr  Abdomen: soft, NT,ND, ( )BM  :  voiding  Extremities:  -c/c/e    LABS  05-    136  |  93<L>  |  19  ----------------------------<  118<H>  3.9   |  32<H>  |  0.89    Ca    8.7      25 May 2018 06:42  Phos  3.0     05-25  Mg     2.0     05-25                                   12.0   9.15  )-----------( 301      ( 25 May 2018 07:46 )             37.9                 PAST MEDICAL & SURGICAL HISTORY:  Obesity, unspecified obesity severity, unspecified obesity type  Hyperlipidemia, unspecified hyperlipidemia type  Essential hypertension  Persistent atrial fibrillation  H/O bilateral hip replacements  H/O arthroscopy of knee

## 2018-05-26 NOTE — PROGRESS NOTE ADULT - SUBJECTIVE AND OBJECTIVE BOX
[place] NESBITT    Patient is a 78y old  Male who presents with a chief complaint of SOB,acute on chronic diastolic CHF,HTN,hyperlipidemia,PAF S/P ablation,S/P left VATS.Improved,no CP less SOB.      Allergies    No Known Allergies    Intolerances      MEDICATIONS  (STANDING):  aspirin enteric coated 81 milliGRAM(s) Oral daily  ATENolol  Tablet 50 milliGRAM(s) Oral daily  calcium carbonate 1250 mG + Vitamin D (OsCal 500 + D) 1 Tablet(s) Oral daily  ciprofloxacin     Tablet 500 milliGRAM(s) Oral every 12 hours  docusate sodium 100 milliGRAM(s) Oral three times a day  enoxaparin Injectable 40 milliGRAM(s) SubCutaneous every 24 hours  furosemide    Tablet 40 milliGRAM(s) Oral daily  lisinopril 10 milliGRAM(s) Oral daily  pantoprazole    Tablet 40 milliGRAM(s) Oral before breakfast  predniSONE   Tablet 60 milliGRAM(s) Oral daily  senna 2 Tablet(s) Oral at bedtime  simvastatin 40 milliGRAM(s) Oral at bedtime  tamsulosin 0.4 milliGRAM(s) Oral at bedtime  trimethoprim  160 mG/sulfamethoxazole 800 mG 1 Tablet(s) Oral <User Schedule>    MEDICATIONS  (PRN):  acetaminophen   Tablet. 650 milliGRAM(s) Oral every 6 hours PRN Mild Pain (1 - 3)  oxyCODONE    5 mG/acetaminophen 325 mG 1 Tablet(s) Oral every 4 hours PRN Moderate Pain (4 - 6)  oxyCODONE    5 mG/acetaminophen 325 mG 2 Tablet(s) Oral every 4 hours PRN Severe Pain (7 - 10)      ROS:  Positive:    General: Denies weight loss, fevers, rash, decreased hearing  Cardiac: Denies chest pain, SOB, GRIMALDO, orthopnea, PND, claudication, edema, snoring, daytime somnolence, palpitations, syncope  Resp: Denies SOB, GRIMALDO, cough, sputum, wheezing, hemoptysis  GI: Denies change in bowel habits, diarrhea, weight loss, melena, tarry stools,   nausea, vomiting, jaundice, abdominal pain, dysphagia  : Denies dysuria, nocturia, hematuria  Neuro: Denies tinnitus, headache, visual changes, weakness, dizziness or vertigo  Musculoskeletal: Denies neck pain back pain joint pain.  Skin: Denies rash, itching, dryness.  Endocrine: Denies polydipsia, polyuria  Psychiatric: Denies depression, anxiety      PHYSICAL EXAM:  Vital Signs Last 24 Hrs  T(C): 36.8 (25 May 2018 21:05), Max: 36.8 (25 May 2018 21:05)  T(F): 98.3 (25 May 2018 21:05), Max: 98.3 (25 May 2018 21:05)  HR: 72 (25 May 2018 21:05) (63 - 72)  BP: 124/73 (25 May 2018 21:05) (91/48 - 124/73)  BP(mean): --  RR: 19 (25 May 2018 21:05) (19 - 19)  SpO2: 95% (25 May 2018 21:05) (95% - 95%)  Daily     Daily   I&O's Summary    25 May 2018 07:01  -  26 May 2018 07:00  --------------------------------------------------------  IN: 600 mL / OUT: 2480 mL / NET: -1880 mL        General Appearance: 	 Alert, cooperative, no distress  HEENT: normocephalic, atraumatic, PERRLA, EOMI, conjunctiva normal, sclera anicteric,   Neck: no JVD,  carotid 2+  bilaterally without bruits, thyroid normal to inspection and palpation, no adenopathy, trachea midline  Lungs:  Decreased BS left base,CT in place  Cor:  pmi 5th ICS MCL, regular rate and rhythm, S1 normal intensity, S2 normal intensity, no gallops, murmurs or rubs  Abdomen:	 soft, non-tender; bowel sounds normal; no masses,  no organomegaly  Extremities: without cyanosis, clubbing or edema  Vasc: 2-+ PT and DP pulses; no varicosities  Neurologic: A&O x 3 (time, place, person). Symmetric strength; limited exam  Musculoskeletal: no kyphosis, scoliosis; normal gait, normal tone  Skin: no rashes; limited exam      Labs:  CBC Full  -  ( 25 May 2018 07:46 )  WBC Count : 9.15 K/uL  Hemoglobin : 12.0 g/dL  Hematocrit : 37.9 %  Platelet Count - Automated : 301 K/uL  Mean Cell Volume : 97.4 fl  Mean Cell Hemoglobin : 30.8 pg  Mean Cell Hemoglobin Concentration : 31.7 gm/dL  Auto Neutrophil # : 6.81 K/uL  Auto Lymphocyte # : 1.17 K/uL  Auto Monocyte # : 0.98 K/uL  Auto Eosinophil # : 0.15 K/uL  Auto Basophil # : 0.01 K/uL  Auto Neutrophil % : 74.5 %  Auto Lymphocyte % : 12.8 %  Auto Monocyte % : 10.7 %  Auto Eosinophil % : 1.6 %  Auto Basophil % : 0.1 %          PT/INR - ( 24 May 2018 07:58 )   PT: 12.6 sec;   INR: 1.11 ratio         PTT - ( 24 May 2018 07:58 )  PTT:45.9 sec      Impression/Plan:Patient with acute on chronic diastolic CHF,PAF S/P ablation,HTN,hyperlipidemia,S/P left VATS and chest tube.Doing well,less SOB.Continue ASA,Lovenox for DVT PPX,Lasix,lisinopril,protonix,prednisone and Zocor.CT as per thoracic surgery.F/U  for management of hess.Would restaet Eliquis once OK with surgery.Then can D/C ASA and Lovenox with recent a fib ablation.Pulmonary toilet and incentive spirometry.OOB,ambulation.        Neri Pretty MD, [facility]  [facility]

## 2018-05-26 NOTE — PROGRESS NOTE ADULT - PROBLEM SELECTOR PLAN 5
TTE 5/2018 showed LVEF 55%, diastolic dysfunction. Presented with pBNP 7400 so possible mild diastolic HF exacerbation contributing to presenting symptoms.  -Home dose ramipril equivalent lisinopril 10mg daily  -lasix 40mg po daily, lisinopril 10mg, atenolol. Spaced out in day TTE 5/2018 showed LVEF 55%, diastolic dysfunction. Presented with pBNP 7400 so possible mild diastolic HF exacerbation contributing to presenting symptoms. Home dose ramipril equivalent to lisinopril 40mg daily  -lasix 40mg po daily, lisinopril 10mg, atenolol. Spaced out in day

## 2018-05-26 NOTE — PROGRESS NOTE ADULT - SUBJECTIVE AND OBJECTIVE BOX
CHIEF COMPLAINT: dyspnea      Interval Events: s/p vats  , feels betetr obn supplemental oxygen    REVIEW OF SYSTEMS:  Constitutional: No fevers or chills. No weight loss. No fatigue or generalised malaise.  Eyes: No itching or discharge from the eyes  ENT: No ear pain. No ear discharge. No nasal congestion. No post nasal drip. No epistaxis. No throat pain. No sore throat. No difficulty swallowing.   CV: No chest pain. No palpitations. No lightheadedness or dizziness.   Resp: dyspnea  GI: No nausea. No vomiting. No diarrhea.  MSK: No joint pain or pain in any extremities  Integumentary: No skin lesions. No pedal edema.  Neurological: No gross motor weakness. No sensory changes.  [ ] All other systems negative  [ ] Unable to assess ROS because ________    OBJECTIVE:  ICU Vital Signs Last 24 Hrs  T(C): 36.2 (26 May 2018 14:34), Max: 36.8 (25 May 2018 21:05)  T(F): 97.1 (26 May 2018 14:34), Max: 98.3 (25 May 2018 21:05)  HR: 63 (26 May 2018 14:34) (63 - 72)  BP: 96/57 (26 May 2018 14:34) (96/57 - 124/73)  BP(mean): --  ABP: --  ABP(mean): --  RR: 19 (26 May 2018 14:34) (19 - 19)  SpO2: 93% (26 May 2018 15:28) (93% - 95%)        05-25 @ 07:01 - 05-26 @ 07:00  --------------------------------------------------------  IN: 600 mL / OUT: 2480 mL / NET: -1880 mL    05-26 @ 07:01 - 05-26 @ 18:44  --------------------------------------------------------  IN: 1130 mL / OUT: 450 mL / NET: 680 mL      CAPILLARY BLOOD GLUCOSE          PHYSICAL EXAM:  General: Awake, alert, oriented X 3.   HEENT: Atraumatic, normocephalic.                 Mallampatti Grade                 No nasal congestion.                No tonsillar or pharyngeal exudates.  Lymph Nodes: No palpable lymphadenopathy  Neck: No JVD. No carotid bruit.   Respiratory: b/l  fine crackles  and wheezing  Cardiovascular: S1 S2 normal. No murmurs, rubs or gallops.   Abdomen: Soft, non-tender, non-distended. No organomegaly.  Extremities: Warm to touch. Peripheral pulse palpable. No pedal edema.   Skin: No rashes or skin lesions  Neurological: Motor and sensory examination equal and normal in all four extremities.  Psychiatry: Appropriate mood and affect.    HOSPITAL MEDICATIONS:  MEDICATIONS  (STANDING):  apixaban 5 milliGRAM(s) Oral every 12 hours  ATENolol  Tablet 50 milliGRAM(s) Oral daily  calcium carbonate 1250 mG + Vitamin D (OsCal 500 + D) 1 Tablet(s) Oral daily  ciprofloxacin     Tablet 500 milliGRAM(s) Oral every 12 hours  docusate sodium 100 milliGRAM(s) Oral three times a day  furosemide    Tablet 40 milliGRAM(s) Oral daily  lisinopril 10 milliGRAM(s) Oral daily  pantoprazole    Tablet 40 milliGRAM(s) Oral before breakfast  predniSONE   Tablet 60 milliGRAM(s) Oral daily  senna 2 Tablet(s) Oral at bedtime  simvastatin 40 milliGRAM(s) Oral at bedtime  tamsulosin 0.4 milliGRAM(s) Oral at bedtime  trimethoprim  160 mG/sulfamethoxazole 800 mG 1 Tablet(s) Oral <User Schedule>    MEDICATIONS  (PRN):  acetaminophen   Tablet. 650 milliGRAM(s) Oral every 6 hours PRN Mild Pain (1 - 3)  oxyCODONE    5 mG/acetaminophen 325 mG 1 Tablet(s) Oral every 4 hours PRN Moderate Pain (4 - 6)  oxyCODONE    5 mG/acetaminophen 325 mG 2 Tablet(s) Oral every 4 hours PRN Severe Pain (7 - 10)  polyethylene glycol 3350 17 Gram(s) Oral daily PRN Constipation      LABS:                        12.0   9.15  )-----------( 301      ( 25 May 2018 07:46 )             37.9     05-25    136  |  93<L>  |  19  ----------------------------<  118<H>  3.9   |  32<H>  |  0.89    Ca    8.7      25 May 2018 06:42  Phos  3.0     05-25  Mg     2.0     05-25      < from: Xray Chest 1 View- PORTABLE-Routine (05.26.18 @ 09:27) >  Left-sided chest tube unchanged. Loop recorder is present. Bilateral   airspace opacities consistent with multifocal pneumonia oredema are   again seen. Left apical pneumothorax again present.    May 26 at 9:24 AM:  Left chest tube and apical pneumothorax unchanged. Likewise, bilateral   airspace opacities consistent with pulmonary edema without cardiomegaly   present.    COMPARISON:  May 25 at 11:16 AM      IMPRESSION:  Follow-up thoracotomy with left chest tube, suspicion for   pulmonary edema and residual left apical pneumothorax.    < end of copied text >            MICROBIOLOGY:     RADIOLOGY:  [ ] Reviewed and interpreted by me    Point of Care Ultrasound Findings:    PFT:    EKG:

## 2018-05-27 LAB
ANION GAP SERPL CALC-SCNC: 9 MMOL/L — SIGNIFICANT CHANGE UP (ref 5–17)
BASOPHILS # BLD AUTO: 0.01 K/UL — SIGNIFICANT CHANGE UP (ref 0–0.2)
BASOPHILS NFR BLD AUTO: 0.1 % — SIGNIFICANT CHANGE UP (ref 0–2)
BUN SERPL-MCNC: 21 MG/DL — SIGNIFICANT CHANGE UP (ref 7–23)
CALCIUM SERPL-MCNC: 8.8 MG/DL — SIGNIFICANT CHANGE UP (ref 8.4–10.5)
CHLORIDE SERPL-SCNC: 95 MMOL/L — LOW (ref 96–108)
CO2 SERPL-SCNC: 30 MMOL/L — SIGNIFICANT CHANGE UP (ref 22–31)
CREAT SERPL-MCNC: 0.97 MG/DL — SIGNIFICANT CHANGE UP (ref 0.5–1.3)
EOSINOPHIL # BLD AUTO: 0.01 K/UL — SIGNIFICANT CHANGE UP (ref 0–0.5)
EOSINOPHIL NFR BLD AUTO: 0.1 % — SIGNIFICANT CHANGE UP (ref 0–6)
GLUCOSE SERPL-MCNC: 130 MG/DL — HIGH (ref 70–99)
HCT VFR BLD CALC: 35 % — LOW (ref 39–50)
HGB BLD-MCNC: 11.5 G/DL — LOW (ref 13–17)
IMM GRANULOCYTES NFR BLD AUTO: 0.4 % — SIGNIFICANT CHANGE UP (ref 0–1.5)
LYMPHOCYTES # BLD AUTO: 1.66 K/UL — SIGNIFICANT CHANGE UP (ref 1–3.3)
LYMPHOCYTES # BLD AUTO: 12.4 % — LOW (ref 13–44)
MAGNESIUM SERPL-MCNC: 2 MG/DL — SIGNIFICANT CHANGE UP (ref 1.6–2.6)
MCHC RBC-ENTMCNC: 30.9 PG — SIGNIFICANT CHANGE UP (ref 27–34)
MCHC RBC-ENTMCNC: 32.9 GM/DL — SIGNIFICANT CHANGE UP (ref 32–36)
MCV RBC AUTO: 94.1 FL — SIGNIFICANT CHANGE UP (ref 80–100)
MONOCYTES # BLD AUTO: 1.49 K/UL — HIGH (ref 0–0.9)
MONOCYTES NFR BLD AUTO: 11.1 % — SIGNIFICANT CHANGE UP (ref 2–14)
NEUTROPHILS # BLD AUTO: 10.17 K/UL — HIGH (ref 1.8–7.4)
NEUTROPHILS NFR BLD AUTO: 75.9 % — SIGNIFICANT CHANGE UP (ref 43–77)
PHOSPHATE SERPL-MCNC: 2.3 MG/DL — LOW (ref 2.5–4.5)
PLATELET # BLD AUTO: 384 K/UL — SIGNIFICANT CHANGE UP (ref 150–400)
POTASSIUM SERPL-MCNC: 4.2 MMOL/L — SIGNIFICANT CHANGE UP (ref 3.5–5.3)
POTASSIUM SERPL-SCNC: 4.2 MMOL/L — SIGNIFICANT CHANGE UP (ref 3.5–5.3)
RBC # BLD: 3.72 M/UL — LOW (ref 4.2–5.8)
RBC # FLD: 13.6 % — SIGNIFICANT CHANGE UP (ref 10.3–14.5)
SODIUM SERPL-SCNC: 134 MMOL/L — LOW (ref 135–145)
WBC # BLD: 13.4 K/UL — HIGH (ref 3.8–10.5)
WBC # FLD AUTO: 13.4 K/UL — HIGH (ref 3.8–10.5)

## 2018-05-27 PROCEDURE — 71045 X-RAY EXAM CHEST 1 VIEW: CPT | Mod: 26

## 2018-05-27 PROCEDURE — 99233 SBSQ HOSP IP/OBS HIGH 50: CPT | Mod: GC

## 2018-05-27 RX ORDER — SODIUM,POTASSIUM PHOSPHATES 278-250MG
1 POWDER IN PACKET (EA) ORAL
Qty: 0 | Refills: 0 | Status: DISCONTINUED | OUTPATIENT
Start: 2018-05-27 | End: 2018-05-29

## 2018-05-27 RX ADMIN — Medication 100 MILLIGRAM(S): at 22:22

## 2018-05-27 RX ADMIN — Medication 500 MILLIGRAM(S): at 18:02

## 2018-05-27 RX ADMIN — SIMVASTATIN 40 MILLIGRAM(S): 20 TABLET, FILM COATED ORAL at 22:22

## 2018-05-27 RX ADMIN — Medication 100 MILLIGRAM(S): at 13:19

## 2018-05-27 RX ADMIN — TAMSULOSIN HYDROCHLORIDE 0.4 MILLIGRAM(S): 0.4 CAPSULE ORAL at 22:22

## 2018-05-27 RX ADMIN — Medication 60 MILLIGRAM(S): at 05:15

## 2018-05-27 RX ADMIN — Medication 1 TABLET(S): at 13:19

## 2018-05-27 RX ADMIN — PANTOPRAZOLE SODIUM 40 MILLIGRAM(S): 20 TABLET, DELAYED RELEASE ORAL at 05:15

## 2018-05-27 RX ADMIN — SENNA PLUS 2 TABLET(S): 8.6 TABLET ORAL at 22:22

## 2018-05-27 RX ADMIN — LISINOPRIL 10 MILLIGRAM(S): 2.5 TABLET ORAL at 05:15

## 2018-05-27 RX ADMIN — Medication 1 TABLET(S): at 23:07

## 2018-05-27 RX ADMIN — Medication 40 MILLIGRAM(S): at 05:15

## 2018-05-27 RX ADMIN — Medication 500 MILLIGRAM(S): at 05:15

## 2018-05-27 RX ADMIN — Medication 1 TABLET(S): at 18:02

## 2018-05-27 RX ADMIN — ATENOLOL 50 MILLIGRAM(S): 25 TABLET ORAL at 05:15

## 2018-05-27 RX ADMIN — APIXABAN 5 MILLIGRAM(S): 2.5 TABLET, FILM COATED ORAL at 18:02

## 2018-05-27 RX ADMIN — Medication 100 MILLIGRAM(S): at 05:15

## 2018-05-27 RX ADMIN — APIXABAN 5 MILLIGRAM(S): 2.5 TABLET, FILM COATED ORAL at 05:15

## 2018-05-27 NOTE — PROGRESS NOTE ADULT - SUBJECTIVE AND OBJECTIVE BOX
Patient is a 78y old  Male who presents with a chief complaint of SOB (21 May 2018 13:43)      SUBJECTIVE / OVERNIGHT EVENTS: Patient notes improvement in respiratory symptoms. S/p chest tube removal. Still requiring supplemental oxygen.    REVIEW OF SYSTEMS:    CONSTITUTIONAL: No weakness, fevers or chills  EYES/ENT: No visual changes;  No vertigo or throat pain   NECK: No pain or stiffness  RESPIRATORY: No cough, wheezing, hemoptysis; No shortness of breath  CARDIOVASCULAR: No chest pain or palpitations  GASTROINTESTINAL: No abdominal or epigastric pain. No nausea, vomiting, or hematemesis; No diarrhea or constipation. No melena or hematochezia.  GENITOURINARY: No dysuria, frequency or hematuria  NEUROLOGICAL: No numbness or weakness  SKIN: No itching, burning, rashes, or lesions   All other review of systems is negative unless indicated above.    MEDICATIONS  (STANDING):  apixaban 5 milliGRAM(s) Oral every 12 hours  ATENolol  Tablet 50 milliGRAM(s) Oral daily  calcium carbonate 1250 mG + Vitamin D (OsCal 500 + D) 1 Tablet(s) Oral daily  ciprofloxacin     Tablet 500 milliGRAM(s) Oral every 12 hours  docusate sodium 100 milliGRAM(s) Oral three times a day  furosemide    Tablet 40 milliGRAM(s) Oral daily  lisinopril 10 milliGRAM(s) Oral daily  pantoprazole    Tablet 40 milliGRAM(s) Oral before breakfast  potassium acid phosphate/sodium acid phosphate tablet (K-PHOS No. 2) 1 Tablet(s) Oral four times a day with meals  predniSONE   Tablet 60 milliGRAM(s) Oral daily  senna 2 Tablet(s) Oral at bedtime  simvastatin 40 milliGRAM(s) Oral at bedtime  tamsulosin 0.4 milliGRAM(s) Oral at bedtime  trimethoprim  160 mG/sulfamethoxazole 800 mG 1 Tablet(s) Oral <User Schedule>    MEDICATIONS  (PRN):  acetaminophen   Tablet. 650 milliGRAM(s) Oral every 6 hours PRN Mild Pain (1 - 3)  oxyCODONE    5 mG/acetaminophen 325 mG 1 Tablet(s) Oral every 4 hours PRN Moderate Pain (4 - 6)  oxyCODONE    5 mG/acetaminophen 325 mG 2 Tablet(s) Oral every 4 hours PRN Severe Pain (7 - 10)  polyethylene glycol 3350 17 Gram(s) Oral daily PRN Constipation        CAPILLARY BLOOD GLUCOSE        I&O's Summary    26 May 2018 07:01  -  27 May 2018 07:00  --------------------------------------------------------  IN: 1430 mL / OUT: 1950 mL / NET: -520 mL    Vital Signs Last 24 Hrs  T(C): 36.6 (27 May 2018 04:39), Max: 36.6 (27 May 2018 04:39)  T(F): 97.8 (27 May 2018 04:39), Max: 97.8 (27 May 2018 04:39)  HR: 63 (27 May 2018 04:39) (63 - 65)  BP: 121/67 (27 May 2018 04:39) (96/57 - 121/67)  BP(mean): --  RR: 18 (27 May 2018 04:39) (18 - 19)  SpO2: 94% (27 May 2018 04:39) (93% - 96%)    PHYSICAL EXAM  GENERAL: NAD, well-developed  HEAD:  Atraumatic  EYES: EOMI, PERRLA, conjunctiva and sclera clear  NECK: Supple, No JVD  CHEST/LUNG: Breath sounds symmetric, respirations nonlabored, Minimal crackles at bases. Left chest tube removed.  HEART: Regular rate and rhythm; No murmurs, rubs, or gallops  ABDOMEN: Soft, Nontender, Nondistended; Bowel sounds present  EXTREMITIES:  2+ Peripheral Pulses, No clubbing, cyanosis, or edema  NEURO/PSYCH: AAOx3, nonfocal  SKIN: No rashes or lesions      LABS:                        11.5   13.40 )-----------( 384      ( 27 May 2018 09:00 )             35.0     WBC Trend: 13.40<--, 9.15<--, 9.07<--  05-27    134<L>  |  95<L>  |  21  ----------------------------<  130<H>  4.2   |  30  |  0.97    Ca    8.8      27 May 2018 05:26  Phos  2.3     05-27  Mg     2.0     05-27      Creatinine Trend: 0.97<--, 0.89<--, 0.95<--, 1.00<--, 1.14<--, 1.16<--              RADIOLOGY & ADDITIONAL TESTS:    Imaging Personally Reviewed: CXR        Care Discussed with Consultants/Other Providers:

## 2018-05-27 NOTE — PROGRESS NOTE ADULT - PROBLEM SELECTOR PLAN 4
PVR straight cath 400cc. Suspect BPH in elderly man which likely caused acute cystitis.  -continue Flomax  -TOV initiated this morning; follow up this afternoon.

## 2018-05-27 NOTE — PROGRESS NOTE ADULT - PROBLEM SELECTOR PLAN 1
No objection to reinstate eliquis  Pt to follow up with Dr Gilson Bass in 2 weeks after discharge  Please call [phone] if status changes No objection to reinstate eliquis  Pt to follow up with Dr Mamie Cazares in 2 weeks after discharge  Please call [phone] if status changes

## 2018-05-27 NOTE — PROGRESS NOTE ADULT - PROBLEM SELECTOR PLAN 2
Presented with hypoxia sat 82% on RA, still hypoxic 78% on RA. Does not use oxygen at home, no hx of pulmonary disease except 60 pack year history and restrictive PFTs.  -continue supplemental oxygen to maintain SpO2 >92%. Will likely need home O2  -continue with current dose of Lasix  -f/u lung biopsy path; pending at this time.

## 2018-05-27 NOTE — PROGRESS NOTE ADULT - SUBJECTIVE AND OBJECTIVE BOX
VITAL SIGNS    Vital Signs Last 24 Hrs  T(C): 36.6 (18 @ 04:39), Max: 36.6 (18 @ 04:39)  T(F): 97.8 (18 @ 04:39), Max: 97.8 (18 @ 04:39)  HR: 63 (18 @ 04:39) (63 - 65)  BP: 121/67 (18 @ 04:39) (96/57 - 121/67)  RR: 18 (18 @ 04:39) (18 - 19)  SpO2: 94% (18 @ 04:39) (93% - 96%)             @ 07:01  -   @ 07:00  --------------------------------------------------------  IN: 1430 mL / OUT: 1950 mL / NET: -520 mL       Daily     Daily Weight in k.8 (26 May 2018 14:34)  Admit Wt: Drug Dosing Weight  Height (cm): 177.8 (21 May 2018 16:21)  Weight (kg): 97 (21 May 2018 16:21)  BMI (kg/m2): 30.7 (21 May 2018 16:21)  BSA (m2): 2.15 (21 May 2018 16:21)    MEDICATIONS  acetaminophen   Tablet. 650 milliGRAM(s) Oral every 6 hours PRN  apixaban 5 milliGRAM(s) Oral every 12 hours  ATENolol  Tablet 50 milliGRAM(s) Oral daily  calcium carbonate 1250 mG + Vitamin D (OsCal 500 + D) 1 Tablet(s) Oral daily  ciprofloxacin     Tablet 500 milliGRAM(s) Oral every 12 hours  docusate sodium 100 milliGRAM(s) Oral three times a day  furosemide    Tablet 40 milliGRAM(s) Oral daily  lisinopril 10 milliGRAM(s) Oral daily  oxyCODONE    5 mG/acetaminophen 325 mG 1 Tablet(s) Oral every 4 hours PRN  oxyCODONE    5 mG/acetaminophen 325 mG 2 Tablet(s) Oral every 4 hours PRN  pantoprazole    Tablet 40 milliGRAM(s) Oral before breakfast  polyethylene glycol 3350 17 Gram(s) Oral daily PRN  potassium acid phosphate/sodium acid phosphate tablet (K-PHOS No. 2) 1 Tablet(s) Oral four times a day with meals  predniSONE   Tablet 60 milliGRAM(s) Oral daily  senna 2 Tablet(s) Oral at bedtime  simvastatin 40 milliGRAM(s) Oral at bedtime  tamsulosin 0.4 milliGRAM(s) Oral at bedtime  trimethoprim  160 mG/sulfamethoxazole 800 mG 1 Tablet(s) Oral <User Schedule>      PHYSICAL EXAM  Subjective: NAD  Neurology: alert and oriented x 3, nonfocal, no gross deficits  CV : S1S2  Lungs: CTA b/l  Abdomen: soft, NT,ND, ( +)BM  :  voiding  Extremities:  -c/c/e     LABS      134<L>  |  95<L>  |  21  ----------------------------<  130<H>  4.2   |  30  |  0.97    Ca    8.8      27 May 2018 05:26  Phos  2.3       Mg     2.0                                        11.5   13.40 )-----------( 384      ( 27 May 2018 09:00 )             35.0                 PAST MEDICAL & SURGICAL HISTORY:  Obesity, unspecified obesity severity, unspecified obesity type  Hyperlipidemia, unspecified hyperlipidemia type  Essential hypertension  Persistent atrial fibrillation  H/O bilateral hip replacements  H/O arthroscopy of knee

## 2018-05-27 NOTE — PROGRESS NOTE ADULT - ASSESSMENT
78y Male with history of HTN, HLD, PAF S/P Ablation admitted with increasing SOB. Cardiac cath findings mild CAD. Component of acute on chronic diastolic CHF improved with lasix. Remains NSR post ablation off amiodarone for now. CT and CXR findings more c/w primary pulmonary process. Amiodarone use has been short term but can be associated with acute toxicity as well. He is S/P VATS  left wedge resection with Dr Cazares  on 5/24/18 5/25  left ct placed on water seal 5/26 chest tube removed. pending cxr  5/27 CXR negative for PTX

## 2018-05-27 NOTE — PROGRESS NOTE ADULT - ATTENDING COMMENTS
TOV .   leukocytosis - likely related to steroid monitor  Eliquis resumed   will speak to  regarding home oxygen

## 2018-05-27 NOTE — PROGRESS NOTE ADULT - PROBLEM SELECTOR PLAN 6
Atrial fibrillation diagnosed 2014. 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 s/p ablation (4/24). Pt has loop recorder and has gone into paroxysmal AF after ablation.  -Currently in sinus rhythm, monitor on tele  -continue atenolol  -Eliquis resumed on 5/26.

## 2018-05-27 NOTE — PROGRESS NOTE ADULT - PROBLEM SELECTOR PLAN 5
TTE 5/2018 showed LVEF 55%, diastolic dysfunction. Presented with pBNP 7400 so possible mild diastolic HF exacerbation contributing to presenting symptoms. Home dose ramipril equivalent to lisinopril 40mg daily  -Lasix 40mg po daily, lisinopril 10mg, atenolol. Spaced out in day

## 2018-05-27 NOTE — PROGRESS NOTE ADULT - ASSESSMENT
Assessment and Plan:   · Assessment		  78 year old man PMH HTN, HLD, HFpEF, AFib s/p ablation (4/24/2018) presents with shortness of breath, orthopnea, LE edema x 1 month found to have new bilateral airspace infiltrates and nodules most prominent in the upper lobes, associated with mediastinal LAD on CT chest s/p L VATS with wedge resection (5/24, upper and lower lobes), left chest tube s/p removal on 5/26.     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 Would continue current  Prednisone dose. Ok from pulmonary standpoint to f/u with Dr. Saul results of lung biopsy and continue on Prednisone at current dosage. He melvin need supplemental oxygen at discharge.    Problem/Plan - 2:  ·  Problem: Hypoxia.  Plan: Presented with hypoxia sat 82% on RA, still hypoxic 78% on RA. Does not use oxygen at home, no hx of pulmonary disease except 60 pack year history and restrictive PFTs.  -continue supplemental oxygen to maintain SpO2 >92%. Will likely need home O2  -continue with current dose of Lasix  -f/u lung biopsy path; pending at this time.        Problem/Plan - 3:  ·  Problem: Diastolic CHF.  Plan: TTE 5/2018 showed LVEF 55%, diastolic dysfunction. Presented with pBNP 7400 so possible mild diastolic HF exacerbation contributing to presenting symptoms. Home dose ramipril equivalent to lisinopril 40mg daily  -Lasix 40mg po daily, lisinopril 10mg, atenolol. Spaced out in day.      Problem/Plan -4:  Problem: Persistent atrial fibrillation. Plan: Atrial fibrillation diagnosed 2014. 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 s/p ablation (4/24). Pt has loop recorder and has gone into paroxysmal AF after ablation.  -Currently in sinus rhythm, monitor on tele  -continue atenolol  -Eliquis resumed on 5/26.

## 2018-05-27 NOTE — PROGRESS NOTE ADULT - ASSESSMENT
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 Carlos  Pulmonary follow up with Dr. Saul

## 2018-05-27 NOTE — PROGRESS NOTE ADULT - ASSESSMENT
78 year old man PMH HTN, HLD, HFpEF, AFib s/p ablation (4/24/2018) presents with shortness of breath, orthopnea, LE edema x 1 month found to have new bilateral airspace infiltrates and nodules most prominent in the upper lobes, associated with mediastinal LAD on CT chest s/p L VATS with wedge resection (5/24, upper and lower lobes), left chest tube s/p removal on 5/26.

## 2018-05-27 NOTE — PROGRESS NOTE ADULT - PROBLEM SELECTOR PLAN 1
Unexplained dyspnea and hypoxemia for >1 month with b/l pulmonary infiltrates and mediastinal LAD s/p wedge resection and placement of left sided chest tube s/p removal   -Improved oxygenation - currently on 3L NC  -Denies shortness of breath and respiratory complaints today  -Appreciate CTS recommendations  -Follow up CXR shows pneumothorax is smaller

## 2018-05-27 NOTE — PROGRESS NOTE ADULT - SUBJECTIVE AND OBJECTIVE BOX
Patient is a 78y old  Male who presents with a chief complaint of SOB (21 May 2018 13:43)    I came to see the patient in follow up today but the patient and his wife informed me that the have changed cardiologists to Dr. Carlos and his group.  Pt feels better and is sitting up in his chair.  He offers no complaints.  Family at bedside.  Chart reviewed.  Pt not examined.    I am signing off the case at the patient and family request.  Cardiology care per Dr. Carlos.    Allergies:   No Known Allergies      MEDICATIONS  (STANDING):  apixaban 5 milliGRAM(s) Oral every 12 hours  ATENolol  Tablet 50 milliGRAM(s) Oral daily  calcium carbonate 1250 mG + Vitamin D (OsCal 500 + D) 1 Tablet(s) Oral daily  ciprofloxacin     Tablet 500 milliGRAM(s) Oral every 12 hours  docusate sodium 100 milliGRAM(s) Oral three times a day  furosemide    Tablet 40 milliGRAM(s) Oral daily  lisinopril 10 milliGRAM(s) Oral daily  pantoprazole    Tablet 40 milliGRAM(s) Oral before breakfast  potassium acid phosphate/sodium acid phosphate tablet (K-PHOS No. 2) 1 Tablet(s) Oral four times a day with meals  predniSONE   Tablet 60 milliGRAM(s) Oral daily  senna 2 Tablet(s) Oral at bedtime  simvastatin 40 milliGRAM(s) Oral at bedtime  tamsulosin 0.4 milliGRAM(s) Oral at bedtime  trimethoprim  160 mG/sulfamethoxazole 800 mG 1 Tablet(s) Oral <User Schedule>    MEDICATIONS  (PRN):  acetaminophen   Tablet. 650 milliGRAM(s) Oral every 6 hours PRN Mild Pain (1 - 3)  oxyCODONE    5 mG/acetaminophen 325 mG 1 Tablet(s) Oral every 4 hours PRN Moderate Pain (4 - 6)  oxyCODONE    5 mG/acetaminophen 325 mG 2 Tablet(s) Oral every 4 hours PRN Severe Pain (7 - 10)  polyethylene glycol 3350 17 Gram(s) Oral daily PRN Constipation      Daily     Daily Weight in k.8 (26 May 2018 14:34)  Drug Dosing Weight  Height (cm): 177.8 (21 May 2018 16:21)  Weight (kg): 97 (21 May 2018 16:21)  BMI (kg/m2): 30.7 (21 May 2018 16:21)  BSA (m2): 2.15 (21 May 2018 16:21)  Vital Signs Last 24 Hrs  T(C): 36.6 (27 May 2018 04:39), Max: 36.6 (27 May 2018 04:39)  T(F): 97.8 (27 May 2018 04:39), Max: 97.8 (27 May 2018 04:39)  HR: 63 (27 May 2018 04:39) (63 - 65)  BP: 121/67 (27 May 2018 04:39) (96/57 - 121/67)  BP(mean): --  RR: 18 (27 May 2018 04:39) (18 - 19)  SpO2: 94% (27 May 2018 04:39) (93% - 96%)    I&O's Summary    26 May 2018 07:  -  27 May 2018 07:00  --------------------------------------------------------  IN: 1430 mL / OUT: 1950 mL / NET: -520 mL    27 May 2018 07:01  -  27 May 2018 14:01  --------------------------------------------------------  IN: 840 mL / OUT: 250 mL / NET: 590 mL      LABS:                        11.5   13.40 )-----------( 384      ( 27 May 2018 09:00 )             35.0         134<L>  |  95<L>  |  21  ----------------------------<  130<H>  4.2   |  30  |  0.97    Ca    8.8      27 May 2018 05:26  Phos  2.3       Mg     2.0     23 @ 07:09  Trop-I --  CK     53  CK MB  --     @ 11:42  Trop-I --  CK     90  CK MB  --    Pro BNP  7432  @ 11:42  D Dimer  --  @ 11:42              RADIOLOGY & ADDITIONAL STUDIES:    HEALTH ISSUES - PROBLEM Dx:  Pleural effusion, left: Pleural effusion, left  Urinary retention: Urinary retention  Atrial fibrillation, unspecified type: Atrial fibrillation, unspecified type  Acute on chronic diastolic congestive heart failure: Acute on chronic diastolic congestive heart failure  Abnormal CT of the chest: Abnormal CT of the chest  Shortness of breath: Shortness of breath  Acute hypoxemic respiratory failure: Acute hypoxemic respiratory failure  Diastolic CHF: Diastolic CHF  Cystitis: Cystitis  Dyspnea: Dyspnea  Prophylactic measure: Prophylactic measure  Hypoxia: Hypoxia  Persistent atrial fibrillation: Persistent atrial fibrillation  Essential hypertension: Essential hypertension  Dyspnea on exertion: Dyspnea on exertion

## 2018-05-27 NOTE — PROGRESS NOTE ADULT - SUBJECTIVE AND OBJECTIVE BOX
CH[place] NESBITT    Patient is a 78y old  Male who presents with a chief complaint of SOB,S/P Vats,CT removed.Improved less SOB,no CP.      Allergies    No Known Allergies    Intolerances      MEDICATIONS  (STANDING):  apixaban 5 milliGRAM(s) Oral every 12 hours  ATENolol  Tablet 50 milliGRAM(s) Oral daily  calcium carbonate 1250 mG + Vitamin D (OsCal 500 + D) 1 Tablet(s) Oral daily  ciprofloxacin     Tablet 500 milliGRAM(s) Oral every 12 hours  docusate sodium 100 milliGRAM(s) Oral three times a day  furosemide    Tablet 40 milliGRAM(s) Oral daily  lisinopril 10 milliGRAM(s) Oral daily  pantoprazole    Tablet 40 milliGRAM(s) Oral before breakfast  predniSONE   Tablet 60 milliGRAM(s) Oral daily  senna 2 Tablet(s) Oral at bedtime  simvastatin 40 milliGRAM(s) Oral at bedtime  tamsulosin 0.4 milliGRAM(s) Oral at bedtime  trimethoprim  160 mG/sulfamethoxazole 800 mG 1 Tablet(s) Oral <User Schedule>    MEDICATIONS  (PRN):  acetaminophen   Tablet. 650 milliGRAM(s) Oral every 6 hours PRN Mild Pain (1 - 3)  oxyCODONE    5 mG/acetaminophen 325 mG 1 Tablet(s) Oral every 4 hours PRN Moderate Pain (4 - 6)  oxyCODONE    5 mG/acetaminophen 325 mG 2 Tablet(s) Oral every 4 hours PRN Severe Pain (7 - 10)  polyethylene glycol 3350 17 Gram(s) Oral daily PRN Constipation      ROS:  Positive:Dyspnea    General: Denies weight loss, fevers, rash, decreased hearing  Cardiac: Denies chest pain,  orthopnea, PND, claudication, edema, snoring, daytime somnolence, palpitations, syncope  Resp: Denies  cough, sputum, wheezing, hemoptysis  GI: Denies change in bowel habits, diarrhea, weight loss, melena, tarry stools,   nausea, vomiting, jaundice, abdominal pain, dysphagia  : Denies dysuria, nocturia, hematuria  Neuro: Denies tinnitus, headache, visual changes, weakness, dizziness or vertigo  Musculoskeletal: Denies neck pain back pain joint pain.  Skin: Denies rash, itching, dryness.  Endocrine: Denies polydipsia, polyuria  Psychiatric: Denies depression, anxiety      PHYSICAL EXAM:  Vital Signs Last 24 Hrs  T(C): 36.6 (27 May 2018 04:39), Max: 36.6 (27 May 2018 04:39)  T(F): 97.8 (27 May 2018 04:39), Max: 97.8 (27 May 2018 04:39)  HR: 63 (27 May 2018 04:39) (63 - 65)  BP: 121/67 (27 May 2018 04:39) (96/57 - 121/67)  BP(mean): --  RR: 18 (27 May 2018 04:39) (18 - 19)  SpO2: 94% (27 May 2018 04:39) (93% - 96%)  Daily     Daily Weight in k.8 (26 May 2018 14:34)  I&O's Summary    26 May 2018 07:01  -  27 May 2018 07:00  --------------------------------------------------------  IN: 1430 mL / OUT: 1950 mL / NET: -520 mL        General Appearance: 	 Alert, cooperative, no distress  HEENT: normocephalic, atraumatic, PERRLA, EOMI, conjunctiva normal, sclera anicteric,   Neck: no JVD,  carotid 2+  bilaterally without bruits, thyroid normal to inspection and palpation, no adenopathy, trachea midline  Lungs:  Bilateral wheezes  Cor:  pmi 5th ICS MCL, regular rate and rhythm, S1 normal intensity, S2 normal intensity, no gallops, murmurs or rubs  Abdomen:	 soft, non-tender; bowel sounds normal; no masses,  no organomegaly  Extremities: without cyanosis, clubbing or edema  Vasc: 2-+ PT and DP pulses; no varicosities  Neurologic: A&O x 3 (time, place, person). Symmetric strength; limited exam  Musculoskeletal: no kyphosis, scoliosis; normal gait, normal tone  Skin: no rashes; limited exam      Labs:  CBC Full  -  ( 25 May 2018 07:46 )  WBC Count : 9.15 K/uL  Hemoglobin : 12.0 g/dL  Hematocrit : 37.9 %  Platelet Count - Automated : 301 K/uL  Mean Cell Volume : 97.4 fl  Mean Cell Hemoglobin : 30.8 pg  Mean Cell Hemoglobin Concentration : 31.7 gm/dL  Auto Neutrophil # : 6.81 K/uL  Auto Lymphocyte # : 1.17 K/uL  Auto Monocyte # : 0.98 K/uL  Auto Eosinophil # : 0.15 K/uL  Auto Basophil # : 0.01 K/uL  Auto Neutrophil % : 74.5 %  Auto Lymphocyte % : 12.8 %  Auto Monocyte % : 10.7 %  Auto Eosinophil % : 1.6 %  Auto Basophil % : 0.1 %      Impression/Plan:Patient with GRIMALDO,S/P VATS,PAF S/P ablation.Improved less SOB.ContinueEliquis,atenolol,Lasix,linopril,Prednisone and Zocor.F/U pulmonary start Neb's with pulmonary toilet and incentive spirometry.May D/C tele. OOB/PT.F/U CXR small PTX.EFE hess.        Neri Pretty MD, [facility]  [facility]

## 2018-05-27 NOTE — PROGRESS NOTE ADULT - PROBLEM SELECTOR PLAN 3
C/o intermittent dysuria, urinary frequency, and incomplete voiding. Pansensitive E coli UTI  -Completed course of antibiotics

## 2018-05-27 NOTE — PROGRESS NOTE ADULT - SUBJECTIVE AND OBJECTIVE BOX
Patient is a 78y old  Male who presents with a chief complaint of SOB (21 May 2018 13:43)      SUBJECTIVE / OVERNIGHT EVENTS: No acute events overnight. Chest tube removed yesterday evening and Eliquis resumed. The patient has no focal complaints today. He is anxious to go home. Ames catheter was removed with morning.     REVIEW OF SYSTEMS:    CONSTITUTIONAL: No weakness, fevers or chills  EYES/ENT: No visual changes;  No vertigo or throat pain   NECK: No pain or stiffness  RESPIRATORY: No cough, wheezing, hemoptysis; No shortness of breath  CARDIOVASCULAR: No chest pain or palpitations  GASTROINTESTINAL: No abdominal or epigastric pain. No nausea, vomiting, or hematemesis; No diarrhea or constipation. No melena or hematochezia.  GENITOURINARY: No dysuria, frequency or hematuria  NEUROLOGICAL: No numbness or weakness  SKIN: No itching, burning, rashes, or lesions   All other review of systems is negative unless indicated above.    MEDICATIONS  (STANDING):  apixaban 5 milliGRAM(s) Oral every 12 hours  ATENolol  Tablet 50 milliGRAM(s) Oral daily  calcium carbonate 1250 mG + Vitamin D (OsCal 500 + D) 1 Tablet(s) Oral daily  ciprofloxacin     Tablet 500 milliGRAM(s) Oral every 12 hours  docusate sodium 100 milliGRAM(s) Oral three times a day  furosemide    Tablet 40 milliGRAM(s) Oral daily  lisinopril 10 milliGRAM(s) Oral daily  pantoprazole    Tablet 40 milliGRAM(s) Oral before breakfast  potassium acid phosphate/sodium acid phosphate tablet (K-PHOS No. 2) 1 Tablet(s) Oral four times a day with meals  predniSONE   Tablet 60 milliGRAM(s) Oral daily  senna 2 Tablet(s) Oral at bedtime  simvastatin 40 milliGRAM(s) Oral at bedtime  tamsulosin 0.4 milliGRAM(s) Oral at bedtime  trimethoprim  160 mG/sulfamethoxazole 800 mG 1 Tablet(s) Oral <User Schedule>    MEDICATIONS  (PRN):  acetaminophen   Tablet. 650 milliGRAM(s) Oral every 6 hours PRN Mild Pain (1 - 3)  oxyCODONE    5 mG/acetaminophen 325 mG 1 Tablet(s) Oral every 4 hours PRN Moderate Pain (4 - 6)  oxyCODONE    5 mG/acetaminophen 325 mG 2 Tablet(s) Oral every 4 hours PRN Severe Pain (7 - 10)  polyethylene glycol 3350 17 Gram(s) Oral daily PRN Constipation        CAPILLARY BLOOD GLUCOSE        I&O's Summary    26 May 2018 07:01  -  27 May 2018 07:00  --------------------------------------------------------  IN: 1430 mL / OUT: 1950 mL / NET: -520 mL    Vital Signs Last 24 Hrs  T(C): 36.6 (27 May 2018 04:39), Max: 36.6 (27 May 2018 04:39)  T(F): 97.8 (27 May 2018 04:39), Max: 97.8 (27 May 2018 04:39)  HR: 63 (27 May 2018 04:39) (63 - 65)  BP: 121/67 (27 May 2018 04:39) (96/57 - 121/67)  BP(mean): --  RR: 18 (27 May 2018 04:39) (18 - 19)  SpO2: 94% (27 May 2018 04:39) (93% - 96%)    PHYSICAL EXAM  GENERAL: NAD, well-developed  HEAD:  Atraumatic  EYES: EOMI, PERRLA, conjunctiva and sclera clear  NECK: Supple, No JVD  CHEST/LUNG: Breath sounds symmetric, respirations nonlabored, CTAB b/l. Left chest tube removed.  HEART: Regular rate and rhythm; No murmurs, rubs, or gallops  ABDOMEN: Soft, Nontender, Nondistended; Bowel sounds present  EXTREMITIES:  2+ Peripheral Pulses, No clubbing, cyanosis, or edema  NEURO/PSYCH: AAOx3, nonfocal  SKIN: No rashes or lesions      LABS:                        11.5   13.40 )-----------( 384      ( 27 May 2018 09:00 )             35.0     WBC Trend: 13.40<--, 9.15<--, 9.07<--  05-27    134<L>  |  95<L>  |  21  ----------------------------<  130<H>  4.2   |  30  |  0.97    Ca    8.8      27 May 2018 05:26  Phos  2.3     05-27  Mg     2.0     05-27      Creatinine Trend: 0.97<--, 0.89<--, 0.95<--, 1.00<--, 1.14<--, 1.16<--              RADIOLOGY & ADDITIONAL TESTS:    Imaging Personally Reviewed:    Consultant(s) Notes Reviewed:  CTS, Cardiology    Care Discussed with Consultants/Other Providers: Patient is a 78y old  Male who presents with a chief complaint of SOB (21 May 2018 13:43)      SUBJECTIVE / OVERNIGHT EVENTS: No acute events overnight. Chest tube removed yesterday evening and Eliquis resumed. The patient has no focal complaints today. He is anxious to go home. Ames catheter was removed with morning.     REVIEW OF SYSTEMS:    No fever chills  No Chest pain     MEDICATIONS  (STANDING):  apixaban 5 milliGRAM(s) Oral every 12 hours  ATENolol  Tablet 50 milliGRAM(s) Oral daily  calcium carbonate 1250 mG + Vitamin D (OsCal 500 + D) 1 Tablet(s) Oral daily  ciprofloxacin     Tablet 500 milliGRAM(s) Oral every 12 hours  docusate sodium 100 milliGRAM(s) Oral three times a day  furosemide    Tablet 40 milliGRAM(s) Oral daily  lisinopril 10 milliGRAM(s) Oral daily  pantoprazole    Tablet 40 milliGRAM(s) Oral before breakfast  potassium acid phosphate/sodium acid phosphate tablet (K-PHOS No. 2) 1 Tablet(s) Oral four times a day with meals  predniSONE   Tablet 60 milliGRAM(s) Oral daily  senna 2 Tablet(s) Oral at bedtime  simvastatin 40 milliGRAM(s) Oral at bedtime  tamsulosin 0.4 milliGRAM(s) Oral at bedtime  trimethoprim  160 mG/sulfamethoxazole 800 mG 1 Tablet(s) Oral <User Schedule>    MEDICATIONS  (PRN):  acetaminophen   Tablet. 650 milliGRAM(s) Oral every 6 hours PRN Mild Pain (1 - 3)  oxyCODONE    5 mG/acetaminophen 325 mG 1 Tablet(s) Oral every 4 hours PRN Moderate Pain (4 - 6)  oxyCODONE    5 mG/acetaminophen 325 mG 2 Tablet(s) Oral every 4 hours PRN Severe Pain (7 - 10)  polyethylene glycol 3350 17 Gram(s) Oral daily PRN Constipation        CAPILLARY BLOOD GLUCOSE        I&O's Summary    26 May 2018 07:01  -  27 May 2018 07:00  --------------------------------------------------------  IN: 1430 mL / OUT: 1950 mL / NET: -520 mL    Vital Signs Last 24 Hrs  T(C): 36.6 (27 May 2018 04:39), Max: 36.6 (27 May 2018 04:39)  T(F): 97.8 (27 May 2018 04:39), Max: 97.8 (27 May 2018 04:39)  HR: 63 (27 May 2018 04:39) (63 - 65)  BP: 121/67 (27 May 2018 04:39) (96/57 - 121/67)  BP(mean): --  RR: 18 (27 May 2018 04:39) (18 - 19)  SpO2: 94% (27 May 2018 04:39) (93% - 96%)    PHYSICAL EXAM  GENERAL: NAD, well-developed  HEAD:  Atraumatic  EYES: EOMI, PERRLA, conjunctiva and sclera clear  NECK: Supple, No JVD  CHEST/LUNG: Breath sounds symmetric, respirations nonlabored, CTAB b/l. Left chest tube removed.  HEART: Regular rate and rhythm; No murmurs, rubs, or gallops  ABDOMEN: Soft, Nontender, Nondistended; Bowel sounds present  EXTREMITIES:  2+ Peripheral Pulses, No clubbing, cyanosis, or edema  NEURO/PSYCH: AAOx3, nonfocal  SKIN: No rashes or lesions      LABS:                        11.5   13.40 )-----------( 384      ( 27 May 2018 09:00 )             35.0     WBC Trend: 13.40<--, 9.15<--, 9.07<--  05-27    134<L>  |  95<L>  |  21  ----------------------------<  130<H>  4.2   |  30  |  0.97    Ca    8.8      27 May 2018 05:26  Phos  2.3     05-27  Mg     2.0     05-27      Creatinine Trend: 0.97<--, 0.89<--, 0.95<--, 1.00<--, 1.14<--, 1.16<--              RADIOLOGY & ADDITIONAL TESTS:    Imaging Personally Reviewed: CXR personally reviewed - very  tiny PTX stable     Consultant(s) Notes Reviewed:  CTS, Cardiology    Care Discussed with Consultants/Other Providers:

## 2018-05-28 ENCOUNTER — TRANSCRIPTION ENCOUNTER (OUTPATIENT)
Age: 78
End: 2018-05-28

## 2018-05-28 LAB
ANION GAP SERPL CALC-SCNC: 10 MMOL/L — SIGNIFICANT CHANGE UP (ref 5–17)
ANION GAP SERPL CALC-SCNC: 10 MMOL/L — SIGNIFICANT CHANGE UP (ref 5–17)
BASOPHILS # BLD AUTO: 0 K/UL — SIGNIFICANT CHANGE UP (ref 0–0.2)
BASOPHILS NFR BLD AUTO: 0 % — SIGNIFICANT CHANGE UP (ref 0–2)
BUN SERPL-MCNC: 21 MG/DL — SIGNIFICANT CHANGE UP (ref 7–23)
BUN SERPL-MCNC: 30 MG/DL — HIGH (ref 7–23)
CALCIUM SERPL-MCNC: 8.3 MG/DL — LOW (ref 8.4–10.5)
CALCIUM SERPL-MCNC: 9 MG/DL — SIGNIFICANT CHANGE UP (ref 8.4–10.5)
CHLORIDE SERPL-SCNC: 94 MMOL/L — LOW (ref 96–108)
CHLORIDE SERPL-SCNC: 96 MMOL/L — SIGNIFICANT CHANGE UP (ref 96–108)
CO2 SERPL-SCNC: 26 MMOL/L — SIGNIFICANT CHANGE UP (ref 22–31)
CO2 SERPL-SCNC: 29 MMOL/L — SIGNIFICANT CHANGE UP (ref 22–31)
CREAT SERPL-MCNC: 0.84 MG/DL — SIGNIFICANT CHANGE UP (ref 0.5–1.3)
CREAT SERPL-MCNC: 0.99 MG/DL — SIGNIFICANT CHANGE UP (ref 0.5–1.3)
EOSINOPHIL # BLD AUTO: 0.13 K/UL — SIGNIFICANT CHANGE UP (ref 0–0.5)
EOSINOPHIL NFR BLD AUTO: 1 % — SIGNIFICANT CHANGE UP (ref 0–6)
GLUCOSE SERPL-MCNC: 182 MG/DL — HIGH (ref 70–99)
GLUCOSE SERPL-MCNC: 98 MG/DL — SIGNIFICANT CHANGE UP (ref 70–99)
HCT VFR BLD CALC: 41.2 % — SIGNIFICANT CHANGE UP (ref 39–50)
HGB BLD-MCNC: 13.4 G/DL — SIGNIFICANT CHANGE UP (ref 13–17)
LYMPHOCYTES # BLD AUTO: 16 % — SIGNIFICANT CHANGE UP (ref 13–44)
LYMPHOCYTES # BLD AUTO: 2.01 K/UL — SIGNIFICANT CHANGE UP (ref 1–3.3)
MAGNESIUM SERPL-MCNC: 2 MG/DL — SIGNIFICANT CHANGE UP (ref 1.6–2.6)
MCHC RBC-ENTMCNC: 31.6 PG — SIGNIFICANT CHANGE UP (ref 27–34)
MCHC RBC-ENTMCNC: 32.5 GM/DL — SIGNIFICANT CHANGE UP (ref 32–36)
MCV RBC AUTO: 97.2 FL — SIGNIFICANT CHANGE UP (ref 80–100)
MONOCYTES # BLD AUTO: 0.75 K/UL — SIGNIFICANT CHANGE UP (ref 0–0.9)
MONOCYTES NFR BLD AUTO: 6 % — SIGNIFICANT CHANGE UP (ref 2–14)
NEUTROPHILS # BLD AUTO: 9.69 K/UL — HIGH (ref 1.8–7.4)
NEUTROPHILS NFR BLD AUTO: 77 % — SIGNIFICANT CHANGE UP (ref 43–77)
PHOSPHATE SERPL-MCNC: 2.7 MG/DL — SIGNIFICANT CHANGE UP (ref 2.5–4.5)
PLATELET # BLD AUTO: 388 K/UL — SIGNIFICANT CHANGE UP (ref 150–400)
POTASSIUM SERPL-MCNC: 4.1 MMOL/L — SIGNIFICANT CHANGE UP (ref 3.5–5.3)
POTASSIUM SERPL-MCNC: 4.2 MMOL/L — SIGNIFICANT CHANGE UP (ref 3.5–5.3)
POTASSIUM SERPL-SCNC: 4.1 MMOL/L — SIGNIFICANT CHANGE UP (ref 3.5–5.3)
POTASSIUM SERPL-SCNC: 4.2 MMOL/L — SIGNIFICANT CHANGE UP (ref 3.5–5.3)
RBC # BLD: 4.24 M/UL — SIGNIFICANT CHANGE UP (ref 4.2–5.8)
RBC # FLD: 13.8 % — SIGNIFICANT CHANGE UP (ref 10.3–14.5)
SODIUM SERPL-SCNC: 130 MMOL/L — LOW (ref 135–145)
SODIUM SERPL-SCNC: 135 MMOL/L — SIGNIFICANT CHANGE UP (ref 135–145)
WBC # BLD: 12.58 K/UL — HIGH (ref 3.8–10.5)
WBC # FLD AUTO: 12.58 K/UL — HIGH (ref 3.8–10.5)

## 2018-05-28 PROCEDURE — 99232 SBSQ HOSP IP/OBS MODERATE 35: CPT | Mod: GC

## 2018-05-28 PROCEDURE — 71045 X-RAY EXAM CHEST 1 VIEW: CPT | Mod: 26

## 2018-05-28 RX ORDER — TAMSULOSIN HYDROCHLORIDE 0.4 MG/1
1 CAPSULE ORAL
Qty: 30 | Refills: 0 | OUTPATIENT
Start: 2018-05-28 | End: 2018-06-26

## 2018-05-28 RX ORDER — SODIUM CHLORIDE 9 MG/ML
1 INJECTION INTRAMUSCULAR; INTRAVENOUS; SUBCUTANEOUS ONCE
Qty: 0 | Refills: 0 | Status: COMPLETED | OUTPATIENT
Start: 2018-05-28 | End: 2018-05-28

## 2018-05-28 RX ORDER — PANTOPRAZOLE SODIUM 20 MG/1
1 TABLET, DELAYED RELEASE ORAL
Qty: 30 | Refills: 0
Start: 2018-05-28 | End: 2018-06-26

## 2018-05-28 RX ORDER — TAMSULOSIN HYDROCHLORIDE 0.4 MG/1
1 CAPSULE ORAL
Qty: 30 | Refills: 0 | DISCHARGE
Start: 2018-05-28 | End: 2018-06-26

## 2018-05-28 RX ORDER — TAMSULOSIN HYDROCHLORIDE 0.4 MG/1
1 CAPSULE ORAL
Qty: 30 | Refills: 0 | COMMUNITY
Start: 2018-05-28 | End: 2018-06-26

## 2018-05-28 RX ORDER — CIPROFLOXACIN LACTATE 400MG/40ML
1 VIAL (ML) INTRAVENOUS
Qty: 0 | Refills: 0 | COMMUNITY
Start: 2018-05-28

## 2018-05-28 RX ADMIN — LISINOPRIL 10 MILLIGRAM(S): 2.5 TABLET ORAL at 05:39

## 2018-05-28 RX ADMIN — Medication 1 TABLET(S): at 05:39

## 2018-05-28 RX ADMIN — Medication 500 MILLIGRAM(S): at 05:39

## 2018-05-28 RX ADMIN — Medication 100 MILLIGRAM(S): at 13:05

## 2018-05-28 RX ADMIN — Medication 100 MILLIGRAM(S): at 05:39

## 2018-05-28 RX ADMIN — Medication 1 TABLET(S): at 17:09

## 2018-05-28 RX ADMIN — APIXABAN 5 MILLIGRAM(S): 2.5 TABLET, FILM COATED ORAL at 17:09

## 2018-05-28 RX ADMIN — TAMSULOSIN HYDROCHLORIDE 0.4 MILLIGRAM(S): 0.4 CAPSULE ORAL at 21:42

## 2018-05-28 RX ADMIN — Medication 1 TABLET(S): at 07:47

## 2018-05-28 RX ADMIN — ATENOLOL 50 MILLIGRAM(S): 25 TABLET ORAL at 05:39

## 2018-05-28 RX ADMIN — PANTOPRAZOLE SODIUM 40 MILLIGRAM(S): 20 TABLET, DELAYED RELEASE ORAL at 05:39

## 2018-05-28 RX ADMIN — Medication 60 MILLIGRAM(S): at 05:39

## 2018-05-28 RX ADMIN — APIXABAN 5 MILLIGRAM(S): 2.5 TABLET, FILM COATED ORAL at 05:39

## 2018-05-28 RX ADMIN — SODIUM CHLORIDE 1 GRAM(S): 9 INJECTION INTRAMUSCULAR; INTRAVENOUS; SUBCUTANEOUS at 11:39

## 2018-05-28 RX ADMIN — Medication 100 MILLIGRAM(S): at 21:42

## 2018-05-28 RX ADMIN — Medication 40 MILLIGRAM(S): at 05:39

## 2018-05-28 RX ADMIN — Medication 1 TABLET(S): at 11:39

## 2018-05-28 RX ADMIN — SENNA PLUS 2 TABLET(S): 8.6 TABLET ORAL at 21:42

## 2018-05-28 RX ADMIN — Medication 1 TABLET(S): at 21:41

## 2018-05-28 RX ADMIN — Medication 500 MILLIGRAM(S): at 17:09

## 2018-05-28 RX ADMIN — SIMVASTATIN 40 MILLIGRAM(S): 20 TABLET, FILM COATED ORAL at 21:42

## 2018-05-28 NOTE — PROGRESS NOTE ADULT - PROBLEM SELECTOR PLAN 1
-Unexplained dyspnea and hypoxemia for >1 month with b/l pulmonary infiltrates and mediastinal LAD s/p wedge resection and placement of left sided chest tube s/p removal   -Improved oxygenation, still requiring supplemental O2  -Denies shortness of breath and respiratory complaints today  -Appreciate CTS recommendations  -Follow up CXR for pneumothorax -Unexplained dyspnea and hypoxemia for >1 month with b/l pulmonary infiltrates and mediastinal LAD s/p wedge resection and placement of left sided chest tube s/p removal   -Improved oxygenation, still requiring supplemental O2  -Denies shortness of breath and respiratory complaints today  -Appreciate CTS recommendations  -Follow up CXR for small pneumothorax

## 2018-05-28 NOTE — DISCHARGE NOTE ADULT - CARE PROVIDER_API CALL
Felice Carlos), Cardiovascular Disease; Internal Medicine; Nuclear Cardiology  [address] [facility]  [address]  [place], [place] [id number]  Phone: [phone]  Fax: [phone]    Lele Saul), Internal Medicine; Pulmonary Disease  [address] [facility]  [address]  [place] [address]  Phone: [phone]  Fax: [phone] Lele Saul), Internal Medicine; Pulmonary Disease  [address] [facility]  [address]  [place] [address]  Phone: [phone]  Fax: [phone]    Neri Pretty  Primary medical doctor/ Cardiologist  Address: [address] [place] [address] [address], [place] [address]  Phone: [phone]  Phone: (   )    -  Fax: (   )    - Lele Saul), Internal Medicine; Pulmonary Disease  [address] [facility]  [address]  [place] [address]  Phone: [phone]  Fax: [phone]    Neri Pretty  Primary medical doctor/ Cardiologist  Address: [address] [place] [address] [address], [place] [address]  Phone: [phone]  Phone: (   )    -  Fax: (   )    -    Mamie Cazares), Surgery  [address] [address] [place] [address]  Phone: [phone]  Fax: [phone]

## 2018-05-28 NOTE — PROGRESS NOTE ADULT - PROBLEM SELECTOR PLAN 5
-TTE 5/2018 showed LVEF 55%, diastolic dysfunction. Presented with pBNP 7400 so possible mild diastolic HF exacerbation contributing to presenting symptoms. Home dose ramipril equivalent to lisinopril 40mg daily  -Lasix 40mg po daily, lisinopril 10mg, atenolol. Spaced out in day

## 2018-05-28 NOTE — PROGRESS NOTE ADULT - PROBLEM SELECTOR PLAN 3
-C/o intermittent dysuria, urinary frequency, and incomplete voiding. Pansensitive E coli UTI  -Completed course of antibiotics Other/Cades

## 2018-05-28 NOTE — PROGRESS NOTE ADULT - PROBLEM SELECTOR PLAN 6
-Atrial fibrillation diagnosed 2014. 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 s/p ablation (4/24). Pt has loop recorder and has gone into paroxysmal AF after ablation.  -Currently in sinus rhythm, monitor on tele  -continue atenolol  -Eliquis resumed on 5/26. -Atrial fibrillation diagnosed 2014. 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

## 2018-05-28 NOTE — PROGRESS NOTE ADULT - SUBJECTIVE AND OBJECTIVE BOX
CHINA NESBITT    Patient is a 78y old  Male who presents with a chief complaint of SOB,S/P Left VATs,atrial fib S/P ablation.Doing well,less SOB,no CP.Hypoxic on RA.      Allergies    No Known Allergies    Intolerances      MEDICATIONS  (STANDING):  apixaban 5 milliGRAM(s) Oral every 12 hours  ATENolol  Tablet 50 milliGRAM(s) Oral daily  calcium carbonate 1250 mG + Vitamin D (OsCal 500 + D) 1 Tablet(s) Oral daily  ciprofloxacin     Tablet 500 milliGRAM(s) Oral every 12 hours  docusate sodium 100 milliGRAM(s) Oral three times a day  furosemide    Tablet 40 milliGRAM(s) Oral daily  lisinopril 10 milliGRAM(s) Oral daily  pantoprazole    Tablet 40 milliGRAM(s) Oral before breakfast  potassium acid phosphate/sodium acid phosphate tablet (K-PHOS No. 2) 1 Tablet(s) Oral four times a day with meals  predniSONE   Tablet 60 milliGRAM(s) Oral daily  senna 2 Tablet(s) Oral at bedtime  simvastatin 40 milliGRAM(s) Oral at bedtime  tamsulosin 0.4 milliGRAM(s) Oral at bedtime  trimethoprim  160 mG/sulfamethoxazole 800 mG 1 Tablet(s) Oral <User Schedule>    MEDICATIONS  (PRN):  acetaminophen   Tablet. 650 milliGRAM(s) Oral every 6 hours PRN Mild Pain (1 - 3)  oxyCODONE    5 mG/acetaminophen 325 mG 1 Tablet(s) Oral every 4 hours PRN Moderate Pain (4 - 6)  oxyCODONE    5 mG/acetaminophen 325 mG 2 Tablet(s) Oral every 4 hours PRN Severe Pain (7 - 10)  polyethylene glycol 3350 17 Gram(s) Oral daily PRN Constipation      ROS:  Positive:    General: Denies weight loss, fevers, rash, decreased hearing  Cardiac: Denies chest pain, SOB, GRIMALDO, orthopnea, PND, claudication, edema, snoring, daytime somnolence, palpitations, syncope  Resp: Denies SOB, GRIMALDO, cough, sputum, wheezing, hemoptysis  GI: Denies change in bowel habits, diarrhea, weight loss, melena, tarry stools,   nausea, vomiting, jaundice, abdominal pain, dysphagia  : Denies dysuria, nocturia, hematuria  Neuro: Denies tinnitus, headache, visual changes, weakness, dizziness or vertigo  Musculoskeletal: Denies neck pain back pain joint pain.  Skin: Denies rash, itching, dryness.  Endocrine: Denies polydipsia, polyuria  Psychiatric: Denies depression, anxiety      PHYSICAL EXAM:  Vital Signs Last 24 Hrs  T(C): 36.9 (28 May 2018 05:20), Max: 36.9 (28 May 2018 05:20)  T(F): 98.4 (28 May 2018 05:20), Max: 98.4 (28 May 2018 05:20)  HR: 69 (28 May 2018 05:20) (61 - 69)  BP: 128/70 (28 May 2018 05:20) (102/61 - 128/70)  BP(mean): --  RR: 18 (28 May 2018 05:20) (18 - 19)  SpO2: 96% (28 May 2018 05:20) (95% - 97%)  Daily     Daily Weight in k.6 (27 May 2018 14:18)  I&O's Summary    26 May 2018 07:01  -  27 May 2018 07:00  --------------------------------------------------------  IN: 1430 mL / OUT: 1950 mL / NET: -520 mL    27 May 2018 07:01  -  28 May 2018 06:30  --------------------------------------------------------  IN: 1240 mL / OUT: 1600 mL / NET: -360 mL        General Appearance: 	 Alert, cooperative, no distress  HEENT: normocephalic, atraumatic, PERRLA, EOMI, conjunctiva normal, sclera anicteric,   Neck: no JVD,  carotid 2+  bilaterally without bruits, thyroid normal to inspection and palpation, no adenopathy, trachea midline  Lungs:  clear to auscultation and percussion bilaterally,no wheezes today  Cor:  pmi 5th ICS MCL, regular rate and rhythm, S1 normal intensity, S2 normal intensity, no gallops, murmurs or rubs  Abdomen:	 soft, non-tender; bowel sounds normal; no masses,  no organomegaly  Extremities: without cyanosis, clubbing or edema  Vasc: 2-+ PT and DP pulses; no varicosities  Neurologic: A&O x 3 (time, place, person). Symmetric strength; limited exam  Musculoskeletal: no kyphosis, scoliosis; normal gait, normal tone  Skin: no rashes; limited exam      Labs:  CBC Full  -  ( 27 May 2018 09:00 )  WBC Count : 13.40 K/uL  Hemoglobin : 11.5 g/dL  Hematocrit : 35.0 %  Platelet Count - Automated : 384 K/uL  Mean Cell Volume : 94.1 fl  Mean Cell Hemoglobin : 30.9 pg  Mean Cell Hemoglobin Concentration : 32.9 gm/dL  Auto Neutrophil # : 10.17 K/uL  Auto Lymphocyte # : 1.66 K/uL  Auto Monocyte # : 1.49 K/uL  Auto Eosinophil # : 0.01 K/uL  Auto Basophil # : 0.01 K/uL  Auto Neutrophil % : 75.9 %  Auto Lymphocyte % : 12.4 %  Auto Monocyte % : 11.1 %  Auto Eosinophil % : 0.1 %  Auto Basophil % : 0.1 %        Impression/Plan:Patient with SOB,PAF S/P ablation,S/P left VATs.Doing well SOBimproved.Chest tube removed.Continue atenolol,lasix,Eliquis,lisinopril,prednisone, and Zocor.F/U NA decrease free H20.F/U RA O2 sat,will need home O2.OOB/PT ambulation.Pulmonary toilet and incentive spirometry.        Neri Pretty MD, FACC  [facility]

## 2018-05-28 NOTE — PROGRESS NOTE ADULT - PROBLEM SELECTOR PLAN 2
-Presented with hypoxia sat 82% on RA, still hypoxic on RA. Does not use oxygen at home, no hx of pulmonary disease except 60 pack year history and restrictive PFTs.  -continue supplemental oxygen to maintain SpO2 >92%. Will need home O2  -continue with current dose of Lasix  -f/u lung biopsy path; pending at this time.

## 2018-05-28 NOTE — DISCHARGE NOTE ADULT - PLAN OF CARE
You had a mild exacerbation of your heart failure which caused too much fluid to be accumulated in the lungs. It was treated with intravenous lasix then you were continued on your oral lasix. You were found to be retaining urine and was started on the medication Flomax. It is likely due to an enlarged prostate. Your medications for hypertension were _____ You remained in normal sinus rhythm in the hospital. Continue taking your atenolol and Eliquis as prescribed. Manage You presented with a month of trouble breathing and were found to have low oxygen in your blood. You had a heart catheterization which was unremarkable for what could cause the trouble breathing. You had a lung biopsy which the pathology is still pending. You were started on prednisone daily after the biopsy showed it is unlikely you have an infection. Your oxygen levels were low after treatment, so you were sent home with home oxygen. It is possible that your lung condition was caused by a side effect of the medication amiodarone or some type of inflammatory condition of the lung. The lung biopsy results will guide your treatment. Your medications for hypertension were kept the same except ramipril was reduced because your blood pressure was normal. You were found to have a urinary tract infection which was likely caused by urinary retention. You were treated with antibiotics.

## 2018-05-28 NOTE — DISCHARGE NOTE ADULT - PATIENT PORTAL LINK FT
You can access the [facility] Patient Portal, offered by [facility], by registering with the following website: [email or web address]

## 2018-05-28 NOTE — PROGRESS NOTE ADULT - PROBLEM SELECTOR PLAN 4
-PVR straight cath 400cc. Suspect BPH in elderly man which likely caused acute cystitis.  -continue Flomax  -TOV initiated, passed. Currently no hess catheter

## 2018-05-28 NOTE — DISCHARGE NOTE ADULT - HOSPITAL COURSE
HPI: 77yo man PMHx HTN, HLD, AFib on Eliquis s/p ablation (4/24/2018) p/w shortness of breath. Pt developed progressive dyspnea, orthopnea, and LE edema starting 1 month ago. Pt saw his cardiologist 3/12 for a checkup who noted the patient was back into atrial fibrillation and started him on amiodarone. Amiodarone was discontinued after one month as pt developed dyspnea. Pt walks with a cane at baseline, but was able to climb a flight of stairs without issue. Now he becomes SOB while at rest. Sitting up in a chair improves his SOB. Pt used to sleep flat, but over past month he now sleeps propped up or in a recliner due to difficulty breathing while flat. Dyspnea associated with LE edema so pt was started on lasix 40mg daily by his cardiologist. CT chest (5/10/2018) showed bilateral airspace consolidation most prominent in the upper lobe, bilateral nodules, mild mediastinal LAD, small-mod R pleural effusion, small L pleural effusion. These are all new findings compared to CT chest from 2/2017. TTE 5 days prior to admission showed LVEF 55%, indeterminate diastolic function, mild segmental hypokinesis, mildly dilated LA, mild aortic insufficiency, mild MR. Was scheduled for a stress test next week to work up the dyspnea. 5 days PTA blood work was remarkable for pBNP 6643, ESR 49, no leukocytosis. Pt saw a pulmonologist for the first time one week ago; PFTs showed a moderate restrictive pattern, no bronchodilator response, mild-mod diffusion impairment that corrected to normal for lung volume. When pt went in for followup today, ox sat was 82% on RA and he was sent to the ED.    Of note, pt took a road trip a couple days prior to admission that was 4 hours each way with 3-4 bathroom breaks each way. He took s trip to [place] 3/2018 where he and his wife developed sneezing and a dry cough. Pt's sneezing resolved but he continues to have daily dry cough. Denies F/C, night sweats, muscle/joint pain, nasal congestion, sore throat. Denies PND, night sweats, weight loss, hemoptysis, myalgias joint pain.    Hospital course: ED vitals: afebrile, HR 84, /70, RR 25, sat 82% on RA improved to 96% on 2L NC. CT chest (5/10/2018) report was obtained and image uploaded to PACS, showed bilateral airspace consolidation most prominent in the upper lobe, bilateral nodules, mild mediastinal LAD, small-mod R pleural effusion, small L pleural effusion. Presented with pBNP 7400, pt was diuresed with lasix 40mg IV daily then transitioned to oral lasix. He underwent LHC/RHC 5/2 which found nonobstructing CAD, mildly elevated RV pressures 47/1/10. He underwent L VATS with wedge resection (5/24, upper and lower lobes) with left chest tube that was later removed c/b small left apical pneumothorax that was stable. Biopsy results showed numerous PMNs, no organisms so pt was started on prednisone 60mg daily with Bactrim ppx. Pathology showed _____. Pt was found to have an E coli UTI and treated with CTX then transitioned to ciprofloxacin 500mg BID, EOT _____. Course complicated by urinary retention so flomax was started. Home oxygen _____ HPI: 77yo man PMHx HTN, HLD, AFib on Eliquis s/p ablation (4/24/2018) p/w shortness of breath. Pt developed progressive dyspnea, orthopnea, and LE edema starting 1 month ago. Pt saw his cardiologist 3/12 for a checkup who noted the patient was back into atrial fibrillation and started him on amiodarone. Amiodarone was discontinued after one month as pt developed dyspnea. Pt walks with a cane at baseline, but was able to climb a flight of stairs without issue. Now he becomes SOB while at rest. Sitting up in a chair improves his SOB. Pt used to sleep flat, but over past month he now sleeps propped up or in a recliner due to difficulty breathing while flat. Dyspnea associated with LE edema so pt was started on lasix 40mg daily by his cardiologist. CT chest (5/10/2018) showed bilateral airspace consolidation most prominent in the upper lobe, bilateral nodules, mild mediastinal LAD, small-mod R pleural effusion, small L pleural effusion. These are all new findings compared to CT chest from 2/2017. TTE 5 days prior to admission showed LVEF 55%, indeterminate diastolic function, mild segmental hypokinesis, mildly dilated LA, mild aortic insufficiency, mild MR. Was scheduled for a stress test next week to work up the dyspnea. 5 days PTA blood work was remarkable for pBNP 6643, ESR 49, no leukocytosis. Pt saw a pulmonologist for the first time one week ago; PFTs showed a moderate restrictive pattern, no bronchodilator response, mild-mod diffusion impairment that corrected to normal for lung volume. When pt went in for followup today, ox sat was 82% on RA and he was sent to the ED.    Of note, pt took a road trip a couple days prior to admission that was 4 hours each way with 3-4 bathroom breaks each way. He took s trip to [place] 3/2018 where he and his wife developed sneezing and a dry cough. Pt's sneezing resolved but he continues to have daily dry cough. Denies F/C, night sweats, muscle/joint pain, nasal congestion, sore throat. Denies PND, night sweats, weight loss, hemoptysis, myalgias joint pain.    Hospital course: ED vitals: afebrile, HR 84, /70, RR 25, sat 82% on RA improved to 96% on 2L NC. CT chest (5/10/2018) report was obtained and image uploaded to PACS, showed bilateral airspace consolidation most prominent in the upper lobe, bilateral nodules, mild mediastinal LAD, small-mod R pleural effusion, small L pleural effusion. Presented with pBNP 7400, pt was diuresed with lasix 40mg IV daily then transitioned to oral lasix. He underwent LHC/RHC 5/2 which found nonobstructing CAD, mildly elevated RV pressures 47/1/10. He underwent L VATS with wedge resection (5/24, upper and lower lobes) with left chest tube that was later removed c/b small left apical pneumothorax that was stable. Biopsy results showed numerous PMNs, no organisms so pt was started on prednisone 60mg daily with Bactrim ppx. Pathology showed numerous PMNs and no organisms on stain, culture was no growth to date. Lung biopsy pathology is still pending and pt was instructed to followup with his pulmonologist, Dr. Saul, for the results. Pt was found to have an E coli UTI and treated with CTX then transitioned to ciprofloxacin 500mg BID, course of antibiotics completed int he hospital. Course complicated by urinary retention so flomax was started. Pt had SpO2 78% on RA at rest, so he was discharged with home oxygen.

## 2018-05-28 NOTE — DISCHARGE NOTE ADULT - MEDICATION SUMMARY - MEDICATIONS TO CHANGE
I will SWITCH the dose or number of times a day I take the medications listed below when I get home from the hospital:    ramipril 10 mg oral tablet  -- 1 tab(s) by mouth once a day

## 2018-05-28 NOTE — DISCHARGE NOTE ADULT - CARE PROVIDERS DIRECT ADDRESSES
,DirectAddress_Unknown,[email or web address],DirectAddress_Unknown ,[email or web address],DirectAddress_Unknown,DirectAddress_Unknown

## 2018-05-28 NOTE — DISCHARGE NOTE ADULT - CARE PLAN
Principal Discharge DX:	Dyspnea  Secondary Diagnosis:	Atrial fibrillation, unspecified type  Assessment and plan of treatment:	You remained in normal sinus rhythm in the hospital. Continue taking your atenolol and Eliquis as prescribed.  Secondary Diagnosis:	Acute on chronic diastolic congestive heart failure  Assessment and plan of treatment:	You had a mild exacerbation of your heart failure which caused too much fluid to be accumulated in the lungs. It was treated with intravenous lasix then you were continued on your oral lasix.  Secondary Diagnosis:	Urinary retention  Assessment and plan of treatment:	You were found to be retaining urine and was started on the medication Flomax. It is likely due to an enlarged prostate.  Secondary Diagnosis:	Essential hypertension  Assessment and plan of treatment:	Your medications for hypertension were _____  Secondary Diagnosis:	Cystitis Principal Discharge DX:	Dyspnea  Goal:	Manage  Assessment and plan of treatment:	You presented with a month of trouble breathing and were found to have low oxygen in your blood. You had a heart catheterization which was unremarkable for what could cause the trouble breathing. You had a lung biopsy which the pathology is still pending. You were started on prednisone daily after the biopsy showed it is unlikely you have an infection. Your oxygen levels were low after treatment, so you were sent home with home oxygen. It is possible that your lung condition was caused by a side effect of the medication amiodarone or some type of inflammatory condition of the lung. The lung biopsy results will guide your treatment.  Secondary Diagnosis:	Atrial fibrillation, unspecified type  Assessment and plan of treatment:	You remained in normal sinus rhythm in the hospital. Continue taking your atenolol and Eliquis as prescribed.  Secondary Diagnosis:	Acute on chronic diastolic congestive heart failure  Assessment and plan of treatment:	You had a mild exacerbation of your heart failure which caused too much fluid to be accumulated in the lungs. It was treated with intravenous lasix then you were continued on your oral lasix.  Secondary Diagnosis:	Urinary retention  Assessment and plan of treatment:	You were found to be retaining urine and was started on the medication Flomax. It is likely due to an enlarged prostate.  Secondary Diagnosis:	Essential hypertension  Assessment and plan of treatment:	Your medications for hypertension were kept the same except ramipril was reduced because your blood pressure was normal.  Secondary Diagnosis:	Cystitis  Assessment and plan of treatment:	You were found to have a urinary tract infection which was likely caused by urinary retention. You were treated with antibiotics.

## 2018-05-28 NOTE — DISCHARGE NOTE ADULT - PROVIDER TOKENS
TOKEN:'[id number]',TOKEN:'[id number]' TOKEN:'[id number]',FREE:[LAST:[Sukhwinder],FIRST:[Neri],PHONE:[(   )    -],FAX:[(   )    -],ADDRESS:[Primary medical doctor/ Cardiologist  Address: [address] [place] [address]  Phone: [phone]]] TOKEN:'[id number]',FREE:[LAST:[Sukhwinder],FIRST:[Neri],PHONE:[(   )    -],FAX:[(   )    -],ADDRESS:[Primary medical doctor/ Cardiologist  Address: [address] [place] [address]  Phone: [phone]]],TOKEN:'[id number]'

## 2018-05-28 NOTE — PROGRESS NOTE ADULT - ATTENDING COMMENTS
Passed TOV, urinating without difficulty . Anxious to go home.   Mild hyponatremia - recheck this PM after Salt tab  leukocytosis - likely related to steroid monitor  HD stable  d/c planning after the home oxygen is arranged

## 2018-05-28 NOTE — PROGRESS NOTE ADULT - PROBLEM SELECTOR PLAN 7
-Normotensive. Kidney function at baseline 1.0 - 1.5 (5/2018). Home equivalent ramipril is lisinopril 40mg daily  -atenolol, lasix 40 po, lisinopril 10mg

## 2018-05-28 NOTE — DISCHARGE NOTE ADULT - ADDITIONAL INSTRUCTIONS
Followup with your primary care and cardiologist doctor within 2 weeks of discharge about your E coli UTI, likely enlarged prostate, diastolic chronic heart failure, atrial fibrillation.  Followup with your pulmonologist within 1 week of discharge to followup your _____ Followup with your primary care and cardiologist doctor within 2 weeks of discharge about your E coli UTI, likely enlarged prostate, diastolic chronic heart failure, atrial fibrillation.  Followup with your pulmonologist within 1 week of discharge for further management and treatment of your shortness of breath, low oxygen and your lung biopsy results.

## 2018-05-28 NOTE — PROGRESS NOTE ADULT - SUBJECTIVE AND OBJECTIVE BOX
Patient is a 78y old  Male who presents with a chief complaint of SOB (21 May 2018 13:43)      INTERVAL HPI/OVERNIGHT EVENTS: No acute events overnight. Patient reports that he is ready to go home. He is still requiring supplemental O2. He denies CP, SOB.    T(C): 36.9 (05-28-18 @ 05:20), Max: 36.9 (05-28-18 @ 05:20)  HR: 69 (05-28-18 @ 05:20) (61 - 69)  BP: 128/70 (05-28-18 @ 05:20) (102/61 - 128/70)  RR: 18 (05-28-18 @ 05:20) (18 - 19)  SpO2: 96% (05-28-18 @ 05:20) (95% - 97%)  Wt(kg): --  I&O's Summary    27 May 2018 07:01  -  28 May 2018 07:00  --------------------------------------------------------  IN: 1240 mL / OUT: 1600 mL / NET: -360 mL        LABS:                        11.5   13.40 )-----------( 384      ( 27 May 2018 09:00 )             35.0     05-28    130<L>  |  94<L>  |  21  ----------------------------<  98  4.1   |  26  |  0.84    Ca    9.0      28 May 2018 05:51  Phos  2.7     05-28  Mg     2.0     05-28          CAPILLARY BLOOD GLUCOSE              REVIEW OF SYSTEMS:  CONSTITUTIONAL: No fever, weight loss, or fatigue  EYES: No eye pain, visual disturbances, or discharge  ENMT:  No difficulty hearing, tinnitus, vertigo; No sinus or throat pain  NECK: No pain or stiffness  BREASTS: No pain, masses, or nipple discharge  RESPIRATORY: No cough, wheezing, chills or hemoptysis; No shortness of breath  CARDIOVASCULAR: No chest pain, palpitations, dizziness, or leg swelling  GASTROINTESTINAL: No abdominal or epigastric pain. No nausea, vomiting, or hematemesis; No diarrhea or constipation. No melena or hematochezia.  GENITOURINARY: No dysuria, frequency, hematuria, or incontinence  NEUROLOGICAL: No headaches, memory loss, loss of strength, numbness, or tremors  SKIN: No itching, burning, rashes, or lesions   LYMPH NODES: No enlarged glands  ENDOCRINE: No heat or cold intolerance; No hair loss  MUSCULOSKELETAL: No joint pain or swelling; No muscle, back, or extremity pain  PSYCHIATRIC: No depression, anxiety, mood swings, or difficulty sleeping  HEME/LYMPH: No easy bruising, or bleeding gums  ALLERY AND IMMUNOLOGIC: No hives or eczema    RADIOLOGY & ADDITIONAL TESTS:    Imaging Personally Reviewed:  [ ] YES  [x ] NO    Consultant(s) Notes Reviewed:  [x ] YES  [ ] NO, Cardiology, Pulmonary    PHYSICAL EXAM:  GENERAL: NAD, well-groomed, well-developed  HEAD:  Atraumatic, Normocephalic  EYES: EOMI, PERRLA, conjunctiva and sclera clear  ENMT: No tonsillar erythema, exudates, or enlargement  NECK: Supple, No JVD, Normal thyroid  NERVOUS SYSTEM:  Alert & Oriented X3, Good concentration; Motor Strength 5/5 B/L upper and lower extremities; DTRs 2+ intact and symmetric  CHEST/LUNG: Scattered wheezes.  HEART: Regular rate and rhythm; No murmurs, rubs, or gallops  ABDOMEN: Soft, Nontender, Nondistended; Bowel sounds present  EXTREMITIES:  2+ Peripheral Pulses, No clubbing, cyanosis, or edema  LYMPH: No lymphadenopathy noted  SKIN: No rashes or lesions    Care Discussed with Consultants/Other Providers [ ] YES  [x] NO Patient is a 78y old  Male who presents with a chief complaint of SOB (21 May 2018 13:43)      INTERVAL HPI/OVERNIGHT EVENTS: No acute events overnight. Patient reports that he is ready to go home. He is still requiring supplemental O2. He denies CP, SOB.    T(C): 36.9 (05-28-18 @ 05:20), Max: 36.9 (05-28-18 @ 05:20)  HR: 69 (05-28-18 @ 05:20) (61 - 69)  BP: 128/70 (05-28-18 @ 05:20) (102/61 - 128/70)  RR: 18 (05-28-18 @ 05:20) (18 - 19)  SpO2: 96% (05-28-18 @ 05:20) (95% - 97%)  Wt(kg): --  I&O's Summary    27 May 2018 07:01  -  28 May 2018 07:00  --------------------------------------------------------  IN: 1240 mL / OUT: 1600 mL / NET: -360 mL        LABS:                        11.5   13.40 )-----------( 384      ( 27 May 2018 09:00 )             35.0     05-28    130<L>  |  94<L>  |  21  ----------------------------<  98  4.1   |  26  |  0.84    Ca    9.0      28 May 2018 05:51  Phos  2.7     05-28  Mg     2.0     05-28          CAPILLARY BLOOD GLUCOSE              REVIEW OF SYSTEMS:  CONSTITUTIONAL: No fever, chills   RESPIRATORY: No cough, wheezing, No shortness of breath  CARDIOVASCULAR: No chest pain, palpitations, dizziness, or leg swelling      RADIOLOGY & ADDITIONAL TESTS:    Imaging Personally Reviewed:  [ ] YES  [x ] NO    Consultant(s) Notes Reviewed:  [x ] YES  [ ] NO, Cardiology, Pulmonary    PHYSICAL EXAM:  GENERAL: NAD, well-groomed, well-developed  HEAD:  Atraumatic, Normocephalic  EYES: EOMI, PERRLA, conjunctiva and sclera clear  ENMT: No tonsillar erythema, exudates, or enlargement  NECK: Supple, No JVD, Normal thyroid  NERVOUS SYSTEM:  Alert & Oriented X3, Good concentration; Motor Strength 5/5 B/L upper and lower extremities; DTRs 2+ intact and symmetric  CHEST/LUNG: Scattered occasional wheezes.  HEART: Regular rate and rhythm; No murmurs, rubs, or gallops  ABDOMEN: Soft, Nontender, Nondistended; Bowel sounds present  EXTREMITIES:  2+ Peripheral Pulses, No clubbing, cyanosis, or edema        Care Discussed with Consultants/Other Providers [ ] YES  [x] NO

## 2018-05-29 VITALS
RESPIRATION RATE: 18 BRPM | DIASTOLIC BLOOD PRESSURE: 54 MMHG | HEART RATE: 63 BPM | OXYGEN SATURATION: 96 % | SYSTOLIC BLOOD PRESSURE: 94 MMHG | TEMPERATURE: 98 F

## 2018-05-29 DIAGNOSIS — J95.811 POSTPROCEDURAL PNEUMOTHORAX: ICD-10-CM

## 2018-05-29 LAB
ANION GAP SERPL CALC-SCNC: 10 MMOL/L — SIGNIFICANT CHANGE UP (ref 5–17)
BUN SERPL-MCNC: 26 MG/DL — HIGH (ref 7–23)
CALCIUM SERPL-MCNC: 8.8 MG/DL — SIGNIFICANT CHANGE UP (ref 8.4–10.5)
CHLORIDE SERPL-SCNC: 96 MMOL/L — SIGNIFICANT CHANGE UP (ref 96–108)
CO2 SERPL-SCNC: 27 MMOL/L — SIGNIFICANT CHANGE UP (ref 22–31)
CREAT SERPL-MCNC: 0.88 MG/DL — SIGNIFICANT CHANGE UP (ref 0.5–1.3)
CULTURE RESULTS: SIGNIFICANT CHANGE UP
CULTURE RESULTS: SIGNIFICANT CHANGE UP
GLUCOSE SERPL-MCNC: 93 MG/DL — SIGNIFICANT CHANGE UP (ref 70–99)
HCT VFR BLD CALC: 39 % — SIGNIFICANT CHANGE UP (ref 39–50)
HGB BLD-MCNC: 12.6 G/DL — LOW (ref 13–17)
MCHC RBC-ENTMCNC: 30.7 PG — SIGNIFICANT CHANGE UP (ref 27–34)
MCHC RBC-ENTMCNC: 32.3 GM/DL — SIGNIFICANT CHANGE UP (ref 32–36)
MCV RBC AUTO: 95.1 FL — SIGNIFICANT CHANGE UP (ref 80–100)
PLATELET # BLD AUTO: 403 K/UL — HIGH (ref 150–400)
POTASSIUM SERPL-MCNC: 4.5 MMOL/L — SIGNIFICANT CHANGE UP (ref 3.5–5.3)
POTASSIUM SERPL-SCNC: 4.5 MMOL/L — SIGNIFICANT CHANGE UP (ref 3.5–5.3)
RBC # BLD: 4.1 M/UL — LOW (ref 4.2–5.8)
RBC # FLD: 13.8 % — SIGNIFICANT CHANGE UP (ref 10.3–14.5)
SODIUM SERPL-SCNC: 133 MMOL/L — LOW (ref 135–145)
SPECIMEN SOURCE: SIGNIFICANT CHANGE UP
SPECIMEN SOURCE: SIGNIFICANT CHANGE UP
WBC # BLD: 13.26 K/UL — HIGH (ref 3.8–10.5)
WBC # FLD AUTO: 13.26 K/UL — HIGH (ref 3.8–10.5)

## 2018-05-29 PROCEDURE — 76937 US GUIDE VASCULAR ACCESS: CPT

## 2018-05-29 PROCEDURE — 82947 ASSAY GLUCOSE BLOOD QUANT: CPT

## 2018-05-29 PROCEDURE — C1889: CPT

## 2018-05-29 PROCEDURE — 83605 ASSAY OF LACTIC ACID: CPT

## 2018-05-29 PROCEDURE — 88112 CYTOPATH CELL ENHANCE TECH: CPT

## 2018-05-29 PROCEDURE — 88312 SPECIAL STAINS GROUP 1: CPT

## 2018-05-29 PROCEDURE — 86480 TB TEST CELL IMMUN MEASURE: CPT

## 2018-05-29 PROCEDURE — 86038 ANTINUCLEAR ANTIBODIES: CPT

## 2018-05-29 PROCEDURE — 87186 SC STD MICRODIL/AGAR DIL: CPT

## 2018-05-29 PROCEDURE — 97162 PT EVAL MOD COMPLEX 30 MIN: CPT

## 2018-05-29 PROCEDURE — 83735 ASSAY OF MAGNESIUM: CPT

## 2018-05-29 PROCEDURE — 99232 SBSQ HOSP IP/OBS MODERATE 35: CPT

## 2018-05-29 PROCEDURE — 86901 BLOOD TYPING SEROLOGIC RH(D): CPT

## 2018-05-29 PROCEDURE — 71046 X-RAY EXAM CHEST 2 VIEWS: CPT

## 2018-05-29 PROCEDURE — 81001 URINALYSIS AUTO W/SCOPE: CPT

## 2018-05-29 PROCEDURE — 99285 EMERGENCY DEPT VISIT HI MDM: CPT

## 2018-05-29 PROCEDURE — 99153 MOD SED SAME PHYS/QHP EA: CPT

## 2018-05-29 PROCEDURE — 71045 X-RAY EXAM CHEST 1 VIEW: CPT

## 2018-05-29 PROCEDURE — 93005 ELECTROCARDIOGRAM TRACING: CPT

## 2018-05-29 PROCEDURE — 85730 THROMBOPLASTIN TIME PARTIAL: CPT

## 2018-05-29 PROCEDURE — 85652 RBC SED RATE AUTOMATED: CPT

## 2018-05-29 PROCEDURE — 82435 ASSAY OF BLOOD CHLORIDE: CPT

## 2018-05-29 PROCEDURE — 82553 CREATINE MB FRACTION: CPT

## 2018-05-29 PROCEDURE — 99239 HOSP IP/OBS DSCHRG MGMT >30: CPT

## 2018-05-29 PROCEDURE — 86900 BLOOD TYPING SEROLOGIC ABO: CPT

## 2018-05-29 PROCEDURE — 93456 R HRT CORONARY ARTERY ANGIO: CPT

## 2018-05-29 PROCEDURE — 71046 X-RAY EXAM CHEST 2 VIEWS: CPT | Mod: 26

## 2018-05-29 PROCEDURE — 87070 CULTURE OTHR SPECIMN AEROBIC: CPT

## 2018-05-29 PROCEDURE — 85027 COMPLETE CBC AUTOMATED: CPT

## 2018-05-29 PROCEDURE — 86140 C-REACTIVE PROTEIN: CPT

## 2018-05-29 PROCEDURE — 80053 COMPREHEN METABOLIC PANEL: CPT

## 2018-05-29 PROCEDURE — 84132 ASSAY OF SERUM POTASSIUM: CPT

## 2018-05-29 PROCEDURE — 84100 ASSAY OF PHOSPHORUS: CPT

## 2018-05-29 PROCEDURE — 82803 BLOOD GASES ANY COMBINATION: CPT

## 2018-05-29 PROCEDURE — 86850 RBC ANTIBODY SCREEN: CPT

## 2018-05-29 PROCEDURE — 88305 TISSUE EXAM BY PATHOLOGIST: CPT

## 2018-05-29 PROCEDURE — 84484 ASSAY OF TROPONIN QUANT: CPT

## 2018-05-29 PROCEDURE — C1887: CPT

## 2018-05-29 PROCEDURE — 87389 HIV-1 AG W/HIV-1&-2 AB AG IA: CPT

## 2018-05-29 PROCEDURE — 83880 ASSAY OF NATRIURETIC PEPTIDE: CPT

## 2018-05-29 PROCEDURE — 99152 MOD SED SAME PHYS/QHP 5/>YRS: CPT

## 2018-05-29 PROCEDURE — 85014 HEMATOCRIT: CPT

## 2018-05-29 PROCEDURE — 85610 PROTHROMBIN TIME: CPT

## 2018-05-29 PROCEDURE — 82164 ANGIOTENSIN I ENZYME TEST: CPT

## 2018-05-29 PROCEDURE — 82565 ASSAY OF CREATININE: CPT

## 2018-05-29 PROCEDURE — 82550 ASSAY OF CK (CPK): CPT

## 2018-05-29 PROCEDURE — 80076 HEPATIC FUNCTION PANEL: CPT

## 2018-05-29 PROCEDURE — C1894: CPT

## 2018-05-29 PROCEDURE — 84295 ASSAY OF SERUM SODIUM: CPT

## 2018-05-29 PROCEDURE — 87086 URINE CULTURE/COLONY COUNT: CPT

## 2018-05-29 PROCEDURE — 80048 BASIC METABOLIC PNL TOTAL CA: CPT

## 2018-05-29 PROCEDURE — 82330 ASSAY OF CALCIUM: CPT

## 2018-05-29 PROCEDURE — 88307 TISSUE EXAM BY PATHOLOGIST: CPT

## 2018-05-29 PROCEDURE — C1769: CPT

## 2018-05-29 PROCEDURE — 84443 ASSAY THYROID STIM HORMONE: CPT

## 2018-05-29 RX ORDER — RAMIPRIL 5 MG
1 CAPSULE ORAL
Qty: 30 | Refills: 0
Start: 2018-05-29 | End: 2018-06-27

## 2018-05-29 RX ADMIN — Medication 500 MILLIGRAM(S): at 05:34

## 2018-05-29 RX ADMIN — Medication 1 TABLET(S): at 18:34

## 2018-05-29 RX ADMIN — Medication 60 MILLIGRAM(S): at 05:34

## 2018-05-29 RX ADMIN — Medication 100 MILLIGRAM(S): at 05:34

## 2018-05-29 RX ADMIN — APIXABAN 5 MILLIGRAM(S): 2.5 TABLET, FILM COATED ORAL at 05:34

## 2018-05-29 RX ADMIN — Medication 500 MILLIGRAM(S): at 18:34

## 2018-05-29 RX ADMIN — ATENOLOL 50 MILLIGRAM(S): 25 TABLET ORAL at 05:34

## 2018-05-29 RX ADMIN — Medication 40 MILLIGRAM(S): at 05:34

## 2018-05-29 RX ADMIN — Medication 1 TABLET(S): at 08:27

## 2018-05-29 RX ADMIN — Medication 1 TABLET(S): at 11:54

## 2018-05-29 RX ADMIN — APIXABAN 5 MILLIGRAM(S): 2.5 TABLET, FILM COATED ORAL at 18:34

## 2018-05-29 RX ADMIN — PANTOPRAZOLE SODIUM 40 MILLIGRAM(S): 20 TABLET, DELAYED RELEASE ORAL at 05:34

## 2018-05-29 RX ADMIN — LISINOPRIL 10 MILLIGRAM(S): 2.5 TABLET ORAL at 05:34

## 2018-05-29 NOTE — PROGRESS NOTE ADULT - ATTENDING COMMENTS
as above-  s/p both LHC and RHC to no evidence of CHF here; now s/p VATS tissue bx for definitive DX prior to initiating immunosuppressive rx.  Await pathology --now on steroids at 60mg per day along w/bactrim ds 1 tab M/W/F (no allergy noted)-will need this dose for 1 month then will look to taper  CTS follow up   PTX--CXR today pre DC  Will need O2 at 2 liters NC--sats above 90%                                    office f/up over next 2 wks        Lele Saul MD-Pulmonary   [phone]

## 2018-05-29 NOTE — PROGRESS NOTE ADULT - ASSESSMENT
Impression:  Pneumonitis of unclear etiology, PTX s/p VATS clinically improved CT out    Pt anxious to go home on home O2, arrangements underway  Steroid taper and bactrim as per Dr. Saul  Await VATS results  Possible amio lung toxicity  Followup with Dr. Carlos in one week and Dr. Saul

## 2018-05-29 NOTE — PROGRESS NOTE ADULT - PROBLEM SELECTOR PLAN 8
DVT: Eliquis  Dispo: PT eval ongoing, walks with cane, will likely need home O2 DVT: Eliquis  Dispo: Home with outpatient PT, walks with cane, home O2

## 2018-05-29 NOTE — PROGRESS NOTE ADULT - PROBLEM SELECTOR PLAN 1
unclear absolute etiology here--could pneumonitis possibly amio toxicity--less likely PNA (non immunocompromised host)

## 2018-05-29 NOTE — PROGRESS NOTE ADULT - PROBLEM SELECTOR PLAN 1
-Unexplained dyspnea and hypoxemia for >1 month with b/l pulmonary infiltrates and mediastinal LAD s/p wedge resection and placement of left sided chest tube s/p removal   -Improved oxygenation, still requiring supplemental O2  -Denies shortness of breath and respiratory complaints today  -Appreciate CTS recommendations  -Follow up CXR for small pneumothorax -Unexplained dyspnea and hypoxemia for >1 month with b/l pulmonary infiltrates and mediastinal LAD s/p wedge resection and placement of left sided chest tube s/p removal   -Improved oxygenation, still requiring supplemental O2  -Denies shortness of breath and respiratory complaints today  -Appreciate CTS recommendations  -Continue prednisone 60mg daily, Bactrim ppx  -Follow up CXR for small pneumothorax

## 2018-05-29 NOTE — PROGRESS NOTE ADULT - PROBLEM SELECTOR PLAN 3
-C/o intermittent dysuria, urinary frequency, and incomplete voiding. Pansensitive E coli UTI  -Completed course of antibiotics

## 2018-05-29 NOTE — PROGRESS NOTE ADULT - ASSESSMENT
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  5/25-prednisone initiated

## 2018-05-29 NOTE — CHART NOTE - NSCHARTNOTEFT_GEN_A_CORE
Hospitalist Brief Note    This patient has an oxygen saturation of 78% on room air.    Carito Greene MD  Hospitalist   office: [phone] Hospitalist Brief Note    This patient has an oxygen saturation of 78% on room air at rest.    Carito Greene MD  Hospitalist   office: [phone]

## 2018-05-29 NOTE — PROGRESS NOTE ADULT - SUBJECTIVE AND OBJECTIVE BOX
Internal Medicine Progress Note    Linnea Butt, PGY1  Internal Medicine, team 1  Pager [phone] / [phone]  After 7PM on weekdays and 12PM on weekends, please page #[phone]    Patient is a 78y old  Male who presents with a chief complaint of SOB (28 May 2018 11:07)      SUBJECTIVE / OVERNIGHT EVENTS: Serum Na improved to 135 overnight. Pt has no complaints. Had a nl BM yesterday. Denies CP, SOB, dysuria, abd pain. Tele SR 70s-90s with wandering atria, PVCs.    MEDICATIONS  (STANDING):  apixaban 5 milliGRAM(s) Oral every 12 hours  ATENolol  Tablet 50 milliGRAM(s) Oral daily  calcium carbonate 1250 mG + Vitamin D (OsCal 500 + D) 1 Tablet(s) Oral daily  ciprofloxacin     Tablet 500 milliGRAM(s) Oral every 12 hours  docusate sodium 100 milliGRAM(s) Oral three times a day  furosemide    Tablet 40 milliGRAM(s) Oral daily  lisinopril 10 milliGRAM(s) Oral daily  pantoprazole    Tablet 40 milliGRAM(s) Oral before breakfast  potassium acid phosphate/sodium acid phosphate tablet (K-PHOS No. 2) 1 Tablet(s) Oral four times a day with meals  predniSONE   Tablet 60 milliGRAM(s) Oral daily  senna 2 Tablet(s) Oral at bedtime  simvastatin 40 milliGRAM(s) Oral at bedtime  tamsulosin 0.4 milliGRAM(s) Oral at bedtime  trimethoprim  160 mG/sulfamethoxazole 800 mG 1 Tablet(s) Oral <User Schedule>    MEDICATIONS  (PRN):  acetaminophen   Tablet. 650 milliGRAM(s) Oral every 6 hours PRN Mild Pain (1 - 3)  oxyCODONE    5 mG/acetaminophen 325 mG 1 Tablet(s) Oral every 4 hours PRN Moderate Pain (4 - 6)  oxyCODONE    5 mG/acetaminophen 325 mG 2 Tablet(s) Oral every 4 hours PRN Severe Pain (7 - 10)  polyethylene glycol 3350 17 Gram(s) Oral daily PRN Constipation    Vital Signs Last 24 Hrs  T(C): 36.4 (28 May 2018 20:51), Max: 36.4 (28 May 2018 13:02)  T(F): 97.6 (28 May 2018 20:51), Max: 97.6 (28 May 2018 20:51)  HR: 60 (28 May 2018 20:51) (60 - 61)  BP: 124/69 (28 May 2018 20:51) (103/63 - 124/69)  BP(mean): --  RR: 20 (28 May 2018 20:51) (19 - 20)  SpO2: 96% (28 May 2018 20:51) (95% - 96%)    CAPILLARY BLOOD GLUCOSE        I&O's Summary    28 May 2018 07:01  -  29 May 2018 07:00  --------------------------------------------------------  IN: 1440 mL / OUT: 1150 mL / NET: 290 mL        PHYSICAL EXAM  GENERAL: NAD, well-developed  HEAD:  Atraumatic  EYES: EOMI, PERRLA, conjunctiva and sclera clear  NECK: Supple, No JVD  CHEST/LUNG: Clear to auscultation bilaterally; No wheeze  HEART: Regular rate and rhythm; No murmurs, rubs, or gallops  ABDOMEN: Soft, Nontender, Nondistended; Bowel sounds present  EXTREMITIES:  2+ Peripheral Pulses, No clubbing, cyanosis, or edema  NEURO/PSYCH: AAOx3, nonfocal  SKIN: No rashes or lesions      LABS:                        13.4   12.58 )-----------( 388      ( 28 May 2018 08:44 )             41.2     CBC Full  -  ( 28 May 2018 08:44 )  WBC Count : 12.58 K/uL  Hemoglobin : 13.4 g/dL  Hematocrit : 41.2 %  Platelet Count - Automated : 388 K/uL  Mean Cell Volume : 97.2 fl  Mean Cell Hemoglobin : 31.6 pg  Mean Cell Hemoglobin Concentration : 32.5 gm/dL  Auto Neutrophil # : 9.69 K/uL  Auto Lymphocyte # : 2.01 K/uL  Auto Monocyte # : 0.75 K/uL  Auto Eosinophil # : 0.13 K/uL  Auto Basophil # : 0.00 K/uL  Auto Neutrophil % : 77.0 %  Auto Lymphocyte % : 16.0 %  Auto Monocyte % : 6.0 %  Auto Eosinophil % : 1.0 %  Auto Basophil % : 0.0 %    05-29    133<L>  |  96  |  26<H>  ----------------------------<  93  4.5   |  27  |  0.88    Ca    8.8      29 May 2018 06:49  Phos  2.7     05-28  Mg     2.0     05-28      Creatinine Trend: 0.88<--, 0.99<--, 0.84<--, 0.97<--, 0.89<--, 0.95<--                MICROBIOLOGY:      RADIOLOGY & ADDITIONAL TESTS:     CONSULTS: Internal Medicine Progress Note    Linnea Butt, PGY1  Internal Medicine, team 1  Pager [phone] / [phone]  After 7PM on weekdays and 12PM on weekends, please page #[phone]    Patient is a 78y old  Male who presents with a chief complaint of SOB (28 May 2018 11:07)      SUBJECTIVE / OVERNIGHT EVENTS: Serum Na improved to 135 overnight. Pt has no complaints. Had a nl BM yesterday. Denies CP, SOB, dysuria, abd pain. Tele SR 70s-90s with wandering atria, PVCs. On 3L NC    MEDICATIONS  (STANDING):  apixaban 5 milliGRAM(s) Oral every 12 hours  ATENolol  Tablet 50 milliGRAM(s) Oral daily  calcium carbonate 1250 mG + Vitamin D (OsCal 500 + D) 1 Tablet(s) Oral daily  ciprofloxacin     Tablet 500 milliGRAM(s) Oral every 12 hours  docusate sodium 100 milliGRAM(s) Oral three times a day  furosemide    Tablet 40 milliGRAM(s) Oral daily  lisinopril 10 milliGRAM(s) Oral daily  pantoprazole    Tablet 40 milliGRAM(s) Oral before breakfast  potassium acid phosphate/sodium acid phosphate tablet (K-PHOS No. 2) 1 Tablet(s) Oral four times a day with meals  predniSONE   Tablet 60 milliGRAM(s) Oral daily  senna 2 Tablet(s) Oral at bedtime  simvastatin 40 milliGRAM(s) Oral at bedtime  tamsulosin 0.4 milliGRAM(s) Oral at bedtime  trimethoprim  160 mG/sulfamethoxazole 800 mG 1 Tablet(s) Oral <User Schedule>    MEDICATIONS  (PRN):  acetaminophen   Tablet. 650 milliGRAM(s) Oral every 6 hours PRN Mild Pain (1 - 3)  oxyCODONE    5 mG/acetaminophen 325 mG 1 Tablet(s) Oral every 4 hours PRN Moderate Pain (4 - 6)  oxyCODONE    5 mG/acetaminophen 325 mG 2 Tablet(s) Oral every 4 hours PRN Severe Pain (7 - 10)  polyethylene glycol 3350 17 Gram(s) Oral daily PRN Constipation    Vital Signs Last 24 Hrs  T(C): 36.4 (28 May 2018 20:51), Max: 36.4 (28 May 2018 13:02)  T(F): 97.6 (28 May 2018 20:51), Max: 97.6 (28 May 2018 20:51)  HR: 60 (28 May 2018 20:51) (60 - 61)  BP: 124/69 (28 May 2018 20:51) (103/63 - 124/69)  BP(mean): --  RR: 20 (28 May 2018 20:51) (19 - 20)  SpO2: 96% (28 May 2018 20:51) (95% - 96%)    CAPILLARY BLOOD GLUCOSE        I&O's Summary    28 May 2018 07:01  -  29 May 2018 07:00  --------------------------------------------------------  IN: 1440 mL / OUT: 1150 mL / NET: 290 mL        PHYSICAL EXAM  GENERAL: NAD, well-developed  HEAD:  Atraumatic  EYES: EOMI, PERRLA, conjunctiva and sclera clear  NECK: Supple, No JVD  CHEST/LUNG: Clear to auscultation bilaterally; No wheeze  HEART: Regular rate and rhythm; No murmurs, rubs, or gallops  ABDOMEN: Soft, Nontender, Nondistended; Bowel sounds present  EXTREMITIES:  2+ Peripheral Pulses, No clubbing, cyanosis, or edema  NEURO/PSYCH: AAOx3, nonfocal  SKIN: No rashes or lesions      LABS:                        13.4   12.58 )-----------( 388      ( 28 May 2018 08:44 )             41.2     CBC Full  -  ( 28 May 2018 08:44 )  WBC Count : 12.58 K/uL  Hemoglobin : 13.4 g/dL  Hematocrit : 41.2 %  Platelet Count - Automated : 388 K/uL  Mean Cell Volume : 97.2 fl  Mean Cell Hemoglobin : 31.6 pg  Mean Cell Hemoglobin Concentration : 32.5 gm/dL  Auto Neutrophil # : 9.69 K/uL  Auto Lymphocyte # : 2.01 K/uL  Auto Monocyte # : 0.75 K/uL  Auto Eosinophil # : 0.13 K/uL  Auto Basophil # : 0.00 K/uL  Auto Neutrophil % : 77.0 %  Auto Lymphocyte % : 16.0 %  Auto Monocyte % : 6.0 %  Auto Eosinophil % : 1.0 %  Auto Basophil % : 0.0 %    05-29    133<L>  |  96  |  26<H>  ----------------------------<  93  4.5   |  27  |  0.88    Ca    8.8      29 May 2018 06:49  Phos  2.7     05-28  Mg     2.0     05-28      Creatinine Trend: 0.88<--, 0.99<--, 0.84<--, 0.97<--, 0.89<--, 0.95<--        MICROBIOLOGY:    RADIOLOGY & ADDITIONAL TESTS:  Lung biopsy cultures no growth     CONSULTS: Pulm, Cards, CT surgery Internal Medicine Progress Note    Linnea Butt, PGY1  Internal Medicine, team 1  Pager [phone] / [phone]  After 7PM on weekdays and 12PM on weekends, please page #[phone]    Patient is a 78y old  Male who presents with a chief complaint of SOB (28 May 2018 11:07)      SUBJECTIVE / OVERNIGHT EVENTS: Serum Na improved to 135 overnight. Pt has no complaints. Had a nl BM yesterday. Denies CP, SOB, dysuria, abd pain. Tele SR 70s-90s with wandering atria, PVCs. On 3L NC satting well.    MEDICATIONS  (STANDING):  apixaban 5 milliGRAM(s) Oral every 12 hours  ATENolol  Tablet 50 milliGRAM(s) Oral daily  calcium carbonate 1250 mG + Vitamin D (OsCal 500 + D) 1 Tablet(s) Oral daily  ciprofloxacin     Tablet 500 milliGRAM(s) Oral every 12 hours  docusate sodium 100 milliGRAM(s) Oral three times a day  furosemide    Tablet 40 milliGRAM(s) Oral daily  lisinopril 10 milliGRAM(s) Oral daily  pantoprazole    Tablet 40 milliGRAM(s) Oral before breakfast  potassium acid phosphate/sodium acid phosphate tablet (K-PHOS No. 2) 1 Tablet(s) Oral four times a day with meals  predniSONE   Tablet 60 milliGRAM(s) Oral daily  senna 2 Tablet(s) Oral at bedtime  simvastatin 40 milliGRAM(s) Oral at bedtime  tamsulosin 0.4 milliGRAM(s) Oral at bedtime  trimethoprim  160 mG/sulfamethoxazole 800 mG 1 Tablet(s) Oral <User Schedule>    MEDICATIONS  (PRN):  acetaminophen   Tablet. 650 milliGRAM(s) Oral every 6 hours PRN Mild Pain (1 - 3)  oxyCODONE    5 mG/acetaminophen 325 mG 1 Tablet(s) Oral every 4 hours PRN Moderate Pain (4 - 6)  oxyCODONE    5 mG/acetaminophen 325 mG 2 Tablet(s) Oral every 4 hours PRN Severe Pain (7 - 10)  polyethylene glycol 3350 17 Gram(s) Oral daily PRN Constipation    Vital Signs Last 24 Hrs  T(C): 36.4 (28 May 2018 20:51), Max: 36.4 (28 May 2018 13:02)  T(F): 97.6 (28 May 2018 20:51), Max: 97.6 (28 May 2018 20:51)  HR: 60 (28 May 2018 20:51) (60 - 61)  BP: 124/69 (28 May 2018 20:51) (103/63 - 124/69)  BP(mean): --  RR: 20 (28 May 2018 20:51) (19 - 20)  SpO2: 96% (28 May 2018 20:51) (95% - 96%)    CAPILLARY BLOOD GLUCOSE        I&O's Summary    28 May 2018 07:01  -  29 May 2018 07:00  --------------------------------------------------------  IN: 1440 mL / OUT: 1150 mL / NET: 290 mL        PHYSICAL EXAM  GENERAL: NAD, well-developed  HEAD:  Atraumatic  EYES: EOMI, PERRLA, conjunctiva and sclera clear  NECK: Supple, No JVD  CHEST/LUNG: Clear to auscultation bilaterally; No wheeze  HEART: Regular rate and rhythm; No murmurs, rubs, or gallops  ABDOMEN: Soft, Nontender, Nondistended; Bowel sounds present  EXTREMITIES:  2+ Peripheral Pulses, No clubbing, cyanosis, or edema  NEURO/PSYCH: AAOx3, nonfocal  SKIN: No rashes or lesions      LABS:                        13.4   12.58 )-----------( 388      ( 28 May 2018 08:44 )             41.2     CBC Full  -  ( 28 May 2018 08:44 )  WBC Count : 12.58 K/uL  Hemoglobin : 13.4 g/dL  Hematocrit : 41.2 %  Platelet Count - Automated : 388 K/uL  Mean Cell Volume : 97.2 fl  Mean Cell Hemoglobin : 31.6 pg  Mean Cell Hemoglobin Concentration : 32.5 gm/dL  Auto Neutrophil # : 9.69 K/uL  Auto Lymphocyte # : 2.01 K/uL  Auto Monocyte # : 0.75 K/uL  Auto Eosinophil # : 0.13 K/uL  Auto Basophil # : 0.00 K/uL  Auto Neutrophil % : 77.0 %  Auto Lymphocyte % : 16.0 %  Auto Monocyte % : 6.0 %  Auto Eosinophil % : 1.0 %  Auto Basophil % : 0.0 %    05-29    133<L>  |  96  |  26<H>  ----------------------------<  93  4.5   |  27  |  0.88    Ca    8.8      29 May 2018 06:49  Phos  2.7     05-28  Mg     2.0     05-28      Creatinine Trend: 0.88<--, 0.99<--, 0.84<--, 0.97<--, 0.89<--, 0.95<--        MICROBIOLOGY:    RADIOLOGY & ADDITIONAL TESTS:  Lung biopsy cultures no growth     CONSULTS: Pulm, Cards, CT surgery Internal Medicine Progress Note    Linnea Butt, PGY1  Internal Medicine, team 1  Pager [phone] / [phone]  After 7PM on weekdays and 12PM on weekends, please page #[phone]    Patient is a 78y old  Male who presents with a chief complaint of SOB (28 May 2018 11:07)      SUBJECTIVE / OVERNIGHT EVENTS: Serum Na improved to 135 overnight. Pt has no complaints. Had a nl BM yesterday. Denies CP, SOB, dysuria, abd pain. Tele SR 70s-90s with wandering atria, PVCs. On 3L NC satting well. L apical PTX resolved on repeat CTX today    MEDICATIONS  (STANDING):  apixaban 5 milliGRAM(s) Oral every 12 hours  ATENolol  Tablet 50 milliGRAM(s) Oral daily  calcium carbonate 1250 mG + Vitamin D (OsCal 500 + D) 1 Tablet(s) Oral daily  ciprofloxacin     Tablet 500 milliGRAM(s) Oral every 12 hours  docusate sodium 100 milliGRAM(s) Oral three times a day  furosemide    Tablet 40 milliGRAM(s) Oral daily  lisinopril 10 milliGRAM(s) Oral daily  pantoprazole    Tablet 40 milliGRAM(s) Oral before breakfast  potassium acid phosphate/sodium acid phosphate tablet (K-PHOS No. 2) 1 Tablet(s) Oral four times a day with meals  predniSONE   Tablet 60 milliGRAM(s) Oral daily  senna 2 Tablet(s) Oral at bedtime  simvastatin 40 milliGRAM(s) Oral at bedtime  tamsulosin 0.4 milliGRAM(s) Oral at bedtime  trimethoprim  160 mG/sulfamethoxazole 800 mG 1 Tablet(s) Oral <User Schedule>    MEDICATIONS  (PRN):  acetaminophen   Tablet. 650 milliGRAM(s) Oral every 6 hours PRN Mild Pain (1 - 3)  oxyCODONE    5 mG/acetaminophen 325 mG 1 Tablet(s) Oral every 4 hours PRN Moderate Pain (4 - 6)  oxyCODONE    5 mG/acetaminophen 325 mG 2 Tablet(s) Oral every 4 hours PRN Severe Pain (7 - 10)  polyethylene glycol 3350 17 Gram(s) Oral daily PRN Constipation    Vital Signs Last 24 Hrs  T(C): 36.4 (28 May 2018 20:51), Max: 36.4 (28 May 2018 13:02)  T(F): 97.6 (28 May 2018 20:51), Max: 97.6 (28 May 2018 20:51)  HR: 60 (28 May 2018 20:51) (60 - 61)  BP: 124/69 (28 May 2018 20:51) (103/63 - 124/69)  BP(mean): --  RR: 20 (28 May 2018 20:51) (19 - 20)  SpO2: 96% (28 May 2018 20:51) (95% - 96%)    CAPILLARY BLOOD GLUCOSE        I&O's Summary    28 May 2018 07:01  -  29 May 2018 07:00  --------------------------------------------------------  IN: 1440 mL / OUT: 1150 mL / NET: 290 mL        PHYSICAL EXAM  GENERAL: NAD, well-developed  HEAD:  Atraumatic  EYES: EOMI, PERRLA, conjunctiva and sclera clear  NECK: Supple, No JVD  CHEST/LUNG: Clear to auscultation bilaterally; No wheeze  HEART: Regular rate and rhythm; No murmurs, rubs, or gallops  ABDOMEN: Soft, Nontender, Nondistended; Bowel sounds present  EXTREMITIES:  2+ Peripheral Pulses, No clubbing, cyanosis, or edema  NEURO/PSYCH: AAOx3, nonfocal  SKIN: No rashes or lesions      LABS:                        13.4   12.58 )-----------( 388      ( 28 May 2018 08:44 )             41.2     CBC Full  -  ( 28 May 2018 08:44 )  WBC Count : 12.58 K/uL  Hemoglobin : 13.4 g/dL  Hematocrit : 41.2 %  Platelet Count - Automated : 388 K/uL  Mean Cell Volume : 97.2 fl  Mean Cell Hemoglobin : 31.6 pg  Mean Cell Hemoglobin Concentration : 32.5 gm/dL  Auto Neutrophil # : 9.69 K/uL  Auto Lymphocyte # : 2.01 K/uL  Auto Monocyte # : 0.75 K/uL  Auto Eosinophil # : 0.13 K/uL  Auto Basophil # : 0.00 K/uL  Auto Neutrophil % : 77.0 %  Auto Lymphocyte % : 16.0 %  Auto Monocyte % : 6.0 %  Auto Eosinophil % : 1.0 %  Auto Basophil % : 0.0 %    05-29    133<L>  |  96  |  26<H>  ----------------------------<  93  4.5   |  27  |  0.88    Ca    8.8      29 May 2018 06:49  Phos  2.7     05-28  Mg     2.0     05-28      Creatinine Trend: 0.88<--, 0.99<--, 0.84<--, 0.97<--, 0.89<--, 0.95<--        MICROBIOLOGY:  Lung biopsy cultures no growth    RADIOLOGY & ADDITIONAL TESTS:  < from: Xray Chest 2 Views PA/Lat (05.29.18 @ 10:49) >  Impression:    The heart is normal in size. Changes of the lungs compatible with   pulmonary edema. Multifocal pneumonia cannot be ruled out. No   pneumothorax could be identified on the current study.  < end of copied text >       CONSULTS: Pulm, Cards, CT surgery Internal Medicine Progress Note    Linnea Butt, PGY1  Internal Medicine, team 1  Pager [phone] / [phone]  After 7PM on weekdays and 12PM on weekends, please page #[phone]    Patient is a 78y old  Male who presents with a chief complaint of SOB (28 May 2018 11:07)      SUBJECTIVE / OVERNIGHT EVENTS: SpO2 78% on room air this admission. Serum Na improved to 135 overnight. Pt has no complaints. Had a nl BM yesterday. Denies CP, SOB, dysuria, abd pain. Tele SR 70s-90s with wandering atria, PVCs. On 3L NC satting well. L apical PTX resolved on repeat CTX today    MEDICATIONS  (STANDING):  apixaban 5 milliGRAM(s) Oral every 12 hours  ATENolol  Tablet 50 milliGRAM(s) Oral daily  calcium carbonate 1250 mG + Vitamin D (OsCal 500 + D) 1 Tablet(s) Oral daily  ciprofloxacin     Tablet 500 milliGRAM(s) Oral every 12 hours  docusate sodium 100 milliGRAM(s) Oral three times a day  furosemide    Tablet 40 milliGRAM(s) Oral daily  lisinopril 10 milliGRAM(s) Oral daily  pantoprazole    Tablet 40 milliGRAM(s) Oral before breakfast  potassium acid phosphate/sodium acid phosphate tablet (K-PHOS No. 2) 1 Tablet(s) Oral four times a day with meals  predniSONE   Tablet 60 milliGRAM(s) Oral daily  senna 2 Tablet(s) Oral at bedtime  simvastatin 40 milliGRAM(s) Oral at bedtime  tamsulosin 0.4 milliGRAM(s) Oral at bedtime  trimethoprim  160 mG/sulfamethoxazole 800 mG 1 Tablet(s) Oral <User Schedule>    MEDICATIONS  (PRN):  acetaminophen   Tablet. 650 milliGRAM(s) Oral every 6 hours PRN Mild Pain (1 - 3)  oxyCODONE    5 mG/acetaminophen 325 mG 1 Tablet(s) Oral every 4 hours PRN Moderate Pain (4 - 6)  oxyCODONE    5 mG/acetaminophen 325 mG 2 Tablet(s) Oral every 4 hours PRN Severe Pain (7 - 10)  polyethylene glycol 3350 17 Gram(s) Oral daily PRN Constipation    Vital Signs Last 24 Hrs  T(C): 36.4 (28 May 2018 20:51), Max: 36.4 (28 May 2018 13:02)  T(F): 97.6 (28 May 2018 20:51), Max: 97.6 (28 May 2018 20:51)  HR: 60 (28 May 2018 20:51) (60 - 61)  BP: 124/69 (28 May 2018 20:51) (103/63 - 124/69)  BP(mean): --  RR: 20 (28 May 2018 20:51) (19 - 20)  SpO2: 96% (28 May 2018 20:51) (95% - 96%)    CAPILLARY BLOOD GLUCOSE        I&O's Summary    28 May 2018 07:01  -  29 May 2018 07:00  --------------------------------------------------------  IN: 1440 mL / OUT: 1150 mL / NET: 290 mL        PHYSICAL EXAM  GENERAL: NAD, well-developed  HEAD:  Atraumatic  EYES: EOMI, PERRLA, conjunctiva and sclera clear  NECK: Supple, No JVD  CHEST/LUNG: Clear to auscultation bilaterally; No wheeze  HEART: Regular rate and rhythm; No murmurs, rubs, or gallops  ABDOMEN: Soft, Nontender, Nondistended; Bowel sounds present  EXTREMITIES:  2+ Peripheral Pulses, No clubbing, cyanosis, or edema  NEURO/PSYCH: AAOx3, nonfocal  SKIN: No rashes or lesions      LABS:                        13.4   12.58 )-----------( 388      ( 28 May 2018 08:44 )             41.2     CBC Full  -  ( 28 May 2018 08:44 )  WBC Count : 12.58 K/uL  Hemoglobin : 13.4 g/dL  Hematocrit : 41.2 %  Platelet Count - Automated : 388 K/uL  Mean Cell Volume : 97.2 fl  Mean Cell Hemoglobin : 31.6 pg  Mean Cell Hemoglobin Concentration : 32.5 gm/dL  Auto Neutrophil # : 9.69 K/uL  Auto Lymphocyte # : 2.01 K/uL  Auto Monocyte # : 0.75 K/uL  Auto Eosinophil # : 0.13 K/uL  Auto Basophil # : 0.00 K/uL  Auto Neutrophil % : 77.0 %  Auto Lymphocyte % : 16.0 %  Auto Monocyte % : 6.0 %  Auto Eosinophil % : 1.0 %  Auto Basophil % : 0.0 %    05-29    133<L>  |  96  |  26<H>  ----------------------------<  93  4.5   |  27  |  0.88    Ca    8.8      29 May 2018 06:49  Phos  2.7     05-28  Mg     2.0     05-28      Creatinine Trend: 0.88<--, 0.99<--, 0.84<--, 0.97<--, 0.89<--, 0.95<--        MICROBIOLOGY:  Lung biopsy cultures no growth    RADIOLOGY & ADDITIONAL TESTS:  < from: Xray Chest 2 Views PA/Lat (05.29.18 @ 10:49) >  Impression:    The heart is normal in size. Changes of the lungs compatible with   pulmonary edema. Multifocal pneumonia cannot be ruled out. No   pneumothorax could be identified on the current study.  < end of copied text >       CONSULTS: Pulm, Cards, CT surgery Internal Medicine Progress Note    Linnea Butt, PGY1  Internal Medicine, team 1  Pager [phone] / [phone]  After 7PM on weekdays and 12PM on weekends, please page #[phone]    Patient is a 78y old  Male who presents with a chief complaint of SOB (28 May 2018 11:07)      SUBJECTIVE / OVERNIGHT EVENTS: SpO2 78% on room air this admission after treatment on prednisone. Serum Na improved to 135 overnight. Pt has no complaints. Had a nl BM yesterday. Denies CP, SOB, dysuria, abd pain. Tele SR 70s-90s with wandering atria, PVCs. On 3L NC satting well. L apical PTX resolved on repeat CTX today    MEDICATIONS  (STANDING):  apixaban 5 milliGRAM(s) Oral every 12 hours  ATENolol  Tablet 50 milliGRAM(s) Oral daily  calcium carbonate 1250 mG + Vitamin D (OsCal 500 + D) 1 Tablet(s) Oral daily  ciprofloxacin     Tablet 500 milliGRAM(s) Oral every 12 hours  docusate sodium 100 milliGRAM(s) Oral three times a day  furosemide    Tablet 40 milliGRAM(s) Oral daily  lisinopril 10 milliGRAM(s) Oral daily  pantoprazole    Tablet 40 milliGRAM(s) Oral before breakfast  potassium acid phosphate/sodium acid phosphate tablet (K-PHOS No. 2) 1 Tablet(s) Oral four times a day with meals  predniSONE   Tablet 60 milliGRAM(s) Oral daily  senna 2 Tablet(s) Oral at bedtime  simvastatin 40 milliGRAM(s) Oral at bedtime  tamsulosin 0.4 milliGRAM(s) Oral at bedtime  trimethoprim  160 mG/sulfamethoxazole 800 mG 1 Tablet(s) Oral <User Schedule>    MEDICATIONS  (PRN):  acetaminophen   Tablet. 650 milliGRAM(s) Oral every 6 hours PRN Mild Pain (1 - 3)  oxyCODONE    5 mG/acetaminophen 325 mG 1 Tablet(s) Oral every 4 hours PRN Moderate Pain (4 - 6)  oxyCODONE    5 mG/acetaminophen 325 mG 2 Tablet(s) Oral every 4 hours PRN Severe Pain (7 - 10)  polyethylene glycol 3350 17 Gram(s) Oral daily PRN Constipation    Vital Signs Last 24 Hrs  T(C): 36.4 (28 May 2018 20:51), Max: 36.4 (28 May 2018 13:02)  T(F): 97.6 (28 May 2018 20:51), Max: 97.6 (28 May 2018 20:51)  HR: 60 (28 May 2018 20:51) (60 - 61)  BP: 124/69 (28 May 2018 20:51) (103/63 - 124/69)  BP(mean): --  RR: 20 (28 May 2018 20:51) (19 - 20)  SpO2: 96% (28 May 2018 20:51) (95% - 96%)    CAPILLARY BLOOD GLUCOSE        I&O's Summary    28 May 2018 07:01  -  29 May 2018 07:00  --------------------------------------------------------  IN: 1440 mL / OUT: 1150 mL / NET: 290 mL        PHYSICAL EXAM  GENERAL: NAD, well-developed  HEAD:  Atraumatic  EYES: EOMI, PERRLA, conjunctiva and sclera clear  NECK: Supple, No JVD  CHEST/LUNG: Clear to auscultation bilaterally; No wheeze  HEART: Regular rate and rhythm; No murmurs, rubs, or gallops  ABDOMEN: Soft, Nontender, Nondistended; Bowel sounds present  EXTREMITIES:  2+ Peripheral Pulses, No clubbing, cyanosis, or edema  NEURO/PSYCH: AAOx3, nonfocal  SKIN: No rashes or lesions      LABS:                        13.4   12.58 )-----------( 388      ( 28 May 2018 08:44 )             41.2     CBC Full  -  ( 28 May 2018 08:44 )  WBC Count : 12.58 K/uL  Hemoglobin : 13.4 g/dL  Hematocrit : 41.2 %  Platelet Count - Automated : 388 K/uL  Mean Cell Volume : 97.2 fl  Mean Cell Hemoglobin : 31.6 pg  Mean Cell Hemoglobin Concentration : 32.5 gm/dL  Auto Neutrophil # : 9.69 K/uL  Auto Lymphocyte # : 2.01 K/uL  Auto Monocyte # : 0.75 K/uL  Auto Eosinophil # : 0.13 K/uL  Auto Basophil # : 0.00 K/uL  Auto Neutrophil % : 77.0 %  Auto Lymphocyte % : 16.0 %  Auto Monocyte % : 6.0 %  Auto Eosinophil % : 1.0 %  Auto Basophil % : 0.0 %    05-29    133<L>  |  96  |  26<H>  ----------------------------<  93  4.5   |  27  |  0.88    Ca    8.8      29 May 2018 06:49  Phos  2.7     05-28  Mg     2.0     05-28      Creatinine Trend: 0.88<--, 0.99<--, 0.84<--, 0.97<--, 0.89<--, 0.95<--        MICROBIOLOGY:  Lung biopsy cultures no growth    RADIOLOGY & ADDITIONAL TESTS:  < from: Xray Chest 2 Views PA/Lat (05.29.18 @ 10:49) >  Impression:    The heart is normal in size. Changes of the lungs compatible with   pulmonary edema. Multifocal pneumonia cannot be ruled out. No   pneumothorax could be identified on the current study.  < end of copied text >       CONSULTS: Pulm, Cards, CT surgery

## 2018-05-29 NOTE — PROGRESS NOTE ADULT - SUBJECTIVE AND OBJECTIVE BOX
[place] NESBITT    Patient is a 78y old  Male who presents with a chief complaint of SOB (28 May 2018 11:07)    Breathing and cough improved.  Saturating well on 3 Lnc.    Allergies    No Known Allergies    MEDICATIONS  (STANDING):  apixaban 5 milliGRAM(s) Oral every 12 hours  ATENolol  Tablet 50 milliGRAM(s) Oral daily  calcium carbonate 1250 mG + Vitamin D (OsCal 500 + D) 1 Tablet(s) Oral daily  ciprofloxacin     Tablet 500 milliGRAM(s) Oral every 12 hours  docusate sodium 100 milliGRAM(s) Oral three times a day  furosemide    Tablet 40 milliGRAM(s) Oral daily  lisinopril 10 milliGRAM(s) Oral daily  pantoprazole    Tablet 40 milliGRAM(s) Oral before breakfast  potassium acid phosphate/sodium acid phosphate tablet (K-PHOS No. 2) 1 Tablet(s) Oral four times a day with meals  predniSONE   Tablet 60 milliGRAM(s) Oral daily  senna 2 Tablet(s) Oral at bedtime  simvastatin 40 milliGRAM(s) Oral at bedtime  tamsulosin 0.4 milliGRAM(s) Oral at bedtime  trimethoprim  160 mG/sulfamethoxazole 800 mG 1 Tablet(s) Oral <User Schedule>    MEDICATIONS  (PRN):  acetaminophen   Tablet. 650 milliGRAM(s) Oral every 6 hours PRN Mild Pain (1 - 3)  oxyCODONE    5 mG/acetaminophen 325 mG 1 Tablet(s) Oral every 4 hours PRN Moderate Pain (4 - 6)  oxyCODONE    5 mG/acetaminophen 325 mG 2 Tablet(s) Oral every 4 hours PRN Severe Pain (7 - 10)  polyethylene glycol 3350 17 Gram(s) Oral daily PRN Constipation    PHYSICAL EXAM:  Vital Signs Last 24 Hrs  T(C): 36.4 (28 May 2018 20:51), Max: 36.4 (28 May 2018 13:02)  T(F): 97.6 (28 May 2018 20:51), Max: 97.6 (28 May 2018 20:51)  HR: 60 (28 May 2018 20:51) (60 - 61)  BP: 124/69 (28 May 2018 20:51) (103/63 - 124/69)  BP(mean): --  RR: 20 (28 May 2018 20:51) (19 - 20)  SpO2: 96% (28 May 2018 20:51) (95% - 96%)  Daily     Daily Weight in k.1 (29 May 2018 08:50)  I&O's Summary    28 May 2018 07:  -  29 May 2018 07:00  --------------------------------------------------------  IN: 1440 mL / OUT: 1150 mL / NET: 290 mL    29 May 2018 07:  -  29 May 2018 11:23  --------------------------------------------------------  IN: 240 mL / OUT: 0 mL / NET: 240 mL        General Appearance: 	 Alert, cooperative, no distress on nc  HEENT: normocephalic, atraumatic  Neck: no JVD  Lungs:  clear to auscultation and percussion bilaterally  Cor:  pmi 5th ICS MCL, regular rate and rhythm, S1 normal intensity, S2 normal intensity, soft CHLOE  Abdomen:	 soft, non-tender; bowel sounds normal; no masses,  no organomegaly  Extremities: without cyanosis, clubbing or edema      EKG:  Telemetry:  [facility]    Labs:  CBC Full  -  ( 29 May 2018 07:46 )  WBC Count : 13.26 K/uL  Hemoglobin : 12.6 g/dL  Hematocrit : 39.0 %  Platelet Count - Automated : 403 K/uL  Mean Cell Volume : 95.1 fl  Mean Cell Hemoglobin : 30.7 pg  Mean Cell Hemoglobin Concentration : 32.3 gm/dL  Auto Neutrophil # : x  Auto Lymphocyte # : x  Auto Monocyte # : x  Auto Eosinophil # : x  Auto Basophil # : x  Auto Neutrophil % : x  Auto Lymphocyte % : x  Auto Monocyte % : x  Auto Eosinophil % : x  Auto Basophil % : x

## 2018-05-29 NOTE — PROGRESS NOTE ADULT - PROBLEM SELECTOR PLAN 5
card cath to r/o CHF negative and now s/p VATS bx to confirm DX before committing to long course of steroids--await path.

## 2018-05-29 NOTE — PROGRESS NOTE ADULT - PROBLEM SELECTOR PROBLEM 8
Prophylactic measure
Pneumothorax after biopsy

## 2018-05-29 NOTE — PROGRESS NOTE ADULT - PROVIDER SPECIALTY LIST ADULT
Anesthesia
Cardiology
Internal Medicine
Pulmonology
Thoracic Surgery
Urology
Pulmonology
Pulmonology
Cardiology
Internal Medicine
Pulmonology
Internal Medicine
Internal Medicine

## 2018-05-29 NOTE — PROGRESS NOTE ADULT - ATTENDING COMMENTS
pt medically stable for d/c home today. he will be discharged on prednisone to be tapered after follow up with pulmonology Dr Saul. He will be discharged on home O2. Final pathology from VATs pending. discharge medication regimen discussed with pt and wife at bedside at length.   discharge time 45 mins

## 2018-05-29 NOTE — PROGRESS NOTE ADULT - PROBLEM SELECTOR PROBLEM 7
Prophylactic measure
Prophylactic measure
Essential hypertension
Prophylactic measure
Prophylactic measure
Essential hypertension
Prophylactic measure

## 2018-05-29 NOTE — PROGRESS NOTE ADULT - PROBLEM SELECTOR PROBLEM 1
Dyspnea
R/O Amiodarone toxicity, accidental or unintentional, initial encounter
Acute hypoxemic respiratory failure
Acute hypoxemic respiratory failure
Dyspnea
Pleural effusion, left
R/O Amiodarone toxicity, accidental or unintentional, initial encounter
Acute hypoxemic respiratory failure
Acute hypoxemic respiratory failure
R/O Amiodarone toxicity, accidental or unintentional, initial encounter

## 2018-05-29 NOTE — PROGRESS NOTE ADULT - SUBJECTIVE AND OBJECTIVE BOX
CHIEF COMPLAINT: f/up for pneumonitis of ?etiology; PTX following VATS bx--doing great-cough -some sob-mild pain at CT site    Interval Events: awaiting DC    REVIEW OF SYSTEMS:  Constitutional: No fevers or chills. No weight loss. + fatigue or generalized malaise.  Eyes: No itching or discharge from the eyes  ENT: No ear pain. No ear discharge. No nasal congestion. No post nasal drip. No epistaxis. No throat pain. No sore throat. No difficulty swallowing.   CV: No chest pain. No palpitations. No lightheadedness or dizziness.   Resp: No dyspnea at rest. + dyspnea on exertion. No orthopnea. No wheezing. + cough. No stridor. No sputum production. No chest pain with respiration.  GI: No nausea. No vomiting. No diarrhea.  MSK: No joint pain or pain in any extremities  Integumentary: No skin lesions. + pedal edema.  Neurological: No gross motor weakness. No sensory changes.  [+ ] All other systems negative  [ ] Unable to assess ROS because ________    OBJECTIVE:  ICU Vital Signs Last 24 Hrs  T(C): 36.4 (28 May 2018 20:51), Max: 36.9 (28 May 2018 05:20)  T(F): 97.6 (28 May 2018 20:51), Max: 98.4 (28 May 2018 05:20)  HR: 60 (28 May 2018 20:51) (60 - 69)  BP: 124/69 (28 May 2018 20:51) (103/63 - 128/70)  BP(mean): --  ABP: --  ABP(mean): --  RR: 20 (28 May 2018 20:51) (18 - 20)  SpO2: 96% (28 May 2018 20:51) (95% - 96%)        05-27 @ 07:01  -  05-28 @ 07:00  --------------------------------------------------------  IN: 1240 mL / OUT: 1600 mL / NET: -360 mL    05-28 @ 07:01 - 05-29 @ 04:47  --------------------------------------------------------  IN: 1440 mL / OUT: 750 mL / NET: 690 mL      CAPILLARY BLOOD GLUCOSE          PHYSICAL EXAM: NAD on O2  General: Awake, alert, oriented X 3.   HEENT: Atraumatic, normocephalic.                 Mallampatti Grade 3                No nasal congestion.                No tonsillar or pharyngeal exudates.  Lymph Nodes: No palpable lymphadenopathy  Neck: No JVD. No carotid bruit.   Respiratory: Normal chest expansion                         Normal percussion                         Normal and equal air entry                         No wheeze, rhonchi except bibasilar rales.  Cardiovascular: S1 S2 normal. No murmurs, rubs or gallops.   Abdomen: Soft, non-tender, non-distended. No organomegaly.  Extremities: Warm to touch. Peripheral pulse palpable. + pedal edema.   Skin: No rashes or skin lesions  Neurological: Motor and sensory examination equal and normal in all four extremities.  Psychiatry: Appropriate mood and affect.    HOSPITAL MEDICATIONS:  MEDICATIONS  (STANDING):  apixaban 5 milliGRAM(s) Oral every 12 hours  ATENolol  Tablet 50 milliGRAM(s) Oral daily  calcium carbonate 1250 mG + Vitamin D (OsCal 500 + D) 1 Tablet(s) Oral daily  ciprofloxacin     Tablet 500 milliGRAM(s) Oral every 12 hours  docusate sodium 100 milliGRAM(s) Oral three times a day  furosemide    Tablet 40 milliGRAM(s) Oral daily  lisinopril 10 milliGRAM(s) Oral daily  pantoprazole    Tablet 40 milliGRAM(s) Oral before breakfast  potassium acid phosphate/sodium acid phosphate tablet (K-PHOS No. 2) 1 Tablet(s) Oral four times a day with meals  predniSONE   Tablet 60 milliGRAM(s) Oral daily  senna 2 Tablet(s) Oral at bedtime  simvastatin 40 milliGRAM(s) Oral at bedtime  tamsulosin 0.4 milliGRAM(s) Oral at bedtime  trimethoprim  160 mG/sulfamethoxazole 800 mG 1 Tablet(s) Oral <User Schedule>    MEDICATIONS  (PRN):  acetaminophen   Tablet. 650 milliGRAM(s) Oral every 6 hours PRN Mild Pain (1 - 3)  oxyCODONE    5 mG/acetaminophen 325 mG 1 Tablet(s) Oral every 4 hours PRN Moderate Pain (4 - 6)  oxyCODONE    5 mG/acetaminophen 325 mG 2 Tablet(s) Oral every 4 hours PRN Severe Pain (7 - 10)  polyethylene glycol 3350 17 Gram(s) Oral daily PRN Constipation      LABS:                        13.4   12.58 )-----------( 388      ( 28 May 2018 08:44 )             41.2     05-28    135  |  96  |  30<H>  ----------------------------<  182<H>  4.2   |  29  |  0.99    Ca    8.3<L>      28 May 2018 20:18  Phos  2.7     05-28  Mg     2.0     05-28                MICROBIOLOGY:     RADIOLOGY:  < from: Xray Chest 1 View- PORTABLE-Urgent (05.26.18 @ 16:51) >  INDINGS:   Loop recorder is present.  Left chest tube is been removed.  The cardiac silhouette is stable in appearance.   Left apical pneumothorax is smaller than on previous exam.  Bilateral airspace opacities consistent with multifocal pneumonia or   edema - are stable in appearance.  No significant pleural effusion.    IMPRESSION:   S/P removal of left-sided chest tube.  Left apical pneumothorax smaller than on previous study.        < end of copied text >    [ ] Reviewed and interpreted by me    Point of Care Ultrasound Findings:    PFT:    EKG:

## 2018-05-29 NOTE — PROGRESS NOTE ADULT - PROBLEM SELECTOR PLAN 6
-Atrial fibrillation diagnosed 2014. 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

## 2018-05-29 NOTE — PROGRESS NOTE ADULT - PROBLEM SELECTOR PROBLEM 2
Shortness of breath
Hypoxia
Shortness of breath
Shortness of breath
Hypoxia
Shortness of breath
Hypoxia

## 2018-05-30 LAB — NON-GYNECOLOGICAL CYTOLOGY STUDY: SIGNIFICANT CHANGE UP

## 2018-06-14 ENCOUNTER — APPOINTMENT (OUTPATIENT)
Dept: THORACIC SURGERY | Facility: CLINIC | Age: 78
End: 2018-06-14

## 2018-06-14 VITALS
BODY MASS INDEX: 29.92 KG/M2 | HEIGHT: 70 IN | WEIGHT: 209 LBS | RESPIRATION RATE: 18 BRPM | TEMPERATURE: 97.7 F | SYSTOLIC BLOOD PRESSURE: 124 MMHG | OXYGEN SATURATION: 98 % | HEART RATE: 65 BPM | DIASTOLIC BLOOD PRESSURE: 76 MMHG

## 2018-06-14 RX ORDER — PRAVASTATIN SODIUM 20 MG/1
20 TABLET ORAL
Refills: 0 | Status: ACTIVE | COMMUNITY

## 2018-06-14 RX ORDER — TAMSULOSIN HYDROCHLORIDE 0.4 MG/1
0.4 CAPSULE ORAL
Refills: 0 | Status: ACTIVE | COMMUNITY

## 2018-06-14 RX ORDER — ATENOLOL 50 MG/1
50 TABLET ORAL
Refills: 0 | Status: ACTIVE | COMMUNITY

## 2018-06-14 RX ORDER — RAMIPRIL 5 MG/1
CAPSULE ORAL
Refills: 0 | Status: ACTIVE | COMMUNITY

## 2018-06-14 RX ORDER — ASPIRIN 81 MG
81 TABLET, DELAYED RELEASE (ENTERIC COATED) ORAL
Refills: 0 | Status: ACTIVE | COMMUNITY

## 2018-06-22 ENCOUNTER — APPOINTMENT (OUTPATIENT)
Dept: PULMONOLOGY | Facility: CLINIC | Age: 78
End: 2018-06-22
Payer: MEDICARE

## 2018-06-22 VITALS
HEIGHT: 70 IN | SYSTOLIC BLOOD PRESSURE: 110 MMHG | BODY MASS INDEX: 30.21 KG/M2 | HEART RATE: 102 BPM | WEIGHT: 211 LBS | DIASTOLIC BLOOD PRESSURE: 80 MMHG | OXYGEN SATURATION: 99 % | RESPIRATION RATE: 17 BRPM

## 2018-06-22 DIAGNOSIS — Z87.19 PERSONAL HISTORY OF OTHER DISEASES OF THE DIGESTIVE SYSTEM: ICD-10-CM

## 2018-06-22 DIAGNOSIS — Z87.891 PERSONAL HISTORY OF NICOTINE DEPENDENCE: ICD-10-CM

## 2018-06-22 DIAGNOSIS — Z82.62 FAMILY HISTORY OF OSTEOPOROSIS: ICD-10-CM

## 2018-06-22 DIAGNOSIS — Z82.49 FAMILY HISTORY OF ISCHEMIC HEART DISEASE AND OTHER DISEASES OF THE CIRCULATORY SYSTEM: ICD-10-CM

## 2018-06-22 DIAGNOSIS — Z87.438 PERSONAL HISTORY OF OTHER DISEASES OF MALE GENITAL ORGANS: ICD-10-CM

## 2018-06-22 DIAGNOSIS — Z86.39 PERSONAL HISTORY OF OTHER ENDOCRINE, NUTRITIONAL AND METABOLIC DISEASE: ICD-10-CM

## 2018-06-22 DIAGNOSIS — Z86.79 PERSONAL HISTORY OF OTHER DISEASES OF THE CIRCULATORY SYSTEM: ICD-10-CM

## 2018-06-22 DIAGNOSIS — Z78.9 OTHER SPECIFIED HEALTH STATUS: ICD-10-CM

## 2018-06-22 DIAGNOSIS — Z81.8 FAMILY HISTORY OF OTHER MENTAL AND BEHAVIORAL DISORDERS: ICD-10-CM

## 2018-06-22 PROCEDURE — 94010 BREATHING CAPACITY TEST: CPT | Mod: 59

## 2018-06-22 PROCEDURE — 94618 PULMONARY STRESS TESTING: CPT

## 2018-06-22 PROCEDURE — 94727 GAS DIL/WSHOT DETER LNG VOL: CPT

## 2018-06-22 PROCEDURE — 99215 OFFICE O/P EST HI 40 MIN: CPT | Mod: 25

## 2018-06-22 PROCEDURE — 94729 DIFFUSING CAPACITY: CPT

## 2018-06-22 PROCEDURE — 71046 X-RAY EXAM CHEST 2 VIEWS: CPT

## 2018-06-24 ENCOUNTER — TRANSCRIPTION ENCOUNTER (OUTPATIENT)
Age: 78
End: 2018-06-24

## 2018-06-25 ENCOUNTER — RX RENEWAL (OUTPATIENT)
Age: 78
End: 2018-06-25

## 2018-06-25 ENCOUNTER — MED ADMIN CHARGE (OUTPATIENT)
Age: 78
End: 2018-06-25

## 2018-06-26 ENCOUNTER — RX RENEWAL (OUTPATIENT)
Age: 78
End: 2018-06-26

## 2018-06-26 ENCOUNTER — TRANSCRIPTION ENCOUNTER (OUTPATIENT)
Age: 78
End: 2018-06-26

## 2018-08-03 ENCOUNTER — APPOINTMENT (OUTPATIENT)
Dept: PULMONOLOGY | Facility: CLINIC | Age: 78
End: 2018-08-03
Payer: MEDICARE

## 2018-08-03 VITALS
HEIGHT: 67 IN | OXYGEN SATURATION: 96 % | WEIGHT: 231 LBS | BODY MASS INDEX: 36.26 KG/M2 | HEART RATE: 103 BPM | DIASTOLIC BLOOD PRESSURE: 72 MMHG | RESPIRATION RATE: 15 BRPM | SYSTOLIC BLOOD PRESSURE: 122 MMHG

## 2018-08-03 PROCEDURE — 94729 DIFFUSING CAPACITY: CPT

## 2018-08-03 PROCEDURE — 99214 OFFICE O/P EST MOD 30 MIN: CPT | Mod: 25

## 2018-08-03 PROCEDURE — 94010 BREATHING CAPACITY TEST: CPT

## 2018-08-03 RX ORDER — AMIODARONE HYDROCHLORIDE 200 MG/1
200 TABLET ORAL
Qty: 90 | Refills: 0 | Status: DISCONTINUED | COMMUNITY
Start: 2018-02-17

## 2018-08-30 ENCOUNTER — LABORATORY RESULT (OUTPATIENT)
Age: 78
End: 2018-08-30

## 2018-08-30 ENCOUNTER — APPOINTMENT (OUTPATIENT)
Dept: PULMONOLOGY | Facility: CLINIC | Age: 78
End: 2018-08-30
Payer: MEDICARE

## 2018-08-30 VITALS
OXYGEN SATURATION: 88 % | DIASTOLIC BLOOD PRESSURE: 80 MMHG | HEIGHT: 67 IN | HEART RATE: 73 BPM | SYSTOLIC BLOOD PRESSURE: 110 MMHG | RESPIRATION RATE: 14 BRPM | BODY MASS INDEX: 34.06 KG/M2 | WEIGHT: 217 LBS

## 2018-08-30 VITALS — OXYGEN SATURATION: 96 %

## 2018-08-30 PROCEDURE — 99214 OFFICE O/P EST MOD 30 MIN: CPT | Mod: 25

## 2018-08-30 PROCEDURE — 71046 X-RAY EXAM CHEST 2 VIEWS: CPT

## 2018-08-31 ENCOUNTER — MEDICATION RENEWAL (OUTPATIENT)
Age: 78
End: 2018-08-31

## 2018-08-31 ENCOUNTER — APPOINTMENT (OUTPATIENT)
Dept: NEUROLOGY | Facility: CLINIC | Age: 78
End: 2018-08-31
Payer: MEDICARE

## 2018-08-31 VITALS
BODY MASS INDEX: 34.06 KG/M2 | WEIGHT: 217 LBS | OXYGEN SATURATION: 97 % | SYSTOLIC BLOOD PRESSURE: 140 MMHG | RESPIRATION RATE: 16 BRPM | HEART RATE: 82 BPM | HEIGHT: 67 IN | DIASTOLIC BLOOD PRESSURE: 76 MMHG

## 2018-08-31 DIAGNOSIS — R29.898 OTHER SYMPTOMS AND SIGNS INVOLVING THE MUSCULOSKELETAL SYSTEM: ICD-10-CM

## 2018-08-31 LAB
24R-OH-CALCIDIOL SERPL-MCNC: 66.7 PG/ML
25(OH)D3 SERPL-MCNC: 32.7 NG/ML
ACE BLD-CCNC: 3 U/L
ANA PAT FLD IF-IMP: ABNORMAL
ANA SER IF-ACNC: ABNORMAL
ANCA AB SER-IMP: NEGATIVE
C-ANCA SER-ACNC: NEGATIVE
CCP AB SER IA-ACNC: <8 UNITS
ERYTHROCYTE [SEDIMENTATION RATE] IN BLOOD BY WESTERGREN METHOD: 37 MM/HR
MPO AB + PR3 PNL SER: NORMAL
P-ANCA TITR SER IF: NEGATIVE
RF+CCP IGG SER-IMP: NEGATIVE
RHEUMATOID FACT SER QL: 10 IU/ML

## 2018-08-31 PROCEDURE — 99204 OFFICE O/P NEW MOD 45 MIN: CPT

## 2018-09-03 LAB
ENA JO1 AB SER IA-ACNC: <0.2 AL
ENA RNP AB SER IA-ACNC: 0.6 AL
ENA RNP AB SER IA-ACNC: 0.6 AL
ENA SCL70 IGG SER IA-ACNC: <0.2 AL
ENA SM AB SER IA-ACNC: <0.2 AL
ENA SS-A AB SER IA-ACNC: <0.2 AL
ENA SS-B AB SER IA-ACNC: <0.2 AL

## 2018-09-04 ENCOUNTER — TRANSCRIPTION ENCOUNTER (OUTPATIENT)
Age: 78
End: 2018-09-04

## 2018-09-04 LAB
ALDOLASE SERPL-CCNC: 10.1 U/L
ALT SERPL-CCNC: 19 U/L
AST SERPL-CCNC: 25 U/L
CK SERPL-CCNC: 58 U/L

## 2018-09-05 ENCOUNTER — FORM ENCOUNTER (OUTPATIENT)
Age: 78
End: 2018-09-05

## 2018-09-05 LAB — HP PNL SER: NORMAL

## 2018-09-06 ENCOUNTER — APPOINTMENT (OUTPATIENT)
Dept: MRI IMAGING | Facility: CLINIC | Age: 78
End: 2018-09-06
Payer: MEDICARE

## 2018-09-06 ENCOUNTER — OUTPATIENT (OUTPATIENT)
Dept: OUTPATIENT SERVICES | Facility: HOSPITAL | Age: 78
LOS: 1 days | End: 2018-09-06
Payer: MEDICARE

## 2018-09-06 DIAGNOSIS — R29.898 OTHER SYMPTOMS AND SIGNS INVOLVING THE MUSCULOSKELETAL SYSTEM: ICD-10-CM

## 2018-09-06 DIAGNOSIS — Z98.89 OTHER SPECIFIED POSTPROCEDURAL STATES: Chronic | ICD-10-CM

## 2018-09-06 DIAGNOSIS — Z96.643 PRESENCE OF ARTIFICIAL HIP JOINT, BILATERAL: Chronic | ICD-10-CM

## 2018-09-06 PROCEDURE — 70551 MRI BRAIN STEM W/O DYE: CPT | Mod: 26

## 2018-09-06 PROCEDURE — 72148 MRI LUMBAR SPINE W/O DYE: CPT | Mod: 26

## 2018-09-06 PROCEDURE — 70551 MRI BRAIN STEM W/O DYE: CPT

## 2018-09-06 PROCEDURE — 72148 MRI LUMBAR SPINE W/O DYE: CPT

## 2018-09-07 ENCOUNTER — TRANSCRIPTION ENCOUNTER (OUTPATIENT)
Age: 78
End: 2018-09-07

## 2018-09-11 ENCOUNTER — APPOINTMENT (OUTPATIENT)
Dept: PULMONOLOGY | Facility: CLINIC | Age: 78
End: 2018-09-11
Payer: MEDICARE

## 2018-09-11 VITALS
WEIGHT: 220 LBS | RESPIRATION RATE: 17 BRPM | HEIGHT: 65 IN | DIASTOLIC BLOOD PRESSURE: 62 MMHG | BODY MASS INDEX: 36.65 KG/M2 | OXYGEN SATURATION: 93 % | HEART RATE: 92 BPM | SYSTOLIC BLOOD PRESSURE: 118 MMHG

## 2018-09-11 PROCEDURE — 99214 OFFICE O/P EST MOD 30 MIN: CPT | Mod: 25

## 2018-09-11 PROCEDURE — 71046 X-RAY EXAM CHEST 2 VIEWS: CPT

## 2018-09-11 NOTE — ASSESSMENT
[FreeTextEntry1] : Mr. Mccullough is a 78 year old male with a history of A-fib, HTN, former smoker, HLD, and obesity, OSAS presenting to the office s/p hospitalization for amiodarone toxicity for an follow up pulmonary re-evaluation, now improved after flair of ILDz. 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 Optimal diagnosing will include rheumatological panel which would include ESR, MATEO, ANCA, ARNP, CCP, rheumatoid factor, hypersensitivity panel, JOE1, scleroderma antibodies, sjogren's syndrome antibodies; biopsy will be necessary to definitively determine the etiology unless the CT scan findings are specific for UIP. Therapy will be based on diagnostics.\par \par problem 2 : amiodarone toxicity\par -off amiodarone permanently\par \par problem 3: PJP prophylaxis / ?COPD\par -Continue Bactrum 1 tablet Monday, Wednesday, Friday\par -He is s/p Rheumatologic Blood work to include: ESR panel (+) , ACE, panel (-) , HP panel (-)\par - He is recommended to follow up for a rheumatological evaluation. \par \par -add Prednisone 20 mg x 2 weeks and then 10 mg for 2 weeks. He is to have a follow up visit prior to any further tapering of his prednisone. \par Information sheet given to the patient to be reviewed, this medication is never to be used without consulting the prescribing physician. Proper dietary restraint is necessary specifically salt containing foods, if any reaction may occur should be reported. \par \par PJP Pneumonia is a major opportunistic infection in immunocompromised patients and corticosteroid therapy is a risk factor. Therefore, patients receiving at least 4 weeks of therapy at 30 mg or more daily should be on prophylaxis.\par  \par problem 4: GERD\par -continue Protonix 40mg QD in the morning\par -Things to avoid including overeating, spicy foods, tight clothing, eating within three hours of bed, this list is not all inclusive. \par -For treatment of reflux, possible options discussed including diet control, H2 blockers, PPIs, as well as coating motility agents discussed as treatment options. Timing of meals and proximity of last meal to sleep were discussed. If symptoms persist, a formal gastrointestinal evaluation is needed.\par -Rule of 2's: Avoid eating too late, too fast, too much, too spicy or within two hours of bedtime \par \par problem 5: (+) OSAS (secondary to snoring, nonrestorative sleep and A-fib) -refused prescription \par -refused prescription for sleep study (APAP) - refused\par -recommended to consider oral appliance with Dr. Gabby Lopez\par Sleep apnea is associated with adverse clinical consequences which an affect most organ systems. Cardiovascular disease risk includes arrhythmias, atrial fibrillation, hypertension, coronary artery disease, and stroke. Metabolic disorders include diabetes type 2, non-alcoholic fatty liver disease. Mood disorder especially depression; and cognitive decline especially in the elderly. Associations with chronic reflux/Candelaria’s esophagus some but not all inclusive. \par -Reasons include arousal consistent with hypopnea; respiratory events most prominent in REM sleep or supine position; therefore sleep staging and body position are important for accurate diagnosis and estimation of AHI. \par \par problem 5: smoking history/lung cancer screening\par -follow up chest CT 1 month\par \par problem 6: poor breathing mechanics\par -Proper breathing techniques were reviewed with an emphasis of exhalation. Patient instructed to breath in for 1 second and out for four seconds. Patient was encouraged to not talk while walking.\par \par problem 7: overweight/out of shape\par -Weight loss, exercise, and diet control were discussed and are highly encouraged. Treatment options were given such as, aqua therapy, and contacting a nutritionist. Recommended to use the elliptical, stationary bike, less use of treadmill. Mindful eating was explained to the patient. Obesity is associated with worsening asthma, shortness of breath, and potential for cardiac disease, diabetes, and other underlying medical conditions.\par \par problem 8: CAD\par -recommended to follow up with cardiologist Dr. Carlos\par \par problem 9: poor balance\par -recommended to complete balance therapy and gait training\par \par problem 10: health maintenance \par -recommended to consult with a neurologist \par -recommended influenza vaccination during flu season\par -recommended strep pneumonia vaccines: Prevnar-13 vaccine, followed by Pneumo vaccine 23 one year following\par -recommended early intervention for URIs\par -recommended regular osteoporosis evaluations\par -recommended early dermatological evaluations\par -recommended after the age of 50 to the age of 70, colonoscopy every 5 years \par  \par \par Follow up in 2 weeks. \par Patient is encouraged to call with any changes, concerns or questions.

## 2018-09-11 NOTE — PROCEDURE
[FreeTextEntry1] : CXR revealed  mild cardiomegaly, LOOP, some significant near-complete resolution of the prior interstitial markings.

## 2018-09-11 NOTE — PHYSICAL EXAM
[General Appearance - Well Developed] : well developed [Well Groomed] : well groomed [General Appearance - Well Nourished] : well nourished [No Deformities] : no deformities [General Appearance - In No Acute Distress] : no acute distress [Normal Conjunctiva] : the conjunctiva exhibited no abnormalities [Eyelids - No Xanthelasma] : the eyelids demonstrated no xanthelasmas [Normal Oropharynx] : normal oropharynx [III] : III [Neck Appearance] : the appearance of the neck was normal [Neck Cervical Mass (___cm)] : no neck mass was observed [Jugular Venous Distention Increased] : there was no jugular-venous distention [Thyroid Diffuse Enlargement] : the thyroid was not enlarged [Thyroid Nodule] : there were no palpable thyroid nodules [Heart Sounds] : normal S1 and S2 [Murmurs] : no murmurs present [Respiration, Rhythm And Depth] : normal respiratory rhythm and effort [Exaggerated Use Of Accessory Muscles For Inspiration] : no accessory muscle use [Auscultation Breath Sounds / Voice Sounds] : lungs were clear to auscultation bilaterally [Abdomen Soft] : soft [Abdomen Tenderness] : non-tender [Abdomen Mass (___ Cm)] : no abdominal mass palpated [Abnormal Walk] : normal gait [Gait - Sufficient For Exercise Testing] : the gait was sufficient for exercise testing [Nail Clubbing] : no clubbing of the fingernails [Cyanosis, Localized] : no localized cyanosis [Petechial Hemorrhages (___cm)] : no petechial hemorrhages [1+ Pitting] : 1+  pitting [Skin Color & Pigmentation] : normal skin color and pigmentation [] : no rash [No Venous Stasis] : no venous stasis [Skin Lesions] : no skin lesions [No Skin Ulcers] : no skin ulcer [No Xanthoma] : no  xanthoma was observed [Deep Tendon Reflexes (DTR)] : deep tendon reflexes were 2+ and symmetric [Sensation] : the sensory exam was normal to light touch and pinprick [No Focal Deficits] : no focal deficits [Oriented To Time, Place, And Person] : oriented to person, place, and time [Impaired Insight] : insight and judgment were intact [Affect] : the affect was normal [FreeTextEntry1] : I:E ratio 1:3; clear

## 2018-09-11 NOTE — HISTORY OF PRESENT ILLNESS
[FreeTextEntry1] : Mr. Mccullough is a 78 year old male with a history of amiodarone toxicity, GERD, hypoxemia, lung consolidation, LILO overweight, interstitial pneumonitis, poor sleep/balance, snoring and SOB presenting to the office today for a follow up visit. His chief complaint is \par - He comes in stating that he feels remarkably improved.\par - He complains of constant sour taste in the mouth, but he notes that it has improved. \par - He continues to have some issues with his left leg, but he states that they are much more improved. His wife states that his improvement is significant. \par - His sinuses are fine\par - His LE edema is improved since his prior visit. \par \par - He denies any headaches, nausea, vomiting, fever, chills, sweats, chest pain, chest pressure, palpitations, SOB, coughing, wheezing, fatigue, diarrhea, constipation, dysphagia, myalgias, dizziness, leg swelling, leg pain, itchy eyes, itchy ears, heartburn, reflux.

## 2018-09-11 NOTE — REASON FOR VISIT
[Follow-Up] : a follow-up visit [Spouse] : spouse [FreeTextEntry1] : amiodarone toxicity, GERD, hypoxemia, lung consolidation, LILO overweight, interstitial pneumonitis, poor sleep/balance, snoring and SOB

## 2018-09-11 NOTE — ADDENDUM
[FreeTextEntry1] : Documented by Andres Green acting as a scribe for Dr. Lele Saul on 9/11/18\par \par All medical record entries made by the Scribe were at my, Dr. Lele Saul's, direction and personally dictated by me on 9/11/18. I have reviewed the chart and agree that the record accurately reflects my personal performance of the history, physical exam, assessment and plan. I have also personally directed, reviewed, and agree with the discharge instructions. \par \par \par \par \par

## 2018-09-12 ENCOUNTER — TRANSCRIPTION ENCOUNTER (OUTPATIENT)
Age: 78
End: 2018-09-12

## 2018-09-24 ENCOUNTER — TRANSCRIPTION ENCOUNTER (OUTPATIENT)
Age: 78
End: 2018-09-24

## 2018-09-25 ENCOUNTER — APPOINTMENT (OUTPATIENT)
Dept: RHEUMATOLOGY | Facility: CLINIC | Age: 78
End: 2018-09-25
Payer: MEDICARE

## 2018-09-25 VITALS
HEIGHT: 65 IN | OXYGEN SATURATION: 98 % | WEIGHT: 221 LBS | TEMPERATURE: 98.5 F | DIASTOLIC BLOOD PRESSURE: 55 MMHG | BODY MASS INDEX: 36.82 KG/M2 | SYSTOLIC BLOOD PRESSURE: 121 MMHG | HEART RATE: 87 BPM

## 2018-09-25 DIAGNOSIS — M25.562 PAIN IN LEFT KNEE: ICD-10-CM

## 2018-09-25 DIAGNOSIS — M62.81 MUSCLE WEAKNESS (GENERALIZED): ICD-10-CM

## 2018-09-25 DIAGNOSIS — M25.512 PAIN IN LEFT SHOULDER: ICD-10-CM

## 2018-09-25 PROCEDURE — 99204 OFFICE O/P NEW MOD 45 MIN: CPT

## 2018-09-26 RX ORDER — PREDNISONE 10 MG/1
10 TABLET ORAL
Qty: 100 | Refills: 0 | Status: DISCONTINUED | COMMUNITY
Start: 2018-08-31 | End: 2018-09-26

## 2018-09-26 RX ORDER — POTASSIUM CHLORIDE 1500 MG/1
20 TABLET, EXTENDED RELEASE ORAL
Qty: 30 | Refills: 0 | Status: COMPLETED | COMMUNITY
Start: 2018-09-13

## 2018-09-26 RX ORDER — NAPROXEN 500 MG/1
500 TABLET ORAL
Qty: 14 | Refills: 0 | Status: DISCONTINUED | COMMUNITY
Start: 2018-02-17 | End: 2018-09-26

## 2018-09-26 RX ORDER — PANTOPRAZOLE 40 MG/1
40 TABLET, DELAYED RELEASE ORAL
Refills: 0 | Status: COMPLETED | COMMUNITY

## 2018-09-26 RX ORDER — CHOLECALCIFEROL (VITAMIN D3) 25 MCG
TABLET ORAL
Refills: 0 | Status: COMPLETED | COMMUNITY

## 2018-09-26 RX ORDER — POTASSIUM CHLORIDE 1.5 G/1
20 POWDER, FOR SOLUTION ORAL
Refills: 0 | Status: COMPLETED | COMMUNITY

## 2018-09-26 RX ORDER — PREDNISONE 20 MG/1
20 TABLET ORAL DAILY
Refills: 0 | Status: DISCONTINUED | COMMUNITY
End: 2018-09-26

## 2018-09-26 RX ORDER — PREDNISONE 10 MG/1
10 TABLET ORAL
Qty: 100 | Refills: 0 | Status: DISCONTINUED | COMMUNITY
Start: 2018-06-25 | End: 2018-09-26

## 2018-09-27 ENCOUNTER — TRANSCRIPTION ENCOUNTER (OUTPATIENT)
Age: 78
End: 2018-09-27

## 2018-10-03 ENCOUNTER — APPOINTMENT (OUTPATIENT)
Dept: MRI IMAGING | Facility: CLINIC | Age: 78
End: 2018-10-03

## 2018-10-05 ENCOUNTER — APPOINTMENT (OUTPATIENT)
Dept: PULMONOLOGY | Facility: CLINIC | Age: 78
End: 2018-10-05

## 2018-10-10 ENCOUNTER — NON-APPOINTMENT (OUTPATIENT)
Age: 78
End: 2018-10-10

## 2018-10-10 ENCOUNTER — APPOINTMENT (OUTPATIENT)
Dept: PULMONOLOGY | Facility: CLINIC | Age: 78
End: 2018-10-10
Payer: MEDICARE

## 2018-10-10 VITALS
DIASTOLIC BLOOD PRESSURE: 70 MMHG | SYSTOLIC BLOOD PRESSURE: 130 MMHG | BODY MASS INDEX: 36.49 KG/M2 | HEIGHT: 65 IN | WEIGHT: 219 LBS | HEART RATE: 87 BPM | OXYGEN SATURATION: 97 %

## 2018-10-10 PROCEDURE — 94010 BREATHING CAPACITY TEST: CPT

## 2018-10-10 PROCEDURE — 71046 X-RAY EXAM CHEST 2 VIEWS: CPT

## 2018-10-10 PROCEDURE — 99214 OFFICE O/P EST MOD 30 MIN: CPT | Mod: 25

## 2018-10-10 NOTE — PROCEDURE
[FreeTextEntry1] : PFT revealed normal flows, with a FEV1 of  2.48 ,which is 85% of predicted, with a normal flow volume loop \par \par CXR revealed mild cardiomegaly, right upper lung scar and left upper lung scar, resolution of prior increased interstitial infiltrates

## 2018-10-10 NOTE — ADDENDUM
[FreeTextEntry1] : Documented by Bala Bran acting as a scribe for Dr. Lele Saul on 10/10/18.\par \par All medical record entries made by the Scribe were at my, Dr. Lele Saul's, direction and personally dictated by me on 10/10/18. I have reviewed the chart and agree that the record accurately reflects my personal performance of the history, physical exam, assessment and plan. I have also personally directed, reviewed, and agree with the discharge instructions. \par \par \par

## 2018-10-10 NOTE — REASON FOR VISIT
[Follow-Up] : a follow-up visit [FreeTextEntry1] : amiodarone toxicity, former smoker, GERD, lung consolidation, LILO, overweight, interstitial pneumonitis, poor balance, poor sleep, snoring and SOB

## 2018-10-10 NOTE — PHYSICAL EXAM
[General Appearance - Well Developed] : well developed [Well Groomed] : well groomed [General Appearance - Well Nourished] : well nourished [No Deformities] : no deformities [General Appearance - In No Acute Distress] : no acute distress [Normal Conjunctiva] : the conjunctiva exhibited no abnormalities [Eyelids - No Xanthelasma] : the eyelids demonstrated no xanthelasmas [Normal Oropharynx] : normal oropharynx [III] : III [Neck Appearance] : the appearance of the neck was normal [Neck Cervical Mass (___cm)] : no neck mass was observed [Jugular Venous Distention Increased] : there was no jugular-venous distention [Thyroid Diffuse Enlargement] : the thyroid was not enlarged [Thyroid Nodule] : there were no palpable thyroid nodules [Heart Sounds] : normal S1 and S2 [Murmurs] : no murmurs present [Respiration, Rhythm And Depth] : normal respiratory rhythm and effort [Exaggerated Use Of Accessory Muscles For Inspiration] : no accessory muscle use [Auscultation Breath Sounds / Voice Sounds] : lungs were clear to auscultation bilaterally [Abdomen Soft] : soft [Abdomen Tenderness] : non-tender [Abdomen Mass (___ Cm)] : no abdominal mass palpated [Gait - Sufficient For Exercise Testing] : the gait was sufficient for exercise testing [Nail Clubbing] : no clubbing of the fingernails [Cyanosis, Localized] : no localized cyanosis [Petechial Hemorrhages (___cm)] : no petechial hemorrhages [1+ Pitting] : 1+  pitting [Skin Color & Pigmentation] : normal skin color and pigmentation [] : no rash [No Venous Stasis] : no venous stasis [Skin Lesions] : no skin lesions [No Skin Ulcers] : no skin ulcer [No Xanthoma] : no  xanthoma was observed [Deep Tendon Reflexes (DTR)] : deep tendon reflexes were 2+ and symmetric [Sensation] : the sensory exam was normal to light touch and pinprick [No Focal Deficits] : no focal deficits [Oriented To Time, Place, And Person] : oriented to person, place, and time [Impaired Insight] : insight and judgment were intact [Affect] : the affect was normal [FreeTextEntry1] : using a cane

## 2018-10-10 NOTE — ASSESSMENT
[FreeTextEntry1] : Mr. Mccullough is a 78 year old male with a history of A-fib, HTN, former smoker, HLD, and obesity, OSAS presenting to the office s/p hospitalization for amiodarone toxicity for an follow up pulmonary re-evaluation, s/p flair of ILDz and is controlled\par \par His shortness of breath is multifactorial due to:\par -poor balance/poor balance\par -obesity/out of shape\par -?CAD\par -pulmonary diseases (?COPD, PJP) ; Pneumonitis\par \par His Pneumonitis (controlled) with ?etiology could be due to:\par -BOOP\par -\par -sarcoidosis\par -hypersensitivity pneumonitis- possible\par -Amiodarone therapy (doubtful) \par \par Problem 1: ILDz of ? Etiology (doubt amio)\par Prednisone taper, 10mg one even days, 5 mg on odd days for 30 days\par Information sheet given to the patient to be reviewed, this medication is never to be used without consulting the prescribing physician. Proper dietary restraint is necessary specifically salt containing foods, if any reaction may occur should be reported. \par -Repeat CXR in one month\par - Etiology will depend on the causative agent possibilities include: idiopathic pulmonary fibrosis (UIP), NSIP (nonspecific interstitial pneumonia) , respiratory bronchiolitis in someone who is a smoker, drug induced lung disease, hypersensitivity pneumonitis, BOOP, sarcoidosis, chronic congestive heart failure, rheumatologic disorder induced interstitial lung disease. Optimal diagnosing will include rheumatological panel which would include ESR, MATEO, ANCA, ARNP, CCP, rheumatoid factor, hypersensitivity panel, JOE1, scleroderma antibodies, sjogren's syndrome antibodies; biopsy will be necessary to definitively determine the etiology unless the CT scan findings are specific for UIP. Therapy will be based on diagnostics.\par \par problem 2 : amiodarone toxicity\par -off amiodarone permanently\par \par problem 3: PJP prophylaxis / ?COPD\par -Continue Bactrim 1 tablet Monday, Wednesday, Friday until on 5mg of Prednisone\par -He is s/p Rheumatologic Blood work to include: ESR panel (+) , ACE, panel (-) , HP panel (-)\par - s/p rheumatological evaluation. \par \par -add Prednisone 20 mg x 2 weeks and then 10 mg for 2 weeks. He is to have a follow up visit prior to any further tapering of his prednisone. \par Information sheet given to the patient to be reviewed, this medication is never to be used without consulting the prescribing physician. Proper dietary restraint is necessary specifically salt containing foods, if any reaction may occur should be reported. \par \par PJP Pneumonia is a major opportunistic infection in immunocompromised patients and corticosteroid therapy is a risk factor. Therefore, patients receiving at least 4 weeks of therapy at 30 mg or more daily should be on prophylaxis.\par  \par problem 4: GERD\par -continue Protonix 40mg QD in the morning\par -Things to avoid including overeating, spicy foods, tight clothing, eating within three hours of bed, this list is not all inclusive. \par -For treatment of reflux, possible options discussed including diet control, H2 blockers, PPIs, as well as coating motility agents discussed as treatment options. Timing of meals and proximity of last meal to sleep were discussed. If symptoms persist, a formal gastrointestinal evaluation is needed.\par -Rule of 2's: Avoid eating too late, too fast, too much, too spicy or within two hours of bedtime \par \par problem 5: (+) OSAS (secondary to snoring, nonrestorative sleep and A-fib) -refused prescription \par -refused prescription for sleep study (APAP) - refused\par -recommended to consider oral appliance with Dr. Gabby Lopez\par Sleep apnea is associated with adverse clinical consequences which an affect most organ systems. Cardiovascular disease risk includes arrhythmias, atrial fibrillation, hypertension, coronary artery disease, and stroke. Metabolic disorders include diabetes type 2, non-alcoholic fatty liver disease. Mood disorder especially depression; and cognitive decline especially in the elderly. Associations with chronic reflux/Candelaria’s esophagus some but not all inclusive. \par -Reasons include arousal consistent with hypopnea; respiratory events most prominent in REM sleep or supine position; therefore sleep staging and body position are important for accurate diagnosis and estimation of AHI. \par \par problem 5: smoking history/lung cancer screening\par -follow up chest CT 1 month\par \par problem 6: poor breathing mechanics\par -Proper breathing techniques were reviewed with an emphasis of exhalation. Patient instructed to breath in for 1 second and out for four seconds. Patient was encouraged to not talk while walking.\par \par problem 7: overweight/out of shape\par -Weight loss, exercise, and diet control were discussed and are highly encouraged. Treatment options were given such as, aqua therapy, and contacting a nutritionist. Recommended to use the elliptical, stationary bike, less use of treadmill. Mindful eating was explained to the patient. Obesity is associated with worsening asthma, shortness of breath, and potential for cardiac disease, diabetes, and other underlying medical conditions.\par \par problem 8: CAD\par -recommended to follow up with cardiologist Dr. Carlos\par \par problem 9: poor balance\par -recommended to complete balance therapy and gait training\par \par problem 10: health maintenance \par -recommended to consult with a neurologist \par -recommended influenza vaccination during flu season\par -recommended strep pneumonia vaccines: Prevnar-13 vaccine, followed by Pneumo vaccine 23 one year following\par -recommended early intervention for URIs\par -recommended regular osteoporosis evaluations\par -recommended early dermatological evaluations\par -recommended after the age of 50 to the age of 70, colonoscopy every 5 years \par  \par \par Follow up in 2 weeks. \par Patient is encouraged to call with any changes, concerns or questions.

## 2018-10-10 NOTE — HISTORY OF PRESENT ILLNESS
[FreeTextEntry1] : Mr. Mccullough is a 78 year old male presenting to the office today for a follow up visit with a history of amiodarone toxicity, former smoker, GERD, lung consolidation, LILO, overweight, interstitial pneumonitis, poor balance, poor sleep, snoring and SOB. His chief complaint is nocturia\par -he notes he is feeling well today\par -he reports his sleep has recently improved\par -he states he sleeps for 7-8 hours but his sleep is interrupted due to 3-4 episodes of nocturia\par -he reports his nocturia is due to his diuretic \par -he notes his walking has improved and his exercising often\par -he states he is currently on 10mg of Prednisone daily\par -he denies any coughing, wheezing, headaches, nausea, vomiting, fever, chills, chest pain, chest pressure, diarrhea, constipation, dysphagia, dizziness, itchy eyes, itchy ears

## 2018-10-23 NOTE — CONSULT NOTE ADULT - PROBLEM SELECTOR RECOMMENDATION 3
-Will need to review NRAD images as these do not correlate with CXR findings  -Await results of bronchoscopy - hopefully with biopsy to rule out infectious etiologies. Would suggest checking cell counts as well as routine cultures  -Short interval followup CT chest   -If patient worsens despite aggressive therapy may need to repeat imaging while in hospital and consider an interstitial lung disease  -If no improvement with diuresis would check rheumatologic workup Statement Selected

## 2018-10-29 ENCOUNTER — TRANSCRIPTION ENCOUNTER (OUTPATIENT)
Age: 78
End: 2018-10-29

## 2018-11-12 ENCOUNTER — TRANSCRIPTION ENCOUNTER (OUTPATIENT)
Age: 78
End: 2018-11-12

## 2018-11-13 ENCOUNTER — APPOINTMENT (OUTPATIENT)
Dept: PULMONOLOGY | Facility: CLINIC | Age: 78
End: 2018-11-13
Payer: MEDICARE

## 2018-11-13 ENCOUNTER — NON-APPOINTMENT (OUTPATIENT)
Age: 78
End: 2018-11-13

## 2018-11-13 VITALS
BODY MASS INDEX: 33.65 KG/M2 | RESPIRATION RATE: 17 BRPM | HEART RATE: 104 BPM | OXYGEN SATURATION: 97 % | DIASTOLIC BLOOD PRESSURE: 60 MMHG | HEIGHT: 68 IN | WEIGHT: 222 LBS | SYSTOLIC BLOOD PRESSURE: 112 MMHG

## 2018-11-13 PROCEDURE — 99214 OFFICE O/P EST MOD 30 MIN: CPT | Mod: 25

## 2018-11-13 PROCEDURE — 71046 X-RAY EXAM CHEST 2 VIEWS: CPT

## 2018-11-13 PROCEDURE — 94010 BREATHING CAPACITY TEST: CPT

## 2018-11-13 NOTE — ASSESSMENT
[FreeTextEntry1] : Mr. Mccullough is a 78 year old male with a history of A-fib, HTN, former smoker, HLD, and obesity, OSAS presenting to the office s/p hospitalization for amiodarone toxicity/ interstitial pneumonitis (NSIP) for an follow up pulmonary re-evaluation, s/p flair of ILDz and is currently controlled\par \par His shortness of breath is multifactorial due to:\par -poor balance/poor balance\par -obesity/out of shape\par -?CAD\par -pulmonary diseases (?COPD, PJP) ; Pneumonitis\par \par His Pneumonitis (controlled) with ?etiology could be due to:\par -BOOP\par -\par -sarcoidosis\par -hypersensitivity pneumonitis- possible\par -Amiodarone therapy (doubtful) \par - NSIP\par \par Problem 1: ILDz of ? Etiology (doubt amio)- ?NSIP\par Prednisone taper: 5 mg QD for 6 weeks \par Information sheet given to the patient to be reviewed, this medication is never to be used without consulting the prescribing physician. Proper dietary restraint is necessary specifically salt containing foods, if any reaction may occur should be reported. \par -Repeat CXR in 6 weeks \par - Etiology will depend on the causative agent possibilities include: idiopathic pulmonary fibrosis (UIP), NSIP (nonspecific interstitial pneumonia) , respiratory bronchiolitis in someone who is a smoker, drug induced lung disease, hypersensitivity pneumonitis, BOOP, sarcoidosis, chronic congestive heart failure, rheumatologic disorder induced interstitial lung disease. Optimal diagnosing will include rheumatological panel which would include ESR, MATEO, ANCA, ARNP, CCP, rheumatoid factor, hypersensitivity panel, JOE1, scleroderma antibodies, sjogren's syndrome antibodies; biopsy will be necessary to definitively determine the etiology unless the CT scan findings are specific for UIP. Therapy will be based on diagnostics.\par \par problem 2 : amiodarone toxicity\par -off amiodarone permanently\par \par problem 3: PJP prophylaxis / ?COPD\par -He is off Bactrim 1 tablet Monday, Wednesday, Friday \par -He is s/p Rheumatologic Blood work to include: ESR panel (+) , ACE, panel (-) , HP panel (-)\par - s/p rheumatological evaluation. \par \par -add Prednisone 20 mg x 2 weeks and then 10 mg for 2 weeks. He is to have a follow up visit prior to any further tapering of his prednisone. \par Information sheet given to the patient to be reviewed, this medication is never to be used without consulting the prescribing physician. Proper dietary restraint is necessary specifically salt containing foods, if any reaction may occur should be reported. \par \par PJP Pneumonia is a major opportunistic infection in immunocompromised patients and corticosteroid therapy is a risk factor. Therefore, patients receiving at least 4 weeks of therapy at 30 mg or more daily should be on prophylaxis.\par  \par problem 4: GERD\par -continue Protonix 40mg QD in the morning\par -Things to avoid including overeating, spicy foods, tight clothing, eating within three hours of bed, this list is not all inclusive. \par -For treatment of reflux, possible options discussed including diet control, H2 blockers, PPIs, as well as coating motility agents discussed as treatment options. Timing of meals and proximity of last meal to sleep were discussed. If symptoms persist, a formal gastrointestinal evaluation is needed.\par -Rule of 2's: Avoid eating too late, too fast, too much, too spicy or within two hours of bedtime \par \par problem 5: (+) OSAS (secondary to snoring, nonrestorative sleep and A-fib) -refused prescription \par -refused prescription for sleep study (APAP) - refused\par -recommended to consider oral appliance with Dr. Gabby Lopez\par Sleep apnea is associated with adverse clinical consequences which an affect most organ systems. Cardiovascular disease risk includes arrhythmias, atrial fibrillation, hypertension, coronary artery disease, and stroke. Metabolic disorders include diabetes type 2, non-alcoholic fatty liver disease. Mood disorder especially depression; and cognitive decline especially in the elderly. Associations with chronic reflux/Candelaria’s esophagus some but not all inclusive. \par -Reasons include arousal consistent with hypopnea; respiratory events most prominent in REM sleep or supine position; therefore sleep staging and body position are important for accurate diagnosis and estimation of AHI. \par \par problem 5: smoking history/lung cancer screening\par -follow up chest CT 1/ 2019\par \par problem 6: poor breathing mechanics\par -Proper breathing techniques were reviewed with an emphasis of exhalation. Patient instructed to breath in for 1 second and out for four seconds. Patient was encouraged to not talk while walking.\par \par problem 7: overweight/out of shape\par -Weight loss, exercise, and diet control were discussed and are highly encouraged. Treatment options were given such as, aqua therapy, and contacting a nutritionist. Recommended to use the elliptical, stationary bike, less use of treadmill. Mindful eating was explained to the patient. Obesity is associated with worsening asthma, shortness of breath, and potential for cardiac disease, diabetes, and other underlying medical conditions.\par \par problem 8: CAD\par -recommended to follow up with cardiologist Dr. Carlos\par \par problem 9: poor balance\par -recommended to complete balance therapy and gait training\par \par problem 10: health maintenance \par - He is s/p consult with a neurologist \par - He will be receiving an influenza vaccine 2018 on 11/15/2018\par -recommended strep pneumonia vaccines: Prevnar-13 vaccine, followed by Pneumo vaccine 23 one year following\par -recommended early intervention for URIs\par -recommended regular osteoporosis evaluations\par -recommended early dermatological evaluations\par -recommended after the age of 50 to the age of 70, colonoscopy every 5 years \par  \par \par Follow up in 6 weeks. \par Patient is encouraged to call with any changes, concerns or questions.

## 2018-11-13 NOTE — ADDENDUM
[FreeTextEntry1] : Documented by Andres Green acting as a scribe for Dr. Lele Saul on 11/13/18\par \par All medical record entries made by the Scribe were at my, Dr. Lele Saul's, direction and personally dictated by me on 11/13/18. I have reviewed the chart and agree that the record accurately reflects my personal performance of the history, physical exam, assessment and plan. I have also personally directed, reviewed, and agree with the discharge instructions. \par \par \par \par \par

## 2018-11-13 NOTE — PROCEDURE
[FreeTextEntry1] : PFT- spi reveals normal flows; FEV1 was 2.18L which is 82% of predicted; normal flow volume loop \par \par CXR revealed mild cardiomegaly, mild scoliosis to the right, very fine scarring left middle, upper lung; there was no evidence of infiltrate or effusion.

## 2018-11-13 NOTE — PHYSICAL EXAM
[General Appearance - Well Developed] : well developed [Well Groomed] : well groomed [General Appearance - Well Nourished] : well nourished [No Deformities] : no deformities [General Appearance - In No Acute Distress] : no acute distress [Normal Conjunctiva] : the conjunctiva exhibited no abnormalities [Eyelids - No Xanthelasma] : the eyelids demonstrated no xanthelasmas [Normal Oropharynx] : normal oropharynx [Neck Appearance] : the appearance of the neck was normal [Neck Cervical Mass (___cm)] : no neck mass was observed [Jugular Venous Distention Increased] : there was no jugular-venous distention [Thyroid Diffuse Enlargement] : the thyroid was not enlarged [Thyroid Nodule] : there were no palpable thyroid nodules [Heart Sounds] : normal S1 and S2 [Respiration, Rhythm And Depth] : normal respiratory rhythm and effort [Exaggerated Use Of Accessory Muscles For Inspiration] : no accessory muscle use [Abdomen Soft] : soft [Abdomen Tenderness] : non-tender [Abdomen Mass (___ Cm)] : no abdominal mass palpated [Gait - Sufficient For Exercise Testing] : the gait was sufficient for exercise testing [Nail Clubbing] : no clubbing of the fingernails [Cyanosis, Localized] : no localized cyanosis [Petechial Hemorrhages (___cm)] : no petechial hemorrhages [1+ Pitting] : 1+  pitting [Skin Color & Pigmentation] : normal skin color and pigmentation [] : no rash [No Venous Stasis] : no venous stasis [Skin Lesions] : no skin lesions [No Skin Ulcers] : no skin ulcer [No Xanthoma] : no  xanthoma was observed [Deep Tendon Reflexes (DTR)] : deep tendon reflexes were 2+ and symmetric [Sensation] : the sensory exam was normal to light touch and pinprick [No Focal Deficits] : no focal deficits [Oriented To Time, Place, And Person] : oriented to person, place, and time [Impaired Insight] : insight and judgment were intact [Affect] : the affect was normal [II] : II [Heart Rate And Rhythm] : heart rate and rhythm were normal [FreeTextEntry1] : using a cane

## 2018-11-13 NOTE — HISTORY OF PRESENT ILLNESS
[FreeTextEntry1] : Mr. Mccullough is a 78 year old male presenting to the office today for a follow up visit with a history of amiodarone toxicity, former smoker, GERD, lung consolidation, LILO, overweight, interstitial pneumonitis, poor balance, poor sleep, snoring and SOB. His chief complaint is health maintenance. \par - He comes in stating that he is feeling generally very well.\par - His wife states that he lost weight while in the hospital and never put it back on\par - He is exercising regularly with PT and a senior exercise class. \par - he reports that he is sleeping well, though he experiences some nocturia due to his diuretic, which he describes as "heavy"\par - he is getting an influenza \par - his wife denies that he snores/

## 2018-11-15 ENCOUNTER — APPOINTMENT (OUTPATIENT)
Dept: RHEUMATOLOGY | Facility: CLINIC | Age: 78
End: 2018-11-15

## 2018-11-15 ENCOUNTER — MEDICATION RENEWAL (OUTPATIENT)
Age: 78
End: 2018-11-15

## 2018-11-15 ENCOUNTER — TRANSCRIPTION ENCOUNTER (OUTPATIENT)
Age: 78
End: 2018-11-15

## 2018-11-16 ENCOUNTER — TRANSCRIPTION ENCOUNTER (OUTPATIENT)
Age: 78
End: 2018-11-16

## 2018-11-30 ENCOUNTER — RX RENEWAL (OUTPATIENT)
Age: 78
End: 2018-11-30

## 2018-12-07 ENCOUNTER — TRANSCRIPTION ENCOUNTER (OUTPATIENT)
Age: 78
End: 2018-12-07

## 2018-12-27 ENCOUNTER — APPOINTMENT (OUTPATIENT)
Dept: PULMONOLOGY | Facility: CLINIC | Age: 78
End: 2018-12-27
Payer: MEDICARE

## 2018-12-27 ENCOUNTER — NON-APPOINTMENT (OUTPATIENT)
Age: 78
End: 2018-12-27

## 2018-12-27 VITALS
HEIGHT: 68 IN | OXYGEN SATURATION: 99 % | HEART RATE: 65 BPM | WEIGHT: 223 LBS | DIASTOLIC BLOOD PRESSURE: 78 MMHG | BODY MASS INDEX: 33.8 KG/M2 | SYSTOLIC BLOOD PRESSURE: 125 MMHG | RESPIRATION RATE: 17 BRPM

## 2018-12-27 PROCEDURE — 99214 OFFICE O/P EST MOD 30 MIN: CPT | Mod: 25

## 2018-12-27 PROCEDURE — 94010 BREATHING CAPACITY TEST: CPT

## 2018-12-27 NOTE — ADDENDUM
[FreeTextEntry1] : Documented by Andres Green acting as a scribe for Dr. Lele Saul on 12/27/18\par \par All medical record entries made by the Scribe were at my, Dr. Lele Saul's, direction and personally dictated by me on 12/27/18. I have reviewed the chart and agree that the record accurately reflects my personal performance of the history, physical exam, assessment and plan. I have also personally directed, reviewed, and agree with the discharge instructions. \par \par \par \par \par

## 2018-12-27 NOTE — PHYSICAL EXAM
[General Appearance - Well Developed] : well developed [Well Groomed] : well groomed [General Appearance - Well Nourished] : well nourished [No Deformities] : no deformities [General Appearance - In No Acute Distress] : no acute distress [Normal Conjunctiva] : the conjunctiva exhibited no abnormalities [Eyelids - No Xanthelasma] : the eyelids demonstrated no xanthelasmas [Normal Oropharynx] : normal oropharynx [II] : II [Neck Appearance] : the appearance of the neck was normal [Neck Cervical Mass (___cm)] : no neck mass was observed [Jugular Venous Distention Increased] : there was no jugular-venous distention [Thyroid Diffuse Enlargement] : the thyroid was not enlarged [Thyroid Nodule] : there were no palpable thyroid nodules [Heart Rate And Rhythm] : heart rate and rhythm were normal [Heart Sounds] : normal S1 and S2 [Respiration, Rhythm And Depth] : normal respiratory rhythm and effort [Exaggerated Use Of Accessory Muscles For Inspiration] : no accessory muscle use [Abdomen Soft] : soft [Abdomen Tenderness] : non-tender [Abdomen Mass (___ Cm)] : no abdominal mass palpated [Gait - Sufficient For Exercise Testing] : the gait was sufficient for exercise testing [Nail Clubbing] : no clubbing of the fingernails [Cyanosis, Localized] : no localized cyanosis [Petechial Hemorrhages (___cm)] : no petechial hemorrhages [1+ Pitting] : 1+  pitting [Skin Color & Pigmentation] : normal skin color and pigmentation [] : no rash [No Venous Stasis] : no venous stasis [Skin Lesions] : no skin lesions [No Skin Ulcers] : no skin ulcer [No Xanthoma] : no  xanthoma was observed [Deep Tendon Reflexes (DTR)] : deep tendon reflexes were 2+ and symmetric [Sensation] : the sensory exam was normal to light touch and pinprick [No Focal Deficits] : no focal deficits [Oriented To Time, Place, And Person] : oriented to person, place, and time [Impaired Insight] : insight and judgment were intact [Affect] : the affect was normal [FreeTextEntry1] : using a cane

## 2018-12-27 NOTE — ASSESSMENT
[FreeTextEntry1] : Mr. 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 He is currently stable from a pulmonary perspective. \par \par His shortness of breath is multifactorial due to:\par -poor balance/poor balance\par -obesity/out of shape\par -?CAD\par -pulmonary diseases : ?COPD ; Pneumonitis\par \par His Pneumonitis (controlled) with ?etiology could be due to:\par -BOOP\par -\par -sarcoidosis\par -hypersensitivity pneumonitis- possible\par -Amiodarone therapy (doubtful) \par - NSIP\par \par Problem 1: ILDz of ? Etiology (doubt amio)- ?NSIP\par - Prednisone taper: 4 mg for 2 weeks 3 mg for 2 weeks, 2 mg for 2 weeks, then 1 mg for 2 weeks\par Information sheet given to the patient to be reviewed, this medication is never to be used without consulting the prescribing physician. Proper dietary restraint is necessary specifically salt containing foods, if any reaction may occur should be reported. \par -Repeat CXR in 6 weeks \par - Etiology will depend on the causative agent possibilities include: idiopathic pulmonary fibrosis (UIP), NSIP (nonspecific interstitial pneumonia) , respiratory bronchiolitis in someone who is a smoker, drug induced lung disease, hypersensitivity pneumonitis, BOOP, sarcoidosis, chronic congestive heart failure, rheumatologic disorder induced interstitial lung disease. Optimal diagnosing will include rheumatological panel which would include ESR, MATEO, ANCA, ARNP, CCP, rheumatoid factor, hypersensitivity panel, JOE1, scleroderma antibodies, sjogren's syndrome antibodies; biopsy will be necessary to definitively determine the etiology unless the CT scan findings are specific for UIP. Therapy will be based on diagnostics.\par \par problem 2 : amiodarone toxicity\par -off amiodarone permanently\par \par problem 3:s/p PJP prophylaxis- completed when below 20mg pred. dose per day\par -He is off Bactrim 1 tablet Monday, Wednesday, Friday \par -He is s/p Rheumatologic Blood work to include: ESR panel (+) , ACE, panel (-) , HP panel (-)\par - s/p rheumatological evaluation. \par - PJP Pneumonia is a major opportunistic infection in immunocompromised patients and corticosteroid therapy is a risk factor. Therefore, patients receiving at least 4 weeks of therapy at 30 mg or more daily should be on prophylaxis.\par  \par problem 4: GERD\par -continue Protonix 40mg QD in the morning-pre breakfast\par -Things to avoid including overeating, spicy foods, tight clothing, eating within three hours of bed, this list is not all inclusive. \par -For treatment of reflux, possible options discussed including diet control, H2 blockers, PPIs, as well as coating motility agents discussed as treatment options. Timing of meals and proximity of last meal to sleep were discussed. If symptoms persist, a formal gastrointestinal evaluation is needed.\par -Rule of 2's: Avoid eating too late, too fast, too much, too spicy or within two hours of bedtime \par \par problem 5: (+) OSAS (secondary to snoring, nonrestorative sleep and A-fib) -refused prescription \par -refused prescription for sleep study (APAP) - refused\par -recommended to consider oral appliance with Dr. Gabby Lopez\par Sleep apnea is associated with adverse clinical consequences which an affect most organ systems. Cardiovascular disease risk includes arrhythmias, atrial fibrillation, hypertension, coronary artery disease, and stroke. Metabolic disorders include diabetes type 2, non-alcoholic fatty liver disease. Mood disorder especially depression; and cognitive decline especially in the elderly. Associations with chronic reflux/Candelaria’s esophagus some but not all inclusive. \par -Reasons include arousal consistent with hypopnea; respiratory events most prominent in REM sleep or supine position; therefore sleep staging and body position are important for accurate diagnosis and estimation of AHI. \par \par problem 5: smoking history/lung cancer screening\par -follow up chest CT 1/ 2019\par \par problem 6: poor breathing mechanics\par -Proper breathing techniques were reviewed with an emphasis of exhalation. Patient instructed to breath in for 1 second and out for four seconds. Patient was encouraged to not talk while walking.\par \par problem 7: overweight/out of shape\par -Weight loss, exercise, and diet control were discussed and are highly encouraged. Treatment options were given such as, aqua therapy, and contacting a nutritionist. Recommended to use the elliptical, stationary bike, less use of treadmill. Mindful eating was explained to the patient. Obesity is associated with worsening asthma, shortness of breath, and potential for cardiac disease, diabetes, and other underlying medical conditions.\par \par problem 8: CAD\par -recommended to follow up with cardiologist Dr. Carlos\par \par problem 9: poor balance\par -recommended to complete balance therapy and gait training\par \par problem 10: health maintenance \par - He is s/p consult with a neurologist \par - Received influenza vaccine 2018\par -recommended strep pneumonia vaccines: Prevnar-13 vaccine, followed by Pneumo vaccine 23 one year following\par -recommended early intervention for URIs\par -recommended regular osteoporosis evaluations\par -recommended early dermatological evaluations\par -recommended after the age of 50 to the age of 70, colonoscopy every 5 years \par  \par \par Follow up in 6 weeks \par Patient is encouraged to call with any changes, concerns or questions.

## 2018-12-27 NOTE — PROCEDURE
[FreeTextEntry1] : PFT- spi reveals normal flows; FEV1 was 2.36L which is 89% of predicted; normal flow volume loop

## 2018-12-27 NOTE — HISTORY OF PRESENT ILLNESS
[FreeTextEntry1] : Mr. Mccullough is a 78 year old male presenting to the office today for a follow up visit with a history of amiodarone toxicity, former smoker, GERD, lung consolidation, LILO, overweight, interstitial pneumonitis, poor balance, poor sleep, snoring and SOB. His chief complaint is nocturia. \par - He comes in stating that his ambulation, breathing, and sleeping has improved.\par - He reports that if he sleeps supine, he feels fine, but if he sleeps on his side, he feels his shoulder pain\par - His weight is relatively stable\par - his appetite is fine\par - He is experiencing nocturia due to a diuretic. \par - He is always having muscle aches and pains with his shoulder and his left leg. \par - He is currently on 5mg of prednisone QD. \par - He has been on 5 mg of prednisone since his previous visit (6 weeks). \par - He denies any headaches, nausea, vomiting, fever, chills, sweats, chest pain, chest pressure, palpitations, SOB, coughing, wheezing, fatigue, diarrhea, constipation, dysphagia, dizziness, leg swelling, itchy eyes, itchy ears, heartburn, reflux, or sour taste in the mouth.

## 2019-02-21 ENCOUNTER — APPOINTMENT (OUTPATIENT)
Dept: PULMONOLOGY | Facility: CLINIC | Age: 79
End: 2019-02-21
Payer: MEDICARE

## 2019-02-21 VITALS
WEIGHT: 228 LBS | RESPIRATION RATE: 17 BRPM | DIASTOLIC BLOOD PRESSURE: 65 MMHG | SYSTOLIC BLOOD PRESSURE: 130 MMHG | HEART RATE: 87 BPM | HEIGHT: 66 IN | BODY MASS INDEX: 36.64 KG/M2 | OXYGEN SATURATION: 94 %

## 2019-02-21 PROCEDURE — 99214 OFFICE O/P EST MOD 30 MIN: CPT | Mod: 25

## 2019-02-21 PROCEDURE — 71046 X-RAY EXAM CHEST 2 VIEWS: CPT

## 2019-02-21 NOTE — PROCEDURE
[FreeTextEntry1] : CXR reveals mild cardiomegaly; no evidence of infiltrate or effusion- mild right upper lung haze

## 2019-02-21 NOTE — PHYSICAL EXAM

## 2019-02-21 NOTE — HISTORY OF PRESENT ILLNESS
[FreeTextEntry1] : Mr. Mccullough is a 78 year old male with a history of amiodarone toxicity, former smoker, GERD, hypoxemia, lung consolidation, LILO, OW, interstitial pneumonitis, poor balance/sleep, snoring and SOB presenting to the office today for a follow up visit. His chief complaint is abnormal gait\par -He states that he has generally been feeling well \par -he reports occasional diarrhea\par -he notes that his walking has improved, but not back to 100% \par -he notes that he has an occasional cough\par -he is currently on his last day of the prednisone taper\par -he states that he has never gotten adequate sleep, as his sleep is frequently interrupted\par -he denies any headaches, nausea, vomiting, fever, chills, sweats, chest pain, chest pressure, constipation, dysphagia, dizziness, leg swelling, leg pain, itchy eyes, itchy ears, heartburn, reflux, or sour taste in the mouth.

## 2019-02-21 NOTE — REASON FOR VISIT
[Follow-Up] : a follow-up visit [Spouse] : spouse [FreeTextEntry1] : amiodarone toxicity, former smoker, GERD, hypoxemia, lung consolidation, LILO, OW, interstitial pneumonitis, poor balance/sleep, snoring and SOB

## 2019-02-21 NOTE — ADDENDUM
[FreeTextEntry1] : All medical record entries made by henry Tatum were at Dr. Lele Sual's, direction and personally dictated by me on 02/21/2019. I have reviewed the chart and agree that the record accurately reflects my personal performance of the history, physical exam, assessment and plan. I have also personally directed, reviewed, and agree with the discharge instructions.

## 2019-02-21 NOTE — ASSESSMENT
[FreeTextEntry1] : Mr. 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 He is currently stable from a pulmonary perspective. \par \par His shortness of breath is multifactorial due to:\par -poor balance/poor balance\par -obesity/out of shape\par -?CAD\par -pulmonary diseases : ?COPD ; Pneumonitis\par \par His Pneumonitis (controlled) with ?etiology could be due to:\par -BOOP\par -\par -sarcoidosis\par -hypersensitivity pneumonitis- possible\par -Amiodarone therapy (doubtful) \par - NSIP\par \par Problem 1: ILDz of ? Etiology (doubt amio)- ?NSIP\par -Prednisone taper: completed\par Information sheet given to the patient to be reviewed, this medication is never to be used without consulting the prescribing physician. Proper dietary restraint is necessary specifically salt containing foods, if any reaction may occur should be reported. \par -Repeat CXR in 6 weeks \par \par - Etiology will depend on the causative agent possibilities include: idiopathic pulmonary fibrosis (UIP), NSIP (nonspecific interstitial pneumonia) , respiratory bronchiolitis in someone who is a smoker, drug induced lung disease, hypersensitivity pneumonitis, BOOP, sarcoidosis, chronic congestive heart failure, rheumatologic disorder induced interstitial lung disease. Optimal diagnosing will include rheumatological panel which would include ESR, MATEO, ANCA, ARNP, CCP, rheumatoid factor, hypersensitivity panel, JOE1, scleroderma antibodies, sjogren's syndrome antibodies; biopsy will be necessary to definitively determine the etiology unless the CT scan findings are specific for UIP. Therapy will be based on diagnostics.\par \par problem 2 : amiodarone toxicity\par -off amiodarone permanently\par Amiodarone/bleomycin/methotrexate and other drugs have been associated with pulmonary parenchymal damage. These drugs require pulmonary function testing including DLCO regularly and possible CT/CXR if respiratory complaints develop. PFTs should be performed at 6 month intervals \par \par problem 3:s/p PJP prophylaxis- completed went below 20 mg pred. dose per day\par -He is off Bactrim 1 tablet Monday, Wednesday, Friday \par -He is s/p Rheumatologic Blood work to include: ESR panel (+) , ACE, panel (-) , HP panel (-)\par - s/p rheumatological evaluation. \par - PJP Pneumonia is a major opportunistic infection in immunocompromised patients and corticosteroid therapy is a risk factor. Therefore, patients receiving at least 4 weeks of therapy at 30 mg or more daily should be on prophylaxis.\par  \par problem 4: GERD\par -continue Protonix 40 mg QAM, pre-breakfast\par -Things to avoid including overeating, spicy foods, tight clothing, eating within three hours of bed, this list is not all inclusive. \par -For treatment of reflux, possible options discussed including diet control, H2 blockers, PPIs, as well as coating motility agents discussed as treatment options. Timing of meals and proximity of last meal to sleep were discussed. If symptoms persist, a formal gastrointestinal evaluation is needed.\par -Rule of 2's: Avoid eating too late, too fast, too much, too spicy or within two hours of bedtime \par \par problem 5: (+) OSAS (secondary to snoring, nonrestorative sleep and A-fib) -refused prescription \par -refused prescription for sleep study (APAP) - refused\par -recommended to consider oral appliance with Dr. Gabby Lopez\par Sleep apnea is associated with adverse clinical consequences which an affect most organ systems. Cardiovascular disease risk includes arrhythmias, atrial fibrillation, hypertension, coronary artery disease, and stroke. Metabolic disorders include diabetes type 2, non-alcoholic fatty liver disease. Mood disorder especially depression; and cognitive decline especially in the elderly. Associations with chronic reflux/Candelaria’s esophagus some but not all inclusive. \par -Reasons include arousal consistent with hypopnea; respiratory events most prominent in REM sleep or supine position; therefore sleep staging and body position are important for accurate diagnosis and estimation of AHI. \par \par problem 6: smoking history/lung cancer screening\par -follow up chest CT 1/ 2019 (Due)\par \par problem 7: poor breathing mechanics\par -Proper breathing techniques were reviewed with an emphasis of exhalation. Patient instructed to breath in for 1 second and out for four seconds. Patient was encouraged to not talk while walking.\par \par problem 8: overweight/out of shape\par -Weight loss, exercise, and diet control were discussed and are highly encouraged. Treatment options were given such as, aqua therapy, and contacting a nutritionist. Recommended to use the elliptical, stationary bike, less use of treadmill. Mindful eating was explained to the patient. Obesity is associated with worsening asthma, shortness of breath, and potential for cardiac disease, diabetes, and other underlying medical conditions.\par \par problem 9: CAD\par -recommended to follow up with cardiologist Dr. Carlos\par \par problem 10: poor balance\par -recommended to complete balance therapy and gait training\par \par problem 11: health maintenance \par - He is s/p consult with a neurologist \par - Received influenza vaccine 2018\par -recommended strep pneumonia vaccines: Prevnar-13 vaccine, followed by Pneumo vaccine 23 one year following\par -recommended early intervention for URIs\par -recommended regular osteoporosis evaluations\par -recommended early dermatological evaluations\par -recommended after the age of 50 to the age of 70, colonoscopy every 5 years \par  \par \par Follow up in 2 months with Full PFTs. \par Patient is encouraged to call with any changes, concerns or questions.

## 2019-02-25 ENCOUNTER — TRANSCRIPTION ENCOUNTER (OUTPATIENT)
Age: 79
End: 2019-02-25

## 2019-02-28 ENCOUNTER — TRANSCRIPTION ENCOUNTER (OUTPATIENT)
Age: 79
End: 2019-02-28

## 2019-03-01 ENCOUNTER — TRANSCRIPTION ENCOUNTER (OUTPATIENT)
Age: 79
End: 2019-03-01

## 2019-03-11 ENCOUNTER — TRANSCRIPTION ENCOUNTER (OUTPATIENT)
Age: 79
End: 2019-03-11

## 2019-03-15 ENCOUNTER — TRANSCRIPTION ENCOUNTER (OUTPATIENT)
Age: 79
End: 2019-03-15

## 2019-03-20 ENCOUNTER — TRANSCRIPTION ENCOUNTER (OUTPATIENT)
Age: 79
End: 2019-03-20

## 2019-03-27 ENCOUNTER — APPOINTMENT (OUTPATIENT)
Dept: THORACIC SURGERY | Facility: CLINIC | Age: 79
End: 2019-03-27
Payer: MEDICARE

## 2019-03-27 VITALS
HEIGHT: 66 IN | DIASTOLIC BLOOD PRESSURE: 88 MMHG | SYSTOLIC BLOOD PRESSURE: 148 MMHG | HEART RATE: 121 BPM | OXYGEN SATURATION: 95 % | BODY MASS INDEX: 35.52 KG/M2 | WEIGHT: 221 LBS

## 2019-03-27 PROCEDURE — 99214 OFFICE O/P EST MOD 30 MIN: CPT

## 2019-03-28 ENCOUNTER — TRANSCRIPTION ENCOUNTER (OUTPATIENT)
Age: 79
End: 2019-03-28

## 2019-04-01 NOTE — HISTORY OF PRESENT ILLNESS
[FreeTextEntry1] : 78 year old male presents for evaluation of abnormal CT Chest, referred by Dr. Saul. He is a former smoker with a history of afib, HTN, amiodarone toxicity, GERD, hypoxemia, lung consolidation, LILO, OW, interstitial pneumonitis, poor balance/sleep, snoring and SOB.\par \par CT Chest on 3/15/19:\par - Multifocal bilateral peribronchial infiltrates and opacities\par - Multiple enlarged mediastinal lymph nodes\par - The above findings are more severe than immediate prior 08/2018 study, however, less severe than 05/2018 study\par \par PFTs on 08/03/18:\par - FVC: 2.69 (68%)\par - FEV1: 2.14 (71%)\par - DLCO: 16.6 (72%)

## 2019-04-01 NOTE — ASSESSMENT
[FreeTextEntry1] : 78 year old male with intersitial infiltrates worsening off of steroids. Will plan for VATS lung biopsy.\par \par

## 2019-04-01 NOTE — CONSULT LETTER
[Consult Letter:] : I had the pleasure of evaluating your patient, [unfilled]. [Please see my note below.] : Please see my note below. [Sincerely,] : Sincerely, [FreeTextEntry2] : Dr. Saul [FreeTextEntry3] : \par \par \par Gilson Bass MD\par Attending Surgeon\par Division of Thoracic Surgery\par , Cardiovascular and Thoracic Surgery\par [facility]

## 2019-04-01 NOTE — PHYSICAL EXAM
[General Appearance - Alert] : alert [General Appearance - In No Acute Distress] : in no acute distress [General Appearance - Well Nourished] : well nourished [General Appearance - Well Developed] : well developed [Sclera] : the sclera and conjunctiva were normal [Outer Ear] : the ears and nose were normal in appearance [Neck Appearance] : the appearance of the neck was normal [] : the neck was supple [Auscultation Breath Sounds / Voice Sounds] : lungs were clear to auscultation bilaterally [Heart Rate And Rhythm] : heart rate was normal and rhythm regular [Examination Of The Chest] : the chest was normal in appearance [Edema] : there was no peripheral edema [Bowel Sounds] : normal bowel sounds [Abdomen Soft] : soft [No CVA Tenderness] : no ~M costovertebral angle tenderness [No Spinal Tenderness] : no spinal tenderness [Nail Clubbing] : no clubbing  or cyanosis of the fingernails [Skin Color & Pigmentation] : normal skin color and pigmentation [No Focal Deficits] : no focal deficits [Oriented To Time, Place, And Person] : oriented to person, place, and time [Impaired Insight] : insight and judgment were intact [Affect] : the affect was normal

## 2019-04-01 NOTE — DATA REVIEWED
[FreeTextEntry1] : CT 3/15/2019: bilateral peribronchial infiltrates and enlarged mediastinal lymph nodes

## 2019-04-23 ENCOUNTER — TRANSCRIPTION ENCOUNTER (OUTPATIENT)
Age: 79
End: 2019-04-23

## 2019-04-29 ENCOUNTER — TRANSCRIPTION ENCOUNTER (OUTPATIENT)
Age: 79
End: 2019-04-29

## 2019-04-29 ENCOUNTER — OUTPATIENT (OUTPATIENT)
Dept: OUTPATIENT SERVICES | Facility: HOSPITAL | Age: 79
LOS: 1 days | End: 2019-04-29
Payer: MEDICARE

## 2019-04-29 VITALS
TEMPERATURE: 99 F | HEIGHT: 67.5 IN | DIASTOLIC BLOOD PRESSURE: 70 MMHG | WEIGHT: 220.02 LBS | HEART RATE: 76 BPM | OXYGEN SATURATION: 98 % | SYSTOLIC BLOOD PRESSURE: 140 MMHG

## 2019-04-29 DIAGNOSIS — K21.9 GASTRO-ESOPHAGEAL REFLUX DISEASE WITHOUT ESOPHAGITIS: ICD-10-CM

## 2019-04-29 DIAGNOSIS — J18.1 LOBAR PNEUMONIA, UNSPECIFIED ORGANISM: ICD-10-CM

## 2019-04-29 DIAGNOSIS — Z98.89 OTHER SPECIFIED POSTPROCEDURAL STATES: Chronic | ICD-10-CM

## 2019-04-29 DIAGNOSIS — G47.33 OBSTRUCTIVE SLEEP APNEA (ADULT) (PEDIATRIC): ICD-10-CM

## 2019-04-29 DIAGNOSIS — Z96.651 PRESENCE OF RIGHT ARTIFICIAL KNEE JOINT: Chronic | ICD-10-CM

## 2019-04-29 DIAGNOSIS — Z96.643 PRESENCE OF ARTIFICIAL HIP JOINT, BILATERAL: Chronic | ICD-10-CM

## 2019-04-29 DIAGNOSIS — I48.91 UNSPECIFIED ATRIAL FIBRILLATION: ICD-10-CM

## 2019-04-29 DIAGNOSIS — J98.4 OTHER DISORDERS OF LUNG: ICD-10-CM

## 2019-04-29 DIAGNOSIS — I10 ESSENTIAL (PRIMARY) HYPERTENSION: ICD-10-CM

## 2019-04-29 LAB
ANION GAP SERPL CALC-SCNC: 13 MMO/L — SIGNIFICANT CHANGE UP (ref 7–14)
APTT BLD: 56.2 SEC — HIGH (ref 27.5–36.3)
BLD GP AB SCN SERPL QL: NEGATIVE — SIGNIFICANT CHANGE UP
BUN SERPL-MCNC: 27 MG/DL — HIGH (ref 7–23)
CALCIUM SERPL-MCNC: 9.6 MG/DL — SIGNIFICANT CHANGE UP (ref 8.4–10.5)
CHLORIDE SERPL-SCNC: 100 MMOL/L — SIGNIFICANT CHANGE UP (ref 98–107)
CO2 SERPL-SCNC: 27 MMOL/L — SIGNIFICANT CHANGE UP (ref 22–31)
CREAT SERPL-MCNC: 1.22 MG/DL — SIGNIFICANT CHANGE UP (ref 0.5–1.3)
GLUCOSE SERPL-MCNC: 83 MG/DL — SIGNIFICANT CHANGE UP (ref 70–99)
HCT VFR BLD CALC: 35.9 % — LOW (ref 39–50)
HGB BLD-MCNC: 11.3 G/DL — LOW (ref 13–17)
INR BLD: 1.27 — HIGH (ref 0.88–1.17)
MCHC RBC-ENTMCNC: 31.1 PG — SIGNIFICANT CHANGE UP (ref 27–34)
MCHC RBC-ENTMCNC: 31.5 % — LOW (ref 32–36)
MCV RBC AUTO: 98.9 FL — SIGNIFICANT CHANGE UP (ref 80–100)
NRBC # FLD: 0.02 K/UL — SIGNIFICANT CHANGE UP (ref 0–0)
PLATELET # BLD AUTO: 173 K/UL — SIGNIFICANT CHANGE UP (ref 150–400)
PMV BLD: 11.4 FL — SIGNIFICANT CHANGE UP (ref 7–13)
POTASSIUM SERPL-MCNC: 4 MMOL/L — SIGNIFICANT CHANGE UP (ref 3.5–5.3)
POTASSIUM SERPL-SCNC: 4 MMOL/L — SIGNIFICANT CHANGE UP (ref 3.5–5.3)
PROTHROM AB SERPL-ACNC: 14.2 SEC — HIGH (ref 9.8–13.1)
RBC # BLD: 3.63 M/UL — LOW (ref 4.2–5.8)
RBC # FLD: 12.3 % — SIGNIFICANT CHANGE UP (ref 10.3–14.5)
RH IG SCN BLD-IMP: POSITIVE — SIGNIFICANT CHANGE UP
SODIUM SERPL-SCNC: 140 MMOL/L — SIGNIFICANT CHANGE UP (ref 135–145)
WBC # BLD: 6.48 K/UL — SIGNIFICANT CHANGE UP (ref 3.8–10.5)
WBC # FLD AUTO: 6.48 K/UL — SIGNIFICANT CHANGE UP (ref 3.8–10.5)

## 2019-04-29 PROCEDURE — 93010 ELECTROCARDIOGRAM REPORT: CPT

## 2019-04-29 RX ORDER — ASPIRIN/ACETAMINOPHEN/CAFFEINE 250-250-65
30 TABLET ORAL
Qty: 0 | Refills: 0 | COMMUNITY

## 2019-04-29 RX ORDER — ATENOLOL 25 MG/1
1 TABLET ORAL
Qty: 0 | Refills: 0 | COMMUNITY

## 2019-04-29 RX ORDER — SODIUM CHLORIDE 9 MG/ML
1000 INJECTION, SOLUTION INTRAVENOUS
Qty: 0 | Refills: 0 | Status: DISCONTINUED | OUTPATIENT
Start: 2019-05-07 | End: 2019-05-07

## 2019-04-29 NOTE — H&P PST ADULT - LYMPHATIC
anterior cervical L/posterior cervical R/anterior cervical R/supraclavicular L/supraclavicular R/posterior cervical L

## 2019-04-29 NOTE — H&P PST ADULT - NSICDXPROBLEM_GEN_ALL_CORE_FT
PROBLEM DIAGNOSES  Problem: Lung disease  Assessment and Plan: Right Video Assisted Thoracoscopy Wedge Resection  Ptr op instructions including Hibiclens given to pt pt appears to have a good understanding of pre op uctions  Pt to Dr Carlos for pre op cardiac evaluation  Pt with event monitor ; pt without card ; Dr CISSE office to fax     Problem: Atrial fibrillation  Assessment and Plan: Pt with appt 4/30/19 with Dr Terrance Raza instructions as per Dr Carlos     Problem: LILO (obstructive sleep apnea)  Assessment and Plan: H/O LILO  OR Booking notified via fax    Problem: HTN (hypertension)  Assessment and Plan: Pt to take atenol , and ramipril dos     Problem: GERD (gastroesophageal reflux disease)  Assessment and Plan: Pt to take pantoprazole dos PROBLEM DIAGNOSES  Problem: Lung disease  Assessment and Plan: Right Video Assisted Thoracoscopy Wedge Resection  Pre op instructions including Hibiclens given to pt pt appears to have a good understanding of pre op uctions  Pt to Dr Carlos for pre op cardiac evaluation  Pt with event monitor ; information obtained cardiologist  OR booking notified via fax    Problem: Atrial fibrillation  Assessment and Plan: Pt with appt 4/30/19 with Dr Terrance Raza instructions as per Dr Carlos     Problem: LILO (obstructive sleep apnea)  Assessment and Plan: H/O LILO  OR Booking notified via fax    Problem: HTN (hypertension)  Assessment and Plan: Pt to take atenolol , and ramipril dos     Problem: GERD (gastroesophageal reflux disease)  Assessment and Plan: Pt to take pantoprazole dos PROBLEM DIAGNOSES  Problem: Lung disease  Assessment and Plan: Right Video Assisted Thoracoscopy Wedge Resection  Pre op instructions including Hibiclens given to pt pt appears to have a good understanding of pre op instructions  Pt to Dr Carlos for pre op cardiac evaluation  Pt with event monitor ; information obtained cardiologist  OR booking notified via fax    Problem: Atrial fibrillation  Assessment and Plan: Pt with appt 4/30/19 with Dr Terrance Raza instructions as per Dr Carlos     Problem: LILO (obstructive sleep apnea)  Assessment and Plan: H/O LILO  OR Booking notified via fax    Problem: HTN (hypertension)  Assessment and Plan: Pt to take atenolol , and ramipril dos     Problem: GERD (gastroesophageal reflux disease)  Assessment and Plan: Pt to take pantoprazole dos

## 2019-04-29 NOTE — H&P PST ADULT - NSICDXPASTMEDICALHX_GEN_ALL_CORE_FT
PAST MEDICAL HISTORY:  Arthritis Bilateral shoulders    Essential hypertension     GERD (gastroesophageal reflux disease)     History of BPH " slight"    Hyperlipidemia, unspecified hyperlipidemia type     Obesity, unspecified obesity severity, unspecified obesity type     LILO (obstructive sleep apnea) dx 2018 ; pt uses " mouth piece"    Persistent atrial fibrillation s/p Cardioversion, s/p Ablation    Rotator cuff tear Right Shoulder PAST MEDICAL HISTORY:  Arthritis Bilateral shoulders    Essential hypertension     GERD (gastroesophageal reflux disease)     History of BPH " slight"    Hyperlipidemia, unspecified hyperlipidemia type     Obesity, unspecified obesity severity, unspecified obesity type     LILO (obstructive sleep apnea) dx 2018 ; pt uses " mouth piece"    Persistent atrial fibrillation s/p Cardioversion, s/p Ablation    Rotator cuff tear Left  Shoulder

## 2019-04-29 NOTE — H&P PST ADULT - NSICDXPASTSURGICALHX_GEN_ALL_CORE_FT
PAST SURGICAL HISTORY:  H/O arthroscopy of knee     H/O bilateral hip replacements     S/P total knee replacement, right

## 2019-04-29 NOTE — H&P PST ADULT - HISTORY OF PRESENT ILLNESS
Pt is an 79 y.o. male ;pt c/o sob ; pt reports w/u done ; pt s/p Left Thoracoscopy. Pt now presents for Pt is an 79 y.o. male ;h/o abnormal ct referred by pulmonologist . Pt with h/o afib , htn gerd , hypoxemia , chas . Pt f/u  CT 3/19/19; pt now presents for Right Video Assisted Thoracoscopy Wedge Resection    Pt is a fair historian

## 2019-04-29 NOTE — H&P PST ADULT - NSICDXFAMILYHX_GEN_ALL_CORE_FT
FAMILY HISTORY:  Family history of arrhythmia  Type 2 diabetes mellitus    Sibling  Still living? No  Family history of lung cancer, Age at diagnosis: Age Unknown

## 2019-04-30 PROBLEM — M19.90 UNSPECIFIED OSTEOARTHRITIS, UNSPECIFIED SITE: Chronic | Status: ACTIVE | Noted: 2019-04-29

## 2019-04-30 PROBLEM — M75.100 UNSPECIFIED ROTATOR CUFF TEAR OR RUPTURE OF UNSPECIFIED SHOULDER, NOT SPECIFIED AS TRAUMATIC: Chronic | Status: ACTIVE | Noted: 2019-04-29

## 2019-04-30 PROBLEM — Z87.438 PERSONAL HISTORY OF OTHER DISEASES OF MALE GENITAL ORGANS: Chronic | Status: ACTIVE | Noted: 2019-04-29

## 2019-04-30 PROBLEM — G47.33 OBSTRUCTIVE SLEEP APNEA (ADULT) (PEDIATRIC): Chronic | Status: ACTIVE | Noted: 2019-04-29

## 2019-05-02 ENCOUNTER — TRANSCRIPTION ENCOUNTER (OUTPATIENT)
Age: 79
End: 2019-05-02

## 2019-05-06 RX ORDER — HEPARIN SODIUM 5000 [USP'U]/ML
5000 INJECTION INTRAVENOUS; SUBCUTANEOUS ONCE
Qty: 0 | Refills: 0 | Status: COMPLETED | OUTPATIENT
Start: 2019-05-07 | End: 2019-05-07

## 2019-05-07 ENCOUNTER — APPOINTMENT (OUTPATIENT)
Dept: THORACIC SURGERY | Facility: HOSPITAL | Age: 79
End: 2019-05-07

## 2019-05-07 ENCOUNTER — INPATIENT (INPATIENT)
Facility: HOSPITAL | Age: 79
LOS: 0 days | Discharge: HOME | End: 2019-05-08
Attending: THORACIC SURGERY (CARDIOTHORACIC VASCULAR SURGERY) | Admitting: THORACIC SURGERY (CARDIOTHORACIC VASCULAR SURGERY)
Payer: MEDICARE

## 2019-05-07 ENCOUNTER — TRANSCRIPTION ENCOUNTER (OUTPATIENT)
Age: 79
End: 2019-05-07

## 2019-05-07 ENCOUNTER — RESULT REVIEW (OUTPATIENT)
Age: 79
End: 2019-05-07

## 2019-05-07 VITALS
HEIGHT: 67.5 IN | SYSTOLIC BLOOD PRESSURE: 115 MMHG | RESPIRATION RATE: 16 BRPM | WEIGHT: 220.02 LBS | DIASTOLIC BLOOD PRESSURE: 78 MMHG | TEMPERATURE: 98 F | HEART RATE: 58 BPM | OXYGEN SATURATION: 96 %

## 2019-05-07 DIAGNOSIS — Z96.643 PRESENCE OF ARTIFICIAL HIP JOINT, BILATERAL: Chronic | ICD-10-CM

## 2019-05-07 DIAGNOSIS — J18.1 LOBAR PNEUMONIA, UNSPECIFIED ORGANISM: ICD-10-CM

## 2019-05-07 DIAGNOSIS — Z96.651 PRESENCE OF RIGHT ARTIFICIAL KNEE JOINT: Chronic | ICD-10-CM

## 2019-05-07 DIAGNOSIS — Z98.89 OTHER SPECIFIED POSTPROCEDURAL STATES: Chronic | ICD-10-CM

## 2019-05-07 LAB
GRAM STN WND: SIGNIFICANT CHANGE UP
GRAM STN WND: SIGNIFICANT CHANGE UP
RH IG SCN BLD-IMP: POSITIVE — SIGNIFICANT CHANGE UP
SPECIMEN SOURCE: SIGNIFICANT CHANGE UP
SPECIMEN SOURCE: SIGNIFICANT CHANGE UP

## 2019-05-07 PROCEDURE — 32607 THORACOSCOPY W/BX INFILTRATE: CPT | Mod: AS,59

## 2019-05-07 PROCEDURE — 71045 X-RAY EXAM CHEST 1 VIEW: CPT | Mod: 26

## 2019-05-07 PROCEDURE — 32225 PARTIAL RELEASE OF LUNG: CPT | Mod: AS

## 2019-05-07 PROCEDURE — 32225 PARTIAL RELEASE OF LUNG: CPT

## 2019-05-07 PROCEDURE — 88307 TISSUE EXAM BY PATHOLOGIST: CPT | Mod: 26

## 2019-05-07 PROCEDURE — 32607 THORACOSCOPY W/BX INFILTRATE: CPT | Mod: 59

## 2019-05-07 PROCEDURE — 88331 PATH CONSLTJ SURG 1 BLK 1SPC: CPT | Mod: 26

## 2019-05-07 RX ORDER — ONDANSETRON 8 MG/1
4 TABLET, FILM COATED ORAL EVERY 6 HOURS
Qty: 0 | Refills: 0 | Status: DISCONTINUED | OUTPATIENT
Start: 2019-05-07 | End: 2019-05-07

## 2019-05-07 RX ORDER — DOCUSATE SODIUM 100 MG
100 CAPSULE ORAL THREE TIMES A DAY
Qty: 0 | Refills: 0 | Status: DISCONTINUED | OUTPATIENT
Start: 2019-05-07 | End: 2019-05-08

## 2019-05-07 RX ORDER — TRAMADOL HYDROCHLORIDE 50 MG/1
25 TABLET ORAL EVERY 4 HOURS
Qty: 0 | Refills: 0 | Status: DISCONTINUED | OUTPATIENT
Start: 2019-05-07 | End: 2019-05-08

## 2019-05-07 RX ORDER — ATENOLOL 25 MG/1
25 TABLET ORAL DAILY
Qty: 0 | Refills: 0 | Status: DISCONTINUED | OUTPATIENT
Start: 2019-05-07 | End: 2019-05-08

## 2019-05-07 RX ORDER — POTASSIUM CHLORIDE 20 MEQ
20 PACKET (EA) ORAL DAILY
Qty: 0 | Refills: 0 | Status: DISCONTINUED | OUTPATIENT
Start: 2019-05-07 | End: 2019-05-08

## 2019-05-07 RX ORDER — FUROSEMIDE 40 MG
40 TABLET ORAL DAILY
Qty: 0 | Refills: 0 | Status: DISCONTINUED | OUTPATIENT
Start: 2019-05-07 | End: 2019-05-08

## 2019-05-07 RX ORDER — HYDROMORPHONE HYDROCHLORIDE 2 MG/ML
0.25 INJECTION INTRAMUSCULAR; INTRAVENOUS; SUBCUTANEOUS
Qty: 0 | Refills: 0 | Status: DISCONTINUED | OUTPATIENT
Start: 2019-05-07 | End: 2019-05-07

## 2019-05-07 RX ORDER — TRAMADOL HYDROCHLORIDE 50 MG/1
50 TABLET ORAL EVERY 4 HOURS
Qty: 0 | Refills: 0 | Status: DISCONTINUED | OUTPATIENT
Start: 2019-05-07 | End: 2019-05-08

## 2019-05-07 RX ORDER — NALOXONE HYDROCHLORIDE 4 MG/.1ML
0.1 SPRAY NASAL
Qty: 0 | Refills: 0 | Status: DISCONTINUED | OUTPATIENT
Start: 2019-05-07 | End: 2019-05-07

## 2019-05-07 RX ORDER — PANTOPRAZOLE SODIUM 20 MG/1
40 TABLET, DELAYED RELEASE ORAL
Qty: 0 | Refills: 0 | Status: DISCONTINUED | OUTPATIENT
Start: 2019-05-07 | End: 2019-05-08

## 2019-05-07 RX ORDER — HYDROMORPHONE HYDROCHLORIDE 2 MG/ML
30 INJECTION INTRAMUSCULAR; INTRAVENOUS; SUBCUTANEOUS
Qty: 0 | Refills: 0 | Status: DISCONTINUED | OUTPATIENT
Start: 2019-05-07 | End: 2019-05-07

## 2019-05-07 RX ORDER — HEPARIN SODIUM 5000 [USP'U]/ML
5000 INJECTION INTRAVENOUS; SUBCUTANEOUS EVERY 8 HOURS
Qty: 0 | Refills: 0 | Status: DISCONTINUED | OUTPATIENT
Start: 2019-05-07 | End: 2019-05-08

## 2019-05-07 RX ORDER — SENNA PLUS 8.6 MG/1
2 TABLET ORAL AT BEDTIME
Qty: 0 | Refills: 0 | Status: DISCONTINUED | OUTPATIENT
Start: 2019-05-07 | End: 2019-05-08

## 2019-05-07 RX ORDER — ACETAMINOPHEN 500 MG
650 TABLET ORAL EVERY 6 HOURS
Qty: 0 | Refills: 0 | Status: DISCONTINUED | OUTPATIENT
Start: 2019-05-07 | End: 2019-05-08

## 2019-05-07 RX ADMIN — HEPARIN SODIUM 5000 UNIT(S): 5000 INJECTION INTRAVENOUS; SUBCUTANEOUS at 22:10

## 2019-05-07 RX ADMIN — Medication 650 MILLIGRAM(S): at 20:26

## 2019-05-07 RX ADMIN — ATENOLOL 25 MILLIGRAM(S): 25 TABLET ORAL at 22:10

## 2019-05-07 RX ADMIN — Medication 650 MILLIGRAM(S): at 21:20

## 2019-05-07 RX ADMIN — Medication 100 MILLIGRAM(S): at 14:03

## 2019-05-07 RX ADMIN — HEPARIN SODIUM 5000 UNIT(S): 5000 INJECTION INTRAVENOUS; SUBCUTANEOUS at 14:03

## 2019-05-07 RX ADMIN — HYDROMORPHONE HYDROCHLORIDE 30 MILLILITER(S): 2 INJECTION INTRAMUSCULAR; INTRAVENOUS; SUBCUTANEOUS at 13:14

## 2019-05-07 RX ADMIN — SODIUM CHLORIDE 30 MILLILITER(S): 9 INJECTION, SOLUTION INTRAVENOUS at 07:02

## 2019-05-07 RX ADMIN — HEPARIN SODIUM 5000 UNIT(S): 5000 INJECTION INTRAVENOUS; SUBCUTANEOUS at 07:01

## 2019-05-07 RX ADMIN — HYDROMORPHONE HYDROCHLORIDE 30 MILLILITER(S): 2 INJECTION INTRAMUSCULAR; INTRAVENOUS; SUBCUTANEOUS at 10:05

## 2019-05-07 NOTE — PATIENT PROFILE ADULT - HAS THE PATIENT USED TOBACCO IN THE PAST 30 DAYS?
Pt called back and dose of warfarin was confirmed and she will recheck again at [facility] lab in 4 weeks. Episode updated in Epic.   Pt states she also did a urine today and wondering if we have received those results yet or not? She would like a call back as soon as we have them.   No

## 2019-05-07 NOTE — DISCHARGE NOTE PROVIDER - CARE PROVIDER_API CALL
Gilson Bass)  Thoracic Surgery  [address] [place] [address] [place] [address]  Phone: [phone]  Fax: [phone]  Follow Up Time:

## 2019-05-07 NOTE — BRIEF OPERATIVE NOTE - NSICDXBRIEFPROCEDURE_GEN_ALL_CORE_FT
PROCEDURES:  Biopsy, lung, using VATS 07-May-2019 09:40:13 Uniportal VATS Right upper and right middle lobe wedge Everardo Forte

## 2019-05-07 NOTE — ASU PATIENT PROFILE, ADULT - ABILITY TO HEAR (WITH HEARING AID OR HEARING APPLIANCE IF NORMALLY USED):
Mildly to Moderately Impaired: difficulty hearing in some environments or speaker may need to increase volume or speak distinctly/bilateral hearing aids bilateral hearing aids. only has right hearing aid in at time of admission/Mildly to Moderately Impaired: difficulty hearing in some environments or speaker may need to increase volume or speak distinctly

## 2019-05-07 NOTE — DISCHARGE NOTE PROVIDER - HOSPITAL COURSE
Pt is an 79 y.o. male with abnormal ct , referred by pulmonologist . Pt with h/o afib , htn gerd , hypoxemia , chas . Pt f/u  CT 3/19/19.  Patient s/p Uniportal right vats, right upper and middle lobe wedge resection.  Post op course without complication, patient stable for discharge home when chest tube removed. Pt is an 79 y.o. male with abnormal ct , referred by pulmonologist . Pt with h/o afib , htn gerd , hypoxemia , chas . Pt f/u  CT 3/19/19.      Patient s/p Uniportal right vats, right upper and middle lobe wedge resection.  Post op course without complication, patient stable for discharge home when chest tube removed.      Today pt feels well. No CP or sOB. Ambulating without difficulty. CT removed today. FU CXR w stable sml ptx. VATS c/d/i. Pt voiding. Cleared for dc to home by Dr. Bass    Vital Signs Last 24 Hrs    T(C): 36.3 (08 May 2019 08:12), Max: 36.6 (07 May 2019 16:12)    T(F): 97.4 (08 May 2019 08:12), Max: 97.9 (07 May 2019 16:12)    HR: 62 (08 May 2019 08:12) (62 - 87)    BP: 133/56 (08 May 2019 08:12) (113/51 - 158/73)    BP(mean): 74 (08 May 2019 08:12) (74 - 105)    RR: 18 (08 May 2019 08:12) (17 - 18)    SpO2: 100% (08 May 2019 08:12) (96% - 100%)

## 2019-05-07 NOTE — DISCHARGE NOTE PROVIDER - NSDCFUADDAPPT_GEN_ALL_CORE_FT
Follow up with Dr. Bass in 7-10 days  Chest x-ray 1-2 days prior to appointment with Dr. Bass.  Please bring copy of x-ray to appointment with Dr. Bass Follow up with Dr. Bass in 7-10 days. Call for an apt. [phone]  Chest x-ray 1-2 days prior to appointment with Dr. Bass.  Please bring copy of x-ray to appointment with Dr. Bass

## 2019-05-07 NOTE — PROGRESS NOTE ADULT - SUBJECTIVE AND OBJECTIVE BOX
Patient s/p Uniportal right vats, right upper and middle lobe wedge resection, resting comfortably.  Chest tube to water seal, no air leak noted.  Incision with dressing clean and dry.  Tolerating po, voiding   Vital Signs Last 24 Hrs  T(C): 36.6 (07 May 2019 16:12), Max: 36.6 (07 May 2019 06:34)  T(F): 97.9 (07 May 2019 16:12), Max: 97.9 (07 May 2019 06:34)  HR: 70 (07 May 2019 16:12) (58 - 74)  BP: 113/51 (07 May 2019 16:12) (113/51 - 150/86)  BP(mean): 86 (07 May 2019 13:23) (76 - 131)  RR: 18 (07 May 2019 16:12) (13 - 18)  SpO2: 96% (07 May 2019 16:12) (96% - 100%)    I&O's Detail    07 May 2019 07:01  -  07 May 2019 19:35  --------------------------------------------------------  IN:    lactated ringers.: 210 mL    Oral Fluid: 480 mL  Total IN: 690 mL    OUT:    Chest Tube: 85 mL    Indwelling Catheter - Urethral: 115 mL    Voided: 350 mL  Total OUT: 550 mL    Total NET: 140 mL      MEDICATIONS  (STANDING):  docusate sodium 100 milliGRAM(s) Oral three times a day  furosemide    Tablet 40 milliGRAM(s) Oral daily  heparin  Injectable 5000 Unit(s) SubCutaneous every 8 hours  pantoprazole    Tablet 40 milliGRAM(s) Oral before breakfast  potassium chloride    Tablet ER 20 milliEquivalent(s) Oral daily  senna 2 Tablet(s) Oral at bedtime

## 2019-05-07 NOTE — DISCHARGE NOTE PROVIDER - NSDCACTIVITY_GEN_ALL_CORE
Do not make important decisions/Stairs allowed/Showering allowed/Walking - Outdoors allowed/Walking - Indoors allowed/Do not drive or operate machinery/No heavy lifting/straining Walking - Indoors allowed/Sex allowed/Do not make important decisions/Stairs allowed/Showering allowed/Do not drive or operate machinery/No heavy lifting/straining/Walking - Outdoors allowed

## 2019-05-07 NOTE — DISCHARGE NOTE PROVIDER - NSDCFUADDINST_GEN_ALL_CORE_FT
Walk 4-5 x per day. Increase as tolerated. YOu may climb stairs.   Continue to use incentive spirometer.   You may remove all dressings tomorrow and begin to shower. Leave incisions uncovered. Suture will be removed in office.

## 2019-05-07 NOTE — PROGRESS NOTE ADULT - ASSESSMENT
Patient s/p Uniportal right vats, right upper and middle lobe wedge resection    Plan:  Continue chest tube to water seal   Follow up chest x-ray and labs in am   Chest PT, ambulation and incentive spirometer   Pain management

## 2019-05-07 NOTE — DISCHARGE NOTE PROVIDER - NSDCCPCAREPLAN_GEN_ALL_CORE_FT
PRINCIPAL DISCHARGE DIAGNOSIS  Diagnosis: Lung disease  Assessment and Plan of Treatment: S/p Uniportal right vats, right upper and middle lobe wedge resection

## 2019-05-08 ENCOUNTER — TRANSCRIPTION ENCOUNTER (OUTPATIENT)
Age: 79
End: 2019-05-08

## 2019-05-08 VITALS
RESPIRATION RATE: 18 BRPM | DIASTOLIC BLOOD PRESSURE: 56 MMHG | SYSTOLIC BLOOD PRESSURE: 133 MMHG | OXYGEN SATURATION: 100 % | TEMPERATURE: 97 F | HEART RATE: 62 BPM

## 2019-05-08 PROBLEM — K21.9 GASTRO-ESOPHAGEAL REFLUX DISEASE WITHOUT ESOPHAGITIS: Chronic | Status: ACTIVE | Noted: 2019-04-29

## 2019-05-08 LAB
ANION GAP SERPL CALC-SCNC: 10 MMO/L — SIGNIFICANT CHANGE UP (ref 7–14)
BASOPHILS # BLD AUTO: 0.01 K/UL — SIGNIFICANT CHANGE UP (ref 0–0.2)
BASOPHILS NFR BLD AUTO: 0.1 % — SIGNIFICANT CHANGE UP (ref 0–2)
BUN SERPL-MCNC: 22 MG/DL — SIGNIFICANT CHANGE UP (ref 7–23)
CALCIUM SERPL-MCNC: 9.4 MG/DL — SIGNIFICANT CHANGE UP (ref 8.4–10.5)
CHLORIDE SERPL-SCNC: 100 MMOL/L — SIGNIFICANT CHANGE UP (ref 98–107)
CO2 SERPL-SCNC: 27 MMOL/L — SIGNIFICANT CHANGE UP (ref 22–31)
CREAT SERPL-MCNC: 1 MG/DL — SIGNIFICANT CHANGE UP (ref 0.5–1.3)
CULTURE - ACID FAST SMEAR CONCENTRATED: SIGNIFICANT CHANGE UP
CULTURE - ACID FAST SMEAR CONCENTRATED: SIGNIFICANT CHANGE UP
EOSINOPHIL # BLD AUTO: 0.01 K/UL — SIGNIFICANT CHANGE UP (ref 0–0.5)
EOSINOPHIL NFR BLD AUTO: 0.1 % — SIGNIFICANT CHANGE UP (ref 0–6)
GLUCOSE SERPL-MCNC: 112 MG/DL — HIGH (ref 70–99)
HCT VFR BLD CALC: 34.3 % — LOW (ref 39–50)
HGB BLD-MCNC: 11 G/DL — LOW (ref 13–17)
IMM GRANULOCYTES NFR BLD AUTO: 0.2 % — SIGNIFICANT CHANGE UP (ref 0–1.5)
LYMPHOCYTES # BLD AUTO: 1.85 K/UL — SIGNIFICANT CHANGE UP (ref 1–3.3)
LYMPHOCYTES # BLD AUTO: 22.1 % — SIGNIFICANT CHANGE UP (ref 13–44)
MCHC RBC-ENTMCNC: 31.4 PG — SIGNIFICANT CHANGE UP (ref 27–34)
MCHC RBC-ENTMCNC: 32.1 % — SIGNIFICANT CHANGE UP (ref 32–36)
MCV RBC AUTO: 98 FL — SIGNIFICANT CHANGE UP (ref 80–100)
MONOCYTES # BLD AUTO: 1.01 K/UL — HIGH (ref 0–0.9)
MONOCYTES NFR BLD AUTO: 12 % — SIGNIFICANT CHANGE UP (ref 2–14)
NEUTROPHILS # BLD AUTO: 5.49 K/UL — SIGNIFICANT CHANGE UP (ref 1.8–7.4)
NEUTROPHILS NFR BLD AUTO: 65.5 % — SIGNIFICANT CHANGE UP (ref 43–77)
NRBC # FLD: 0 K/UL — SIGNIFICANT CHANGE UP (ref 0–0)
PLATELET # BLD AUTO: 158 K/UL — SIGNIFICANT CHANGE UP (ref 150–400)
PMV BLD: 11.3 FL — SIGNIFICANT CHANGE UP (ref 7–13)
POTASSIUM SERPL-MCNC: 3.9 MMOL/L — SIGNIFICANT CHANGE UP (ref 3.5–5.3)
POTASSIUM SERPL-SCNC: 3.9 MMOL/L — SIGNIFICANT CHANGE UP (ref 3.5–5.3)
RBC # BLD: 3.5 M/UL — LOW (ref 4.2–5.8)
RBC # FLD: 12.3 % — SIGNIFICANT CHANGE UP (ref 10.3–14.5)
SODIUM SERPL-SCNC: 137 MMOL/L — SIGNIFICANT CHANGE UP (ref 135–145)
SPECIMEN SOURCE: SIGNIFICANT CHANGE UP
WBC # BLD: 8.39 K/UL — SIGNIFICANT CHANGE UP (ref 3.8–10.5)
WBC # FLD AUTO: 8.39 K/UL — SIGNIFICANT CHANGE UP (ref 3.8–10.5)

## 2019-05-08 PROCEDURE — 71045 X-RAY EXAM CHEST 1 VIEW: CPT | Mod: 26

## 2019-05-08 PROCEDURE — 99238 HOSP IP/OBS DSCHRG MGMT 30/<: CPT

## 2019-05-08 RX ORDER — ACETAMINOPHEN 500 MG
2 TABLET ORAL
Qty: 0 | Refills: 0 | DISCHARGE
Start: 2019-05-08

## 2019-05-08 RX ORDER — SENNA PLUS 8.6 MG/1
2 TABLET ORAL
Qty: 0 | Refills: 0 | DISCHARGE
Start: 2019-05-08

## 2019-05-08 RX ORDER — DOCUSATE SODIUM 100 MG
1 CAPSULE ORAL
Qty: 0 | Refills: 0 | DISCHARGE
Start: 2019-05-08

## 2019-05-08 RX ORDER — LISINOPRIL 2.5 MG/1
10 TABLET ORAL DAILY
Qty: 0 | Refills: 0 | Status: DISCONTINUED | OUTPATIENT
Start: 2019-05-08 | End: 2019-05-08

## 2019-05-08 RX ORDER — TRAMADOL HYDROCHLORIDE 50 MG/1
1 TABLET ORAL
Qty: 30 | Refills: 0
Start: 2019-05-08 | End: 2019-05-12

## 2019-05-08 RX ADMIN — Medication 20 MILLIEQUIVALENT(S): at 12:55

## 2019-05-08 RX ADMIN — PANTOPRAZOLE SODIUM 40 MILLIGRAM(S): 20 TABLET, DELAYED RELEASE ORAL at 06:12

## 2019-05-08 RX ADMIN — HEPARIN SODIUM 5000 UNIT(S): 5000 INJECTION INTRAVENOUS; SUBCUTANEOUS at 06:12

## 2019-05-08 RX ADMIN — Medication 650 MILLIGRAM(S): at 03:41

## 2019-05-08 RX ADMIN — Medication 650 MILLIGRAM(S): at 03:11

## 2019-05-08 RX ADMIN — Medication 40 MILLIGRAM(S): at 06:12

## 2019-05-08 RX ADMIN — LISINOPRIL 10 MILLIGRAM(S): 2.5 TABLET ORAL at 06:12

## 2019-05-08 RX ADMIN — ATENOLOL 25 MILLIGRAM(S): 25 TABLET ORAL at 06:12

## 2019-05-08 RX ADMIN — Medication 100 MILLIGRAM(S): at 06:12

## 2019-05-08 NOTE — DISCHARGE NOTE NURSING/CASE MANAGEMENT/SOCIAL WORK - NSDCPNINST_GEN_ALL_CORE
Please call your doctor or return to the hospital with chest pain, shortness of breath, uncontrolled pain, drainage from incision site, fever > 100.4 F, chills, persistent nausea/vomiting.

## 2019-05-08 NOTE — DISCHARGE NOTE NURSING/CASE MANAGEMENT/SOCIAL WORK - NSDCDPATPORTLINK_GEN_ALL_CORE
You can access the [email or web address], offered by [facility], by registering with the following website: [email or web address]

## 2019-05-08 NOTE — PROGRESS NOTE ADULT - ASSESSMENT
80 yo male with history of chronic diastolic CHF, HTN and PAF S/P VATS.  He is doing well. BP stable. NSR. No CHF>    Rec:  Resume out patient CV meds  DVT prophylaxis  Chest tube And D/C planning per thoracic.

## 2019-05-08 NOTE — PHYSICAL THERAPY INITIAL EVALUATION ADULT - ADDITIONAL COMMENTS
lives in apartment with elevator; uses Single Axis Cane for community ambulation. Owns rolling walker

## 2019-05-08 NOTE — DISCHARGE NOTE NURSING/CASE MANAGEMENT/SOCIAL WORK - NSDCFUADDAPPT_GEN_ALL_CORE_FT
Follow up with Dr. Bass in 7-10 days  Chest x-ray 1-2 days prior to appointment with Dr. Bass.  Please bring copy of x-ray to appointment with Dr. Bass

## 2019-05-08 NOTE — DISCHARGE NOTE NURSING/CASE MANAGEMENT/SOCIAL WORK - NSDCPNDISPN_GEN_ALL_CORE
Activities of daily living, including home environment that might     exacerbate pain or reduce effectiveness of the pain management plan of care as well as strategies to address these issues/Side effects of pain management treatment/Safe use, storage and disposal of opioids when prescribed/Education provided on the pain management plan of care

## 2019-05-10 LAB — SURGICAL PATHOLOGY STUDY: SIGNIFICANT CHANGE UP

## 2019-05-13 LAB
CULTURE - SURGICAL SITE: SIGNIFICANT CHANGE UP
CULTURE - SURGICAL SITE: SIGNIFICANT CHANGE UP

## 2019-05-14 ENCOUNTER — TRANSCRIPTION ENCOUNTER (OUTPATIENT)
Age: 79
End: 2019-05-14

## 2019-05-14 LAB
SPECIMEN SOURCE: SIGNIFICANT CHANGE UP
SPECIMEN SOURCE: SIGNIFICANT CHANGE UP

## 2019-05-16 ENCOUNTER — APPOINTMENT (OUTPATIENT)
Dept: PULMONOLOGY | Facility: CLINIC | Age: 79
End: 2019-05-16
Payer: MEDICARE

## 2019-05-16 VITALS
SYSTOLIC BLOOD PRESSURE: 120 MMHG | DIASTOLIC BLOOD PRESSURE: 65 MMHG | BODY MASS INDEX: 34.87 KG/M2 | HEART RATE: 88 BPM | HEIGHT: 66 IN | WEIGHT: 217 LBS | OXYGEN SATURATION: 97 % | RESPIRATION RATE: 17 BRPM

## 2019-05-16 PROCEDURE — 71046 X-RAY EXAM CHEST 2 VIEWS: CPT

## 2019-05-16 PROCEDURE — 99214 OFFICE O/P EST MOD 30 MIN: CPT | Mod: 25

## 2019-05-16 NOTE — ADDENDUM
[FreeTextEntry1] : All medical record entries made by henry Tatum were at Dr. Lele Saul's, direction and personally dictated by me on 05/16/2019. I have reviewed the chart and agree that the record accurately reflects my personal performance of the history, physical exam, assessment and plan. I have also personally directed, reviewed, and agree with the discharge instructions.

## 2019-05-16 NOTE — HISTORY OF PRESENT ILLNESS
[FreeTextEntry1] : Mr. Mccullough is a 79 year old male with a history of amiodarone toxicity, GERD, former smoker, LILO, OW, poor sleep/balance, snoring, and SOB presenting to the office today for a follow up visit. His chief complaint is cough. \par -he is about 1 week s/p his biopsy \par -he reports that he was coughing for a few days while in the hospital, however it has since resolved. In addition, he believes that his breathing has been improved, although he had one episode in which he felt very short of breath \par -he notes that he has been bringing up mucus \par -he states that his sinuses have been clear\par -he has been using his oral appliance every night\par -he denies any headaches, nausea, vomiting, fever, chills, sweats, chest pain, chest pressure, diarrhea, constipation, dysphagia, dizziness, leg swelling, leg pain, itchy eyes, itchy ears, heartburn, reflux, or sour taste in the mouth.

## 2019-05-16 NOTE — REASON FOR VISIT
[Follow-Up] : a follow-up visit [FreeTextEntry1] : amiodarone toxicity, GERD, former smoker, LILO, OW, poor sleep/balance, snoring, and SOB

## 2019-05-16 NOTE — PROCEDURE
[FreeTextEntry1] : Pathology (May 7, 2019) reveals lung right, muddle, and upper lobe wedge resection: - lung parenchyma with follicular bronchiolitis superimposed to respiratory bronchiolitis and focal organizing PNA. no vasculitis, granuloma or viral cytopathic effect  present. \par \par CXR reveals mild cardiomegaly s/p right lung surgery, faint interstitial markings b/l

## 2019-05-16 NOTE — PHYSICAL EXAM
[General Appearance - Well Developed] : well developed [Normal Appearance] : normal appearance [No Deformities] : no deformities [Well Groomed] : well groomed [General Appearance - Well Nourished] : well nourished [Normal Conjunctiva] : the conjunctiva exhibited no abnormalities [Eyelids - No Xanthelasma] : the eyelids demonstrated no xanthelasmas [General Appearance - In No Acute Distress] : no acute distress [Normal Oropharynx] : normal oropharynx [Neck Appearance] : the appearance of the neck was normal [Neck Cervical Mass (___cm)] : no neck mass was observed [Thyroid Nodule] : there were no palpable thyroid nodules [Thyroid Diffuse Enlargement] : the thyroid was not enlarged [Jugular Venous Distention Increased] : there was no jugular-venous distention [Heart Rate And Rhythm] : heart rate and rhythm were normal [Heart Sounds] : normal S1 and S2 [Murmurs] : no murmurs present [Respiration, Rhythm And Depth] : normal respiratory rhythm and effort [Exaggerated Use Of Accessory Muscles For Inspiration] : no accessory muscle use [Abdomen Soft] : soft [Abdomen Tenderness] : non-tender [Abnormal Walk] : normal gait [Abdomen Mass (___ Cm)] : no abdominal mass palpated [Gait - Sufficient For Exercise Testing] : the gait was sufficient for exercise testing [Nail Clubbing] : no clubbing of the fingernails [Petechial Hemorrhages (___cm)] : no petechial hemorrhages [Cyanosis, Localized] : no localized cyanosis [Skin Color & Pigmentation] : normal skin color and pigmentation [] : no rash [No Venous Stasis] : no venous stasis [No Xanthoma] : no  xanthoma was observed [Skin Lesions] : no skin lesions [No Skin Ulcers] : no skin ulcer [Sensation] : the sensory exam was normal to light touch and pinprick [Deep Tendon Reflexes (DTR)] : deep tendon reflexes were 2+ and symmetric [Oriented To Time, Place, And Person] : oriented to person, place, and time [No Focal Deficits] : no focal deficits [Impaired Insight] : insight and judgment were intact [Affect] : the affect was normal [II] : II [FreeTextEntry1] : I:E ratio 1:3; mild inspiratory crackles

## 2019-05-16 NOTE — ASSESSMENT
[FreeTextEntry1] : Mr. 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 He is currently stable from a pulmonary perspective. 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 Proper dietary restraint is necessary specifically salt containing foods, if any reaction may occur should be reported. \par -Repeat CXR in 6 weeks \par \par - Etiology will depend on the causative agent possibilities include: idiopathic pulmonary fibrosis (UIP), NSIP (nonspecific interstitial pneumonia) , respiratory bronchiolitis in someone who is a smoker, drug induced lung disease, hypersensitivity pneumonitis, BOOP, sarcoidosis, chronic congestive heart failure, rheumatologic disorder induced interstitial lung disease. Optimal diagnosing will include rheumatological panel which would include ESR, MATEO, ANCA, ARNP, CCP, rheumatoid factor, hypersensitivity panel, JOE1, scleroderma antibodies, sjogren's syndrome antibodies; biopsy will be necessary to definitively determine the etiology unless the CT scan findings are specific for UIP. 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 These drugs require pulmonary function testing including DLCO regularly and possible CT/CXR if respiratory complaints develop. PFTs should be performed at 6 month intervals \par \par problem 3:s/p PJP prophylaxis- completed went below 20 mg pred. dose per day\par -He is off Bactrim 1 tablet Monday, Wednesday, Friday \par -He is s/p Rheumatologic Blood work to include: ESR panel (+) , ACE, panel (-) , HP panel (-)\par - s/p rheumatological evaluation. \par - PJP Pneumonia is a major opportunistic infection in immunocompromised patients and corticosteroid therapy is a risk factor. Therefore, patients receiving at least 4 weeks of therapy at 30 mg or more daily should be on prophylaxis.\par  \par problem 4: GERD\par -continue Protonix 40 mg QAM, pre-breakfast\par -Things to avoid including overeating, spicy foods, tight clothing, eating within three hours of bed, this list is not all inclusive. \par -For treatment of reflux, possible options discussed including diet control, H2 blockers, PPIs, as well as coating motility agents discussed as treatment options. Timing of meals and proximity of last meal to sleep were discussed. If symptoms persist, a formal gastrointestinal evaluation is needed.\par -Rule of 2's: Avoid eating too late, too fast, too much, too spicy or within two hours of bedtime \par \par problem 5: (+) OSAS (secondary to snoring, nonrestorative sleep and A-fib) -refused prescription \par -refused prescription for sleep study (APAP) - refused\par -recommended to consider oral appliance with Dr. Gabby Lopez\par Sleep apnea is associated with adverse clinical consequences which an affect most organ systems. Cardiovascular disease risk includes arrhythmias, atrial fibrillation, hypertension, coronary artery disease, and stroke. Metabolic disorders include diabetes type 2, non-alcoholic fatty liver disease. Mood disorder especially depression; and cognitive decline especially in the elderly. Associations with chronic reflux/Candelaria’s esophagus some but not all inclusive. \par -Reasons include arousal consistent with hypopnea; respiratory events most prominent in REM sleep or supine position; therefore sleep staging and body position are important for accurate diagnosis and estimation of AHI. \par \par problem 6: smoking history/lung cancer screening\par -follow up chest CT 7/2019\par \par problem 7: poor breathing mechanics\par -Proper breathing techniques were reviewed with an emphasis of exhalation. Patient instructed to breath in for 1 second and out for four seconds. Patient was encouraged to not talk while walking.\par \par problem 8: overweight/out of shape\par -Weight loss, exercise, and diet control were discussed and are highly encouraged. Treatment options were given such as, aqua therapy, and contacting a nutritionist. Recommended to use the elliptical, stationary bike, less use of treadmill. Mindful eating was explained to the patient. Obesity is associated with worsening asthma, shortness of breath, and potential for cardiac disease, diabetes, and other underlying medical conditions.\par \par problem 9: CAD\par -recommended to follow up with cardiologist Dr. Carlos\par \par problem 10: poor balance\par -recommended to complete balance therapy and gait training\par \par problem 11: health maintenance \par - He is s/p consult with a neurologist \par - Received influenza vaccine 2018\par -recommended strep pneumonia vaccines: Prevnar-13 vaccine, followed by Pneumo vaccine 23 one year following\par -recommended early intervention for URIs\par -recommended regular osteoporosis evaluations\par -recommended early dermatological evaluations\par -recommended after the age of 50 to the age of 70, colonoscopy every 5 years \par  \par \par Follow up in 2 months with Full PFTs. \par Patient is encouraged to call with any changes, concerns or questions.

## 2019-05-17 ENCOUNTER — TRANSCRIPTION ENCOUNTER (OUTPATIENT)
Age: 79
End: 2019-05-17

## 2019-05-17 ENCOUNTER — APPOINTMENT (OUTPATIENT)
Dept: THORACIC SURGERY | Facility: CLINIC | Age: 79
End: 2019-05-17
Payer: MEDICARE

## 2019-05-17 VITALS
OXYGEN SATURATION: 97 % | RESPIRATION RATE: 16 BRPM | HEART RATE: 69 BPM | WEIGHT: 217 LBS | BODY MASS INDEX: 35.02 KG/M2 | TEMPERATURE: 98.7 F | SYSTOLIC BLOOD PRESSURE: 115 MMHG | DIASTOLIC BLOOD PRESSURE: 71 MMHG

## 2019-05-17 PROCEDURE — 99024 POSTOP FOLLOW-UP VISIT: CPT

## 2019-05-17 RX ORDER — PREDNISONE 10 MG/1
10 TABLET ORAL
Qty: 1 | Refills: 0 | Status: COMPLETED | COMMUNITY
Start: 2018-06-25 | End: 2019-05-17

## 2019-05-17 RX ORDER — VANCOMYCIN HYDROCHLORIDE 125 MG/1
125 CAPSULE ORAL
Qty: 40 | Refills: 0 | Status: COMPLETED | COMMUNITY
Start: 2018-08-27 | End: 2019-05-17

## 2019-05-17 RX ORDER — ATENOLOL 25 MG/1
25 TABLET ORAL
Qty: 30 | Refills: 0 | Status: COMPLETED | COMMUNITY
Start: 2018-03-27 | End: 2019-05-17

## 2019-05-17 RX ORDER — VALACYCLOVIR 1 G/1
1 TABLET, FILM COATED ORAL
Qty: 21 | Refills: 0 | Status: COMPLETED | COMMUNITY
Start: 2018-10-05 | End: 2019-05-17

## 2019-05-17 RX ORDER — PREDNISONE 5 MG/1
5 TABLET ORAL
Qty: 30 | Refills: 5 | Status: COMPLETED | COMMUNITY
Start: 2018-06-26 | End: 2019-05-17

## 2019-06-04 LAB
FUNGUS SPEC QL CULT: SIGNIFICANT CHANGE UP
FUNGUS SPEC QL CULT: SIGNIFICANT CHANGE UP

## 2019-06-06 ENCOUNTER — FORM ENCOUNTER (OUTPATIENT)
Age: 79
End: 2019-06-06

## 2019-06-07 ENCOUNTER — LABORATORY RESULT (OUTPATIENT)
Age: 79
End: 2019-06-07

## 2019-06-07 ENCOUNTER — APPOINTMENT (OUTPATIENT)
Dept: RHEUMATOLOGY | Facility: CLINIC | Age: 79
End: 2019-06-07
Payer: MEDICARE

## 2019-06-07 VITALS
OXYGEN SATURATION: 96 % | HEART RATE: 72 BPM | TEMPERATURE: 97.8 F | DIASTOLIC BLOOD PRESSURE: 67 MMHG | HEIGHT: 66 IN | RESPIRATION RATE: 16 BRPM | BODY MASS INDEX: 34.39 KG/M2 | SYSTOLIC BLOOD PRESSURE: 118 MMHG | WEIGHT: 214 LBS

## 2019-06-07 DIAGNOSIS — M19.90 UNSPECIFIED OSTEOARTHRITIS, UNSPECIFIED SITE: ICD-10-CM

## 2019-06-07 DIAGNOSIS — R76.8 OTHER SPECIFIED ABNORMAL IMMUNOLOGICAL FINDINGS IN SERUM: ICD-10-CM

## 2019-06-07 DIAGNOSIS — M48.061 SPINAL STENOSIS, LUMBAR REGION WITHOUT NEUROGENIC CLAUDICATION: ICD-10-CM

## 2019-06-07 PROCEDURE — 73130 X-RAY EXAM OF HAND: CPT | Mod: RT

## 2019-06-07 PROCEDURE — 99214 OFFICE O/P EST MOD 30 MIN: CPT

## 2019-06-07 RX ORDER — PREDNISONE 1 MG/1
1 TABLET ORAL
Qty: 200 | Refills: 2 | Status: DISCONTINUED | COMMUNITY
Start: 2018-12-27 | End: 2019-04-07

## 2019-06-07 RX ORDER — SULFAMETHOXAZOLE AND TRIMETHOPRIM 800; 160 MG/1; MG/1
800-160 TABLET ORAL
Refills: 0 | Status: DISCONTINUED | COMMUNITY
End: 2019-06-07

## 2019-06-08 PROBLEM — M48.061 SPINAL STENOSIS OF LUMBAR REGION: Status: ACTIVE | Noted: 2019-06-08

## 2019-06-08 PROBLEM — R76.8 ANA POSITIVE: Status: ACTIVE | Noted: 2018-08-31

## 2019-06-10 ENCOUNTER — TRANSCRIPTION ENCOUNTER (OUTPATIENT)
Age: 79
End: 2019-06-10

## 2019-06-17 LAB
ALBUMIN SERPL ELPH-MCNC: 4 G/DL
ALP BLD-CCNC: 73 U/L
ALT SERPL-CCNC: 16 U/L
ANA PAT FLD IF-IMP: ABNORMAL
ANA SER IF-ACNC: ABNORMAL
ANION GAP SERPL CALC-SCNC: 12 MMOL/L
AST SERPL-CCNC: 22 U/L
BASOPHILS # BLD AUTO: 0.02 K/UL
BASOPHILS NFR BLD AUTO: 0.3 %
BILIRUB SERPL-MCNC: 0.5 MG/DL
BUN SERPL-MCNC: 30 MG/DL
CALCIUM SERPL-MCNC: 9.8 MG/DL
CHLORIDE SERPL-SCNC: 98 MMOL/L
CK SERPL-CCNC: 113 U/L
CO2 SERPL-SCNC: 30 MMOL/L
CREAT SERPL-MCNC: 1.22 MG/DL
CRP SERPL-MCNC: 1.05 MG/DL
ENA RNP AB SER IA-ACNC: 0.2 AL
ENA SM AB SER IA-ACNC: <0.2 AL
ENA SS-A AB SER IA-ACNC: <0.2 AL
ENA SS-B AB SER IA-ACNC: <0.2 AL
EOSINOPHIL # BLD AUTO: 0.55 K/UL
EOSINOPHIL NFR BLD AUTO: 9.2 %
ERYTHROCYTE [SEDIMENTATION RATE] IN BLOOD BY WESTERGREN METHOD: 38 MM/HR
GLUCOSE SERPL-MCNC: 102 MG/DL
HCT VFR BLD CALC: 36.6 %
HGB BLD-MCNC: 11.8 G/DL
IMM GRANULOCYTES NFR BLD AUTO: 0.3 %
LYMPHOCYTES # BLD AUTO: 2.45 K/UL
LYMPHOCYTES NFR BLD AUTO: 40.8 %
MAN DIFF?: NORMAL
MCHC RBC-ENTMCNC: 31.3 PG
MCHC RBC-ENTMCNC: 32.2 GM/DL
MCV RBC AUTO: 97.1 FL
MONOCYTES # BLD AUTO: 0.69 K/UL
MONOCYTES NFR BLD AUTO: 11.5 %
MYELOPEROXIDASE AB SER QL IA: <5 UNITS
MYELOPEROXIDASE CELLS FLD QL: NEGATIVE
MYOGLOBIN SERPL-MCNC: 91 NG/ML
NEUTROPHILS # BLD AUTO: 2.27 K/UL
NEUTROPHILS NFR BLD AUTO: 37.9 %
PLATELET # BLD AUTO: 187 K/UL
POTASSIUM SERPL-SCNC: 4 MMOL/L
PROT SERPL-MCNC: 7.1 G/DL
RBC # BLD: 3.77 M/UL
RBC # FLD: 12.8 %
SODIUM SERPL-SCNC: 140 MMOL/L
WBC # FLD AUTO: 6 K/UL

## 2019-06-18 ENCOUNTER — TRANSCRIPTION ENCOUNTER (OUTPATIENT)
Age: 79
End: 2019-06-18

## 2019-06-18 LAB
ACID FAST STN SPEC: SIGNIFICANT CHANGE UP
ACID FAST STN SPEC: SIGNIFICANT CHANGE UP

## 2019-06-20 ENCOUNTER — TRANSCRIPTION ENCOUNTER (OUTPATIENT)
Age: 79
End: 2019-06-20

## 2019-06-26 LAB
EJ AB SER QL: NEGATIVE
ENA JO1 AB SER IA-ACNC: <20 UNITS
ENA PM/SCL AB SER-ACNC: <20 UNITS
ENA SM+RNP AB SER IA-ACNC: <20 UNITS
ENA SS-A IGG SER QL: <20 UNITS
FIBRILLARIN AB SER QL: NEGATIVE
KU AB SER QL: NEGATIVE
MDA-5 (P140)(CADM-140): <20 UNITS
MI2 AB SER QL: NEGATIVE
NXP-2 (P140): <20 UNITS
OJ AB SER QL: NEGATIVE
PL12 AB SER QL: NEGATIVE
PL7 AB SER QL: NEGATIVE
SRP AB SERPL QL: NEGATIVE
TIF GAMMA (P155/140): <20 UNITS
U2 SNRNP AB SER QL: NEGATIVE

## 2019-07-01 ENCOUNTER — TRANSCRIPTION ENCOUNTER (OUTPATIENT)
Age: 79
End: 2019-07-01

## 2019-07-08 ENCOUNTER — TRANSCRIPTION ENCOUNTER (OUTPATIENT)
Age: 79
End: 2019-07-08

## 2019-07-11 ENCOUNTER — NON-APPOINTMENT (OUTPATIENT)
Age: 79
End: 2019-07-11

## 2019-07-11 ENCOUNTER — APPOINTMENT (OUTPATIENT)
Dept: PULMONOLOGY | Facility: CLINIC | Age: 79
End: 2019-07-11
Payer: MEDICARE

## 2019-07-11 VITALS
HEIGHT: 66 IN | OXYGEN SATURATION: 97 % | SYSTOLIC BLOOD PRESSURE: 122 MMHG | BODY MASS INDEX: 34.55 KG/M2 | HEART RATE: 69 BPM | WEIGHT: 215 LBS | DIASTOLIC BLOOD PRESSURE: 78 MMHG | RESPIRATION RATE: 16 BRPM

## 2019-07-11 PROCEDURE — 99214 OFFICE O/P EST MOD 30 MIN: CPT | Mod: 25

## 2019-07-11 PROCEDURE — 94010 BREATHING CAPACITY TEST: CPT

## 2019-07-11 NOTE — REASON FOR VISIT
[Follow-Up] : a follow-up visit [Spouse] : spouse [FreeTextEntry1] : amiodarone toxicity, BOOP, former smoker, GERD, hypoxemia, LILO, OW, interstitial pneumonitis, poor sleep, snoring, and SOB

## 2019-07-11 NOTE — PHYSICAL EXAM
[General Appearance - Well Developed] : well developed [Normal Appearance] : normal appearance [Well Groomed] : well groomed [General Appearance - Well Nourished] : well nourished [Normal Conjunctiva] : the conjunctiva exhibited no abnormalities [General Appearance - In No Acute Distress] : no acute distress [No Deformities] : no deformities [Eyelids - No Xanthelasma] : the eyelids demonstrated no xanthelasmas [Normal Oropharynx] : normal oropharynx [Neck Cervical Mass (___cm)] : no neck mass was observed [Jugular Venous Distention Increased] : there was no jugular-venous distention [Neck Appearance] : the appearance of the neck was normal [Thyroid Diffuse Enlargement] : the thyroid was not enlarged [Thyroid Nodule] : there were no palpable thyroid nodules [Heart Sounds] : normal S1 and S2 [Heart Rate And Rhythm] : heart rate and rhythm were normal [Respiration, Rhythm And Depth] : normal respiratory rhythm and effort [Auscultation Breath Sounds / Voice Sounds] : lungs were clear to auscultation bilaterally [Exaggerated Use Of Accessory Muscles For Inspiration] : no accessory muscle use [Abdomen Tenderness] : non-tender [Abdomen Soft] : soft [Abdomen Mass (___ Cm)] : no abdominal mass palpated [Abnormal Walk] : normal gait [Gait - Sufficient For Exercise Testing] : the gait was sufficient for exercise testing [Nail Clubbing] : no clubbing of the fingernails [Petechial Hemorrhages (___cm)] : no petechial hemorrhages [Cyanosis, Localized] : no localized cyanosis [Skin Color & Pigmentation] : normal skin color and pigmentation [No Venous Stasis] : no venous stasis [] : no rash [No Xanthoma] : no  xanthoma was observed [No Skin Ulcers] : no skin ulcer [Skin Lesions] : no skin lesions [Sensation] : the sensory exam was normal to light touch and pinprick [Deep Tendon Reflexes (DTR)] : deep tendon reflexes were 2+ and symmetric [No Focal Deficits] : no focal deficits [Affect] : the affect was normal [Oriented To Time, Place, And Person] : oriented to person, place, and time [Impaired Insight] : insight and judgment were intact [III] : III [FreeTextEntry1] : I:E ratio 1:3; clear  [FreeTextEntry2] : trace pedal edema

## 2019-07-11 NOTE — ASSESSMENT
[FreeTextEntry1] : Mr. 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 Proper dietary restraint is necessary specifically salt containing foods, if any reaction may occur should be reported. \par -Repeat CT - January, 2020 \par \par - Etiology will depend on the causative agent possibilities include: idiopathic pulmonary fibrosis (UIP), NSIP (nonspecific interstitial pneumonia) , respiratory bronchiolitis in someone who is a smoker, drug induced lung disease, hypersensitivity pneumonitis, BOOP, sarcoidosis, chronic congestive heart failure, rheumatologic disorder induced interstitial lung disease. Optimal diagnosing will include rheumatological panel which would include ESR, MATEO, ANCA, ARNP, CCP, rheumatoid factor, hypersensitivity panel, JOE1, scleroderma antibodies, sjogren's syndrome antibodies; biopsy will be necessary to definitively determine the etiology unless the CT scan findings are specific for UIP. 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 These drugs require pulmonary function testing including DLCO regularly and possible CT/CXR if respiratory complaints develop. PFTs should be performed at 6 month intervals \par \par problem 3: s/p PJP prophylaxis- completed went below 20 mg pred. dose per day\par -He is off Bactrim 1 tablet Monday, Wednesday, Friday \par -He is s/p Rheumatologic Blood work to include: ESR panel (+) , ACE, panel (-) , HP panel (-)\par - s/p rheumatological evaluation - Dr. Torrez\par - PJP Pneumonia is a major opportunistic infection in immunocompromised patients and corticosteroid therapy is a risk factor. Therefore, patients receiving at least 4 weeks of therapy at 30 mg or more daily should be on prophylaxis.\par  \par problem 4: GERD\par -continue Protonix 40 mg QAM, pre-breakfast\par -Things to avoid including overeating, spicy foods, tight clothing, eating within three hours of bed, this list is not all inclusive. \par -For treatment of reflux, possible options discussed including diet control, H2 blockers, PPIs, as well as coating motility agents discussed as treatment options. Timing of meals and proximity of last meal to sleep were discussed. If symptoms persist, a formal gastrointestinal evaluation is needed.\par -Rule of 2's: Avoid eating too late, too fast, too much, too spicy or within two hours of bedtime \par \par problem 5: (+) OSAS (secondary to snoring, nonrestorative sleep and A-fib) -refused prescription \par -refused prescription for sleep study (APAP) - refused\par -recommended to consider oral appliance with Dr. Gabby Lopez\par Sleep apnea is associated with adverse clinical consequences which an affect most organ systems. Cardiovascular disease risk includes arrhythmias, atrial fibrillation, hypertension, coronary artery disease, and stroke. Metabolic disorders include diabetes type 2, non-alcoholic fatty liver disease. Mood disorder especially depression; and cognitive decline especially in the elderly. Associations with chronic reflux/Candelaria’s esophagus some but not all inclusive. \par -Reasons include arousal consistent with hypopnea; respiratory events most prominent in REM sleep or supine position; therefore sleep staging and body position are important for accurate diagnosis and estimation of AHI. \par \par problem 6: smoking history/lung cancer screening\par -follow up chest CT 1/2020\par \par problem 7: poor breathing mechanics\par -Proper breathing techniques were reviewed with an emphasis of exhalation. Patient instructed to breath in for 1 second and out for four seconds. Patient was encouraged to not talk while walking.\par \par problem 8: overweight/out of shape\par -Weight loss, exercise, and diet control were discussed and are highly encouraged. Treatment options were given such as, aqua therapy, and contacting a nutritionist. Recommended to use the elliptical, stationary bike, less use of treadmill. Mindful eating was explained to the patient. Obesity is associated with worsening asthma, shortness of breath, and potential for cardiac disease, diabetes, and other underlying medical conditions.\par \par problem 9: CAD\par -recommended to follow up with cardiologist Dr. Carlos\par \par problem 10: poor balance\par -recommended to complete balance therapy and gait training\par \par problem 11: allergies\par -add Claritin 10 mg QAM\par Environmental measures for allergies were encouraged including mattress and pillow cover, air purifier, and environmental controls \par \par problem 12: health maintenance \par - He is s/p consult with a neurologist \par - Received influenza vaccine 2018\par -recommended strep pneumonia vaccines: Prevnar-13 vaccine, followed by Pneumo vaccine 23 one year following\par -recommended early intervention for URIs\par -recommended regular osteoporosis evaluations\par -recommended early dermatological evaluations\par -recommended after the age of 50 to the age of 70, colonoscopy every 5 years \par  \par \par Follow up in 3 months with Full PFTs /  CXR \par Patient is encouraged to call with any changes, concerns or questions.

## 2019-07-11 NOTE — PROCEDURE
[FreeTextEntry1] : Chest CT (7/10/19) reveals improved aeratoin b/l resolved / decreasing multiple pulmonary opacities compared to CT 3/15/19.  A few new or increased very small (5mm or less) pulmonary nodules with multiple additoinal stable and/or decreased nodules.  Stable postsurgical changes in left lung. New postsurgical changes in the right lung (upper and middle lobes).  Mild emphysema/ minimal right and left pleural effusions, new. Cardiomegaly with dilated aortic root (4.4 cm) \par \par PFT - spi reveals normal flows; FEV1 is 2.07 which is 87% of predicted, normal flow volume loop

## 2019-07-11 NOTE — ADDENDUM
[FreeTextEntry1] : All medical record entries made by henry Tatum were at Dr. Lele Saul's, direction and personally dictated by me on 07/11/2019. I have reviewed the chart and agree that the record accurately reflects my personal performance of the history, physical exam, assessment and plan. I have also personally directed, reviewed, and agree with the discharge instructions.

## 2019-07-11 NOTE — HISTORY OF PRESENT ILLNESS
[FreeTextEntry1] : Mr. Mccullough is a 79 year old male with a history of amiodarone toxicity, BOOP, former smoker, GERD, hypoxemia, LILO, OW, interstitial pneumonitis, poor sleep, snoring, and SOB presenting to the office today for a follow up visit. His chief complaint is inability to walk. \par -He states that he has generally been feeling well. he has an occasional cough which he feels is originating from his throat\par -he states that he does not become SOB after walking, although he feels that his muscles become very fatigued\par -he reports that he has been sleeping well, getting about 4-7 hours. he will sleep about 4 hours uninterrupted, then fall back asleep for another 2-3 hours. he adds that he has been using his oral appliance, although it has been disturbing his sleep since the top part of the device becomes displaces during the night \par -he reports that he will occasionally become SOB after walking up stairs or inclines. \par -he denies any headaches, nausea, vomiting, fever, chills, sweats, chest pain, chest pressure, diarrhea, constipation, dysphagia, dizziness, leg swelling, leg pain, itchy eyes, itchy ears, heartburn, reflux, or sour taste in the mouth, wheeze.

## 2019-07-12 ENCOUNTER — TRANSCRIPTION ENCOUNTER (OUTPATIENT)
Age: 79
End: 2019-07-12

## 2019-09-11 ENCOUNTER — MEDICATION RENEWAL (OUTPATIENT)
Age: 79
End: 2019-09-11

## 2019-09-11 ENCOUNTER — TRANSCRIPTION ENCOUNTER (OUTPATIENT)
Age: 79
End: 2019-09-11

## 2019-10-16 ENCOUNTER — TRANSCRIPTION ENCOUNTER (OUTPATIENT)
Age: 79
End: 2019-10-16

## 2019-10-25 ENCOUNTER — APPOINTMENT (OUTPATIENT)
Dept: PULMONOLOGY | Facility: CLINIC | Age: 79
End: 2019-10-25
Payer: MEDICARE

## 2019-10-25 ENCOUNTER — MED ADMIN CHARGE (OUTPATIENT)
Age: 79
End: 2019-10-25

## 2019-10-25 VITALS
HEART RATE: 78 BPM | SYSTOLIC BLOOD PRESSURE: 120 MMHG | DIASTOLIC BLOOD PRESSURE: 65 MMHG | BODY MASS INDEX: 34.72 KG/M2 | HEIGHT: 66 IN | OXYGEN SATURATION: 96 % | RESPIRATION RATE: 17 BRPM | WEIGHT: 216 LBS

## 2019-10-25 DIAGNOSIS — Z23 ENCOUNTER FOR IMMUNIZATION: ICD-10-CM

## 2019-10-25 PROCEDURE — 94010 BREATHING CAPACITY TEST: CPT

## 2019-10-25 PROCEDURE — 90662 IIV NO PRSV INCREASED AG IM: CPT

## 2019-10-25 PROCEDURE — 99214 OFFICE O/P EST MOD 30 MIN: CPT | Mod: 25

## 2019-10-25 PROCEDURE — ZZZZZ: CPT

## 2019-10-25 PROCEDURE — 94729 DIFFUSING CAPACITY: CPT

## 2019-10-25 PROCEDURE — G0008: CPT

## 2019-10-25 RX ORDER — HYDROCHLOROTHIAZIDE 25 MG/1
25 TABLET ORAL
Qty: 90 | Refills: 0 | Status: DISCONTINUED | COMMUNITY
Start: 2018-02-19 | End: 2019-10-25

## 2019-10-25 RX ORDER — CALCIUM CITRATE/VITAMIN D3 315MG-6.25
TABLET ORAL
Refills: 0 | Status: DISCONTINUED | COMMUNITY
End: 2019-10-25

## 2019-10-25 RX ORDER — SIMVASTATIN 40 MG/1
40 TABLET, FILM COATED ORAL
Qty: 90 | Refills: 0 | Status: DISCONTINUED | COMMUNITY
Start: 2018-02-17 | End: 2019-10-25

## 2019-10-25 RX ORDER — CHOLESTYRAMINE 4 G/9G
4 POWDER, FOR SUSPENSION ORAL
Qty: 60 | Refills: 0 | Status: DISCONTINUED | COMMUNITY
Start: 2018-08-21 | End: 2019-10-25

## 2019-10-25 RX ORDER — PRAVASTATIN SODIUM 40 MG/1
40 TABLET ORAL
Qty: 90 | Refills: 0 | Status: DISCONTINUED | COMMUNITY
Start: 2019-02-15 | End: 2019-10-25

## 2019-10-25 RX ORDER — APIXABAN 5 MG/1
5 TABLET, FILM COATED ORAL
Refills: 0 | Status: DISCONTINUED | COMMUNITY
End: 2019-10-25

## 2019-10-25 RX ORDER — FUROSEMIDE 20 MG/1
20 TABLET ORAL
Qty: 90 | Refills: 0 | Status: DISCONTINUED | COMMUNITY
Start: 2018-05-11 | End: 2019-10-25

## 2019-10-25 NOTE — REVIEW OF SYSTEMS
[Cough] : cough [Constipation] : constipation [As Noted in HPI] : as noted in HPI [Negative] : Pulmonary Hypertension [Chest Discomfort] : no chest discomfort [Itchy Eyes] : no itching of ~T the eyes [Heartburn] : no heartburn [Reflux] : no reflux [Dysphagia] : no dysphagia [Dizziness] : no dizziness [Diarrhea] : no diarrhea [Difficulty Initiating Sleep] : no difficulty falling asleep [Difficulty Maintaining Sleep] : no difficulty maintaining sleep

## 2019-10-25 NOTE — ADDENDUM
[FreeTextEntry1] : Documented by Piyush Shaver acting as a scribe for Dr. Lele Saul on 10/25/2019.\par \par All medical record entries made by the Scribe were at my, Dr. Lele Saul's, direction and personally dictated by me on 10/25/2019. I have reviewed the chart and agree that the record accurately reflects my personal performance of the history, physical exam, assessment and plan. I have also personally directed, reviewed, and agree with the discharge instructions.

## 2019-10-25 NOTE — ASSESSMENT
[FreeTextEntry1] : Mr. 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 Proper dietary restraint is necessary specifically salt containing foods, if any reaction may occur should be reported. \par -Repeat CT - January, 2020 \par \par - Etiology will depend on the causative agent possibilities include: idiopathic pulmonary fibrosis (UIP), NSIP (nonspecific interstitial pneumonia) , respiratory bronchiolitis in someone who is a smoker, drug induced lung disease, hypersensitivity pneumonitis, BOOP, sarcoidosis, chronic congestive heart failure, rheumatologic disorder induced interstitial lung disease. Optimal diagnosing will include rheumatological panel which would include ESR, MATEO, ANCA, ARNP, CCP, rheumatoid factor, hypersensitivity panel, JOE1, scleroderma antibodies, sjogren's syndrome antibodies; biopsy will be necessary to definitively determine the etiology unless the CT scan findings are specific for UIP. 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 These drugs require pulmonary function testing including DLCO regularly and possible CT/CXR if respiratory complaints develop. PFTs should be performed at 6 month intervals \par \par problem 3: s/p PJP prophylaxis- completed went below 20 mg pred. dose per day\par -He is off Bactrim 1 tablet Monday, Wednesday, Friday \par -He is s/p Rheumatologic Blood work to include: ESR panel (+) , ACE, panel (-) , HP panel (-)\par - s/p rheumatological evaluation - Dr. Torrez\par - PJP Pneumonia is a major opportunistic infection in immunocompromised patients and corticosteroid therapy is a risk factor. Therefore, patients receiving at least 4 weeks of therapy at 30 mg or more daily should be on prophylaxis.\par  \par problem 4: GERD\par -continue Protonix 40 mg QAM, pre-breakfast\par -Things to avoid including overeating, spicy foods, tight clothing, eating within three hours of bed, this list is not all inclusive. \par -For treatment of reflux, possible options discussed including diet control, H2 blockers, PPIs, as well as coating motility agents discussed as treatment options. Timing of meals and proximity of last meal to sleep were discussed. If symptoms persist, a formal gastrointestinal evaluation is needed.\par -Rule of 2's: Avoid eating too late, too fast, too much, too spicy or within two hours of bedtime \par \par problem 5: (+) OSAS (secondary to snoring, nonrestorative sleep and A-fib) - on Rx\par -refused prescription for sleep study (APAP) - refused\par -s/p oral appliance with Dr. Gabby Lopez\par Sleep apnea is associated with adverse clinical consequences which an affect most organ systems. Cardiovascular disease risk includes arrhythmias, atrial fibrillation, hypertension, coronary artery disease, and stroke. Metabolic disorders include diabetes type 2, non-alcoholic fatty liver disease. Mood disorder especially depression; and cognitive decline especially in the elderly. Associations with chronic reflux/Candelaria’s esophagus some but not all inclusive. \par -Reasons include arousal consistent with hypopnea; respiratory events most prominent in REM sleep or supine position; therefore sleep staging and body position are important for accurate diagnosis and estimation of AHI. \par \par problem 6: smoking history/lung cancer screening (improved)\par -follow up chest CT 4/2020\par \par Lung cancer screening is recommended for people between the ages of 55 and 80 with prior 30+ pack year smoking histories. There is irrefutable evidence for realization of lung cancer screening based on two large randomized control trials demonstrating relative reduction in lung cancer mortality for patients undergoing low-dose CT scanning. Risks and benefits reviewed with the patient.\par \par problem 7: poor breathing mechanics\par -Proper breathing techniques were reviewed with an emphasis of exhalation. Patient instructed to breath in for 1 second and out for four seconds. Patient was encouraged to not talk while walking.\par \par problem 8: overweight/out of shape\par -Weight loss, exercise, and diet control were discussed and are highly encouraged. Treatment options were given such as, aqua therapy, and contacting a nutritionist. Recommended to use the elliptical, stationary bike, less use of treadmill. Mindful eating was explained to the patient. Obesity is associated with worsening asthma, shortness of breath, and potential for cardiac disease, diabetes, and other underlying medical conditions.\par \par problem 9: CAD\par -recommended to follow up with cardiologist Dr. Carlos\par \par problem 10: poor balance\par -recommended to complete balance therapy and gait training\par \par problem 11: allergies\par -continue Claritin 10 mg QAM\par Environmental measures for allergies were encouraged including mattress and pillow cover, air purifier, and environmental controls \par \par problem 12: health maintenance \par - He is s/p consult with a neurologist \par - Received influenza vaccine (in office 10/25/19)\par -recommended strep pneumonia vaccines: Prevnar-13 vaccine, followed by Pneumo vaccine 23 one year following\par -recommended early intervention for URIs\par -recommended regular osteoporosis evaluations\par -recommended early dermatological evaluations\par -recommended after the age of 50 to the age of 70, colonoscopy every 5 years \par  \par \par Follow up in 3 months with Full PFTs /  CXR \par Patient is encouraged to call with any changes, concerns or questions.

## 2019-10-25 NOTE — PHYSICAL EXAM
[General Appearance - Well Developed] : well developed [Normal Appearance] : normal appearance [Well Groomed] : well groomed [General Appearance - Well Nourished] : well nourished [General Appearance - In No Acute Distress] : no acute distress [No Deformities] : no deformities [Normal Conjunctiva] : the conjunctiva exhibited no abnormalities [Eyelids - No Xanthelasma] : the eyelids demonstrated no xanthelasmas [Normal Oropharynx] : normal oropharynx [II] : II [Neck Cervical Mass (___cm)] : no neck mass was observed [Neck Appearance] : the appearance of the neck was normal [Thyroid Diffuse Enlargement] : the thyroid was not enlarged [Jugular Venous Distention Increased] : there was no jugular-venous distention [Thyroid Nodule] : there were no palpable thyroid nodules [Heart Rate And Rhythm] : heart rate and rhythm were normal [Heart Sounds] : normal S1 and S2 [Respiration, Rhythm And Depth] : normal respiratory rhythm and effort [Exaggerated Use Of Accessory Muscles For Inspiration] : no accessory muscle use [Auscultation Breath Sounds / Voice Sounds] : lungs were clear to auscultation bilaterally [Kyphosis] : kyphosis [Abdomen Soft] : soft [Abdomen Tenderness] : non-tender [Abdomen Mass (___ Cm)] : no abdominal mass palpated [Abnormal Walk] : normal gait [Gait - Sufficient For Exercise Testing] : the gait was sufficient for exercise testing [Cyanosis, Localized] : no localized cyanosis [Nail Clubbing] : no clubbing of the fingernails [Petechial Hemorrhages (___cm)] : no petechial hemorrhages [Skin Color & Pigmentation] : normal skin color and pigmentation [] : no rash [Skin Lesions] : no skin lesions [No Skin Ulcers] : no skin ulcer [No Venous Stasis] : no venous stasis [Deep Tendon Reflexes (DTR)] : deep tendon reflexes were 2+ and symmetric [No Xanthoma] : no  xanthoma was observed [Sensation] : the sensory exam was normal to light touch and pinprick [No Focal Deficits] : no focal deficits [Oriented To Time, Place, And Person] : oriented to person, place, and time [Impaired Insight] : insight and judgment were intact [Affect] : the affect was normal [FreeTextEntry1] : I:E ratio 1:3; clear  [FreeTextEntry2] : trace pedal edema

## 2019-10-25 NOTE — HISTORY OF PRESENT ILLNESS
[FreeTextEntry1] : Mr. Mccullough is a 79 year old male with a history of amiodarone toxicity, BOOP, former smoker, GERD, hypoxemia, LILO, OW, interstitial pneumonitis, poor sleep, snoring, and SOB presenting to the office today for a follow up visit. His chief complaint is occasional constipation\par -he reports feeling improved\par -his wife notes he is coughing often, daily, as well as sneezing.\par -he reports only wearing his CPAP 4 hours nightly, as he is uncomfortable with it. He states he is using his oral appliance\par -he reports having occasional constipation\par -he reports he is exercising by working with a , senior's exercise program, daily exercises, and walking\par -he reports having a sour taste in his mouth due to breathing through his mouth at night\par -he states his energy level is 8/10, but his wife states it is lower than that\par -he denies taking any new medications, vitamins, or supplements. \par -he denies any chest pain, chest pressure, diarrhea, dysphagia, dizziness, leg swelling, leg pain, heartburn, reflux

## 2019-10-25 NOTE — PROCEDURE
[FreeTextEntry1] :  Full PFT revealed mild-moderate obstructive dysfunction, with a FEV1 of 2.11L, which is 68% of predicted, normal lung volumes, and a diffusion of 16.7, which is 78% of predicted, with a normal flow volume loop\par \par \par Chest CT (10.16.19) reveals post surgical changes in the bilateral lungs related to multiple nodule resections. There is mild heterogeneity of the lung parenchyma with overall waxing and waning patchy nodularity as noted above. Scattered additional smaller nodules are stable.  Stable dilatation of the aortic root up to 4.4 cm with ectatic ascending aorta. Stable mild emphysema. Mildly enlarged lymph nodes in the mediastinum have mildly decreased overall since 7/10/19, favoring a benign, reactive etiology

## 2019-11-25 ENCOUNTER — TRANSCRIPTION ENCOUNTER (OUTPATIENT)
Age: 79
End: 2019-11-25

## 2019-11-25 ENCOUNTER — MEDICATION RENEWAL (OUTPATIENT)
Age: 79
End: 2019-11-25

## 2019-11-26 NOTE — PHYSICAL THERAPY INITIAL EVALUATION ADULT - ORIENTATION, REHAB EVAL
[facility]     Name:  Mckenzie Guaman  MR#:  [id number]  :  1931  ACCOUNT #:  [de-identified]  ADMIT DATE:  2019  DISCHARGE DATE:  2019    FINAL DIAGNOSES:  1. Hypoxic respiratory failure. 2.  Congestive heart failure. 3.  Right perihilar pneumonia. 4.  Chronic atrial fibrillation. 5.  Hypertension. DISCHARGE MEDICATIONS:  Lisinopril 10 mg daily, metoprolol 25 mg daily, prednisone 10 mg b.i.d., furosemide 20 mg daily, Synthroid 100 mcg daily, potassium chloride 20 mEq b.i.d., trazodone 150 mg at bedtime, Flonase nasal spray, Pradaxa 75 mg b.i.d., Tylenol Arthritis p.r.n., calcium with vitamin D 600 one tablet twice daily. CONSULTATIONS:  Cardiology, Dr. Remberto Vásquez. HISTORY OF PRESENT ILLNESS:  The patient is a very pleasant 79-year-old white female, past medical history of hypertension, AFib, hypothyroidism, came to the emergency room with a 3-day history of progressive weakness, tiredness, shortness of breath, and cough. She denied any fever, chills, sputum production, headaches, nausea, vomiting, abdominal pain, chest pain, dizziness, numbness or leg swelling. No heart palpitations, diarrhea, constipation, melena, hematochezia. There has been a mild cough. The patient is [employer], she is on Pradaxa for AFib, refuses blood products. PAST MEDICAL HISTORY:  Significant for AFib, normal stress test in , mild-to-moderate aortic regurgitation and echo, history of GI bleed and history of hypertension, history of refusal of blood transfusion, history of hypothyroidism. SURGERIES:  Include colonoscopy in 2017 by Dr. Alma Rosa Mendoza. History of vaginal deliveries x10, history of tubal ligation, history of cardioversion for AFib. HABITS:  Smoking is negative, alcohol use is negative. ALLERGIES:  TO SEROQUEL AND AMBIEN.     MEDICATIONS ON ADMISSION:  Calcium with vitamin D 600/400 one daily, Synthroid 100 mcg daily, potassium chloride 20 mEq b.i.d, atenolol 50 mg daily, trazodone 150 mg at bedtime, Protonix 40 mg twice daily, Flonase nasal spray, Benadryl 25 mg p.r.n., Tylenol Arthritis 1 every 8 hours p.r.n., Pradaxa 75 mg b.i.d. REVIEW OF SYSTEMS:  Please see HPI. PHYSICAL EXAMINATION:  VITAL SIGNS:  Blood pressure 133/96, pulse 100, temperature 99.1, respiratory rate 24, height 5 feet 2 inches, weight 139, O2 sat 94%. GENERAL:  Alert, cooperative, no distress. NECK:  Supple, symmetrical.  Thyroid nontender. LUNGS:  Coarse breath sounds at bases bilaterally. CARDIOVASCULAR:  Regular rhythm. No murmurs, thrills, rubs, or gallops. EXTREMITIES:  1+ edema. ABDOMEN:  Soft, nontender, nondistended. PSYCHIATRIC:  Good insight, not depressed. NEUROLOGIC:  EOMS intact. No facial asymmetry. No aphasia or slurred speech, muscular strength, alert and oriented x4. HOSPITAL COURSE:  The patient was admitted, she is diuresed with IV Lasix. Chest x-ray raised a question of right perihilar airspace disease consistent with pneumonia. The patient also complained of some right foot pain on the dorsum of the right foot and some mild degenerative changes, osteopenia, otherwise normal.  The patient had an echo prior to discharge. Ejection fraction is 40%-45%, global hypokinesis, mild aortic valve stenosis, moderate aortic valve regurge. Moderate mitral valve regurge, moderate tricuspid regurgitation. Pulmonary artery systolic pressure was 31. The patient improved nicely with diuresis. Her breathing and fatigue got much better. She was up walking around without symptoms at the time of discharge. Suspected cause of symptoms more consistent with heart failure than pneumonia. She was discharged. She was stable enough to be discharged home on 11/25/2019. She is to follow up in the office tomorrow.         [facility] MD LUIS ENRIQUE [email or web address]  D:  11/25/2019 17:07  T:  11/26/2019 9:47  JOB #:  [id number] oriented to person, place, time and situation

## 2019-12-30 ENCOUNTER — TRANSCRIPTION ENCOUNTER (OUTPATIENT)
Age: 79
End: 2019-12-30

## 2020-02-03 ENCOUNTER — TRANSCRIPTION ENCOUNTER (OUTPATIENT)
Age: 80
End: 2020-02-03

## 2020-02-06 ENCOUNTER — APPOINTMENT (OUTPATIENT)
Dept: PULMONOLOGY | Facility: CLINIC | Age: 80
End: 2020-02-06
Payer: MEDICARE

## 2020-02-06 VITALS
BODY MASS INDEX: 34.72 KG/M2 | WEIGHT: 216 LBS | OXYGEN SATURATION: 97 % | HEIGHT: 66 IN | HEART RATE: 75 BPM | DIASTOLIC BLOOD PRESSURE: 70 MMHG | SYSTOLIC BLOOD PRESSURE: 130 MMHG | RESPIRATION RATE: 16 BRPM

## 2020-02-06 PROCEDURE — 94618 PULMONARY STRESS TESTING: CPT

## 2020-02-06 PROCEDURE — 94729 DIFFUSING CAPACITY: CPT

## 2020-02-06 PROCEDURE — 99214 OFFICE O/P EST MOD 30 MIN: CPT | Mod: 25

## 2020-02-06 PROCEDURE — 94010 BREATHING CAPACITY TEST: CPT

## 2020-02-06 NOTE — ASSESSMENT
[FreeTextEntry1] : Mr. 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 Proper dietary restraint is necessary specifically salt containing foods, if any reaction may occur should be reported. \par -Repeat CT - January, 2021\par \par - Etiology will depend on the causative agent possibilities include: idiopathic pulmonary fibrosis (UIP), NSIP (nonspecific interstitial pneumonia) , respiratory bronchiolitis in someone who is a smoker, drug induced lung disease, hypersensitivity pneumonitis, BOOP, sarcoidosis, chronic congestive heart failure, rheumatologic disorder induced interstitial lung disease. Optimal diagnosing will include rheumatological panel which would include ESR, MATEO, ANCA, ARNP, CCP, rheumatoid factor, hypersensitivity panel, JOE1, scleroderma antibodies, sjogren's syndrome antibodies; biopsy will be necessary to definitively determine the etiology unless the CT scan findings are specific for UIP. 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 These drugs require pulmonary function testing including DLCO regularly and possible CT/CXR if respiratory complaints develop. PFTs should be performed at 6 month intervals \par \par problem 3: s/p PJP prophylaxis- completed went below 20 mg pred. dose per day\par -He is off Bactrim 1 tablet Monday, Wednesday, Friday \par -He is s/p Rheumatologic Blood work to include: ESR panel (+) , ACE, panel (-) , HP panel (-)\par - s/p rheumatological evaluation - Dr. Torrez\par - PJP Pneumonia is a major opportunistic infection in immunocompromised patients and corticosteroid therapy is a risk factor. Therefore, patients receiving at least 4 weeks of therapy at 30 mg or more daily should be on prophylaxis.\par  \par problem 4: GERD\par -continue Protonix 40 mg QAM, pre-breakfast\par -Things to avoid including overeating, spicy foods, tight clothing, eating within three hours of bed, this list is not all inclusive. \par -For treatment of reflux, possible options discussed including diet control, H2 blockers, PPIs, as well as coating motility agents discussed as treatment options. Timing of meals and proximity of last meal to sleep were discussed. If symptoms persist, a formal gastrointestinal evaluation is needed.\par -Rule of 2's: Avoid eating too late, too fast, too much, too spicy or within two hours of bedtime \par \par problem 5: (+) OSAS (secondary to snoring, nonrestorative sleep and A-fib) - on Rx\par -refused prescription for sleep study (APAP) - refused\par -s/p oral appliance with Dr. Gabby Lopez\par Sleep apnea is associated with adverse clinical consequences which an affect most organ systems. Cardiovascular disease risk includes arrhythmias, atrial fibrillation, hypertension, coronary artery disease, and stroke. Metabolic disorders include diabetes type 2, non-alcoholic fatty liver disease. Mood disorder especially depression; and cognitive decline especially in the elderly. Associations with chronic reflux/Candelaria’s esophagus some but not all inclusive. \par -Reasons include arousal consistent with hypopnea; respiratory events most prominent in REM sleep or supine position; therefore sleep staging and body position are important for accurate diagnosis and estimation of AHI. \par \par problem 6: smoking history/lung cancer screening (improved)\par -follow up chest CT 1/2021\par \par Lung cancer screening is recommended for people between the ages of 55 and 80 with prior 30+ pack year smoking histories. There is irrefutable evidence for realization of lung cancer screening based on two large randomized control trials demonstrating relative reduction in lung cancer mortality for patients undergoing low-dose CT scanning. Risks and benefits reviewed with the patient.\par \par Problem 6A: COPD \par -add Trelegy 1 puff QD (gargle and spit after use)\par -COPD is a progressive disease and although it can’t be cured , appropriate management can slow its progression, reduce frequency and severity of exacerbations, and improve symptoms and the patient quality of life. Hospitalizations are the greatest contributor to the total COPD costs and account for up to 87% of total COPD related costs. Exacerbations are the main cause of admissions and subsequently account for the 40-75% of COPD costs. Inhaled maintenance therapy reduces the incidence of exacerbations in patients with stable COPD. Incorrect inhaler use and nonadherence are major obstacles to achieving COPD treatment goals. Many COPD patients have challenges (impaired inhalation, limited dexterity, reduced cognition: that limit their ability to correctly use their COPD treatment devices resulting in reduced symptom control. Of most importance is smoking cessation and early intervention with respiratory illnesses and contemplation for pulmonary rehab to enhance quality of life.\par -Inhaler technique reviewed as well as oral hygiene techniques reviewed with patient. Avoidance of cold air, extremes of temperature, rescue inhaler should be used before exercise. Order of medication reviewed with patient. Recommended use of a cool mist humidifier in the bedroom.\par \par problem 7: poor breathing mechanics\par -Proper breathing techniques were reviewed with an emphasis of exhalation. Patient instructed to breath in for 1 second and out for four seconds. Patient was encouraged to not talk while walking.\par \par problem 8: overweight/out of shape\par -Weight loss, exercise, and diet control were discussed and are highly encouraged. Treatment options were given such as, aqua therapy, and contacting a nutritionist. Recommended to use the elliptical, stationary bike, less use of treadmill. Mindful eating was explained to the patient. Obesity is associated with worsening asthma, shortness of breath, and potential for cardiac disease, diabetes, and other underlying medical conditions.\par \par problem 9: CAD\par -recommended to follow up with cardiologist Dr. Carlos\par \par problem 10: poor balance\par -recommended to complete balance therapy and gait training\par \par problem 11: allergies\par -continue Claritin 10 mg QAM\par Environmental measures for allergies were encouraged including mattress and pillow cover, air purifier, and environmental controls \par \par problem 12: health maintenance \par - He is s/p consult with a neurologist \par - Received influenza vaccine (in office 10/25/19)\par -recommended strep pneumonia vaccines: Prevnar-13 vaccine, followed by Pneumo vaccine 23 one year following\par -recommended early intervention for URIs\par -recommended regular osteoporosis evaluations\par -recommended early dermatological evaluations\par -recommended after the age of 50 to the age of 70, colonoscopy every 5 years \par  \par \par Follow up in 3 months with Full PFTs /  CXR \par Patient is encouraged to call with any changes, concerns or questions.

## 2020-02-06 NOTE — HISTORY OF PRESENT ILLNESS
[FreeTextEntry1] : Mr. Mccullough is a 79 year old male with a history of amiodarone toxicity, BOOP, former smoker, GERD, hypoxemia, LILO, OW, interstitial pneumonitis, poor sleep, snoring, and SOB presenting to the office today for a follow up visit. His chief complaint is \par - Hes been feeling a little more breathy since hes been told he has Afib \par - He feels breathless when going up and down stairs \par - He has a cough that has been ongoing \par - Sometimes his wheezing comes in the middle of the night \par - his sinuses have been fine, although he blows his nose a lot \par - Denies visual issues \par - his mouth gets dry \par - His back his feels \par - His walking has not declined \par - he sleeps for 3 hours then gets up then goes back to sleep, its irregular\par - His appetite has declined, he eats half of what he used to eat. Happened since he came back from the hospital. \par - He is not using any inhalers \par - He is not snoring\par - He has no reflux\par - He is using is mouth piece for LILO \par - He denies anydiarrhea, constipation, dysphagia, myalgia, dizziness, leg swelling, leg pain, itchy eyes, itchy ears, heartburn, reflux, or sour taste in the mouth.\par

## 2020-02-06 NOTE — PHYSICAL EXAM
[General Appearance - Well Developed] : well developed [Normal Appearance] : normal appearance [General Appearance - Well Nourished] : well nourished [Well Groomed] : well groomed [General Appearance - In No Acute Distress] : no acute distress [No Deformities] : no deformities [Normal Conjunctiva] : the conjunctiva exhibited no abnormalities [Normal Oropharynx] : normal oropharynx [Eyelids - No Xanthelasma] : the eyelids demonstrated no xanthelasmas [Neck Appearance] : the appearance of the neck was normal [Jugular Venous Distention Increased] : there was no jugular-venous distention [Neck Cervical Mass (___cm)] : no neck mass was observed [Thyroid Diffuse Enlargement] : the thyroid was not enlarged [Thyroid Nodule] : there were no palpable thyroid nodules [Heart Rate And Rhythm] : heart rate and rhythm were normal [Heart Sounds] : normal S1 and S2 [Auscultation Breath Sounds / Voice Sounds] : lungs were clear to auscultation bilaterally [Exaggerated Use Of Accessory Muscles For Inspiration] : no accessory muscle use [Respiration, Rhythm And Depth] : normal respiratory rhythm and effort [Kyphosis] : kyphosis [Abdomen Tenderness] : non-tender [Abdomen Soft] : soft [Abnormal Walk] : normal gait [Gait - Sufficient For Exercise Testing] : the gait was sufficient for exercise testing [Abdomen Mass (___ Cm)] : no abdominal mass palpated [Nail Clubbing] : no clubbing of the fingernails [Petechial Hemorrhages (___cm)] : no petechial hemorrhages [Cyanosis, Localized] : no localized cyanosis [Skin Color & Pigmentation] : normal skin color and pigmentation [] : no rash [No Venous Stasis] : no venous stasis [No Skin Ulcers] : no skin ulcer [Skin Lesions] : no skin lesions [Deep Tendon Reflexes (DTR)] : deep tendon reflexes were 2+ and symmetric [No Xanthoma] : no  xanthoma was observed [No Focal Deficits] : no focal deficits [Sensation] : the sensory exam was normal to light touch and pinprick [Impaired Insight] : insight and judgment were intact [Affect] : the affect was normal [Oriented To Time, Place, And Person] : oriented to person, place, and time [III] : III [FreeTextEntry1] : mild irregular heartbeat  [FreeTextEntry2] : 1+ LE pedal edema

## 2020-02-06 NOTE — ADDENDUM
[FreeTextEntry1] : Documented by Marguerite Michael acting as a scribe for Dr. Lele Saul on 02/06/2020 \par \par All medical record entries made by the Scribe were at my, Dr. Lele Saul's, direction and personally dictated by me on 02/06/2020 . I have reviewed the chart and agree that the record accurately reflects my personal performance of the history, physical exam, assessment and plan. I have also personally directed, reviewed, and agree with the discharge instructions.\par

## 2020-02-06 NOTE — PROCEDURE
[FreeTextEntry1] : Chest CT (1.29.2020) reveals stable post-surgical changes in the bilateral lungs related to multiple nodule resections. There is stable mil heterogeneity of the lung parenchyma with resolution of patchy nodularity that was new on 10/16/19. Scattered additional smaller nodules are unchanged, and no new lesions have developed.\par Stable dilatation of the aortic root up to 4.3 cm with an ectatic ascending aorta.\par Stable mild emphysema.\par Stable benign mediastinal lymph nodes.

## 2020-02-10 ENCOUNTER — TRANSCRIPTION ENCOUNTER (OUTPATIENT)
Age: 80
End: 2020-02-10

## 2020-02-11 ENCOUNTER — TRANSCRIPTION ENCOUNTER (OUTPATIENT)
Age: 80
End: 2020-02-11

## 2020-03-09 ENCOUNTER — TRANSCRIPTION ENCOUNTER (OUTPATIENT)
Age: 80
End: 2020-03-09

## 2020-04-02 ENCOUNTER — TRANSCRIPTION ENCOUNTER (OUTPATIENT)
Age: 80
End: 2020-04-02

## 2020-05-07 ENCOUNTER — APPOINTMENT (OUTPATIENT)
Dept: PULMONOLOGY | Facility: CLINIC | Age: 80
End: 2020-05-07
Payer: MEDICARE

## 2020-05-07 DIAGNOSIS — Z87.891 PERSONAL HISTORY OF NICOTINE DEPENDENCE: ICD-10-CM

## 2020-05-07 PROCEDURE — 99214 OFFICE O/P EST MOD 30 MIN: CPT | Mod: 95

## 2020-05-07 NOTE — ASSESSMENT
[FreeTextEntry1] : Mr. 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 Proper dietary restraint is necessary specifically salt containing foods, if any reaction may occur should be reported. \par -Repeat CT - January, 2021\par \par - Etiology will depend on the causative agent possibilities include: idiopathic pulmonary fibrosis (UIP), NSIP (nonspecific interstitial pneumonia) , respiratory bronchiolitis in someone who is a smoker, drug induced lung disease, hypersensitivity pneumonitis, BOOP, sarcoidosis, chronic congestive heart failure, rheumatologic disorder induced interstitial lung disease. Optimal diagnosing will include rheumatological panel which would include ESR, MATEO, ANCA, ARNP, CCP, rheumatoid factor, hypersensitivity panel, JOE1, scleroderma antibodies, sjogren's syndrome antibodies; biopsy will be necessary to definitively determine the etiology unless the CT scan findings are specific for UIP. 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 These drugs require pulmonary function testing including DLCO regularly and possible CT/CXR if respiratory complaints develop. PFTs should be performed at 6 month intervals \par \par problem 3: s/p PJP prophylaxis- completed went below 20 mg pred. dose per day\par -He is off Bactrim 1 tablet Monday, Wednesday, Friday \par -He is s/p Rheumatologic Blood work to include: ESR panel (+) , ACE, panel (-) , HP panel (-)\par - s/p rheumatological evaluation - Dr. Torrez\par - PJP Pneumonia is a major opportunistic infection in immunocompromised patients and corticosteroid therapy is a risk factor. Therefore, patients receiving at least 4 weeks of therapy at 30 mg or more daily should be on prophylaxis.\par  \par problem 4: GERD\par -continue Protonix 40 mg QAM, pre-breakfast\par -Things to avoid including overeating, spicy foods, tight clothing, eating within three hours of bed, this list is not all inclusive. \par -For treatment of reflux, possible options discussed including diet control, H2 blockers, PPIs, as well as coating motility agents discussed as treatment options. Timing of meals and proximity of last meal to sleep were discussed. If symptoms persist, a formal gastrointestinal evaluation is needed.\par -Rule of 2's: Avoid eating too late, too fast, too much, too spicy or within two hours of bedtime \par \par problem 5: (+) OSAS (secondary to snoring, nonrestorative sleep and A-fib) - on Rx\par -refused prescription for sleep study (APAP) - refused\par -s/p oral appliance with Dr. Gabby Lopez\par Sleep apnea is associated with adverse clinical consequences which an affect most organ systems. Cardiovascular disease risk includes arrhythmias, atrial fibrillation, hypertension, coronary artery disease, and stroke. Metabolic disorders include diabetes type 2, non-alcoholic fatty liver disease. Mood disorder especially depression; and cognitive decline especially in the elderly. Associations with chronic reflux/Candelaria’s esophagus some but not all inclusive. \par -Reasons include arousal consistent with hypopnea; respiratory events most prominent in REM sleep or supine position; therefore sleep staging and body position are important for accurate diagnosis and estimation of AHI. \par \par problem 6: smoking history/lung cancer screening (improved)\par -follow up chest CT 1/2021\par \par Lung cancer screening is recommended for people between the ages of 55 and 80 with prior 30+ pack year smoking histories. There is irrefutable evidence for realization of lung cancer screening based on two large randomized control trials demonstrating relative reduction in lung cancer mortality for patients undergoing low-dose CT scanning. Risks and benefits reviewed with the patient.\par \par Problem 6A: COPD \par -continue Trelegy 1 puff QD (gargle and spit after use)\par -COPD is a progressive disease and although it can’t be cured , appropriate management can slow its progression, reduce frequency and severity of exacerbations, and improve symptoms and the patient quality of life. Hospitalizations are the greatest contributor to the total COPD costs and account for up to 87% of total COPD related costs. Exacerbations are the main cause of admissions and subsequently account for the 40-75% of COPD costs. Inhaled maintenance therapy reduces the incidence of exacerbations in patients with stable COPD. Incorrect inhaler use and nonadherence are major obstacles to achieving COPD treatment goals. Many COPD patients have challenges (impaired inhalation, limited dexterity, reduced cognition: that limit their ability to correctly use their COPD treatment devices resulting in reduced symptom control. Of most importance is smoking cessation and early intervention with respiratory illnesses and contemplation for pulmonary rehab to enhance quality of life.\par -Inhaler technique reviewed as well as oral hygiene techniques reviewed with patient. Avoidance of cold air, extremes of temperature, rescue inhaler should be used before exercise. Order of medication reviewed with patient. Recommended use of a cool mist humidifier in the bedroom.\par \par problem 7: poor breathing mechanics\par -Proper breathing techniques were reviewed with an emphasis of exhalation. Patient instructed to breath in for 1 second and out for four seconds. Patient was encouraged to not talk while walking.\par \par problem 8: overweight/out of shape\par -Weight loss, exercise, and diet control were discussed and are highly encouraged. Treatment options were given such as, aqua therapy, and contacting a nutritionist. Recommended to use the elliptical, stationary bike, less use of treadmill. Mindful eating was explained to the patient. Obesity is associated with worsening asthma, shortness of breath, and potential for cardiac disease, diabetes, and other underlying medical conditions.\par \par problem 9: CAD\par -recommended to follow up with cardiologist Dr. Carlos\par \par problem 10: poor balance\par -recommended to complete balance therapy and gait training\par \par problem 11: allergies\par -continue Claritin 10 mg QAM\par Environmental measures for allergies were encouraged including mattress and pillow cover, air purifier, and environmental controls \par \par Problem 12: Health Maintenance/COVID19 Precautions:\par - Clean your hands often. Wash your hands often with soap and water for at least 20 seconds, especially after blowing your nose, coughing, or sneezing, or having been in a public place.\par - If soap and water are not available, use a hand  that contains at least 60% alcohol.\par - To the extent possible, avoid touching high-touch surfaces in public places - elevator buttons, door handles, handrails, handshaking with people, etc. Use a tissue or your sleeve to cover your hand or finger if you must touch something.\par - Wash your hands after touching surfaces in public places.\par - Avoid touching your face, nose, eyes, etc.\par - Clean and disinfect your home to remove germs: practice routine cleaning of frequently touched surfaces (for example: tables, doorknobs, light switches, handles, desks, toilets, faucets, sinks & cell phones)\par - Avoid crowds, especially in poorly ventilated spaces. Your risk of exposure to respiratory viruses like COVID-19 may increase in crowded, closed-in settings with little air circulation if there are people in the crowd who are sick. All patients are recommended to practice social distancing and stay at least 6 feet away from others.\par - Avoid all non-essential travel including plane trips, and especially avoid embarking on cruise ships.\par -If COVID-19 is spreading in your community, take extra measures to put distance between yourself and other people to further reduce your risk of being exposed to this new virus.\par -Stay home as much as possible.\par - Consider ways of getting food brought to your house through family, social, or commercial networks\par -Be aware that the virus has been known to live in the air up to 3 hours post exposure, cardboard up to 24 hours post exposure, copper up to 4 hours post exposure, steel and plastic up to 2-3 days post exposure. Risk of transmission from these surfaces are affected by many variables.\par Immune Support Recommendations:\par -OTC Vitamin C 500mg BID \par -OTC Quercetin 250-500mg BID \par -OTC Zinc 75-100mg per day \par -OTC Melatonin 1 or 2 mg a night \par -OTC Vitamin D 1-4000mg per day \par -OTC Tonic Water 8oz per day\par Asthma and COVID19:\par You need to make sure your asthma is under control. This often requires the use of inhaled corticosteroids (and sometimes oral corticosteroids). Inhaled corticosteroids do not likely reduce your immune system’s ability to fight infections, but oral corticosteroids may. It is important to use the steps above to protect yourself to limit your exposure to any respiratory virus.\par \par problem 13: health maintenance \par - He is s/p consult with a neurologist \par - Received influenza vaccine (in office 10/25/19)\par -recommended strep pneumonia vaccines: Prevnar-13 vaccine, followed by Pneumo vaccine 23 one year following\par -recommended early intervention for URIs\par -recommended regular osteoporosis evaluations\par -recommended early dermatological evaluations\par -recommended after the age of 50 to the age of 70, colonoscopy every 5 years \par  \par \par Follow up in 3 months with Full PFTs /  CXR \par Patient is encouraged to call with any changes, concerns or questions.

## 2020-05-07 NOTE — ADDENDUM
[FreeTextEntry1] : Documented by Marguerite Michael acting as a scribe for Dr. Lele Saul on 05/07/2020.\par \par All medical record entries made by the Scribe were at my, Dr. Lele Sual's, direction and personally dictated by me on 05/07/2020. I have reviewed the chart and agree that the record accurately reflects my personal performance of the history, physical exam, assessment and plan. I have also personally directed, reviewed, and agree with the discharge instructions. \par

## 2020-05-07 NOTE — PHYSICAL EXAM
[No Acute Distress] : no acute distress [Well Nourished] : well nourished [Well Groomed] : well groomed [Normal Appearance] : normal appearance [No Deformities] : no deformities [Well Developed] : well developed

## 2020-05-07 NOTE — REASON FOR VISIT
[Follow-Up] : a follow-up visit [Spouse] : spouse [FreeTextEntry1] : video call - amiodarone toxicity, BOOP, former smoker, GERD, hypoxemia, LILO, OW, interstitial pneumonitis, poor sleep, snoring, and SOB

## 2020-05-07 NOTE — HISTORY OF PRESENT ILLNESS
[Medical Office: ([facility])___] : at the medical office located in  [Home] : at home, [unfilled] , at the time of the visit. [Self] : self [Patient] : the patient [FreeTextEntry2] : CHINA NESBITT  [FreeTextEntry1] : Mr. Mccullough is a 80 year old male with a history of amiodarone toxicity, BOOP, former smoker, GERD, hypoxemia, LILO, OW, interstitial pneumonitis, poor sleep, snoring, and SOB who was spoken to today via video call for a follow up . is chief complaint is \par - he notes his energy levels have been good\par - he notes he has been walking for exercising, he notes he has not been going out much. \par - he notes his bowels are regular \par - he notes his energy levels are 9 out of 10. \par - he denies any coughing / wheezing \par - he was recently put on Vitamin D supplements, and a couple of other vitamins. \par - he notes his weight has been pretty much stable \par - he notes he has been eating well \par - he notes his number one complaint is his voice, its gotten a little better. \par -he denies any headaches, nausea, vomiting, fever, chills, sweats, chest pain, chest pressure, diarrhea, constipation, dysphagia, dizziness, sour taste in the mouth, leg swelling, leg pain, itchy eyes, itchy ears.

## 2020-07-14 ENCOUNTER — TRANSCRIPTION ENCOUNTER (OUTPATIENT)
Age: 80
End: 2020-07-14

## 2020-07-31 ENCOUNTER — RX RENEWAL (OUTPATIENT)
Age: 80
End: 2020-07-31

## 2020-08-13 ENCOUNTER — APPOINTMENT (OUTPATIENT)
Dept: PULMONOLOGY | Facility: CLINIC | Age: 80
End: 2020-08-13
Payer: MEDICARE

## 2020-08-13 VITALS
HEART RATE: 74 BPM | RESPIRATION RATE: 17 BRPM | SYSTOLIC BLOOD PRESSURE: 130 MMHG | BODY MASS INDEX: 35.2 KG/M2 | WEIGHT: 219 LBS | HEIGHT: 66 IN | DIASTOLIC BLOOD PRESSURE: 70 MMHG | TEMPERATURE: 97.7 F | OXYGEN SATURATION: 97 %

## 2020-08-13 DIAGNOSIS — J18.1 LOBAR PNEUMONIA, UNSPECIFIED ORGANISM: ICD-10-CM

## 2020-08-13 DIAGNOSIS — R09.02 HYPOXEMIA: ICD-10-CM

## 2020-08-13 DIAGNOSIS — Z99.81 HYPOXEMIA: ICD-10-CM

## 2020-08-13 PROCEDURE — 99214 OFFICE O/P EST MOD 30 MIN: CPT

## 2020-08-13 NOTE — PHYSICAL EXAM
[No Acute Distress] : no acute distress [Normal Oropharynx] : normal oropharynx [Normal Appearance] : normal appearance [Normal Rate/Rhythm] : normal rate/rhythm [No Neck Mass] : no neck mass [Normal S1, S2] : normal s1, s2 [No Murmurs] : no murmurs [No Resp Distress] : no resp distress [No Abnormalities] : no abnormalities [Clear to Auscultation Bilaterally] : clear to auscultation bilaterally [Benign] : benign [Normal Gait] : normal gait [No Clubbing] : no clubbing [No Edema] : no edema [No Cyanosis] : no cyanosis [FROM] : FROM [Normal Color/ Pigmentation] : normal color/ pigmentation [No Focal Deficits] : no focal deficits [Normal Affect] : normal affect [Oriented x3] : oriented x3 [III] : Mallampati Class: III [TextBox_68] : I:E ratio 1:3; clear

## 2020-08-13 NOTE — ASSESSMENT
[FreeTextEntry1] : Mr. 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 Proper dietary restraint is necessary specifically salt containing foods, if any reaction may occur should be reported. \par -Repeat CT - January, 2021\par \par - Etiology will depend on the causative agent possibilities include: idiopathic pulmonary fibrosis (UIP), NSIP (nonspecific interstitial pneumonia) , respiratory bronchiolitis in someone who is a smoker, drug induced lung disease, hypersensitivity pneumonitis, BOOP, sarcoidosis, chronic congestive heart failure, rheumatologic disorder induced interstitial lung disease. Optimal diagnosing will include rheumatological panel which would include ESR, MATEO, ANCA, ARNP, CCP, rheumatoid factor, hypersensitivity panel, JOE1, scleroderma antibodies, sjogren's syndrome antibodies; biopsy will be necessary to definitively determine the etiology unless the CT scan findings are specific for UIP. 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 These drugs require pulmonary function testing including DLCO regularly and possible CT/CXR if respiratory complaints develop. PFTs should be performed at 6 month intervals \par \par problem 3: s/p PJP prophylaxis- completed went below 20 mg pred. dose per day\par -He is off Bactrim 1 tablet Monday, Wednesday, Friday \par -He is s/p Rheumatologic Blood work to include: ESR panel (+) , ACE, panel (-) , HP panel (-)\par - s/p rheumatological evaluation - Dr. Torrez\par - PJP Pneumonia is a major opportunistic infection in immunocompromised patients and corticosteroid therapy is a risk factor. Therefore, patients receiving at least 4 weeks of therapy at 30 mg or more daily should be on prophylaxis.\par  \par problem 4: GERD\par -continue Protonix 40 mg QAM, pre-breakfast\par -Things to avoid including overeating, spicy foods, tight clothing, eating within three hours of bed, this list is not all inclusive. \par -For treatment of reflux, possible options discussed including diet control, H2 blockers, PPIs, as well as coating motility agents discussed as treatment options. Timing of meals and proximity of last meal to sleep were discussed. If symptoms persist, a formal gastrointestinal evaluation is needed.\par -Rule of 2's: Avoid eating too late, too fast, too much, too spicy or within two hours of bedtime \par \par problem 5: (+) OSAS (secondary to snoring, nonrestorative sleep and A-fib) - on Rx\par -refused prescription for sleep study (APAP) - refused\par -s/p oral appliance with Dr. Gabby Lopez\par Sleep apnea is associated with adverse clinical consequences which an affect most organ systems. Cardiovascular disease risk includes arrhythmias, atrial fibrillation, hypertension, coronary artery disease, and stroke. Metabolic disorders include diabetes type 2, non-alcoholic fatty liver disease. Mood disorder especially depression; and cognitive decline especially in the elderly. Associations with chronic reflux/Candelaria’s esophagus some but not all inclusive. \par -Reasons include arousal consistent with hypopnea; respiratory events most prominent in REM sleep or supine position; therefore sleep staging and body position are important for accurate diagnosis and estimation of AHI. \par \par problem 6: smoking history/lung cancer screening (improved)\par -follow up chest CT 1/2021\par \par Lung cancer screening is recommended for people between the ages of 55 and 80 with prior 30+ pack year smoking histories. There is irrefutable evidence for realization of lung cancer screening based on two large randomized control trials demonstrating relative reduction in lung cancer mortality for patients undergoing low-dose CT scanning. Risks and benefits reviewed with the patient.\par \par Problem 6A: COPD \par -continue Trelegy 1 puff QD (gargle and spit after use)\par -COPD is a progressive disease and although it can’t be cured , appropriate management can slow its progression, reduce frequency and severity of exacerbations, and improve symptoms and the patient quality of life. Hospitalizations are the greatest contributor to the total COPD costs and account for up to 87% of total COPD related costs. Exacerbations are the main cause of admissions and subsequently account for the 40-75% of COPD costs. Inhaled maintenance therapy reduces the incidence of exacerbations in patients with stable COPD. Incorrect inhaler use and nonadherence are major obstacles to achieving COPD treatment goals. Many COPD patients have challenges (impaired inhalation, limited dexterity, reduced cognition: that limit their ability to correctly use their COPD treatment devices resulting in reduced symptom control. Of most importance is smoking cessation and early intervention with respiratory illnesses and contemplation for pulmonary rehab to enhance quality of life.\par -Inhaler technique reviewed as well as oral hygiene techniques reviewed with patient. Avoidance of cold air, extremes of temperature, rescue inhaler should be used before exercise. Order of medication reviewed with patient. Recommended use of a cool mist humidifier in the bedroom.\par \par problem 7: poor breathing mechanics\par -Proper breathing techniques were reviewed with an emphasis of exhalation. Patient instructed to breath in for 1 second and out for four seconds. Patient was encouraged to not talk while walking.\par \par problem 8: overweight/out of shape\par -Weight loss, exercise, and diet control were discussed and are highly encouraged. Treatment options were given such as, aqua therapy, and contacting a nutritionist. Recommended to use the elliptical, stationary bike, less use of treadmill. Mindful eating was explained to the patient. Obesity is associated with worsening asthma, shortness of breath, and potential for cardiac disease, diabetes, and other underlying medical conditions.\par \par problem 9: CAD\par -recommended to follow up with cardiologist Dr. Carlos\par \par problem 10: poor balance\par -recommended to complete balance therapy and gait training\par \par problem 11: allergies\par -continue Claritin 10 mg QAM\par Environmental measures for allergies were encouraged including mattress and pillow cover, air purifier, and environmental controls \par \par Problem 12: Health Maintenance/COVID19 Precautions:\par - Clean your hands often. Wash your hands often with soap and water for at least 20 seconds, especially after blowing your nose, coughing, or sneezing, or having been in a public place.\par - If soap and water are not available, use a hand  that contains at least 60% alcohol.\par - To the extent possible, avoid touching high-touch surfaces in public places - elevator buttons, door handles, handrails, handshaking with people, etc. Use a tissue or your sleeve to cover your hand or finger if you must touch something.\par - Wash your hands after touching surfaces in public places.\par - Avoid touching your face, nose, eyes, etc.\par - Clean and disinfect your home to remove germs: practice routine cleaning of frequently touched surfaces (for example: tables, doorknobs, light switches, handles, desks, toilets, faucets, sinks & cell phones)\par - Avoid crowds, especially in poorly ventilated spaces. Your risk of exposure to respiratory viruses like COVID-19 may increase in crowded, closed-in settings with little air circulation if there are people in the crowd who are sick. All patients are recommended to practice social distancing and stay at least 6 feet away from others.\par - Avoid all non-essential travel including plane trips, and especially avoid embarking on cruise ships.\par -If COVID-19 is spreading in your community, take extra measures to put distance between yourself and other people to further reduce your risk of being exposed to this new virus.\par -Stay home as much as possible.\par - Consider ways of getting food brought to your house through family, social, or commercial networks\par -Be aware that the virus has been known to live in the air up to 3 hours post exposure, cardboard up to 24 hours post exposure, copper up to 4 hours post exposure, steel and plastic up to 2-3 days post exposure. Risk of transmission from these surfaces are affected by many variables.\par Immune Support Recommendations:\par -OTC Vitamin C 500mg BID \par -OTC Quercetin 250-500mg BID \par -OTC Zinc 75-100mg per day \par -OTC Melatonin 1 or 2 mg a night \par -OTC Vitamin D 1-4000mg per day \par -OTC Tonic Water 8oz per day\par Asthma and COVID19:\par You need to make sure your asthma is under control. This often requires the use of inhaled corticosteroids (and sometimes oral corticosteroids). Inhaled corticosteroids do not likely reduce your immune system’s ability to fight infections, but oral corticosteroids may. It is important to use the steps above to protect yourself to limit your exposure to any respiratory virus.\par \par problem 13: health maintenance \par - He is s/p consult with a neurologist \par - Received influenza vaccine (in office 10/25/19)\par -recommended strep pneumonia vaccines: Prevnar-13 vaccine, followed by Pneumo vaccine 23 one year following\par -recommended early intervention for URIs\par -recommended regular osteoporosis evaluations\par -recommended early dermatological evaluations\par -recommended after the age of 50 to the age of 70, colonoscopy every 5 years \par  \par \par Follow up in 3 months with Full PFTs /  CXR \par Patient is encouraged to call with any changes, concerns or questions.

## 2020-08-13 NOTE — ADDENDUM
[FreeTextEntry1] : Documented by Andrea Keating acting as a scribe for Dr. Lele Saul on 08/13/2020.\par \par All medical record entries made by the Scribe were at my, Dr. Lele Saul's, direction and personally dictated by me on 08/13/2020. I have reviewed the chart and agree that the record accurately reflects my personal performance of the history, physical exam, assessment and plan. I have also personally directed, reviewed, and agree with the discharge instructions.

## 2020-08-13 NOTE — HISTORY OF PRESENT ILLNESS
[FreeTextEntry1] : Mr. Mccullough is a 80 year old male with a history of amiodarone toxicity, BOOP, former smoker, GERD, hypoxemia, LILO, OW, interstitial pneumonitis, poor sleep, snoring, and SOB who presents for a follow up Her chief complaint is vocal issues \par \par -he notes generally feeling well\par -he notes mild intermittent cough\par -he note vocal hoarseness \par -he notes arthralgias in hands\par -he notes dry mouth during sleep and upon awakening\par -he notes properly hydrating\par -he denies palpitations\par -He notes His bowels are regular\par -he notes supplementing with probiotic nightly\par -he notes weight is stable\par -he notes appetite is diminished exacerbated by getting hinkle faster\par -he notes nocturia episodes 1 or 2 a night, regular and controlled by Rx\par \par \par -he denies any chest pain, chest pressure, diarrhea, constipation, dysphagia, dizziness, sour taste in the mouth, leg swelling, leg pain, itchy eyes, itchy ears, heartburn, reflux, myalgias

## 2020-11-19 ENCOUNTER — RX RENEWAL (OUTPATIENT)
Age: 80
End: 2020-11-19

## 2021-01-01 NOTE — DISCHARGE NOTE PROVIDER - NSDCCPTREATMENT_GEN_ALL_CORE_FT
PRINCIPAL PROCEDURE  Procedure: Biopsy, lung, using VATS  Findings and Treatment: Uniportal VATS Right upper and right middle lobe wedge      SECONDARY PROCEDURE  Procedure: Biopsy, lung, using VATS  Findings and Treatment: Uniportal VATS Right upper and right middle lobe wedge
Statement Selected

## 2021-01-12 ENCOUNTER — RX RENEWAL (OUTPATIENT)
Age: 81
End: 2021-01-12

## 2021-01-14 ENCOUNTER — TRANSCRIPTION ENCOUNTER (OUTPATIENT)
Age: 81
End: 2021-01-14

## 2021-01-15 ENCOUNTER — RX RENEWAL (OUTPATIENT)
Age: 81
End: 2021-01-15

## 2021-01-19 ENCOUNTER — APPOINTMENT (OUTPATIENT)
Dept: PULMONOLOGY | Facility: CLINIC | Age: 81
End: 2021-01-19
Payer: MEDICARE

## 2021-01-19 VITALS — HEIGHT: 70 IN | WEIGHT: 213 LBS | BODY MASS INDEX: 30.49 KG/M2

## 2021-01-19 PROCEDURE — 99214 OFFICE O/P EST MOD 30 MIN: CPT | Mod: 95

## 2021-01-19 NOTE — ADDENDUM
[FreeTextEntry1] : Documented by Dmitri Varghese acting as a scribe for Dr. Lele Saul on (01/19/2021).\par \par All medical record entries made by the Scribe were at my, Dr. Lele Saul's, direction and personally dictated by me on (01/19/2021). I have reviewed the chart and agree that the record accurately reflects my personal performance of the history, physical exam, assessment and plan. I have also personally directed, reviewed, and agree with the discharge instructions. \par

## 2021-01-19 NOTE — HISTORY OF PRESENT ILLNESS
[Home] : at home, [unfilled] , at the time of the visit. [Medical Office: ([facility])___] : at the medical office located in  [Verbal consent obtained from patient] : the patient, [unfilled] [FreeTextEntry1] : Mr. Mccullough is a 80 year old male with a history of amiodarone toxicity, BOOP, former smoker, GERD, hypoxemia, LILO, OW, interstitial pneumonitis, poor sleep, snoring, and SOB who was spoken to today via video call for a follow up . is chief complaint is \par -He notes feeling well in general \par -He notes his oxygen level is good\par -He notes his pulse is good \par -He notes an occasional sour taste in the mouth, but less than it used to \par -He notes his vision is the same as it has been \par -He notes doing full body exercise, and walks 45 minutes every afternoon \par -He notes his memory and concentration are good \par -He notes being off Eloquis due to not having Afib in over a year\par -He notes thinking Trelegy is not affecting him\par -He denies wheezing\par -He notes a slight cough\par -He denies SOB\par -His wife notes he occasionally sounds breathless \par -He notes his voice has been good \par \par \par - denies any headaches, nausea, vomiting, fever, chills, sweats, chest pain, chest pressure, diarrhea, constipation, dysphagia, dizziness, leg swelling, leg pain, itchy eyes, itchy ears, heartburn, reflux

## 2021-01-19 NOTE — ASSESSMENT
[FreeTextEntry1] : Mr. 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 Proper dietary restraint is necessary specifically salt containing foods, if any reaction may occur should be reported. \par -Repeat CT - January, 2021\par \par - Etiology will depend on the causative agent possibilities include: idiopathic pulmonary fibrosis (UIP), NSIP (nonspecific interstitial pneumonia) , respiratory bronchiolitis in someone who is a smoker, drug induced lung disease, hypersensitivity pneumonitis, BOOP, sarcoidosis, chronic congestive heart failure, rheumatologic disorder induced interstitial lung disease. Optimal diagnosing will include rheumatological panel which would include ESR, MATEO, ANCA, ARNP, CCP, rheumatoid factor, hypersensitivity panel, JOE1, scleroderma antibodies, sjogren's syndrome antibodies; biopsy will be necessary to definitively determine the etiology unless the CT scan findings are specific for UIP. 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 These drugs require pulmonary function testing including DLCO regularly and possible CT/CXR if respiratory complaints develop. PFTs should be performed at 6 month intervals \par \par problem 3: s/p PJP prophylaxis- completed went below 20 mg pred. dose per day\par -He is off Bactrim 1 tablet Monday, Wednesday, Friday \par -He is s/p Rheumatologic Blood work to include: ESR panel (+) , ACE, panel (-) , HP panel (-)\par - s/p rheumatological evaluation - Dr. Torrez\par - PJP Pneumonia is a major opportunistic infection in immunocompromised patients and corticosteroid therapy is a risk factor. Therefore, patients receiving at least 4 weeks of therapy at 30 mg or more daily should be on prophylaxis.\par  \par problem 4: GERD\par -continue Protonix 40 mg QAM, pre-breakfast\par -Things to avoid including overeating, spicy foods, tight clothing, eating within three hours of bed, this list is not all inclusive. \par -For treatment of reflux, possible options discussed including diet control, H2 blockers, PPIs, as well as coating motility agents discussed as treatment options. Timing of meals and proximity of last meal to sleep were discussed. If symptoms persist, a formal gastrointestinal evaluation is needed.\par -Rule of 2's: Avoid eating too late, too fast, too much, too spicy or within two hours of bedtime \par \par problem 5: (+) OSAS (secondary to snoring, nonrestorative sleep and A-fib) - on Rx\par -refused prescription for sleep study (APAP) - refused\par -s/p oral appliance with Dr. Gabby Lopez\par Sleep apnea is associated with adverse clinical consequences which an affect most organ systems. Cardiovascular disease risk includes arrhythmias, atrial fibrillation, hypertension, coronary artery disease, and stroke. Metabolic disorders include diabetes type 2, non-alcoholic fatty liver disease. Mood disorder especially depression; and cognitive decline especially in the elderly. Associations with chronic reflux/Candelaria’s esophagus some but not all inclusive. \par -Reasons include arousal consistent with hypopnea; respiratory events most prominent in REM sleep or supine position; therefore sleep staging and body position are important for accurate diagnosis and estimation of AHI. \par \par problem 6: smoking history/lung cancer screening (improved)\par -follow up chest CT 1/2021\par \par Lung cancer screening is recommended for people between the ages of 55 and 80 with prior 30+ pack year smoking histories. There is irrefutable evidence for realization of lung cancer screening based on two large randomized control trials demonstrating relative reduction in lung cancer mortality for patients undergoing low-dose CT scanning. Risks and benefits reviewed with the patient.\par \par Problem 6A: COPD  -?quiet\par -discontinue Trelegy 1 puff QD (gargle and spit after use)\par -COPD is a progressive disease and although it can’t be cured , appropriate management can slow its progression, reduce frequency and severity of exacerbations, and improve symptoms and the patient quality of life. Hospitalizations are the greatest contributor to the total COPD costs and account for up to 87% of total COPD related costs. Exacerbations are the main cause of admissions and subsequently account for the 40-75% of COPD costs. Inhaled maintenance therapy reduces the incidence of exacerbations in patients with stable COPD. Incorrect inhaler use and nonadherence are major obstacles to achieving COPD treatment goals. Many COPD patients have challenges (impaired inhalation, limited dexterity, reduced cognition: that limit their ability to correctly use their COPD treatment devices resulting in reduced symptom control. Of most importance is smoking cessation and early intervention with respiratory illnesses and contemplation for pulmonary rehab to enhance quality of life.\par -Inhaler technique reviewed as well as oral hygiene techniques reviewed with patient. Avoidance of cold air, extremes of temperature, rescue inhaler should be used before exercise. Order of medication reviewed with patient. Recommended use of a cool mist humidifier in the bedroom.\par \par problem 7: poor breathing mechanics\par -Proper breathing techniques were reviewed with an emphasis of exhalation. Patient instructed to breath in for 1 second and out for four seconds. Patient was encouraged to not talk while walking.\par \par problem 8: overweight/out of shape\par -Weight loss, exercise, and diet control were discussed and are highly encouraged. Treatment options were given such as, aqua therapy, and contacting a nutritionist. Recommended to use the elliptical, stationary bike, less use of treadmill. Mindful eating was explained to the patient. Obesity is associated with worsening asthma, shortness of breath, and potential for cardiac disease, diabetes, and other underlying medical conditions.\par \par problem 9: CAD\par -recommended to follow up with cardiologist Dr. Carlos\par \par problem 10: poor balance\par -recommended to complete balance therapy and gait training\par \par problem 11: allergies\par -continue Claritin 10 mg QAM\par Environmental measures for allergies were encouraged including mattress and pillow cover, air purifier, and environmental controls \par \par Problem 12: Health Maintenance/COVID19 Precautions:\par - Clean your hands often. Wash your hands often with soap and water for at least 20 seconds, especially after blowing your nose, coughing, or sneezing, or having been in a public place.\par - If soap and water are not available, use a hand  that contains at least 60% alcohol.\par - To the extent possible, avoid touching high-touch surfaces in public places - elevator buttons, door handles, handrails, handshaking with people, etc. Use a tissue or your sleeve to cover your hand or finger if you must touch something.\par - Wash your hands after touching surfaces in public places.\par - Avoid touching your face, nose, eyes, etc.\par - Clean and disinfect your home to remove germs: practice routine cleaning of frequently touched surfaces (for example: tables, doorknobs, light switches, handles, desks, toilets, faucets, sinks & cell phones)\par - Avoid crowds, especially in poorly ventilated spaces. Your risk of exposure to respiratory viruses like COVID-19 may increase in crowded, closed-in settings with little air circulation if there are people in the crowd who are sick. All patients are recommended to practice social distancing and stay at least 6 feet away from others.\par - Avoid all non-essential travel including plane trips, and especially avoid embarking on cruise ships.\par -If COVID-19 is spreading in your community, take extra measures to put distance between yourself and other people to further reduce your risk of being exposed to this new virus.\par -Stay home as much as possible.\par - Consider ways of getting food brought to your house through family, social, or commercial networks\par -Be aware that the virus has been known to live in the air up to 3 hours post exposure, cardboard up to 24 hours post exposure, copper up to 4 hours post exposure, steel and plastic up to 2-3 days post exposure. Risk of transmission from these surfaces are affected by many variables.\par Immune Support Recommendations:\par -OTC Vitamin C 500mg BID \par -OTC Quercetin 250-500mg BID \par -OTC Zinc 75-100mg per day \par -OTC Melatonin 1 or 2 mg a night \par -OTC Vitamin D 1-4000mg per day \par -OTC Tonic Water 8oz per day\par Asthma and COVID19:\par You need to make sure your asthma is under control. This often requires the use of inhaled corticosteroids (and sometimes oral corticosteroids). Inhaled corticosteroids do not likely reduce your immune system’s ability to fight infections, but oral corticosteroids may. It is important to use the steps above to protect yourself to limit your exposure to any respiratory virus.\par \par problem 13: health maintenance \par - He is s/p consult with a neurologist \par - Received influenza vaccine 2020\par -recommended strep pneumonia vaccines: Prevnar-13 vaccine, followed by Pneumo vaccine 23 one year following\par -recommended early intervention for URIs\par -recommended regular osteoporosis evaluations\par -recommended early dermatological evaluations\par -recommended after the age of 50 to the age of 70, colonoscopy every 5 years \par  \par \par Follow up in 3 months with Full PFTs /  CXR \par Patient is encouraged to call with any changes, concerns or questions.

## 2021-03-15 NOTE — ED PROVIDER NOTE - PSYCHIATRIC, MLM
Alert and oriented to person, place, time/situation. normal mood and affect. no apparent risk to self or others. 25

## 2021-04-02 DIAGNOSIS — Z01.812 ENCOUNTER FOR PREPROCEDURAL LABORATORY EXAMINATION: ICD-10-CM

## 2021-05-14 ENCOUNTER — TRANSCRIPTION ENCOUNTER (OUTPATIENT)
Age: 81
End: 2021-05-14

## 2021-05-15 ENCOUNTER — LABORATORY RESULT (OUTPATIENT)
Age: 81
End: 2021-05-15

## 2021-05-18 ENCOUNTER — NON-APPOINTMENT (OUTPATIENT)
Age: 81
End: 2021-05-18

## 2021-05-18 ENCOUNTER — TRANSCRIPTION ENCOUNTER (OUTPATIENT)
Age: 81
End: 2021-05-18

## 2021-05-19 ENCOUNTER — APPOINTMENT (OUTPATIENT)
Dept: PULMONOLOGY | Facility: CLINIC | Age: 81
End: 2021-05-19
Payer: MEDICARE

## 2021-05-19 VITALS
TEMPERATURE: 97.6 F | BODY MASS INDEX: 30.78 KG/M2 | SYSTOLIC BLOOD PRESSURE: 132 MMHG | HEIGHT: 70 IN | RESPIRATION RATE: 17 BRPM | DIASTOLIC BLOOD PRESSURE: 60 MMHG | WEIGHT: 215 LBS | HEART RATE: 75 BPM | OXYGEN SATURATION: 98 %

## 2021-05-19 PROCEDURE — 99214 OFFICE O/P EST MOD 30 MIN: CPT | Mod: 25

## 2021-05-19 PROCEDURE — ZZZZZ: CPT

## 2021-05-19 PROCEDURE — 94618 PULMONARY STRESS TESTING: CPT

## 2021-05-19 PROCEDURE — 94010 BREATHING CAPACITY TEST: CPT

## 2021-05-19 PROCEDURE — 94729 DIFFUSING CAPACITY: CPT

## 2021-05-19 NOTE — ADDENDUM
[FreeTextEntry1] : Documented by Andrea Keating acting as a scribe for Dr. Lele Saul on 05/19/2021.\par \par All medical record entries made by the Scribe were at my, Dr. Lele Saul's, direction and personally dictated by me on 05/19/2021 . I have reviewed the chart and agree that the record accurately reflects my personal performance of the history, physical exam, assessment and plan. I have also personally directed, reviewed, and agree with the discharge instructions. \par

## 2021-05-19 NOTE — ASSESSMENT
[FreeTextEntry1] : Mr. 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 Proper dietary restraint is necessary specifically salt containing foods, if any reaction may occur should be reported. \par -Repeat CT - 9/2021\par \par - Etiology will depend on the causative agent possibilities include: idiopathic pulmonary fibrosis (UIP), NSIP (nonspecific interstitial pneumonia) , respiratory bronchiolitis in someone who is a smoker, drug induced lung disease, hypersensitivity pneumonitis, BOOP, sarcoidosis, chronic congestive heart failure, rheumatologic disorder induced interstitial lung disease. Optimal diagnosing will include rheumatological panel which would include ESR, MATEO, ANCA, ARNP, CCP, rheumatoid factor, hypersensitivity panel, JOE1, scleroderma antibodies, sjogren's syndrome antibodies; biopsy will be necessary to definitively determine the etiology unless the CT scan findings are specific for UIP. Therapy will be based on diagnostics.\par \par Problem 1A: Abnormal CT - New Nodule 8 x 4 mm\par -follow up CT 9/2021\par CAT scans are the only radiological modality to identify abnormalities w/in the lungs with regards to nodules/masses/lymph nodes. Risks, benefits were reviewed in detail. The guidelines for abnormalities include follow up CT scans at various intervals which could range from 6 weeks to 1 year intervals. If there is a change for the worse then consideration for a biopsy will be considered if you are a candidate. Second opinion evaluation with a thoracic surgeon or an interventional radiologist could be offered. 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 These drugs require pulmonary function testing including DLCO regularly and possible CT/CXR if respiratory complaints develop. PFTs should be performed at 6 month intervals \par \par problem 3: s/p PJP prophylaxis- completed went below 20 mg pred. dose per day\par -He is off Bactrim 1 tablet Monday, Wednesday, Friday \par -He is s/p Rheumatologic Blood work to include: ESR panel (+) , ACE, panel (-) , HP panel (-)\par - s/p rheumatological evaluation - Dr. Torrez\par - PJP Pneumonia is a major opportunistic infection in immunocompromised patients and corticosteroid therapy is a risk factor. Therefore, patients receiving at least 4 weeks of therapy at 30 mg or more daily should be on prophylaxis.\par  \par problem 4: GERD\par -continue Protonix 40 mg QAM, pre-breakfast\par -Things to avoid including overeating, spicy foods, tight clothing, eating within three hours of bed, this list is not all inclusive. \par -For treatment of reflux, possible options discussed including diet control, H2 blockers, PPIs, as well as coating motility agents discussed as treatment options. Timing of meals and proximity of last meal to sleep were discussed. If symptoms persist, a formal gastrointestinal evaluation is needed.\par -Rule of 2's: Avoid eating too late, too fast, too much, too spicy or within two hours of bedtime \par \par problem 5: (+) OSAS (secondary to snoring, nonrestorative sleep and A-fib) - on Rx\par -refused prescription for sleep study (APAP) - refused\par -s/p oral appliance with Dr. Gabby Lopez\par Sleep apnea is associated with adverse clinical consequences which an affect most organ systems. Cardiovascular disease risk includes arrhythmias, atrial fibrillation, hypertension, coronary artery disease, and stroke. Metabolic disorders include diabetes type 2, non-alcoholic fatty liver disease. Mood disorder especially depression; and cognitive decline especially in the elderly. Associations with chronic reflux/Candelaria’s esophagus some but not all inclusive. \par -Reasons include arousal consistent with hypopnea; respiratory events most prominent in REM sleep or supine position; therefore sleep staging and body position are important for accurate diagnosis and estimation of AHI. \par \par problem 6: smoking history/lung cancer screening (improved)\par -follow up chest CT 9/2021\par \par Lung cancer screening is recommended for people between the ages of 55 and 80 with prior 30+ pack year smoking histories. There is irrefutable evidence for realization of lung cancer screening based on two large randomized control trials demonstrating relative reduction in lung cancer mortality for patients undergoing low-dose CT scanning. Risks and benefits reviewed with the patient.\par \par Problem 6A: COPD  -?quiet\par -discontinue Trelegy 1 puff QD (gargle and spit after use)\par -COPD is a progressive disease and although it can’t be cured , appropriate management can slow its progression, reduce frequency and severity of exacerbations, and improve symptoms and the patient quality of life. Hospitalizations are the greatest contributor to the total COPD costs and account for up to 87% of total COPD related costs. Exacerbations are the main cause of admissions and subsequently account for the 40-75% of COPD costs. Inhaled maintenance therapy reduces the incidence of exacerbations in patients with stable COPD. Incorrect inhaler use and nonadherence are major obstacles to achieving COPD treatment goals. Many COPD patients have challenges (impaired inhalation, limited dexterity, reduced cognition: that limit their ability to correctly use their COPD treatment devices resulting in reduced symptom control. Of most importance is smoking cessation and early intervention with respiratory illnesses and contemplation for pulmonary rehab to enhance quality of life.\par -Inhaler technique reviewed as well as oral hygiene techniques reviewed with patient. Avoidance of cold air, extremes of temperature, rescue inhaler should be used before exercise. Order of medication reviewed with patient. Recommended use of a cool mist humidifier in the bedroom.\par \par problem 7: poor breathing mechanics\par -Proper breathing techniques were reviewed with an emphasis of exhalation. Patient instructed to breath in for 1 second and out for four seconds. Patient was encouraged to not talk while walking.\par \par problem 8: overweight/out of shape\par -Weight loss, exercise, and diet control were discussed and are highly encouraged. Treatment options were given such as, aqua therapy, and contacting a nutritionist. Recommended to use the elliptical, stationary bike, less use of treadmill. Mindful eating was explained to the patient. Obesity is associated with worsening asthma, shortness of breath, and potential for cardiac disease, diabetes, and other underlying medical conditions.\par \par problem 9: CAD\par -recommended to follow up with cardiologist Dr. Carlos\par \par problem 10: poor balance\par -recommended to complete balance therapy and gait training\par \par problem 11: allergies\par -continue Claritin 10 mg QAM\par Environmental measures for allergies were encouraged including mattress and pillow cover, air purifier, and environmental controls \par \par Problem 12: Health Maintenance/COVID19 Precautions:\par - Clean your hands often. Wash your hands often with soap and water for at least 20 seconds, especially after blowing your nose, coughing, or sneezing, or having been in a public place.\par - If soap and water are not available, use a hand  that contains at least 60% alcohol.\par - To the extent possible, avoid touching high-touch surfaces in public places - elevator buttons, door handles, handrails, handshaking with people, etc. Use a tissue or your sleeve to cover your hand or finger if you must touch something.\par - Wash your hands after touching surfaces in public places.\par - Avoid touching your face, nose, eyes, etc.\par - Clean and disinfect your home to remove germs: practice routine cleaning of frequently touched surfaces (for example: tables, doorknobs, light switches, handles, desks, toilets, faucets, sinks & cell phones)\par - Avoid crowds, especially in poorly ventilated spaces. Your risk of exposure to respiratory viruses like COVID-19 may increase in crowded, closed-in settings with little air circulation if there are people in the crowd who are sick. All patients are recommended to practice social distancing and stay at least 6 feet away from others.\par - Avoid all non-essential travel including plane trips, and especially avoid embarking on cruise ships.\par -If COVID-19 is spreading in your community, take extra measures to put distance between yourself and other people to further reduce your risk of being exposed to this new virus.\par -Stay home as much as possible.\par - Consider ways of getting food brought to your house through family, social, or commercial networks\par -Be aware that the virus has been known to live in the air up to 3 hours post exposure, cardboard up to 24 hours post exposure, copper up to 4 hours post exposure, steel and plastic up to 2-3 days post exposure. Risk of transmission from these surfaces are affected by many variables.\par Immune Support Recommendations:\par -OTC Vitamin C 500mg BID \par -OTC Quercetin 250-500mg BID \par -OTC Zinc 75-100mg per day \par -OTC Melatonin 1 or 2 mg a night \par -OTC Vitamin D 1-4000mg per day \par -OTC Tonic Water 8oz per day\par Asthma and COVID19:\par You need to make sure your asthma is under control. This often requires the use of inhaled corticosteroids (and sometimes oral corticosteroids). Inhaled corticosteroids do not likely reduce your immune system’s ability to fight infections, but oral corticosteroids may. It is important to use the steps above to protect yourself to limit your exposure to any respiratory virus.\par \par problem 13: health maintenance \par - He is s/p consult with a neurologist \par - Received influenza vaccine 2020\par -recommended strep pneumonia vaccines: Prevnar-13 vaccine, followed by Pneumo vaccine 23 one year following\par -recommended early intervention for URIs\par -recommended regular osteoporosis evaluations\par -recommended early dermatological evaluations\par -recommended after the age of 50 to the age of 70, colonoscopy every 5 years \par  \par \par Follow up in 3 months with Full PFTs /  CXR \par Patient is encouraged to call with any changes, concerns or questions.

## 2021-05-19 NOTE — PROCEDURE
[FreeTextEntry1] : Full PFT revealed mild obstructive dysfunction, with a FEV1 of 2.31L, which is 79% of predicted, and a low-normal diffusion of 15.9, which is 77% of predicted, with a normal flow volume loop \par \par 6 minute walk test reveals a low saturation of 96% with mild dyspnea; walked 195.6 meters.\par \par \par Ct Chest (5.13.2021) reveals new small (8x4 mm) right lung nodules along the major interlobular fissure compared to 1/29/2020. stable post- surgical changes in the bilateral lungs related to multiple nodule resections. There is improved mild heterogeneity of the lung parenchyma compared to 1/29/20202. Scattered additional smaller nodules are unchanged. Noncontrast chest CT follow up is recommended in 6 months. Stable dilation of the aortic root up to 4.3 cm with an ectatic ascending aorta. Stable mild emphysema. Stable benign mediastinal lymph nodes.

## 2021-05-19 NOTE — HISTORY OF PRESENT ILLNESS
[FreeTextEntry1] : Mr. Mccullough is a 81 year old male with a history of amiodarone toxicity, BOOP, former smoker, GERD, hypoxemia, LILO, OW, interstitial pneumonitis, poor sleep, snoring, and SOB who presents for a pulmonary follow up. His chief complaint pandemic inactivity\par -he reports feeling well most days. He has occasional tiredness due to not being active due to the pandemic\par -he reports he coughs slightly in the morning, which clears with use of a cough drop\par -he states he sleeps fairly well, but wakes up every few hours. He finds it easier to sleep on an incline\par -he states he is s/p the Moderna Covid-19 vaccine\par \par -denies any chest pain, chest pressure, diarrhea, constipation, dysphagia, sour taste in the mouth, dizziness, itchy eyes, itchy ears, heartburn, or reflux.\par \par

## 2021-06-01 ENCOUNTER — TRANSCRIPTION ENCOUNTER (OUTPATIENT)
Age: 81
End: 2021-06-01

## 2021-06-09 ENCOUNTER — TRANSCRIPTION ENCOUNTER (OUTPATIENT)
Age: 81
End: 2021-06-09

## 2021-09-06 ENCOUNTER — RX RENEWAL (OUTPATIENT)
Age: 81
End: 2021-09-06

## 2021-09-10 ENCOUNTER — TRANSCRIPTION ENCOUNTER (OUTPATIENT)
Age: 81
End: 2021-09-10

## 2021-09-13 ENCOUNTER — TRANSCRIPTION ENCOUNTER (OUTPATIENT)
Age: 81
End: 2021-09-13

## 2021-09-15 ENCOUNTER — TRANSCRIPTION ENCOUNTER (OUTPATIENT)
Age: 81
End: 2021-09-15

## 2021-09-16 ENCOUNTER — TRANSCRIPTION ENCOUNTER (OUTPATIENT)
Age: 81
End: 2021-09-16

## 2021-09-23 ENCOUNTER — APPOINTMENT (OUTPATIENT)
Dept: PULMONOLOGY | Facility: CLINIC | Age: 81
End: 2021-09-23
Payer: MEDICARE

## 2021-09-23 DIAGNOSIS — R91.1 SOLITARY PULMONARY NODULE: ICD-10-CM

## 2021-09-23 DIAGNOSIS — R06.83 SNORING: ICD-10-CM

## 2021-09-23 PROCEDURE — 99214 OFFICE O/P EST MOD 30 MIN: CPT | Mod: 95

## 2021-09-23 NOTE — HISTORY OF PRESENT ILLNESS
[Home] : at home, [unfilled] , at the time of the visit. [Medical Office: ([facility])___] : at the medical office located in  [Verbal consent obtained from patient] : the patient, [unfilled] [FreeTextEntry1] : Mr. Mccullough is a 81 year old male with a history of amiodarone toxicity, BOOP, former smoker, GERD, hypoxemia, LILO, OW, interstitial pneumonitis, poor sleep, snoring, and SOB who presents via video call for a pulmonary follow up. 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 Carlos at the beginning of October 2021 and will get a flu shot\par - S/p Covid 19 vaccine (Moderna) x2 2/2021\par \par patient denies any headaches, nausea, vomiting, fever, chills, sweats, chest pain, chest pressure, palpitations, wheezing, fatigue, dysphagia, myalgias, dizziness, leg swelling, leg pain, itchy eyes, itchy ears, heartburn, reflux or sour taste in the mouth

## 2021-09-23 NOTE — PROCEDURE
[FreeTextEntry1] : CT (9.15.2021) revealed stable interval CT of the chest when compared to prior of 5/13/2021. no suspicious enlarging or new noncalcified pulmonary nodule when compared to that study. \par \par -Images and procedures reviewed in detail and discussed with patient.

## 2021-09-23 NOTE — REVIEW OF SYSTEMS
Problem: Physical Therapy Goal  Goal: Physical Therapy Goal  Goals to be met by: 2017     Patient will increase functional independence with mobility by performin. Supine to sit with Moderate Assistance-not met  2. Sit to supine with Moderate Assistance-not met  3. Sit to stand transfer with Moderate Assistance-met       Revised: Sit to stand transfer with Contact Guard Assistance - Not met  4. Bed to chair transfer with Moderate Assistance using Rolling Walker - Met       Revised: Bed to chair transfer with Contact Guard Assistance using Rolling Walker - Not Met  6. Gait  x 15 feet with Moderate Assistance using Rolling Walker. -not met  7. Lower extremity exercise program x15 reps per handout, with assistance as needed      Outcome: Ongoing (interventions implemented as appropriate)  Goals #3 & #4 met and revised       [Negative] : Endocrine

## 2021-09-23 NOTE — ASSESSMENT
[FreeTextEntry1] : Mr. 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 He is presents to the office via video call and is currently stable from a pulmonary perspective - He is off anti-inflammatory / fibrotic - stable, resolved Afib / mild GRIMALDO - complains of voice issues -all controlled - improved - stable\par \par His shortness of breath is multifactorial due to:\par -poor balance/poor balance\par -obesity/out of shape\par -?CAD\par -pulmonary diseases : ?COPD ; Pneumonitis\par \par His Pneumonitis (controlled) with ?etiology could be due to:\par -BOOP\par -\par -sarcoidosis\par -hypersensitivity pneumonitis- possible\par -Amiodarone therapy (doubtful) \par - NSIP\par \par Problem 1: 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 Proper dietary restraint is necessary specifically salt containing foods, if any reaction may occur should be reported. \par -Repeat CT - 9/2022\par \par - Etiology will depend on the causative agent possibilities include: idiopathic pulmonary fibrosis (UIP), NSIP (nonspecific interstitial pneumonia) , respiratory bronchiolitis in someone who is a smoker, drug induced lung disease, hypersensitivity pneumonitis, BOOP, sarcoidosis, chronic congestive heart failure, rheumatologic disorder induced interstitial lung disease. Optimal diagnosing will include rheumatological panel which would include ESR, MATEO, ANCA, ARNP, CCP, rheumatoid factor, hypersensitivity panel, JOE1, scleroderma antibodies, sjogren's syndrome antibodies; biopsy will be necessary to definitively determine the etiology unless the CT scan findings are specific for UIP. Therapy will be based on diagnostics.\par \par Problem 1A: Abnormal CT - New Nodule 8 x 4 mm - no change\par -follow up CT 9/2022\par CAT scans are the only radiological modality to identify abnormalities w/in the lungs with regards to nodules/masses/lymph nodes. Risks, benefits were reviewed in detail. The guidelines for abnormalities include follow up CT scans at various intervals which could range from 6 weeks to 1 year intervals. If there is a change for the worse then consideration for a biopsy will be considered if you are a candidate. Second opinion evaluation with a thoracic surgeon or an interventional radiologist could be offered. 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 These drugs require pulmonary function testing including DLCO regularly and possible CT/CXR if respiratory complaints develop. PFTs should be performed at 6 month intervals \par \par problem 3: s/p PJP prophylaxis- completed went below 20 mg pred. dose per day\par -He is off Bactrim 1 tablet Monday, Wednesday, Friday \par -He is s/p Rheumatologic Blood work to include: ESR panel (+) , ACE, panel (-) , HP panel (-)\par - s/p rheumatological evaluation - Dr. Torrez\par - PJP Pneumonia is a major opportunistic infection in immunocompromised patients and corticosteroid therapy is a risk factor. Therefore, patients receiving at least 4 weeks of therapy at 30 mg or more daily should be on prophylaxis.\par  \par problem 4: GERD\par -continue Protonix 40 mg QAM, pre-breakfast\par -Things to avoid including overeating, spicy foods, tight clothing, eating within three hours of bed, this list is not all inclusive. \par -For treatment of reflux, possible options discussed including diet control, H2 blockers, PPIs, as well as coating motility agents discussed as treatment options. Timing of meals and proximity of last meal to sleep were discussed. If symptoms persist, a formal gastrointestinal evaluation is needed.\par -Rule of 2's: Avoid eating too late, too fast, too much, too spicy or within two hours of bedtime \par \par problem 5: (+) OSAS (secondary to snoring, nonrestorative sleep and A-fib) - on Rx\par -refused prescription for sleep study (APAP) - refused\par -s/p oral appliance with Dr. Gabby Lopez\par Sleep apnea is associated with adverse clinical consequences which an affect most organ systems. Cardiovascular disease risk includes arrhythmias, atrial fibrillation, hypertension, coronary artery disease, and stroke. Metabolic disorders include diabetes type 2, non-alcoholic fatty liver disease. Mood disorder especially depression; and cognitive decline especially in the elderly. Associations with chronic reflux/Candelaria’s esophagus some but not all inclusive. \par -Reasons include arousal consistent with hypopnea; respiratory events most prominent in REM sleep or supine position; therefore sleep staging and body position are important for accurate diagnosis and estimation of AHI. \par \par problem 6: smoking history/lung cancer screening (improved) - stable 9/2021\par -follow up chest CT 9/2022\par \par Lung cancer screening is recommended for people between the ages of 55 and 80 with prior 30+ pack year smoking histories. There is irrefutable evidence for realization of lung cancer screening based on two large randomized control trials demonstrating relative reduction in lung cancer mortality for patients undergoing low-dose CT scanning. Risks and benefits reviewed with the patient.\par \par Problem 6A: COPD  -?quiet\par -discontinue Trelegy 1 puff QD (gargle and spit after use)\par -COPD is a progressive disease and although it can’t be cured , appropriate management can slow its progression, reduce frequency and severity of exacerbations, and improve symptoms and the patient quality of life. Hospitalizations are the greatest contributor to the total COPD costs and account for up to 87% of total COPD related costs. Exacerbations are the main cause of admissions and subsequently account for the 40-75% of COPD costs. Inhaled maintenance therapy reduces the incidence of exacerbations in patients with stable COPD. Incorrect inhaler use and nonadherence are major obstacles to achieving COPD treatment goals. Many COPD patients have challenges (impaired inhalation, limited dexterity, reduced cognition: that limit their ability to correctly use their COPD treatment devices resulting in reduced symptom control. Of most importance is smoking cessation and early intervention with respiratory illnesses and contemplation for pulmonary rehab to enhance quality of life.\par -Inhaler technique reviewed as well as oral hygiene techniques reviewed with patient. Avoidance of cold air, extremes of temperature, rescue inhaler should be used before exercise. Order of medication reviewed with patient. Recommended use of a cool mist humidifier in the bedroom.\par \par problem 7: poor breathing mechanics\par -Proper breathing techniques were reviewed with an emphasis of exhalation. Patient instructed to breath in for 1 second and out for four seconds. Patient was encouraged to not talk while walking.\par \par problem 8: overweight/out of shape\par -Weight loss, exercise, and diet control were discussed and are highly encouraged. Treatment options were given such as, aqua therapy, and contacting a nutritionist. Recommended to use the elliptical, stationary bike, less use of treadmill. Mindful eating was explained to the patient. Obesity is associated with worsening asthma, shortness of breath, and potential for cardiac disease, diabetes, and other underlying medical conditions.\par \par problem 9: CAD\par -recommended to follow up with cardiologist Dr. Carlos\par \par problem 10: poor balance\par -recommended to complete balance therapy and gait training\par \par problem 11: allergies\par -continue Claritin 10 mg QAM\par Environmental measures for allergies were encouraged including mattress and pillow cover, air purifier, and environmental controls \par \par Problem 12: Health Maintenance/COVID19 Precautions:\par - S/p Covid 19 vaccine (Moderna) x2 2/2021\par -Covid 19 vaccine discussed at length with patient; booster discussed and recommended to wait for vaccine containing new delta variant \par - Clean your hands often. Wash your hands often with soap and water for at least 20 seconds, especially after blowing your nose, coughing, or sneezing, or having been in a public place.\par - If soap and water are not available, use a hand  that contains at least 60% alcohol.\par - To the extent possible, avoid touching high-touch surfaces in public places - elevator buttons, door handles, handrails, handshaking with people, etc. Use a tissue or your sleeve to cover your hand or finger if you must touch something.\par - Wash your hands after touching surfaces in public places.\par - Avoid touching your face, nose, eyes, etc.\par - Clean and disinfect your home to remove germs: practice routine cleaning of frequently touched surfaces (for example: tables, doorknobs, light switches, handles, desks, toilets, faucets, sinks & cell phones)\par - Avoid crowds, especially in poorly ventilated spaces. Your risk of exposure to respiratory viruses like COVID-19 may increase in crowded, closed-in settings with little air circulation if there are people in the crowd who are sick. All patients are recommended to practice social distancing and stay at least 6 feet away from others.\par - Avoid all non-essential travel including plane trips, and especially avoid embarking on cruise ships.\par -If COVID-19 is spreading in your community, take extra measures to put distance between yourself and other people to further reduce your risk of being exposed to this new virus.\par -Stay home as much as possible.\par - Consider ways of getting food brought to your house through family, social, or commercial networks\par -Be aware that the virus has been known to live in the air up to 3 hours post exposure, cardboard up to 24 hours post exposure, copper up to 4 hours post exposure, steel and plastic up to 2-3 days post exposure. Risk of transmission from these surfaces are affected by many variables.\par Immune Support Recommendations:\par -OTC Vitamin C 500mg BID \par -OTC Quercetin 250-500mg BID \par -OTC Zinc 75-100mg per day \par -OTC Melatonin 1 or 2 mg a night \par -OTC Vitamin D 1-4000mg per day \par -OTC Tonic Water 8oz per day\par Asthma and COVID19:\par You need to make sure your asthma is under control. This often requires the use of inhaled corticosteroids (and sometimes oral corticosteroids). Inhaled corticosteroids do not likely reduce your immune system’s ability to fight infections, but oral corticosteroids may. It is important to use the steps above to protect yourself to limit your exposure to any respiratory virus.\par \par problem 13: health maintenance \par - He is s/p consult with a neurologist \par - Received influenza vaccine 2021 pending\par -recommended strep pneumonia vaccines: Prevnar-13 vaccine, followed by Pneumo vaccine 23 one year following\par -recommended early intervention for URIs\par -recommended regular osteoporosis evaluations\par -recommended early dermatological evaluations\par -recommended after the age of 50 to the age of 70, colonoscopy every 5 years \par  \par \par Follow up in 3 months with Full PFTs /  CXR \par Patient is encouraged to call with any changes, concerns or questions.

## 2021-09-23 NOTE — ADDENDUM
[FreeTextEntry1] : Documented by Piyush Valladares acting as a scribe for Dr. Lele Saul on (09/23/2021).\par \par All medical record entries made by the Scribe were at my, Dr. Lele Saul's, direction and personally dictated by me on (09/23/2021). I have reviewed the chart and agree that the record accurately reflects my personal performance of the history, physical exam, assessment and plan. I have also personally directed, reviewed, and agree with the discharge instructions.\par

## 2021-12-23 ENCOUNTER — TRANSCRIPTION ENCOUNTER (OUTPATIENT)
Age: 81
End: 2021-12-23

## 2021-12-24 ENCOUNTER — NON-APPOINTMENT (OUTPATIENT)
Age: 81
End: 2021-12-24

## 2021-12-24 LAB — SARS-COV-2 N GENE NPH QL NAA+PROBE: DETECTED

## 2021-12-27 ENCOUNTER — TRANSCRIPTION ENCOUNTER (OUTPATIENT)
Age: 81
End: 2021-12-27

## 2021-12-28 ENCOUNTER — TRANSCRIPTION ENCOUNTER (OUTPATIENT)
Age: 81
End: 2021-12-28

## 2021-12-30 ENCOUNTER — TRANSCRIPTION ENCOUNTER (OUTPATIENT)
Age: 81
End: 2021-12-30

## 2022-01-05 ENCOUNTER — TRANSCRIPTION ENCOUNTER (OUTPATIENT)
Age: 82
End: 2022-01-05

## 2022-02-23 ENCOUNTER — TRANSCRIPTION ENCOUNTER (OUTPATIENT)
Age: 82
End: 2022-02-23

## 2022-03-14 ENCOUNTER — TRANSCRIPTION ENCOUNTER (OUTPATIENT)
Age: 82
End: 2022-03-14

## 2022-03-15 ENCOUNTER — TRANSCRIPTION ENCOUNTER (OUTPATIENT)
Age: 82
End: 2022-03-15

## 2022-03-15 ENCOUNTER — APPOINTMENT (OUTPATIENT)
Dept: PULMONOLOGY | Facility: CLINIC | Age: 82
End: 2022-03-15

## 2022-03-16 ENCOUNTER — TRANSCRIPTION ENCOUNTER (OUTPATIENT)
Age: 82
End: 2022-03-16

## 2022-03-16 RX ORDER — BUDESONIDE AND FORMOTEROL FUMARATE DIHYDRATE 80; 4.5 UG/1; UG/1
80-4.5 AEROSOL RESPIRATORY (INHALATION) TWICE DAILY
Qty: 1 | Refills: 5 | Status: DISCONTINUED | COMMUNITY
Start: 2021-12-24 | End: 2022-03-16

## 2022-03-17 ENCOUNTER — NON-APPOINTMENT (OUTPATIENT)
Age: 82
End: 2022-03-17

## 2022-03-17 ENCOUNTER — APPOINTMENT (OUTPATIENT)
Dept: PULMONOLOGY | Facility: CLINIC | Age: 82
End: 2022-03-17
Payer: MEDICARE

## 2022-03-17 VITALS
BODY MASS INDEX: 30.85 KG/M2 | OXYGEN SATURATION: 96 % | HEART RATE: 74 BPM | WEIGHT: 215 LBS | TEMPERATURE: 97.8 F | SYSTOLIC BLOOD PRESSURE: 120 MMHG | DIASTOLIC BLOOD PRESSURE: 70 MMHG | RESPIRATION RATE: 16 BRPM

## 2022-03-17 DIAGNOSIS — Z71.89 OTHER SPECIFIED COUNSELING: ICD-10-CM

## 2022-03-17 PROCEDURE — 99214 OFFICE O/P EST MOD 30 MIN: CPT | Mod: CS,25

## 2022-03-17 PROCEDURE — 95012 NITRIC OXIDE EXP GAS DETER: CPT

## 2022-03-17 PROCEDURE — 94010 BREATHING CAPACITY TEST: CPT

## 2022-03-17 RX ORDER — AZITHROMYCIN 250 MG/1
250 TABLET, FILM COATED ORAL
Qty: 36 | Refills: 2 | Status: DISCONTINUED | COMMUNITY
Start: 2019-05-16 | End: 2022-03-17

## 2022-03-17 RX ORDER — EZETIMIBE 10 MG/1
10 TABLET ORAL
Qty: 90 | Refills: 0 | Status: ACTIVE | COMMUNITY
Start: 2021-09-06

## 2022-03-17 NOTE — REASON FOR VISIT
[Follow-Up] : a follow-up visit [Spouse] : spouse [FreeTextEntry1] : amiodarone toxicity, BOOP, COVID-19 12/2021 former smoker, GERD, hypoxemia, LILO, OW, interstitial pneumonitis, poor sleep, snoring, and SOB

## 2022-03-17 NOTE — ADDENDUM
[FreeTextEntry1] : Documented by Criselda Schwarz acting as a scribe for Dr. Lele Saul on 03/17/2022 .\par \par All medical record entries made by the Scribe were at my, Dr. Lele Saul's, direction and personally dictated by me on. I have reviewed the chart and agree that the record accurately reflects my personal performance of the history, physical exam, assessment and plan. I have also personally directed, reviewed, and agree with the discharge instructions.

## 2022-03-17 NOTE — PHYSICAL EXAM
[No Acute Distress] : no acute distress [Well Nourished] : well nourished [No Deformities] : no deformities [Well Developed] : well developed [III] : Mallampati Class: III [Kyphosis] : kyphosis [TextBox_54] : 2/6 systolic murmur  [TextBox_68] : I:E ratio 1:3; clear  [TextBox_105] : 1+ LE edema

## 2022-03-17 NOTE — HISTORY OF PRESENT ILLNESS
[FreeTextEntry1] : Mr. Mccullough is a 81 year old male with a history of amiodarone toxicity, BOOP, former smoker, GERD, hypoxemia, LILO, OW, interstitial pneumonitis, poor sleep, snoring, COVID-19 12/2021 and SOB who presents for a pulmonary follow up. His chief complaint pandemic inactivity\par - he notes feeling alright \par -he notes having PND \par -he notes diarrhea ever couple weeks \par -he notes weighing 215 lbs \par -he notes vision is okay \par -he notes being triple vaxxed \par -he notes trying to walk but is trying his best to do so\par patient denies any headaches, nausea, vomiting, fever, chills, sweats, chest pain, chest pressure, palpitations, coughing, wheezing, fatigue, diarrhea, constipation, dysphagia, myalgias, dizziness, leg swelling, leg pain, itchy eyes, itchy ears, heartburn, reflux or sour taste in the mouth

## 2022-03-17 NOTE — ASSESSMENT
[FreeTextEntry1] : Mr. Mccullough is a 81 year old male with a history of A-fib, HTN, former smoker, HLD, and obesity, OSAS who presents for  s/p hospitalization for amiodarone toxicity/ interstitial pneumonitis (NSIP) asthma, for an follow up visit. He is presents to the office via video call and is currently stable from a pulmonary perspective - He is off anti-inflammatory / fibrotic - stable, resolved Afib / mild GRIMALDO - complains of voice issues -all controlled - improved - stable\par \par His shortness of breath is multifactorial due to:\par -poor balance/poor balance\par -obesity/out of shape\par -?CAD\par -pulmonary diseases : ?COPD ; Pneumonitis,asthma \par \par His Pneumonitis (controlled) with ?etiology could be due to:\par -BOOP\par -\par -sarcoidosis\par -hypersensitivity pneumonitis- possible\par -Amiodarone therapy (doubtful) \par - NSIP\par \par 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 Proper dietary restraint is necessary specifically salt containing foods, if any reaction may occur should be reported. \par -Repeat CT - 9/2022\par \par - Etiology will depend on the causative agent possibilities include: idiopathic pulmonary fibrosis (UIP), NSIP (nonspecific interstitial pneumonia) , respiratory bronchiolitis in someone who is a smoker, drug induced lung disease, hypersensitivity pneumonitis, BOOP, sarcoidosis, chronic congestive heart failure, rheumatologic disorder induced interstitial lung disease. Optimal diagnosing will include rheumatological panel which would include ESR, MATEO, ANCA, ARNP, CCP, rheumatoid factor, hypersensitivity panel, JOE1, scleroderma antibodies, sjogren's syndrome antibodies; biopsy will be necessary to definitively determine the etiology unless the CT scan findings are specific for UIP. Therapy will be based on diagnostics.\par \par Problem 1A: Abnormal CT - New Nodule 8 x 4 mm - no change\par -follow up CT 9/2022\par CAT scans are the only radiological modality to identify abnormalities w/in the lungs with regards to nodules/masses/lymph nodes. Risks, benefits were reviewed in detail. The guidelines for abnormalities include follow up CT scans at various intervals which could range from 6 weeks to 1 year intervals. If there is a change for the worse then consideration for a biopsy will be considered if you are a candidate. Second opinion evaluation with a thoracic surgeon or an interventional radiologist could be offered. 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 These drugs require pulmonary function testing including DLCO regularly and possible CT/CXR if respiratory complaints develop. PFTs should be performed at 6 month intervals \par \par problem 3: s/p PJP prophylaxis- completed went below 20 mg pred. dose per day (resolved) \par -He is off Bactrim 1 tablet Monday, Wednesday, Friday \par -He is s/p Rheumatologic Blood work to include: ESR panel (+) , ACE, panel (-) , HP panel (-)\par - s/p rheumatological evaluation - Dr. Torrez\par - PJP Pneumonia is a major opportunistic infection in immunocompromised patients and corticosteroid therapy is a risk factor. Therefore, patients receiving at least 4 weeks of therapy at 30 mg or more daily should be on prophylaxis.\par  \par problem 4: GERD\par -continue Protonix 40 mg QAM, pre-breakfast\par -Things to avoid including overeating, spicy foods, tight clothing, eating within three hours of bed, this list is not all inclusive. \par -For treatment of reflux, possible options discussed including diet control, H2 blockers, PPIs, as well as coating motility agents discussed as treatment options. Timing of meals and proximity of last meal to sleep were discussed. If symptoms persist, a formal gastrointestinal evaluation is needed.\par -Rule of 2's: Avoid eating too late, too fast, too much, too spicy or within two hours of bedtime \par \par problem 5: (+) OSAS (secondary to snoring, nonrestorative sleep and A-fib) - on Rx\par -refused prescription for sleep study (APAP) - refused\par -s/p oral appliance with Dr. Gabby Lopez\par Sleep apnea is associated with adverse clinical consequences which an affect most organ systems. Cardiovascular disease risk includes arrhythmias, atrial fibrillation, hypertension, coronary artery disease, and stroke. Metabolic disorders include diabetes type 2, non-alcoholic fatty liver disease. Mood disorder especially depression; and cognitive decline especially in the elderly. Associations with chronic reflux/Candelaria’s esophagus some but not all inclusive. \par -Reasons include arousal consistent with hypopnea; respiratory events most prominent in REM sleep or supine position; therefore sleep staging and body position are important for accurate diagnosis and estimation of AHI. \par \par problem 6: smoking history/lung cancer screening (improved) - stable 9/2021\par -follow up chest CT 9/2022\par \par Lung cancer screening is recommended for people between the ages of 55 and 80 with prior 30+ pack year smoking histories. There is irrefutable evidence for realization of lung cancer screening based on two large randomized control trials demonstrating relative reduction in lung cancer mortality for patients undergoing low-dose CT scanning. Risks and benefits reviewed with the patient.\par \par Problem 6A: COPD  -?quiet /asthma \par -discontinue Trelegy 1 puff QD (gargle and spit after use)\par -Add Symbicort 160 2 BID \par -COPD is a progressive disease and although it can’t be cured , appropriate management can slow its progression, reduce frequency and severity of exacerbations, and improve symptoms and the patient quality of life. Hospitalizations are the greatest contributor to the total COPD costs and account for up to 87% of total COPD related costs. Exacerbations are the main cause of admissions and subsequently account for the 40-75% of COPD costs. Inhaled maintenance therapy reduces the incidence of exacerbations in patients with stable COPD. Incorrect inhaler use and nonadherence are major obstacles to achieving COPD treatment goals. Many COPD patients have challenges (impaired inhalation, limited dexterity, reduced cognition: that limit their ability to correctly use their COPD treatment devices resulting in reduced symptom control. Of most importance is smoking cessation and early intervention with respiratory illnesses and contemplation for pulmonary rehab to enhance quality of life.\par -Inhaler technique reviewed as well as oral hygiene techniques reviewed with patient. Avoidance of cold air, extremes of temperature, rescue inhaler should be used before exercise. Order of medication reviewed with patient. Recommended use of a cool mist humidifier in the bedroom.\par \par problem 7: poor breathing mechanics\par -Recommended Wim Hof and Buteyko breathing techniques \par -Proper breathing techniques were reviewed with an emphasis of exhalation. Patient instructed to breath in for 1 second and out for four seconds. Patient was encouraged to not talk while walking.\par \par problem 8: overweight/out of shape\par -Weight loss, exercise, and diet control were discussed and are highly encouraged. Treatment options were given such as, aqua therapy, and contacting a nutritionist. Recommended to use the elliptical, stationary bike, less use of treadmill. Mindful eating was explained to the patient. Obesity is associated with worsening asthma, shortness of breath, and potential for cardiac disease, diabetes, and other underlying medical conditions.\par \par problem 9: CAD\par -recommended to follow up with cardiologist Dr. Carlos\par \par problem 10: poor balance\par -recommended to complete balance therapy and gait training\par \par problem 11: allergies\par -continue Claritin 10 mg QAM\par Environmental measures for allergies were encouraged including mattress and pillow cover, air purifier, and environmental controls \par \par Problem 12: Health Maintenance/COVID19 Precautions:\par - S/p Covid 19 vaccine (Moderna) x3\par -Covid 19 vaccine discussed at length with patient; booster discussed and recommended to wait for vaccine containing new delta variant \par - Clean your hands often. Wash your hands often with soap and water for at least 20 seconds, especially after blowing your nose, coughing, or sneezing, or having been in a public place.\par - If soap and water are not available, use a hand  that contains at least 60% alcohol.\par - To the extent possible, avoid touching high-touch surfaces in public places - elevator buttons, door handles, handrails, handshaking with people, etc. Use a tissue or your sleeve to cover your hand or finger if you must touch something.\par - Wash your hands after touching surfaces in public places.\par - Avoid touching your face, nose, eyes, etc.\par - Clean and disinfect your home to remove germs: practice routine cleaning of frequently touched surfaces (for example: tables, doorknobs, light switches, handles, desks, toilets, faucets, sinks & cell phones)\par - Avoid crowds, especially in poorly ventilated spaces. Your risk of exposure to respiratory viruses like COVID-19 may increase in crowded, closed-in settings with little air circulation if there are people in the crowd who are sick. All patients are recommended to practice social distancing and stay at least 6 feet away from others.\par - Avoid all non-essential travel including plane trips, and especially avoid embarking on cruise ships.\par -If COVID-19 is spreading in your community, take extra measures to put distance between yourself and other people to further reduce your risk of being exposed to this new virus.\par -Stay home as much as possible.\par - Consider ways of getting food brought to your house through family, social, or commercial networks\par -Be aware that the virus has been known to live in the air up to 3 hours post exposure, cardboard up to 24 hours post exposure, copper up to 4 hours post exposure, steel and plastic up to 2-3 days post exposure. Risk of transmission from these surfaces are affected by many variables.\par Immune Support Recommendations:\par -OTC Vitamin C 500mg BID \par -OTC Quercetin 250-500mg BID \par -OTC Zinc 75-100mg per day \par -OTC Melatonin 1 or 2 mg a night \par -OTC Vitamin D 1-4000mg per day \par -OTC Tonic Water 8oz per day\par Asthma and COVID19:\par You need to make sure your asthma is under control. This often requires the use of inhaled corticosteroids (and sometimes oral corticosteroids). Inhaled corticosteroids do not likely reduce your immune system’s ability to fight infections, but oral corticosteroids may. It is important to use the steps above to protect yourself to limit your exposure to any respiratory virus.\par \par problem 13: health maintenance \par - He is s/p consult with a neurologist \par - Received influenza vaccine 2021 pending\par -recommended strep pneumonia vaccines: Prevnar-13 vaccine, followed by Pneumo vaccine 23 one year following\par -recommended early intervention for URIs\par -recommended regular osteoporosis evaluations\par -recommended early dermatological evaluations\par -recommended after the age of 50 to the age of 70, colonoscopy every 5 years \par  \par \par Follow up in 3 months with Full PFTs /  CXR \par Patient is encouraged to call with any changes, concerns or questions.

## 2022-03-29 ENCOUNTER — TRANSCRIPTION ENCOUNTER (OUTPATIENT)
Age: 82
End: 2022-03-29

## 2022-04-07 ENCOUNTER — TRANSCRIPTION ENCOUNTER (OUTPATIENT)
Age: 82
End: 2022-04-07

## 2022-06-13 ENCOUNTER — TRANSCRIPTION ENCOUNTER (OUTPATIENT)
Age: 82
End: 2022-06-13

## 2022-06-20 ENCOUNTER — RX RENEWAL (OUTPATIENT)
Age: 82
End: 2022-06-20

## 2022-06-21 ENCOUNTER — TRANSCRIPTION ENCOUNTER (OUTPATIENT)
Age: 82
End: 2022-06-21

## 2022-06-21 DIAGNOSIS — R68.83 CHILLS (WITHOUT FEVER): ICD-10-CM

## 2022-06-21 DIAGNOSIS — R05.9 COUGH, UNSPECIFIED: ICD-10-CM

## 2022-06-21 LAB
RAPID RVP RESULT: NOT DETECTED
SARS-COV-2 RNA PNL RESP NAA+PROBE: NOT DETECTED

## 2022-06-22 ENCOUNTER — NON-APPOINTMENT (OUTPATIENT)
Age: 82
End: 2022-06-22

## 2022-06-22 ENCOUNTER — TRANSCRIPTION ENCOUNTER (OUTPATIENT)
Age: 82
End: 2022-06-22

## 2022-06-24 ENCOUNTER — TRANSCRIPTION ENCOUNTER (OUTPATIENT)
Age: 82
End: 2022-06-24

## 2022-07-21 ENCOUNTER — APPOINTMENT (OUTPATIENT)
Dept: PULMONOLOGY | Facility: CLINIC | Age: 82
End: 2022-07-21

## 2022-07-29 ENCOUNTER — APPOINTMENT (OUTPATIENT)
Dept: PULMONOLOGY | Facility: CLINIC | Age: 82
End: 2022-07-29

## 2022-07-29 VITALS
TEMPERATURE: 97.4 F | SYSTOLIC BLOOD PRESSURE: 126 MMHG | RESPIRATION RATE: 16 BRPM | DIASTOLIC BLOOD PRESSURE: 64 MMHG | BODY MASS INDEX: 30.06 KG/M2 | WEIGHT: 210 LBS | HEART RATE: 82 BPM | HEIGHT: 70 IN | OXYGEN SATURATION: 97 %

## 2022-07-29 PROCEDURE — 94729 DIFFUSING CAPACITY: CPT

## 2022-07-29 PROCEDURE — 99214 OFFICE O/P EST MOD 30 MIN: CPT | Mod: 25

## 2022-07-29 PROCEDURE — 94010 BREATHING CAPACITY TEST: CPT

## 2022-07-29 PROCEDURE — 94727 GAS DIL/WSHOT DETER LNG VOL: CPT

## 2022-07-29 NOTE — ASSESSMENT
[FreeTextEntry1] : Mr. Mccullough is a 82 year old male with a history of amiodarone toxicity, BOOP, former smoker, GERD, hypoxemia, LILO, interstitial pneumonitis, poor sleep, snoring, COVID-19 12/2021 and SOB who presents to the office for a pulmonary follow up. \par \par His chief concern is follow up.\par \par 1.ILDz "BOOP" \par -continue Zithromax 250 mg q M/W/F\par \par 2. Abnormal CT/ Lung Cancer Screening \par -next CT 9/2022\par \par 3. COPD\par -continue Symbicort 160 2 puff BID: Rinse & gargle after use\par \par 4. GERD\par - continue Protonix 40 mg\par \par 5.LILO\par -Pt continues refuse cpap & oral appliance. \par \par Patient to follow up with Dr. Saul as scheduled.\par Patient to call with further questions and concerns.\par Patient verbalizes understanding of care plan and is agreeable.\par

## 2022-07-29 NOTE — HISTORY OF PRESENT ILLNESS
[Former] : former [Never] : never [TextBox_4] : Mr. Mccullough is a 82 year old male with a history of amiodarone toxicity, BOOP, former smoker, GERD, hypoxemia, LILO, interstitial pneumonitis, poor sleep, snoring, COVID-19 12/2021 and SOB who presents to the office for a pulmonary follow up. \par \par His chief concern is follow up.\par \par Patient reports he is in normal state of health. He reports his breathing is fine. Patient reports he has been falling due to osteoarthritis of knee. He reports he is compliant with medications. Patient wife reports he is not very active due to inability to walk. \par \par Patient wife reports his voice has been hoarse for more than 1 year. He reports he wasn't evaluated by ENT yet. Patient denies globulus sensation, or trouble swallowing. \par \par Patient reports he is sleeping well. He states heartburn is well controlled on Protonix.\par \par Patient denies fever, chills, cough, wheezing, SOB @ rest, nasal congestion, runny nose or heart burn. \par \par \par \par \par \par \par

## 2022-07-29 NOTE — PROCEDURE
[FreeTextEntry1] : PFT'S performed in office show minimal obstructive lung defect. mild restrictive lung defect. diffusion capacity is within normal limits. \par FEV: 80\par FEV1/FVC Ratio: 70\par SNY44-82%: 49\par DLCO: 96\par

## 2022-07-29 NOTE — PHYSICAL EXAM
[No Acute Distress] : no acute distress [Normal Rate/Rhythm] : normal rate/rhythm [Normal S1, S2] : normal s1, s2 [No Resp Distress] : no resp distress [Clear to Auscultation Bilaterally] : clear to auscultation bilaterally [No Abnormalities] : no abnormalities [Benign] : benign [Normal Color/ Pigmentation] : normal color/ pigmentation [No Focal Deficits] : no focal deficits [Oriented x3] : oriented x3 [Normal Affect] : normal affect

## 2022-07-29 NOTE — REVIEW OF SYSTEMS
[SOB on Exertion] : sob on exertion [GERD] : gerd [Negative] : Psychiatric [Cough] : no cough [Hemoptysis] : no hemoptysis [Chest Tightness] : no chest tightness [Frequent URIs] : no frequent URIs [Sputum] : no sputum [Dyspnea] : no dyspnea [Pleuritic Pain] : no pleuritic pain [Wheezing] : no wheezing [A.M. Dry Mouth] : no a.m. dry mouth [Abdominal Pain] : no abdominal pain [Nausea] : no nausea [Vomiting] : no vomiting [Diarrhea] : no diarrhea [Constipation] : no constipation [Dysphagia] : no dysphagia [Bleeding] : no bleeding [Food Intolerance] : no food intolerance [Hepatic Disease] : no hepatic disease

## 2022-08-04 ENCOUNTER — TRANSCRIPTION ENCOUNTER (OUTPATIENT)
Age: 82
End: 2022-08-04

## 2022-08-11 ENCOUNTER — TRANSCRIPTION ENCOUNTER (OUTPATIENT)
Age: 82
End: 2022-08-11

## 2022-09-02 ENCOUNTER — TRANSCRIPTION ENCOUNTER (OUTPATIENT)
Age: 82
End: 2022-09-02

## 2022-09-06 ENCOUNTER — TRANSCRIPTION ENCOUNTER (OUTPATIENT)
Age: 82
End: 2022-09-06

## 2022-09-16 ENCOUNTER — APPOINTMENT (OUTPATIENT)
Dept: CT IMAGING | Facility: CLINIC | Age: 82
End: 2022-09-16

## 2022-09-16 ENCOUNTER — OUTPATIENT (OUTPATIENT)
Dept: OUTPATIENT SERVICES | Facility: HOSPITAL | Age: 82
LOS: 1 days | End: 2022-09-16
Payer: MEDICARE

## 2022-09-16 DIAGNOSIS — R93.89 ABNORMAL FINDINGS ON DIAGNOSTIC IMAGING OF OTHER SPECIFIED BODY STRUCTURES: ICD-10-CM

## 2022-09-16 DIAGNOSIS — Z96.643 PRESENCE OF ARTIFICIAL HIP JOINT, BILATERAL: Chronic | ICD-10-CM

## 2022-09-16 DIAGNOSIS — Z98.89 OTHER SPECIFIED POSTPROCEDURAL STATES: Chronic | ICD-10-CM

## 2022-09-16 DIAGNOSIS — Z96.651 PRESENCE OF RIGHT ARTIFICIAL KNEE JOINT: Chronic | ICD-10-CM

## 2022-09-16 PROCEDURE — 71250 CT THORAX DX C-: CPT | Mod: 26,MH

## 2022-09-16 PROCEDURE — 71250 CT THORAX DX C-: CPT

## 2022-09-17 NOTE — PATIENT PROFILE ADULT - FUNCTIONAL SCREEN CURRENT LEVEL: BATHING, MLM
CM met with patient at bedside. Medicare Outpatient Observation Notice (MOON)/ [place] Outpatient Observation Notice (Stephany Avila) provided to patient/representative with verbal explanation of the notice. Time allotted for questions regarding the notice. Patient /representative provided a completed copy of the MOON/VOON notice. Copy placed on bedside chart. 1045: Discharge order noted if patient tolerates lunch. 2 = assistive person

## 2022-09-22 ENCOUNTER — NON-APPOINTMENT (OUTPATIENT)
Age: 82
End: 2022-09-22

## 2022-09-28 ENCOUNTER — TRANSCRIPTION ENCOUNTER (OUTPATIENT)
Age: 82
End: 2022-09-28

## 2022-09-29 ENCOUNTER — TRANSCRIPTION ENCOUNTER (OUTPATIENT)
Age: 82
End: 2022-09-29

## 2022-09-30 ENCOUNTER — NON-APPOINTMENT (OUTPATIENT)
Age: 82
End: 2022-09-30

## 2022-10-11 ENCOUNTER — RX RENEWAL (OUTPATIENT)
Age: 82
End: 2022-10-11

## 2022-10-13 ENCOUNTER — TRANSCRIPTION ENCOUNTER (OUTPATIENT)
Age: 82
End: 2022-10-13

## 2022-11-02 NOTE — PATIENT PROFILE ADULT. - NSTOBACCONEVERSMOKERY/N_GEN_A
Adbry Counseling: I discussed with the patient the risks of tralokinumab including but not limited to eye infection and irritation, cold sores, injection site reactions, worsening of asthma, allergic reactions and increased risk of parasitic infection.  Live vaccines should be avoided while taking tralokinumab. The patient understands that monitoring is required and they must alert us or the primary physician if symptoms of infection or other concerning signs are noted. No

## 2022-11-10 ENCOUNTER — TRANSCRIPTION ENCOUNTER (OUTPATIENT)
Age: 82
End: 2022-11-10

## 2022-11-11 ENCOUNTER — TRANSCRIPTION ENCOUNTER (OUTPATIENT)
Age: 82
End: 2022-11-11

## 2022-11-13 ENCOUNTER — TRANSCRIPTION ENCOUNTER (OUTPATIENT)
Age: 82
End: 2022-11-13

## 2022-11-14 ENCOUNTER — TRANSCRIPTION ENCOUNTER (OUTPATIENT)
Age: 82
End: 2022-11-14

## 2022-11-15 ENCOUNTER — TRANSCRIPTION ENCOUNTER (OUTPATIENT)
Age: 82
End: 2022-11-15

## 2022-11-16 ENCOUNTER — TRANSCRIPTION ENCOUNTER (OUTPATIENT)
Age: 82
End: 2022-11-16

## 2022-11-18 ENCOUNTER — APPOINTMENT (OUTPATIENT)
Dept: PULMONOLOGY | Facility: CLINIC | Age: 82
End: 2022-11-18

## 2022-11-18 ENCOUNTER — NON-APPOINTMENT (OUTPATIENT)
Age: 82
End: 2022-11-18

## 2022-11-18 VITALS
OXYGEN SATURATION: 98 % | WEIGHT: 205 LBS | SYSTOLIC BLOOD PRESSURE: 110 MMHG | HEART RATE: 103 BPM | BODY MASS INDEX: 29.35 KG/M2 | HEIGHT: 70 IN | RESPIRATION RATE: 16 BRPM | TEMPERATURE: 97.3 F | DIASTOLIC BLOOD PRESSURE: 68 MMHG

## 2022-11-18 PROCEDURE — 99214 OFFICE O/P EST MOD 30 MIN: CPT | Mod: CS,25

## 2022-11-18 PROCEDURE — 95012 NITRIC OXIDE EXP GAS DETER: CPT

## 2022-11-18 PROCEDURE — 94010 BREATHING CAPACITY TEST: CPT

## 2022-11-18 RX ORDER — SPIRONOLACTONE 25 MG/1
25 TABLET ORAL
Qty: 30 | Refills: 0 | Status: ACTIVE | COMMUNITY
Start: 2022-05-03

## 2022-11-18 NOTE — HISTORY OF PRESENT ILLNESS
[FreeTextEntry1] : Mr. Mccullough is a 81 year old male with a history of amiodarone toxicity, BOOP, former smoker, GERD, hypoxemia, LILO, OW, interstitial pneumonitis, poor sleep, snoring, COVID-19 12/2021 and SOB who presents for a pulmonary follow up. His chief complaint pandemic inactivity\par - he notes feeling well in general \par - he notes rhinitis in the morning \par - he notes clogged nose at night \par - he notes sneezing sometimes after eating \par - he notes weight is stable\par - he denies any visual issues \par - he notes balance could be better \par - he notes doing therapy \par - he notes walking, but slowly \par - he denies ankle swelling \par - he notes occasional sour taste in mouth \par - he notes night cramps \par - he denies any recent travel \par \par - He denies any headaches, nausea, vomiting, fever, chills, sweats, chest pain, chest pressure, palpitations, SOB, coughing, wheezing, fatigue, diarrhea, constipation, dysphagia, myalgias, dizziness, leg swelling, leg pain, itchy eyes, itchy ears, heartburn, reflux

## 2022-11-18 NOTE — PROCEDURE
[FreeTextEntry1] : PFT reveals normal flows, with an FEV1 of  2.35L, which is 105% of predicted, with a normal flow volume loop \par \par FENO was 20; a normal value being less than 25\par Fractional exhaled nitric oxide (FENO) is regarded as a simple, noninvasive method for assessing eosinophilic airway inflammation. Produced by a variety of cells within the lung, nitric oxide (NO) concentrations are generally low in healthy individuals. However, high concentrations of NO appear to be involved in nonspecific host defense mechanisms and chronic inflammatory diseases such as asthma. The American Thoracic Society (ATS) therefore has recommended using FENO to aid in the diagnosis and monitoring of eosinophilic airway inflammation and asthma, and for identifying steroid responsive individuals whose chronic respiratory symptoms may be caused by airway inflammation. \par \par CT (09/16/2022) revealed Near resolved prior lung nodules and consolidation.\par New small region of bronchiolar impaction in the right upper lobe.

## 2022-11-18 NOTE — ASSESSMENT
[FreeTextEntry1] : Mr. Mccullough is a 82 year old male with a history of A-fib, HTN, former smoker, HLD, and obesity, OSAS who presents for  s/p hospitalization for amiodarone toxicity/ interstitial pneumonitis (NSIP) asthma, for an follow up visit. He is presents to the office and is currently stable from a pulmonary perspective - He is off anti-inflammatory / fibrotic - stable, resolved Afib / mild GRIMALDO - complains of voice issues -all controlled - improved - gustatory rhinitis \par \par His shortness of breath is multifactorial due to:\par -poor balance/poor balance\par -obesity/out of shape\par -?CAD\par -pulmonary diseases : ?COPD ; Pneumonitis,asthma \par \par His Pneumonitis (controlled) with ?etiology could be due to:\par -BOOP\par -\par -sarcoidosis\par -hypersensitivity pneumonitis- possible\par -Amiodarone therapy (doubtful) \par - NSIP\par \par 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 Proper dietary restraint is necessary specifically salt containing foods, if any reaction may occur should be reported. \par -Repeat CT - 9/2022\par \par - Etiology will depend on the causative agent possibilities include: idiopathic pulmonary fibrosis (UIP), NSIP (nonspecific interstitial pneumonia) , respiratory bronchiolitis in someone who is a smoker, drug induced lung disease, hypersensitivity pneumonitis, BOOP, sarcoidosis, chronic congestive heart failure, rheumatologic disorder induced interstitial lung disease. Optimal diagnosing will include rheumatological panel which would include ESR, MATEO, ANCA, ARNP, CCP, rheumatoid factor, hypersensitivity panel, JOE1, scleroderma antibodies, sjogren's syndrome antibodies; biopsy will be necessary to definitively determine the etiology unless the CT scan findings are specific for UIP. Therapy will be based on diagnostics.\par \par Problem 1A: Abnormal CT - New Nodule 8 x 4 mm - no change (improved) \par -follow up CT 9/2023\par CAT scans are the only radiological modality to identify abnormalities w/in the lungs with regards to nodules/masses/lymph nodes. Risks, benefits were reviewed in detail. The guidelines for abnormalities include follow up CT scans at various intervals which could range from 6 weeks to 1 year intervals. If there is a change for the worse then consideration for a biopsy will be considered if you are a candidate. Second opinion evaluation with a thoracic surgeon or an interventional radiologist could be offered. 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 These drugs require pulmonary function testing including DLCO regularly and possible CT/CXR if respiratory complaints develop. PFTs should be performed at 6 month intervals \par \par problem 3: s/p PJP prophylaxis- completed went below 20 mg pred. dose per day (resolved) \par -He is off Bactrim 1 tablet Monday, Wednesday, Friday \par -He is s/p Rheumatologic Blood work to include: ESR panel (+) , ACE, panel (-) , HP panel (-)\par - s/p rheumatological evaluation - Dr. Torrez\par - PJP Pneumonia is a major opportunistic infection in immunocompromised patients and corticosteroid therapy is a risk factor. Therefore, patients receiving at least 4 weeks of therapy at 30 mg or more daily should be on prophylaxis.\par  \par problem 4: GERD\par -continue Protonix 40 mg QAM, pre-breakfast\par -Things to avoid including overeating, spicy foods, tight clothing, eating within three hours of bed, this list is not all inclusive. \par -For treatment of reflux, possible options discussed including diet control, H2 blockers, PPIs, as well as coating motility agents discussed as treatment options. Timing of meals and proximity of last meal to sleep were discussed. If symptoms persist, a formal gastrointestinal evaluation is needed.\par -Rule of 2's: Avoid eating too late, too fast, too much, too spicy or within two hours of bedtime \par \par problem 5: (+) OSAS (secondary to snoring, nonrestorative sleep and A-fib) - on Rx\par -refused prescription for sleep study (APAP) - refused\par -s/p oral appliance with Dr. Gabby Lopez\par Sleep apnea is associated with adverse clinical consequences which an affect most organ systems. Cardiovascular disease risk includes arrhythmias, atrial fibrillation, hypertension, coronary artery disease, and stroke. Metabolic disorders include diabetes type 2, non-alcoholic fatty liver disease. Mood disorder especially depression; and cognitive decline especially in the elderly. Associations with chronic reflux/Candelaria’s esophagus some but not all inclusive. \par -Reasons include arousal consistent with hypopnea; respiratory events most prominent in REM sleep or supine position; therefore sleep staging and body position are important for accurate diagnosis and estimation of AHI. \par \par problem 6: smoking history/lung cancer screening (improved) - stable 9/2021\par -follow up chest CT 9/2023\par \par Lung cancer screening is recommended for people between the ages of 55 and 80 with prior 30+ pack year smoking histories. There is irrefutable evidence for realization of lung cancer screening based on two large randomized control trials demonstrating relative reduction in lung cancer mortality for patients undergoing low-dose CT scanning. Risks and benefits reviewed with the patient.\par \par Problem 6A: COPD  -?quiet /asthma \par -discontinue Trelegy 1 puff QD (gargle and spit after use)\par -Add Symbicort 160 2 BID \par -COPD is a progressive disease and although it can’t be cured , appropriate management can slow its progression, reduce frequency and severity of exacerbations, and improve symptoms and the patient quality of life. Hospitalizations are the greatest contributor to the total COPD costs and account for up to 87% of total COPD related costs. Exacerbations are the main cause of admissions and subsequently account for the 40-75% of COPD costs. Inhaled maintenance therapy reduces the incidence of exacerbations in patients with stable COPD. Incorrect inhaler use and nonadherence are major obstacles to achieving COPD treatment goals. Many COPD patients have challenges (impaired inhalation, limited dexterity, reduced cognition: that limit their ability to correctly use their COPD treatment devices resulting in reduced symptom control. Of most importance is smoking cessation and early intervention with respiratory illnesses and contemplation for pulmonary rehab to enhance quality of life.\par -Inhaler technique reviewed as well as oral hygiene techniques reviewed with patient. Avoidance of cold air, extremes of temperature, rescue inhaler should be used before exercise. Order of medication reviewed with patient. Recommended use of a cool mist humidifier in the bedroom.\par \par problem 7: poor breathing mechanics\par -Recommended Wim Hemaf and Buteyko breathing techniques \par -Proper breathing techniques were reviewed with an emphasis of exhalation. Patient instructed to breath in for 1 second and out for four seconds. Patient was encouraged to not talk while walking.\par \par problem 8: overweight/out of shape\par -Weight loss, exercise, and diet control were discussed and are highly encouraged. Treatment options were given such as, aqua therapy, and contacting a nutritionist. Recommended to use the elliptical, stationary bike, less use of treadmill. Mindful eating was explained to the patient. Obesity is associated with worsening asthma, shortness of breath, and potential for cardiac disease, diabetes, and other underlying medical conditions.\par \par problem 9: CAD\par -recommended to follow up with cardiologist Dr. Carlos\par \par problem 10: poor balance\par -recommended to complete balance therapy and gait training\par \par problem 11: allergies\par -continue Claritin 10 mg QAM\par Environmental measures for allergies were encouraged including mattress and pillow cover, air purifier, and environmental controls \par \par problem 11A: Dysphagia ENT eval \par \par prblem 11B: Gustatory rhinitis \par - Atrovent .6g 2qsh \par \par Problem 12: Health Maintenance/COVID19 Precautions:\par - S/p Covid 19 vaccine (Moderna) x3\par -Covid 19 vaccine discussed at length with patient; booster discussed and recommended to wait for vaccine containing new delta variant \par - Clean your hands often. Wash your hands often with soap and water for at least 20 seconds, especially after blowing your nose, coughing, or sneezing, or having been in a public place.\par - If soap and water are not available, use a hand  that contains at least 60% alcohol.\par - To the extent possible, avoid touching high-touch surfaces in public places - elevator buttons, door handles, handrails, handshaking with people, etc. Use a tissue or your sleeve to cover your hand or finger if you must touch something.\par - Wash your hands after touching surfaces in public places.\par - Avoid touching your face, nose, eyes, etc.\par - Clean and disinfect your home to remove germs: practice routine cleaning of frequently touched surfaces (for example: tables, doorknobs, light switches, handles, desks, toilets, faucets, sinks & cell phones)\par - Avoid crowds, especially in poorly ventilated spaces. Your risk of exposure to respiratory viruses like COVID-19 may increase in crowded, closed-in settings with little air circulation if there are people in the crowd who are sick. All patients are recommended to practice social distancing and stay at least 6 feet away from others.\par - Avoid all non-essential travel including plane trips, and especially avoid embarking on cruise ships.\par -If COVID-19 is spreading in your community, take extra measures to put distance between yourself and other people to further reduce your risk of being exposed to this new virus.\par -Stay home as much as possible.\par - Consider ways of getting food brought to your house through family, social, or commercial networks\par -Be aware that the virus has been known to live in the air up to 3 hours post exposure, cardboard up to 24 hours post exposure, copper up to 4 hours post exposure, steel and plastic up to 2-3 days post exposure. Risk of transmission from these surfaces are affected by many variables.\par Immune Support Recommendations:\par -OTC Vitamin C 500mg BID \par -OTC Quercetin 250-500mg BID \par -OTC Zinc 75-100mg per day \par -OTC Melatonin 1 or 2 mg a night \par -OTC Vitamin D 1-4000mg per day \par -OTC Tonic Water 8oz per day\par Asthma and COVID19:\par You need to make sure your asthma is under control. This often requires the use of inhaled corticosteroids (and sometimes oral corticosteroids). Inhaled corticosteroids do not likely reduce your immune system’s ability to fight infections, but oral corticosteroids may. It is important to use the steps above to protect yourself to limit your exposure to any respiratory virus.\par \par problem 13: health maintenance \par - He is s/p consult with a neurologist \par - Received influenza vaccine 2021 pending\par -recommended strep pneumonia vaccines: Prevnar-13 vaccine, followed by Pneumo vaccine 23 one year following\par -recommended early intervention for URIs\par -recommended regular osteoporosis evaluations\par -recommended early dermatological evaluations\par -recommended after the age of 50 to the age of 70, colonoscopy every 5 years \par  \par \par Follow up in 3 months with Full PFTs /  CXR \par Patient is encouraged to call with any changes, concerns or questions.

## 2022-11-18 NOTE — ADDENDUM
[FreeTextEntry1] : Documented by Osorio Aviles acting as a scribe for Dr. Lele Saul on (11/18/2022).\par \par All medical record entries made by the Scribe were at my, Dr. Lele Saul's, direction and personally dictated by me on (11/18/2022). I have reviewed the chart and agree that the record accurately reflects my personal performance of the history, physical exam, assessment and plan. I have personally directed, reviewed, and agree with the discharge instructions.

## 2022-11-25 ENCOUNTER — TRANSCRIPTION ENCOUNTER (OUTPATIENT)
Age: 82
End: 2022-11-25

## 2022-11-25 RX ORDER — FLUTICASONE PROPIONATE AND SALMETEROL 250; 50 UG/1; UG/1
250-50 POWDER RESPIRATORY (INHALATION)
Qty: 60 | Refills: 2 | Status: COMPLETED | COMMUNITY
Start: 2022-06-20 | End: 2022-11-04

## 2022-12-06 ENCOUNTER — TRANSCRIPTION ENCOUNTER (OUTPATIENT)
Age: 82
End: 2022-12-06

## 2022-12-06 ENCOUNTER — APPOINTMENT (OUTPATIENT)
Dept: OTOLARYNGOLOGY | Facility: CLINIC | Age: 82
End: 2022-12-06
Payer: MEDICARE

## 2022-12-06 VITALS
DIASTOLIC BLOOD PRESSURE: 70 MMHG | SYSTOLIC BLOOD PRESSURE: 111 MMHG | BODY MASS INDEX: 29.49 KG/M2 | HEIGHT: 70 IN | WEIGHT: 206 LBS | HEART RATE: 112 BPM

## 2022-12-06 DIAGNOSIS — R49.0 DYSPHONIA: ICD-10-CM

## 2022-12-06 PROCEDURE — 31579 LARYNGOSCOPY TELESCOPIC: CPT

## 2022-12-06 PROCEDURE — 99204 OFFICE O/P NEW MOD 45 MIN: CPT | Mod: 25

## 2022-12-06 RX ORDER — FLUTICASONE FUROATE, UMECLIDINIUM BROMIDE AND VILANTEROL TRIFENATATE 100; 62.5; 25 UG/1; UG/1; UG/1
100-62.5-25 POWDER RESPIRATORY (INHALATION)
Qty: 3 | Refills: 1 | Status: COMPLETED | COMMUNITY
Start: 2020-02-06 | End: 2022-12-06

## 2022-12-06 RX ORDER — POTASSIUM CHLORIDE 1500 MG/1
20 TABLET, FILM COATED, EXTENDED RELEASE ORAL
Refills: 0 | Status: COMPLETED | COMMUNITY
End: 2022-12-06

## 2022-12-06 RX ORDER — COLD-HOT PACK
EACH MISCELLANEOUS
Refills: 0 | Status: ACTIVE | COMMUNITY

## 2022-12-06 RX ORDER — CHOLECALCIFEROL (VITAMIN D3) 25 MCG
TABLET ORAL
Refills: 0 | Status: ACTIVE | COMMUNITY

## 2022-12-06 RX ORDER — OMEGA-3/DHA/EPA/FISH OIL 300-1000MG
CAPSULE ORAL
Refills: 0 | Status: ACTIVE | COMMUNITY

## 2022-12-06 RX ORDER — APIXABAN 5 MG/1
TABLET, FILM COATED ORAL
Refills: 0 | Status: COMPLETED | COMMUNITY
End: 2022-12-06

## 2022-12-06 RX ORDER — FUROSEMIDE 40 MG/1
40 TABLET ORAL
Refills: 0 | Status: COMPLETED | COMMUNITY
End: 2022-12-06

## 2022-12-06 RX ORDER — ASCORBIC ACID 500 MG
TABLET ORAL
Refills: 0 | Status: ACTIVE | COMMUNITY

## 2022-12-06 NOTE — CONSULT LETTER
[Consult Letter:] : I had the pleasure of evaluating your patient, [unfilled]. [Please see my note below.] : Please see my note below. [Consult Closing:] : Thank you very much for allowing me to participate in the care of this patient.  If you have any questions, please do not hesitate to contact me. [Sincerely,] : Sincerely, [Dear  ___] : Dear  [unfilled], [FreeTextEntry2] : Dr. Lele Saul [FreeTextEntry3] : Theo Young MD, PhD\par Chief, Division of Laryngology\par Department of Otolaryngology\par [facility]\par Pediatric Otolaryngology, [facility]\par  of Otolaryngology\par [employer]\par

## 2022-12-06 NOTE — REASON FOR VISIT
[Initial Consultation] : an initial consultation for [Spouse] : spouse [FreeTextEntry2] : referred by Dr. Lele Saul, pulmonologist for dysphonia.

## 2022-12-06 NOTE — HISTORY OF PRESENT ILLNESS
[de-identified] : 82 year old male referred by Dr. Lele Saul, pulmonologist for dysphonia. \par History of amiodarone toxicity, had trouble breathing\par States difficulty breathing for about 6 years, following Dr. Saul. H/o cardiac history\par Voice fades as day goes on, frequent globus sensation\par States intermittent unable to voice at times, muffled at times. \par Denies complete loss of voice. \par Denies dysphagia, odynophagia, aspirations.\par Long history bad nasal congestion in the morning, blows nose for an hour, started ipratropium 2 weeks ago\par Sneezing after eating\par Takes PPI in the morning, unclear why, takes plavix\par

## 2022-12-13 ENCOUNTER — TRANSCRIPTION ENCOUNTER (OUTPATIENT)
Age: 82
End: 2022-12-13

## 2022-12-14 ENCOUNTER — TRANSCRIPTION ENCOUNTER (OUTPATIENT)
Age: 82
End: 2022-12-14

## 2023-01-11 ENCOUNTER — APPOINTMENT (OUTPATIENT)
Dept: OTOLARYNGOLOGY | Facility: CLINIC | Age: 83
End: 2023-01-11
Payer: MEDICARE

## 2023-01-11 PROCEDURE — 92520 LARYNGEAL FUNCTION STUDIES: CPT | Mod: GN

## 2023-01-11 PROCEDURE — 92524 BEHAVRAL QUALIT ANALYS VOICE: CPT | Mod: GN

## 2023-01-26 ENCOUNTER — TRANSCRIPTION ENCOUNTER (OUTPATIENT)
Age: 83
End: 2023-01-26

## 2023-02-02 ENCOUNTER — TRANSCRIPTION ENCOUNTER (OUTPATIENT)
Age: 83
End: 2023-02-02

## 2023-02-03 ENCOUNTER — TRANSCRIPTION ENCOUNTER (OUTPATIENT)
Age: 83
End: 2023-02-03

## 2023-02-07 ENCOUNTER — APPOINTMENT (OUTPATIENT)
Dept: OTOLARYNGOLOGY | Facility: CLINIC | Age: 83
End: 2023-02-07

## 2023-02-08 ENCOUNTER — TRANSCRIPTION ENCOUNTER (OUTPATIENT)
Age: 83
End: 2023-02-08

## 2023-02-21 ENCOUNTER — APPOINTMENT (OUTPATIENT)
Dept: OTOLARYNGOLOGY | Facility: CLINIC | Age: 83
End: 2023-02-21

## 2023-03-23 ENCOUNTER — APPOINTMENT (OUTPATIENT)
Dept: PULMONOLOGY | Facility: CLINIC | Age: 83
End: 2023-03-23
Payer: MEDICARE

## 2023-03-23 ENCOUNTER — NON-APPOINTMENT (OUTPATIENT)
Age: 83
End: 2023-03-23

## 2023-03-23 VITALS
HEIGHT: 68 IN | OXYGEN SATURATION: 98 % | RESPIRATION RATE: 16 BRPM | HEART RATE: 92 BPM | TEMPERATURE: 98 F | SYSTOLIC BLOOD PRESSURE: 124 MMHG | BODY MASS INDEX: 31.22 KG/M2 | WEIGHT: 206 LBS | DIASTOLIC BLOOD PRESSURE: 80 MMHG

## 2023-03-23 DIAGNOSIS — R26.89 OTHER ABNORMALITIES OF GAIT AND MOBILITY: ICD-10-CM

## 2023-03-23 DIAGNOSIS — J84.89 OTHER SPECIFIED INTERSTITIAL PULMONARY DISEASES: ICD-10-CM

## 2023-03-23 PROCEDURE — ZZZZZ: CPT

## 2023-03-23 PROCEDURE — 95012 NITRIC OXIDE EXP GAS DETER: CPT

## 2023-03-23 PROCEDURE — 94010 BREATHING CAPACITY TEST: CPT

## 2023-03-23 PROCEDURE — 94727 GAS DIL/WSHOT DETER LNG VOL: CPT

## 2023-03-23 PROCEDURE — 94729 DIFFUSING CAPACITY: CPT

## 2023-03-23 PROCEDURE — 99214 OFFICE O/P EST MOD 30 MIN: CPT | Mod: CS,25

## 2023-03-23 NOTE — PHYSICAL EXAM
[No Acute Distress] : no acute distress [Well Nourished] : well nourished [No Deformities] : no deformities [Well Developed] : well developed [Normal Oropharynx] : normal oropharynx [II] : Mallampati Class: II [Normal Appearance] : normal appearance [No Neck Mass] : no neck mass [Normal Rate/Rhythm] : normal rate/rhythm [Normal S1, S2] : normal s1, s2 [No Murmurs] : no murmurs [No Resp Distress] : no resp distress [Clear to Auscultation Bilaterally] : clear to auscultation bilaterally [No Abnormalities] : no abnormalities [Kyphosis] : kyphosis [Benign] : benign [Normal Gait] : normal gait [No Clubbing] : no clubbing [No Cyanosis] : no cyanosis [FROM] : FROM [Normal Color/ Pigmentation] : normal color/ pigmentation [No Focal Deficits] : no focal deficits [Oriented x3] : oriented x3 [Normal Affect] : normal affect [TextBox_54] : 2/6 systolic murmur  [TextBox_68] : I:E ratio 1:3; clear  [TextBox_105] : trace edema LE

## 2023-03-23 NOTE — ASSESSMENT
[FreeTextEntry1] : Mr. Mccullough is a 82 year old male with a history of A-fib, HTN, former smoker, HLD, and obesity, OSAS who presents for  s/p hospitalization for amiodarone toxicity/ interstitial pneumonitis (NSIP) asthma, for an follow up visit. He is presents to the office and is currently stable from a pulmonary perspective - He is off anti-inflammatory / fibrotic - stable, resolved Afib / mild GRIMALDO - complains of voice issues -all controlled - improved - gustatory rhinitis - #1 issue is hearing / memory \par \par His shortness of breath is multifactorial due to:\par -poor balance/poor balance\par -obesity/out of shape\par -?CAD\par -pulmonary diseases : ?COPD ; Pneumonitis,asthma \par \par His Pneumonitis (controlled) with ?etiology could be due to:\par -BOOP\par -\par -sarcoidosis\par -hypersensitivity pneumonitis- possible\par -Amiodarone therapy (doubtful) \par - NSIP\par \par 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 Proper dietary restraint is necessary specifically salt containing foods, if any reaction may occur should be reported. \par -Repeat CT - 9/2023 \par \par - Etiology will depend on the causative agent possibilities include: idiopathic pulmonary fibrosis (UIP), NSIP (nonspecific interstitial pneumonia) , respiratory bronchiolitis in someone who is a smoker, drug induced lung disease, hypersensitivity pneumonitis, BOOP, sarcoidosis, chronic congestive heart failure, rheumatologic disorder induced interstitial lung disease. Optimal diagnosing will include rheumatological panel which would include ESR, MATEO, ANCA, ARNP, CCP, rheumatoid factor, hypersensitivity panel, JOE1, scleroderma antibodies, sjogren's syndrome antibodies; biopsy will be necessary to definitively determine the etiology unless the CT scan findings are specific for UIP. Therapy will be based on diagnostics.\par \par Problem 1A: Abnormal CT - New Nodule 8 x 4 mm - no change (improved) \par -follow up CT 9/2023\par CAT scans are the only radiological modality to identify abnormalities w/in the lungs with regards to nodules/masses/lymph nodes. Risks, benefits were reviewed in detail. The guidelines for abnormalities include follow up CT scans at various intervals which could range from 6 weeks to 1 year intervals. If there is a change for the worse then consideration for a biopsy will be considered if you are a candidate. Second opinion evaluation with a thoracic surgeon or an interventional radiologist could be offered. 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 These drugs require pulmonary function testing including DLCO regularly and possible CT/CXR if respiratory complaints develop. PFTs should be performed at 6 month intervals \par \par problem 3: s/p PJP prophylaxis- completed went below 20 mg pred. dose per day (resolved) \par -He is off Bactrim 1 tablet Monday, Wednesday, Friday \par -He is s/p Rheumatologic Blood work to include: ESR panel (+) , ACE, panel (-) , HP panel (-)\par - s/p rheumatological evaluation - Dr. Torrez\par - PJP Pneumonia is a major opportunistic infection in immunocompromised patients and corticosteroid therapy is a risk factor. Therefore, patients receiving at least 4 weeks of therapy at 30 mg or more daily should be on prophylaxis.\par  \par problem 4: GERD\par -continue Protonix 40 mg QAM, pre-breakfast\par -Things to avoid including overeating, spicy foods, tight clothing, eating within three hours of bed, this list is not all inclusive. \par -For treatment of reflux, possible options discussed including diet control, H2 blockers, PPIs, as well as coating motility agents discussed as treatment options. Timing of meals and proximity of last meal to sleep were discussed. If symptoms persist, a formal gastrointestinal evaluation is needed.\par -Rule of 2's: Avoid eating too late, too fast, too much, too spicy or within two hours of bedtime \par \par problem 5: (+) OSAS (secondary to snoring, nonrestorative sleep and A-fib) - on Rx\par -refused prescription for sleep study (APAP) - refused\par -s/p oral appliance with Dr. Gabby Lopez\par Sleep apnea is associated with adverse clinical consequences which an affect most organ systems. Cardiovascular disease risk includes arrhythmias, atrial fibrillation, hypertension, coronary artery disease, and stroke. Metabolic disorders include diabetes type 2, non-alcoholic fatty liver disease. Mood disorder especially depression; and cognitive decline especially in the elderly. Associations with chronic reflux/Candelaria’s esophagus some but not all inclusive. \par -Reasons include arousal consistent with hypopnea; respiratory events most prominent in REM sleep or supine position; therefore sleep staging and body position are important for accurate diagnosis and estimation of AHI. \par \par problem 6: smoking history/lung cancer screening (improved) - stable 9/2021\par -follow up chest CT 9/2023\par \par Lung cancer screening is recommended for people between the ages of 55 and 80 with prior 30+ pack year smoking histories. There is irrefutable evidence for realization of lung cancer screening based on two large randomized control trials demonstrating relative reduction in lung cancer mortality for patients undergoing low-dose CT scanning. Risks and benefits reviewed with the patient.\par \par Problem 6A: COPD  -?quiet /asthma \par -continue Symbicort 160 2 BID \par -COPD is a progressive disease and although it can’t be cured , appropriate management can slow its progression, reduce frequency and severity of exacerbations, and improve symptoms and the patient quality of life. Hospitalizations are the greatest contributor to the total COPD costs and account for up to 87% of total COPD related costs. Exacerbations are the main cause of admissions and subsequently account for the 40-75% of COPD costs. Inhaled maintenance therapy reduces the incidence of exacerbations in patients with stable COPD. Incorrect inhaler use and nonadherence are major obstacles to achieving COPD treatment goals. Many COPD patients have challenges (impaired inhalation, limited dexterity, reduced cognition: that limit their ability to correctly use their COPD treatment devices resulting in reduced symptom control. Of most importance is smoking cessation and early intervention with respiratory illnesses and contemplation for pulmonary rehab to enhance quality of life.\par -Inhaler technique reviewed as well as oral hygiene techniques reviewed with patient. Avoidance of cold air, extremes of temperature, rescue inhaler should be used before exercise. Order of medication reviewed with patient. Recommended use of a cool mist humidifier in the bedroom.\par \par problem 7: poor breathing mechanics\par -Recommended Wim Hof and Buteyko breathing techniques \par -Proper breathing techniques were reviewed with an emphasis of exhalation. Patient instructed to breath in for 1 second and out for four seconds. Patient was encouraged to not talk while walking.\par \par problem 8: overweight/out of shape\par -Weight loss, exercise, and diet control were discussed and are highly encouraged. Treatment options were given such as, aqua therapy, and contacting a nutritionist. Recommended to use the elliptical, stationary bike, less use of treadmill. Mindful eating was explained to the patient. Obesity is associated with worsening asthma, shortness of breath, and potential for cardiac disease, diabetes, and other underlying medical conditions.\par \par problem 9: CAD\par -recommended to follow up with cardiologist Dr. Carlos\par \par problem 10: poor balance\par -recommended to complete balance therapy and gait training\par \par problem 11: allergies\par -continue Claritin 10 mg QAM\par Environmental measures for allergies were encouraged including mattress and pillow cover, air purifier, and environmental controls \par \par problem 11A: Dysphagia ENT eval \par \par prblem 11B: Gustatory rhinitis \par - Atrovent .6g 2qsh \par \par Problem 12: Health Maintenance/COVID19 Precautions:\par - S/p Covid 19 vaccine (Moderna) x3\par -Covid 19 vaccine discussed at length with patient; booster discussed and recommended to wait for vaccine containing new delta variant \par - Clean your hands often. Wash your hands often with soap and water for at least 20 seconds, especially after blowing your nose, coughing, or sneezing, or having been in a public place.\par - If soap and water are not available, use a hand  that contains at least 60% alcohol.\par - To the extent possible, avoid touching high-touch surfaces in public places - elevator buttons, door handles, handrails, handshaking with people, etc. Use a tissue or your sleeve to cover your hand or finger if you must touch something.\par - Wash your hands after touching surfaces in public places.\par - Avoid touching your face, nose, eyes, etc.\par - Clean and disinfect your home to remove germs: practice routine cleaning of frequently touched surfaces (for example: tables, doorknobs, light switches, handles, desks, toilets, faucets, sinks & cell phones)\par - Avoid crowds, especially in poorly ventilated spaces. Your risk of exposure to respiratory viruses like COVID-19 may increase in crowded, closed-in settings with little air circulation if there are people in the crowd who are sick. All patients are recommended to practice social distancing and stay at least 6 feet away from others.\par - Avoid all non-essential travel including plane trips, and especially avoid embarking on cruise ships.\par -If COVID-19 is spreading in your community, take extra measures to put distance between yourself and other people to further reduce your risk of being exposed to this new virus.\par -Stay home as much as possible.\par - Consider ways of getting food brought to your house through family, social, or commercial networks\par -Be aware that the virus has been known to live in the air up to 3 hours post exposure, cardboard up to 24 hours post exposure, copper up to 4 hours post exposure, steel and plastic up to 2-3 days post exposure. Risk of transmission from these surfaces are affected by many variables.\par Immune Support Recommendations:\par -OTC Vitamin C 500mg BID \par -OTC Quercetin 250-500mg BID \par -OTC Zinc 75-100mg per day \par -OTC Melatonin 1 or 2 mg a night \par -OTC Vitamin D 1-4000mg per day \par -OTC Tonic Water 8oz per day\par Asthma and COVID19:\par You need to make sure your asthma is under control. This often requires the use of inhaled corticosteroids (and sometimes oral corticosteroids). Inhaled corticosteroids do not likely reduce your immune system’s ability to fight infections, but oral corticosteroids may. It is important to use the steps above to protect yourself to limit your exposure to any respiratory virus.\par \par problem 13: health maintenance \par - He is s/p consult with a neurologist \par - Received influenza vaccine 2021 pending\par -recommended strep pneumonia vaccines: Prevnar-13 vaccine, followed by Pneumo vaccine 23 one year following\par -recommended early intervention for URIs\par -recommended regular osteoporosis evaluations\par -recommended early dermatological evaluations\par -recommended after the age of 50 to the age of 70, colonoscopy every 5 years \par  \par \par Follow up in 3 months with Full PFTs /  CXR \par Patient is encouraged to call with any changes, concerns or questions.

## 2023-03-23 NOTE — HISTORY OF PRESENT ILLNESS
[FreeTextEntry1] : Mr. Mccullough is a 82 year old male with a history of amiodarone toxicity, BOOP, former smoker, GERD, hypoxemia, LILO, OW, interstitial pneumonitis, poor sleep, snoring, COVID-19 12/2021 and SOB who presents for a pulmonary follow up. \par - he notes he has been walking a lot \par - he notes his bowel movements varies, he has 4 days of BM but then he will develop constipation and then the cycle will continue. \par - he notes he's not blowing his nose as much due to his nasal sprays \par - his wife notes he has been drinking too much Scotch \par - he has been wobbling but he does not fall\par - no SOB on his back or middle of the night \par - his wife notes his short term memory is compromised a bit \par - he notes he feels a little bored\par - he notes his hearing isn't great, he wears a hearing aid. \par - he notes he does not have access to a pool \par - he has not been coughing a lot, only occasionally\par - has not been wheezing \par - his legs were swollen when he was in [place]. But when he kept them up high it resolved. \par - Dr. Grissom has been managing his hearing aids \par -He denies any visual issues, headaches, nausea, vomiting, fever, chills, sweats, chest pains, chest pressure, dysphagia, myalgia, leg pain, itchy eyes, itchy ears, heartburn, reflux, or sour taste in the mouth.\par

## 2023-03-23 NOTE — ADDENDUM
[FreeTextEntry1] : Documented by Marguerite Michael acting as a scribe for Dr. Lele Saul on 03/23/2023 \par \par All medical record entries made by the Scribe were at my, Dr. Lele Saul's, direction and personally dictated by me on 03/23/2023 . I have reviewed the chart and agree that the record accurately reflects my personal performance of the history, physical exam, assessment and plan. I have also personally directed, reviewed, and agree with the discharge instructions.

## 2023-03-23 NOTE — PROCEDURE
[FreeTextEntry1] : FULL PFTs reveals mild obstructive flows; FEV1 was  2.76L which is 85% of predicted; normal lung volumes; normal diffusion of 22.1 , which is 106% of predicted; normal flow volume loop \par \par FENO was 18 ; normal value being less than 25\par Fractional exhaled nitric oxide (FENO) is regarded as a simple, noninvasive method for assessing eosinophilic airway inflammation. Produced by a variety of cells within the lung, nitric oxide (NO) concentrations are generally low in healthy individuals. However, high concentrations of NO appear to be involved in nonspecific host defense mechanisms and chronic inflammatory diseases such as asthma. The American Thoracic Society (ATS) therefore has recommended using FENO to aid in the diagnosis and monitoring of eosinophilic airway inflammation and asthma, and for identifying steroid responsive individuals whose chronic respiratory symptoms may be airway inflammation.

## 2023-04-03 ENCOUNTER — APPOINTMENT (OUTPATIENT)
Dept: OTOLARYNGOLOGY | Facility: CLINIC | Age: 83
End: 2023-04-03
Payer: MEDICARE

## 2023-04-03 PROCEDURE — 92507 TX SP LANG VOICE COMM INDIV: CPT | Mod: GN

## 2023-04-10 ENCOUNTER — APPOINTMENT (OUTPATIENT)
Dept: OTOLARYNGOLOGY | Facility: CLINIC | Age: 83
End: 2023-04-10

## 2023-04-10 ENCOUNTER — APPOINTMENT (OUTPATIENT)
Dept: SURGICAL ONCOLOGY | Facility: CLINIC | Age: 83
End: 2023-04-10
Payer: MEDICARE

## 2023-04-10 VITALS
OXYGEN SATURATION: 97 % | HEIGHT: 68 IN | HEART RATE: 93 BPM | WEIGHT: 206 LBS | DIASTOLIC BLOOD PRESSURE: 83 MMHG | BODY MASS INDEX: 31.22 KG/M2 | SYSTOLIC BLOOD PRESSURE: 143 MMHG | RESPIRATION RATE: 17 BRPM

## 2023-04-10 PROCEDURE — 99205 OFFICE O/P NEW HI 60 MIN: CPT

## 2023-04-11 ENCOUNTER — NON-APPOINTMENT (OUTPATIENT)
Age: 83
End: 2023-04-11

## 2023-04-12 ENCOUNTER — TRANSCRIPTION ENCOUNTER (OUTPATIENT)
Age: 83
End: 2023-04-12

## 2023-04-12 ENCOUNTER — APPOINTMENT (OUTPATIENT)
Dept: OTOLARYNGOLOGY | Facility: CLINIC | Age: 83
End: 2023-04-12

## 2023-04-13 ENCOUNTER — TRANSCRIPTION ENCOUNTER (OUTPATIENT)
Age: 83
End: 2023-04-13

## 2023-04-17 ENCOUNTER — TRANSCRIPTION ENCOUNTER (OUTPATIENT)
Age: 83
End: 2023-04-17

## 2023-04-18 ENCOUNTER — TRANSCRIPTION ENCOUNTER (OUTPATIENT)
Age: 83
End: 2023-04-18

## 2023-04-18 ENCOUNTER — OUTPATIENT (OUTPATIENT)
Dept: OUTPATIENT SERVICES | Facility: HOSPITAL | Age: 83
LOS: 1 days | End: 2023-04-18
Payer: MEDICARE

## 2023-04-18 ENCOUNTER — RESULT REVIEW (OUTPATIENT)
Age: 83
End: 2023-04-18

## 2023-04-18 DIAGNOSIS — Z96.643 PRESENCE OF ARTIFICIAL HIP JOINT, BILATERAL: Chronic | ICD-10-CM

## 2023-04-18 DIAGNOSIS — Z98.89 OTHER SPECIFIED POSTPROCEDURAL STATES: Chronic | ICD-10-CM

## 2023-04-18 DIAGNOSIS — Z96.651 PRESENCE OF RIGHT ARTIFICIAL KNEE JOINT: Chronic | ICD-10-CM

## 2023-04-18 DIAGNOSIS — D48.5 NEOPLASM OF UNCERTAIN BEHAVIOR OF SKIN: ICD-10-CM

## 2023-04-18 LAB — SURGICAL PATHOLOGY STUDY: SIGNIFICANT CHANGE UP

## 2023-04-18 PROCEDURE — 88321 CONSLTJ&REPRT SLD PREP ELSWR: CPT

## 2023-04-24 ENCOUNTER — TRANSCRIPTION ENCOUNTER (OUTPATIENT)
Age: 83
End: 2023-04-24

## 2023-05-01 ENCOUNTER — OUTPATIENT (OUTPATIENT)
Dept: OUTPATIENT SERVICES | Facility: HOSPITAL | Age: 83
LOS: 1 days | End: 2023-05-01

## 2023-05-01 VITALS
SYSTOLIC BLOOD PRESSURE: 128 MMHG | HEIGHT: 66 IN | TEMPERATURE: 98 F | DIASTOLIC BLOOD PRESSURE: 77 MMHG | RESPIRATION RATE: 16 BRPM | WEIGHT: 205.91 LBS | HEART RATE: 94 BPM | OXYGEN SATURATION: 97 %

## 2023-05-01 DIAGNOSIS — Z96.651 PRESENCE OF RIGHT ARTIFICIAL KNEE JOINT: Chronic | ICD-10-CM

## 2023-05-01 DIAGNOSIS — C43.59 MALIGNANT MELANOMA OF OTHER PART OF TRUNK: ICD-10-CM

## 2023-05-01 DIAGNOSIS — Z87.438 PERSONAL HISTORY OF OTHER DISEASES OF MALE GENITAL ORGANS: ICD-10-CM

## 2023-05-01 DIAGNOSIS — Z96.643 PRESENCE OF ARTIFICIAL HIP JOINT, BILATERAL: Chronic | ICD-10-CM

## 2023-05-01 DIAGNOSIS — E78.5 HYPERLIPIDEMIA, UNSPECIFIED: ICD-10-CM

## 2023-05-01 DIAGNOSIS — I10 ESSENTIAL (PRIMARY) HYPERTENSION: ICD-10-CM

## 2023-05-01 DIAGNOSIS — Z91.89 OTHER SPECIFIED PERSONAL RISK FACTORS, NOT ELSEWHERE CLASSIFIED: ICD-10-CM

## 2023-05-01 DIAGNOSIS — Z98.89 OTHER SPECIFIED POSTPROCEDURAL STATES: Chronic | ICD-10-CM

## 2023-05-01 DIAGNOSIS — I48.91 UNSPECIFIED ATRIAL FIBRILLATION: ICD-10-CM

## 2023-05-01 DIAGNOSIS — R68.89 OTHER GENERAL SYMPTOMS AND SIGNS: ICD-10-CM

## 2023-05-01 DIAGNOSIS — I50.41 ACUTE COMBINED SYSTOLIC (CONGESTIVE) AND DIASTOLIC (CONGESTIVE) HEART FAILURE: ICD-10-CM

## 2023-05-01 LAB
BLD GP AB SCN SERPL QL: NEGATIVE — SIGNIFICANT CHANGE UP
RH IG SCN BLD-IMP: POSITIVE — SIGNIFICANT CHANGE UP

## 2023-05-01 RX ORDER — ATENOLOL 25 MG/1
1 TABLET ORAL
Qty: 0 | Refills: 0 | DISCHARGE

## 2023-05-01 RX ORDER — SODIUM CHLORIDE 9 MG/ML
1000 INJECTION, SOLUTION INTRAVENOUS
Refills: 0 | Status: DISCONTINUED | OUTPATIENT
Start: 2023-05-09 | End: 2023-05-24

## 2023-05-01 RX ORDER — L.ACIDOPH/B.ANIMALIS/B.LONGUM 15B CELL
1 CAPSULE ORAL
Qty: 0 | Refills: 0 | DISCHARGE

## 2023-05-01 RX ORDER — CHOLECALCIFEROL (VITAMIN D3) 125 MCG
1 CAPSULE ORAL
Qty: 0 | Refills: 0 | DISCHARGE

## 2023-05-01 RX ORDER — POTASSIUM CHLORIDE 20 MEQ
1 PACKET (EA) ORAL
Qty: 0 | Refills: 0 | DISCHARGE

## 2023-05-01 RX ORDER — FUROSEMIDE 40 MG
1 TABLET ORAL
Qty: 0 | Refills: 0 | DISCHARGE

## 2023-05-01 NOTE — H&P PST ADULT - ADDITIONAL PE
Mallampati - 3  Denies dentures. Denies loose teeth. Mallampati - 2  Denies dentures. Denies loose teeth.  Has 2 dental implant

## 2023-05-01 NOTE — H&P PST ADULT - MUSCULOSKELETAL
no calf tenderness/decreased ROM due to pain details… bilateral legs/no calf tenderness/decreased ROM due to pain/extremities exam/abnormal gait

## 2023-05-01 NOTE — H&P PST ADULT - NSICDXPASTMEDICALHX_GEN_ALL_CORE_FT
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

## 2023-05-01 NOTE — H&P PST ADULT - FUNCTIONAL STATUS
Walks 1 to 2 blocks, climbs 1 flight of stairs, ADLs/less than 4 METS Walks 1 to 2 blocks, climbs 1 flight of stairs, ADLs - activity intolerance - dyspnea on exertion/less than 4 METS

## 2023-05-01 NOTE — H&P PST ADULT - OTHER CARE PROVIDERS
Dr Lele Saul - Pulmonologist , Dr Lele Grier - dermatologist Dr Lele Saul [phone] - Pulmonologist , Dr Lele Grier [phone] dermatologist

## 2023-05-01 NOTE — H&P PST ADULT - HISTORY OF PRESENT ILLNESS
83 year old male with pre op dx of malignant melanoma of other part of trunk is scheduled for wide excision melanoma left upper abdominal wall . Dr Knutson to add codes .

## 2023-05-01 NOTE — H&P PST ADULT - GENERAL COMMENTS
Pt reports left upper a03/23 Pt reports newly diagnosed 0.3 mm melanoma of the upper mid abdominal wall on 03/2023 by dermatologist. Biopsy done and showed malignant melanoma.

## 2023-05-01 NOTE — H&P PST ADULT - LAST CARDIAC ANGIOGRAM/IMAGING
2018 , 2022 - s/p watchman 2018 , 2022 - s/p watchman procedure 2018 - s/p ablation  , 2022 - s/p watchman procedure

## 2023-05-01 NOTE — H&P PST ADULT - REASON FOR ADMISSION
"I m having surgery on my left upper abdomin  " "I m having surgery on my left upper abdomen melanoma  "

## 2023-05-01 NOTE — H&P PST ADULT - ATTENDING COMMENTS
83 y.o. man w/ a 0.3 mm melanoma of the ant. abd. ken, brianna'd for w.e. and reconst. (Dr Knutson)    oncologic diagnosis, surgical approach, risks, benefits and possible surgical outcomes reviewed in detail, all questions answered.    consent on chart

## 2023-05-01 NOTE — H&P PST ADULT - PROBLEM SELECTOR PLAN 3
As per cardiologist last dose of aspirin 05/07/2023.  hx of  recent Watchman procedure done 08/2022 and has a loop recorder in place.  Copy of implant card in chart.  Copy of cardiology evaluation in chart.

## 2023-05-01 NOTE — H&P PST ADULT - CARDIOVASCULAR COMMENTS
hx of  recent Watchman procedure done 08/2022 and has a loop recorder in place loop recorder in place

## 2023-05-01 NOTE — H&P PST ADULT - NSANTHOSAYNRD_GEN_A_CORE
No. LILO screening performed.  STOP BANG Legend: 0-2 = LOW Risk; 3-4 = INTERMEDIATE Risk; 5-8 = HIGH Risk Pt denies LILO . Denies using mouthpiece ( used in 2018) for mild LILO ,/No. LILO screening performed.  STOP BANG Legend: 0-2 = LOW Risk; 3-4 = INTERMEDIATE Risk; 5-8 = HIGH Risk

## 2023-05-01 NOTE — H&P PST ADULT - PROBLEM SELECTOR PLAN 7
Unable to assess mets due to - activity intolerance - dyspnea on exertion - hx of CHF , Afib  Copy of cardiology evaluation in chart.

## 2023-05-01 NOTE — H&P PST ADULT - PROBLEM SELECTOR PLAN 1
Patient tentatively scheduled for  wide excision melanoma left upper abdominal wall . Dr Knutson to add codes on 05/09/2023.    Pre-op instructions provided. Pt given verbal and written instructions with teach back on chlorhexidine wash  and pepcid. Pt verbalized understanding with return demonstration.    Labs done by PCP on 04/25/23.  Copy of lab results in chart

## 2023-05-01 NOTE — H&P PST ADULT - NS MD HP INPLANTS MED DEV
bilateral , dental implant x 2 ,loop recorder/Hearing aid bilateral hip, right knee ,bilateral ears  , dental implant x 2 ,loop recorder/Artificial joint/Hearing aid

## 2023-05-01 NOTE — H&P PST ADULT - NS PRO PASSIVE SMOKE EXP
Assumed care of pt. In to see pt, introductions done. Plan of care and scheduled medications discussed. Infant's circumcision discussed. Infant brought back to pt's room. Pt on the phone at this time and denies any needs. Call light in reach. Will continue to monitor. Yes...

## 2023-05-01 NOTE — H&P PST ADULT - PROBLEM SELECTOR PLAN 4
Patient instructed to take spirolactone with a sip of water on the morning of procedure.  Copy of cardiology evaluation in chart.  Requesting echo and stress reports from cardiologist.

## 2023-05-04 ENCOUNTER — APPOINTMENT (OUTPATIENT)
Dept: PLASTIC SURGERY | Facility: CLINIC | Age: 83
End: 2023-05-04
Payer: MEDICARE

## 2023-05-04 VITALS
SYSTOLIC BLOOD PRESSURE: 142 MMHG | TEMPERATURE: 98 F | DIASTOLIC BLOOD PRESSURE: 69 MMHG | HEIGHT: 70 IN | HEART RATE: 88 BPM | WEIGHT: 206 LBS | BODY MASS INDEX: 29.49 KG/M2 | OXYGEN SATURATION: 98 %

## 2023-05-04 DIAGNOSIS — Z80.1 FAMILY HISTORY OF MALIGNANT NEOPLASM OF TRACHEA, BRONCHUS AND LUNG: ICD-10-CM

## 2023-05-04 DIAGNOSIS — E78.00 PURE HYPERCHOLESTEROLEMIA, UNSPECIFIED: ICD-10-CM

## 2023-05-04 DIAGNOSIS — I10 ESSENTIAL (PRIMARY) HYPERTENSION: ICD-10-CM

## 2023-05-04 DIAGNOSIS — N40.0 BENIGN PROSTATIC HYPERPLASIA WITHOUT LOWER URINARY TRACT SYMPMS: ICD-10-CM

## 2023-05-04 PROCEDURE — 99204 OFFICE O/P NEW MOD 45 MIN: CPT

## 2023-05-04 RX ORDER — AZITHROMYCIN 250 MG/1
250 TABLET, FILM COATED ORAL
Refills: 0 | Status: DISCONTINUED | COMMUNITY
End: 2023-05-04

## 2023-05-04 RX ORDER — FLUTICASONE PROPIONATE AND SALMETEROL 250; 50 UG/1; UG/1
250-50 POWDER RESPIRATORY (INHALATION)
Qty: 1 | Refills: 2 | Status: DISCONTINUED | COMMUNITY
Start: 2022-03-16 | End: 2023-05-04

## 2023-05-04 RX ORDER — HYDROCORTISONE BUTYRATE 1 MG/G
0.1 OINTMENT TOPICAL 3 TIMES DAILY
Qty: 1 | Refills: 0 | Status: DISCONTINUED | COMMUNITY
Start: 2018-06-19 | End: 2023-05-04

## 2023-05-04 RX ORDER — FAMOTIDINE 40 MG/1
40 TABLET, FILM COATED ORAL
Qty: 90 | Refills: 1 | Status: DISCONTINUED | COMMUNITY
Start: 2022-12-06 | End: 2023-05-04

## 2023-05-04 RX ORDER — PANTOPRAZOLE 40 MG/1
40 TABLET, DELAYED RELEASE ORAL
Qty: 90 | Refills: 1 | Status: DISCONTINUED | COMMUNITY
End: 2023-05-04

## 2023-05-04 RX ORDER — CLOPIDOGREL BISULFATE 75 MG/1
75 TABLET, FILM COATED ORAL
Qty: 90 | Refills: 0 | Status: DISCONTINUED | COMMUNITY
Start: 2022-10-12 | End: 2023-05-04

## 2023-05-04 NOTE — REASON FOR VISIT
[Consultation] : a consultation visit [Spouse] : spouse [FreeTextEntry1] : Dr. Dung Diana (Surgical Oncology)

## 2023-05-04 NOTE — CONSULT LETTER
[Dear  ___] : Dear  [unfilled], [Consult Letter:] : I had the pleasure of evaluating your patient, [unfilled]. [Please see my note below.] : Please see my note below. [Consult Closing:] : Thank you very much for allowing me to participate in the care of this patient.  If you have any questions, please do not hesitate to contact me. [Sincerely,] : Sincerely, [FreeTextEntry3] : Isaias Knutson MD, FACS

## 2023-05-05 ENCOUNTER — TRANSCRIPTION ENCOUNTER (OUTPATIENT)
Age: 83
End: 2023-05-05

## 2023-05-08 ENCOUNTER — TRANSCRIPTION ENCOUNTER (OUTPATIENT)
Age: 83
End: 2023-05-08

## 2023-05-08 ENCOUNTER — NON-APPOINTMENT (OUTPATIENT)
Age: 83
End: 2023-05-08

## 2023-05-08 NOTE — ASU PATIENT PROFILE, ADULT - NS SC CAGE ALCOHOL EYE OPENER
Reason for Call:  Medication or medication refill:    Do you use a [facility]?  Name of the pharmacy and phone number for the current request:     [id number]  [place], [place] - [address] [facility]    Name of the medication requested: oxyCODONE-acetaminophen (PERCOCET) 5-325 MG tablet       Other request:     Can we leave a detailed message on this number? YES    Phone number patient can be reached at: Cell number on file:    Telephone Information:   Mobile [phone]       Best Time: anytime     Call taken on 10/28/2019 at 3:38 PM by Tawana Gupta       no

## 2023-05-08 NOTE — ASU PATIENT PROFILE, ADULT - FALL HARM RISK - HARM RISK INTERVENTIONS

## 2023-05-09 ENCOUNTER — TRANSCRIPTION ENCOUNTER (OUTPATIENT)
Age: 83
End: 2023-05-09

## 2023-05-09 ENCOUNTER — OUTPATIENT (OUTPATIENT)
Dept: OUTPATIENT SERVICES | Facility: HOSPITAL | Age: 83
LOS: 1 days | Discharge: HOME | End: 2023-05-09
Payer: MEDICARE

## 2023-05-09 ENCOUNTER — APPOINTMENT (OUTPATIENT)
Dept: PLASTIC SURGERY | Facility: HOSPITAL | Age: 83
End: 2023-05-09

## 2023-05-09 ENCOUNTER — RESULT REVIEW (OUTPATIENT)
Age: 83
End: 2023-05-09

## 2023-05-09 ENCOUNTER — APPOINTMENT (OUTPATIENT)
Dept: SURGICAL ONCOLOGY | Facility: HOSPITAL | Age: 83
End: 2023-05-09

## 2023-05-09 VITALS
HEIGHT: 66 IN | DIASTOLIC BLOOD PRESSURE: 55 MMHG | OXYGEN SATURATION: 100 % | HEART RATE: 83 BPM | RESPIRATION RATE: 16 BRPM | SYSTOLIC BLOOD PRESSURE: 102 MMHG | TEMPERATURE: 99 F | WEIGHT: 205.91 LBS

## 2023-05-09 VITALS
DIASTOLIC BLOOD PRESSURE: 58 MMHG | RESPIRATION RATE: 14 BRPM | HEART RATE: 72 BPM | SYSTOLIC BLOOD PRESSURE: 109 MMHG | OXYGEN SATURATION: 97 %

## 2023-05-09 DIAGNOSIS — Z96.651 PRESENCE OF RIGHT ARTIFICIAL KNEE JOINT: Chronic | ICD-10-CM

## 2023-05-09 DIAGNOSIS — C43.59 MALIGNANT MELANOMA OF OTHER PART OF TRUNK: ICD-10-CM

## 2023-05-09 DIAGNOSIS — Z96.643 PRESENCE OF ARTIFICIAL HIP JOINT, BILATERAL: Chronic | ICD-10-CM

## 2023-05-09 DIAGNOSIS — Z98.89 OTHER SPECIFIED POSTPROCEDURAL STATES: Chronic | ICD-10-CM

## 2023-05-09 PROCEDURE — 14301 TIS TRNFR ANY 30.1-60 SQ CM: CPT

## 2023-05-09 PROCEDURE — 88305 TISSUE EXAM BY PATHOLOGIST: CPT | Mod: 26

## 2023-05-09 PROCEDURE — 14302 TIS TRNFR ADDL 30 SQ CM: CPT

## 2023-05-09 PROCEDURE — 88342 IMHCHEM/IMCYTCHM 1ST ANTB: CPT | Mod: 26

## 2023-05-09 PROCEDURE — 11604 EXC TR-EXT MAL+MARG 3.1-4 CM: CPT

## 2023-05-09 DEVICE — ARISTA 1GR: Type: IMPLANTABLE DEVICE | Status: FUNCTIONAL

## 2023-05-09 RX ORDER — FAMOTIDINE 10 MG/ML
1 INJECTION INTRAVENOUS
Refills: 0 | DISCHARGE

## 2023-05-09 RX ORDER — SPIRONOLACTONE 25 MG/1
1 TABLET, FILM COATED ORAL
Refills: 0 | DISCHARGE

## 2023-05-09 RX ORDER — RAMIPRIL 5 MG
1 CAPSULE ORAL
Refills: 0 | DISCHARGE

## 2023-05-09 RX ORDER — EZETIMIBE 10 MG/1
1 TABLET ORAL
Refills: 0 | DISCHARGE

## 2023-05-09 RX ORDER — ATENOLOL 25 MG/1
1 TABLET ORAL
Refills: 0 | DISCHARGE

## 2023-05-09 RX ORDER — ASCORBIC ACID 60 MG
0 TABLET,CHEWABLE ORAL
Refills: 0 | DISCHARGE

## 2023-05-09 RX ORDER — ASPIRIN/CALCIUM CARB/MAGNESIUM 324 MG
1 TABLET ORAL
Refills: 0 | DISCHARGE

## 2023-05-09 RX ORDER — CHOLECALCIFEROL (VITAMIN D3) 125 MCG
0 CAPSULE ORAL
Refills: 0 | DISCHARGE

## 2023-05-09 RX ORDER — AZITHROMYCIN 500 MG/1
1 TABLET, FILM COATED ORAL
Refills: 0 | DISCHARGE

## 2023-05-09 RX ORDER — SPIRONOLACTONE 25 MG/1
0.5 TABLET, FILM COATED ORAL
Refills: 0 | DISCHARGE

## 2023-05-09 NOTE — ASU PREOP CHECKLIST - INTERNAL PROSTHESES
right TKR, bilateral THR/yes(specify) right TKR, bilateral THR, Watchman, loop recorder/yes(specify)

## 2023-05-09 NOTE — BRIEF OPERATIVE NOTE - NSICDXBRIEFPROCEDURE_GEN_ALL_CORE_FT
PROCEDURES:  Excision of malignant lesion of trunk, 3.1 to 3.5cm 09-May-2023 15:22:19  Spencer Cotter

## 2023-05-09 NOTE — ASU DISCHARGE PLAN (ADULT/PEDIATRIC) - CARE PROVIDER_API CALL
Isaias Knutson (MD)  ColonRectal Surgery; Plastic Surgery; Surgery; Surgery of the Hand  [address] [facility] [address]  [place] [address]  Phone: [phone]  Fax: [phone]  Follow Up Time: 1 week

## 2023-05-09 NOTE — ASU DISCHARGE PLAN (ADULT/PEDIATRIC) - ASU DC SPECIAL INSTRUCTIONSFT
Initial followup with plastic surgery in the next 5-15 days, regarding matters of bandages, activities, and showering.    Dr. Diana should call with pathology report in approximately 2 weeks.  The conversation will determine further management. Please follow up with Dr. Knutson in 1 week. You can call his office to set up a follow up appointment. You should take tylenol/ibuprofen for pain. You can shower 24 hours after your surgery. Your surgical dressing will stay on until follow up. It is waterproof, but please do not scrub your incision. You can return to your normal activities.    Dr. Diana should call with pathology report in approximately 2 weeks.  The conversation will determine further management.

## 2023-05-09 NOTE — ASU DISCHARGE PLAN (ADULT/PEDIATRIC) - NURSING INSTRUCTIONS
Last dose of TYLENOL for pain management was at 3:00 PM . Next dose of TYLENOL may be taken at or after 9:00 PM if needed. DO NOT take any additional products containing TYLENOL or ACETAMINOPHEN, such as VICODIN, PERCOCET, NORCO, EXCEDRIN, and any over-the-counter cold medications. DO NOT CONSUME MORE THAN 8071-3510 MG OF TYLENOL (acetaminophen) in a 24-hour period.

## 2023-05-10 ENCOUNTER — NON-APPOINTMENT (OUTPATIENT)
Age: 83
End: 2023-05-10

## 2023-05-15 ENCOUNTER — TRANSCRIPTION ENCOUNTER (OUTPATIENT)
Age: 83
End: 2023-05-15

## 2023-05-16 ENCOUNTER — APPOINTMENT (OUTPATIENT)
Dept: PLASTIC SURGERY | Facility: CLINIC | Age: 83
End: 2023-05-16
Payer: MEDICARE

## 2023-05-16 VITALS
BODY MASS INDEX: 29.49 KG/M2 | SYSTOLIC BLOOD PRESSURE: 122 MMHG | TEMPERATURE: 98.2 F | WEIGHT: 206 LBS | HEIGHT: 70 IN | DIASTOLIC BLOOD PRESSURE: 77 MMHG | HEART RATE: 90 BPM | OXYGEN SATURATION: 98 %

## 2023-05-16 PROBLEM — C43.59 MALIGNANT MELANOMA OF OTHER PART OF TRUNK: Chronic | Status: ACTIVE | Noted: 2023-05-01

## 2023-05-16 PROCEDURE — 99024 POSTOP FOLLOW-UP VISIT: CPT

## 2023-05-17 ENCOUNTER — APPOINTMENT (OUTPATIENT)
Dept: OTOLARYNGOLOGY | Facility: CLINIC | Age: 83
End: 2023-05-17

## 2023-05-17 LAB — SURGICAL PATHOLOGY STUDY: SIGNIFICANT CHANGE UP

## 2023-05-18 ENCOUNTER — TRANSCRIPTION ENCOUNTER (OUTPATIENT)
Age: 83
End: 2023-05-18

## 2023-05-23 ENCOUNTER — APPOINTMENT (OUTPATIENT)
Dept: PLASTIC SURGERY | Facility: CLINIC | Age: 83
End: 2023-05-23
Payer: MEDICARE

## 2023-05-23 VITALS
SYSTOLIC BLOOD PRESSURE: 140 MMHG | HEIGHT: 70 IN | BODY MASS INDEX: 29.49 KG/M2 | OXYGEN SATURATION: 99 % | TEMPERATURE: 98.1 F | WEIGHT: 206 LBS | DIASTOLIC BLOOD PRESSURE: 72 MMHG | HEART RATE: 86 BPM

## 2023-05-23 DIAGNOSIS — C43.59 MALIGNANT MELANOMA OF OTHER PART OF TRUNK: ICD-10-CM

## 2023-05-23 PROCEDURE — 99024 POSTOP FOLLOW-UP VISIT: CPT

## 2023-05-24 NOTE — REVIEW OF SYSTEMS
[Negative] : Heme/Lymph [FreeTextEntry3] : Cataracts [FreeTextEntry4] : Rhinitis [FreeTextEntry5] : Atrial fibrillation [FreeTextEntry6] : Interstitial lung disease [FreeTextEntry8] : Reflux [de-identified] : Osteoarthritis [de-identified] : Melanoma

## 2023-05-24 NOTE — PHYSICAL EXAM
[Normal] : supple, no neck mass and thyroid not enlarged [Normal Neck Lymph Nodes] : normal neck lymph nodes  [Normal Supraclavicular Lymph Nodes] : normal supraclavicular lymph nodes [Normal Axillary Lymph Nodes] : normal axillary lymph nodes [Normal] : full range of motion and no deformities appreciated [de-identified] : Groins not examined [de-identified] : Below

## 2023-05-24 NOTE — HISTORY OF PRESENT ILLNESS
[de-identified] : 82-year-old man.\par \par Referred by dermatology (Dr. 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 Lele DORADO.\par \par \par PMD: Dr. Felice BENAVIDEZ.\par He is also a cardiologist.\par \par \par NKDA.\par \par \par January 2023 he had a WATCHMAN procedure at [facility].\par Since that time, he has been weaned off of Plavix.\par \par The only anticoagulant is 81 mg of aspirin daily.\par \par + History of atrial fibrillation.\par \par + Hypertension.\par Controlled with atenolol, spironolactone, and ramipril.\par \par + Hypercholesterolemia.\par He takes ezetimibe and pravastatin.\par \par His cardiologist is his internist\par \par + History of bilateral lung nodules.\par Bilateral VATS procedures (below): Benign.\par \par Pulmonary medicine: Dr. Lele BARRERA.\par He uses a Wixela inhaler\par \par May 2019:\par VATS for RUL and RML resection.\par Pathology: Follicular bronchiolitis and focal organizing pneumonia.\par Thoracic: Dr. 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 Mamie ISAAC.\par \par September 2022 CT chest at 611:\par Near resolution of prior lung nodules and consolidation.\par New small region of bronchiolar impaction in the RUL.\par \par + LILO\par \par + History of GERD.\par He takes daily Protonix.\par \par + Lumbar spinal stenosis.\par \par + Osteoarthritis.\par + Previous hip replacement\par \par + BPH.\par He takes tamsulosin.\par This is prescribed by his internist, he does not see a urologist.\par \par \par + History of cataract surgery\par 2022 eye examination identified no worrisome findings, but they cannot recall the name of the facility, physician, or the address.\par \par \par Colonoscopy at age 77 was unremarkable

## 2023-05-24 NOTE — REASON FOR VISIT
[Initial Consultation] : an initial consultation for [Other: _____] : [unfilled] [FreeTextEntry2] : Recently diagnosed T1a melanoma (0.3 mm) of the upper abdominal wall

## 2023-05-24 NOTE — ASSESSMENT
[FreeTextEntry1] : 82-year-old man.\par \par Newly diagnosed 0.3 mm melanoma of the upper mid abdominal wall.\par \par Pulmonary imaging is through Dr. Lele Saul.\par Current and within normal limits (September 2022, CT chest).\par \par We reviewed his recently diagnosed melanoma.\par I explained the indications and technique for surgical excision.\par \par This would be an outpatient surgical procedure coordinated with plastics due to the anatomic location and the anticipated size the defect.\par \par Risk, benefits, alternatives, possible surgical outcomes reviewed in detail, all questions answered.\par \par They understand and would like to proceed with the operation.\par We will coordinate with plastics.\par Paperwork submitted.\par \par Note dictated to his referring physician.\par \par \par 05/24/2023.\par I spoke with his wife yesterday evening.\par May 9, 2023, he had wide excision of a 0.3 mm melanoma from the left upper abdominal wall.\par Plastics: Dr. Isaias Knutson.\par Surgical pathology:\par + Residual melanoma (0.4 mm).\par Negative margins.\par + Scar from previous biopsy.\par \par Presently, no further intervention is warranted.\par I have asked to see them in the fall 2023, sooner if needed.\par Note dictated to his physicians.

## 2023-06-01 ENCOUNTER — APPOINTMENT (OUTPATIENT)
Dept: PLASTIC SURGERY | Facility: CLINIC | Age: 83
End: 2023-06-01

## 2023-06-01 VITALS
BODY MASS INDEX: 29.2 KG/M2 | WEIGHT: 204 LBS | DIASTOLIC BLOOD PRESSURE: 73 MMHG | OXYGEN SATURATION: 98 % | HEART RATE: 83 BPM | TEMPERATURE: 97.9 F | SYSTOLIC BLOOD PRESSURE: 123 MMHG | HEIGHT: 70 IN

## 2023-06-09 ENCOUNTER — TRANSCRIPTION ENCOUNTER (OUTPATIENT)
Age: 83
End: 2023-06-09

## 2023-06-12 ENCOUNTER — TRANSCRIPTION ENCOUNTER (OUTPATIENT)
Age: 83
End: 2023-06-12

## 2023-06-15 ENCOUNTER — APPOINTMENT (OUTPATIENT)
Dept: PLASTIC SURGERY | Facility: CLINIC | Age: 83
End: 2023-06-15
Payer: MEDICARE

## 2023-06-15 VITALS
HEIGHT: 70 IN | TEMPERATURE: 97.2 F | SYSTOLIC BLOOD PRESSURE: 111 MMHG | OXYGEN SATURATION: 98 % | WEIGHT: 204 LBS | DIASTOLIC BLOOD PRESSURE: 63 MMHG | HEART RATE: 90 BPM | BODY MASS INDEX: 29.2 KG/M2

## 2023-06-15 DIAGNOSIS — Z09 ENCOUNTER FOR FOLLOW-UP EXAMINATION AFTER COMPLETED TREATMENT FOR CONDITIONS OTHER THAN MALIGNANT NEOPLASM: ICD-10-CM

## 2023-06-15 PROCEDURE — 99024 POSTOP FOLLOW-UP VISIT: CPT

## 2023-07-06 ENCOUNTER — TRANSCRIPTION ENCOUNTER (OUTPATIENT)
Age: 83
End: 2023-07-06

## 2023-07-06 RX ORDER — OLOPATADINE HYDROCHLORIDE 665 UG/1
0.6 SPRAY, METERED NASAL
Qty: 1 | Refills: 2 | Status: ACTIVE | COMMUNITY
Start: 2023-07-06 | End: 1900-01-01

## 2023-07-28 PROBLEM — C43.59 MELANOMA OF ABDOMINAL WALL: Status: ACTIVE | Noted: 2023-04-09

## 2023-08-02 ENCOUNTER — TRANSCRIPTION ENCOUNTER (OUTPATIENT)
Age: 83
End: 2023-08-02

## 2023-08-02 DIAGNOSIS — Z11.59 ENCOUNTER FOR SCREENING FOR OTHER VIRAL DISEASES: ICD-10-CM

## 2023-08-03 ENCOUNTER — TRANSCRIPTION ENCOUNTER (OUTPATIENT)
Age: 83
End: 2023-08-03

## 2023-08-03 DIAGNOSIS — U07.1 COVID-19: ICD-10-CM

## 2023-08-03 LAB — SARS-COV-2 N GENE NPH QL NAA+PROBE: DETECTED

## 2023-08-03 RX ORDER — BUDESONIDE AND FORMOTEROL FUMARATE DIHYDRATE 160; 4.5 UG/1; UG/1
160-4.5 AEROSOL RESPIRATORY (INHALATION) TWICE DAILY
Qty: 1 | Refills: 2 | Status: ACTIVE | COMMUNITY
Start: 2023-08-03 | End: 1900-01-01

## 2023-08-04 ENCOUNTER — TRANSCRIPTION ENCOUNTER (OUTPATIENT)
Age: 83
End: 2023-08-04

## 2023-08-11 ENCOUNTER — INPATIENT (INPATIENT)
Facility: HOSPITAL | Age: 83
LOS: 2 days | Discharge: HOME | DRG: 683 | End: 2023-08-14
Attending: HOSPITALIST | Admitting: HOSPITALIST
Payer: MEDICARE

## 2023-08-11 VITALS
RESPIRATION RATE: 20 BRPM | SYSTOLIC BLOOD PRESSURE: 92 MMHG | DIASTOLIC BLOOD PRESSURE: 59 MMHG | WEIGHT: 179.9 LBS | TEMPERATURE: 98 F | HEART RATE: 82 BPM | OXYGEN SATURATION: 94 % | HEIGHT: 70 IN

## 2023-08-11 DIAGNOSIS — Z96.643 PRESENCE OF ARTIFICIAL HIP JOINT, BILATERAL: Chronic | ICD-10-CM

## 2023-08-11 DIAGNOSIS — Z96.651 PRESENCE OF RIGHT ARTIFICIAL KNEE JOINT: Chronic | ICD-10-CM

## 2023-08-11 DIAGNOSIS — Z98.89 OTHER SPECIFIED POSTPROCEDURAL STATES: Chronic | ICD-10-CM

## 2023-08-11 LAB
ALBUMIN SERPL ELPH-MCNC: 4 G/DL — SIGNIFICANT CHANGE UP (ref 3.3–5)
ALP SERPL-CCNC: 46 U/L — SIGNIFICANT CHANGE UP (ref 40–120)
ALT FLD-CCNC: 24 U/L — SIGNIFICANT CHANGE UP (ref 10–45)
ANION GAP SERPL CALC-SCNC: 17 MMOL/L — SIGNIFICANT CHANGE UP (ref 5–17)
AST SERPL-CCNC: 25 U/L — SIGNIFICANT CHANGE UP (ref 10–40)
BASOPHILS # BLD AUTO: 0.02 K/UL — SIGNIFICANT CHANGE UP (ref 0–0.2)
BASOPHILS NFR BLD AUTO: 0.2 % — SIGNIFICANT CHANGE UP (ref 0–2)
BILIRUB SERPL-MCNC: 0.3 MG/DL — SIGNIFICANT CHANGE UP (ref 0.2–1.2)
BUN SERPL-MCNC: 33 MG/DL — HIGH (ref 7–23)
CALCIUM SERPL-MCNC: 9.5 MG/DL — SIGNIFICANT CHANGE UP (ref 8.4–10.5)
CHLORIDE SERPL-SCNC: 91 MMOL/L — LOW (ref 96–108)
CO2 SERPL-SCNC: 20 MMOL/L — LOW (ref 22–31)
CREAT SERPL-MCNC: 1.34 MG/DL — HIGH (ref 0.5–1.3)
EGFR: 52 ML/MIN/1.73M2 — LOW
EOSINOPHIL # BLD AUTO: 0.38 K/UL — SIGNIFICANT CHANGE UP (ref 0–0.5)
EOSINOPHIL NFR BLD AUTO: 4.4 % — SIGNIFICANT CHANGE UP (ref 0–6)
GLUCOSE SERPL-MCNC: 93 MG/DL — SIGNIFICANT CHANGE UP (ref 70–99)
HCT VFR BLD CALC: 34.1 % — LOW (ref 39–50)
HGB BLD-MCNC: 11.5 G/DL — LOW (ref 13–17)
IMM GRANULOCYTES NFR BLD AUTO: 1.7 % — HIGH (ref 0–0.9)
LYMPHOCYTES # BLD AUTO: 3.35 K/UL — HIGH (ref 1–3.3)
LYMPHOCYTES # BLD AUTO: 38.6 % — SIGNIFICANT CHANGE UP (ref 13–44)
MCHC RBC-ENTMCNC: 33.7 GM/DL — SIGNIFICANT CHANGE UP (ref 32–36)
MCHC RBC-ENTMCNC: 33.8 PG — SIGNIFICANT CHANGE UP (ref 27–34)
MCV RBC AUTO: 100.3 FL — HIGH (ref 80–100)
MONOCYTES # BLD AUTO: 0.93 K/UL — HIGH (ref 0–0.9)
MONOCYTES NFR BLD AUTO: 10.7 % — SIGNIFICANT CHANGE UP (ref 2–14)
NEUTROPHILS # BLD AUTO: 3.85 K/UL — SIGNIFICANT CHANGE UP (ref 1.8–7.4)
NEUTROPHILS NFR BLD AUTO: 44.4 % — SIGNIFICANT CHANGE UP (ref 43–77)
NRBC # BLD: 0 /100 WBCS — SIGNIFICANT CHANGE UP (ref 0–0)
PLATELET # BLD AUTO: 190 K/UL — SIGNIFICANT CHANGE UP (ref 150–400)
POTASSIUM SERPL-MCNC: 4.6 MMOL/L — SIGNIFICANT CHANGE UP (ref 3.5–5.3)
POTASSIUM SERPL-SCNC: 4.6 MMOL/L — SIGNIFICANT CHANGE UP (ref 3.5–5.3)
PROT SERPL-MCNC: 6.9 G/DL — SIGNIFICANT CHANGE UP (ref 6–8.3)
RBC # BLD: 3.4 M/UL — LOW (ref 4.2–5.8)
RBC # FLD: 12.3 % — SIGNIFICANT CHANGE UP (ref 10.3–14.5)
SODIUM SERPL-SCNC: 128 MMOL/L — LOW (ref 135–145)
TROPONIN T, HIGH SENSITIVITY RESULT: 52 NG/L — HIGH (ref 0–51)
WBC # BLD: 8.68 K/UL — SIGNIFICANT CHANGE UP (ref 3.8–10.5)
WBC # FLD AUTO: 8.68 K/UL — SIGNIFICANT CHANGE UP (ref 3.8–10.5)

## 2023-08-11 PROCEDURE — 99285 EMERGENCY DEPT VISIT HI MDM: CPT | Mod: GC

## 2023-08-11 PROCEDURE — 71045 X-RAY EXAM CHEST 1 VIEW: CPT | Mod: 26

## 2023-08-11 NOTE — ED ADULT TRIAGE NOTE - CHIEF COMPLAINT QUOTE
fall after L leg gave out (leg pain is chronic issue). states he lowered himself to the ground. no LOC, head injury

## 2023-08-11 NOTE — ED PROVIDER NOTE - PROGRESS NOTE DETAILS
Jillian Huff MD (PGY-2 EM): discussed need for admission due to elevated trops. patient agreeable with plan for cards workup. no current CP.

## 2023-08-11 NOTE — ED PROVIDER NOTE - ATTENDING CONTRIBUTION TO CARE
Group Therapy Note    Date: 3/11/2020    Group Start Time: 1000  Group End Time: 1100  Group Topic: Psychoeducation    [facility]    Audelia Mayfield        Group Therapy Note    Attendees: 7         Patient's Goal:  \"To get through [employer]"    Notes:  Patient work fairly and independently on her task. She left the group early to go off the unit for ECT.     Status After Intervention:  Unchanged    Participation Level: Adequate    Participation Quality: Appropriate      Speech:  quiet      Thought Process/Content: Linear      Affective Functioning: Flat      Mood: calm      Level of consciousness:  Alert      Response to Learning: Progressing to goal      Endings: None Reported    Modes of Intervention: Education, Socialization and Activity      Discipline Responsible: Psychoeducational Specialist      Signature:  Audelia Mayfield pt is a 82 y/o male with htn, dm, afib with watchman device Stenting status post fall at home was unable to get up he was able to walk with a cane.  Patient denies hitting his head can screen for his wife right after he fell but noted to have soiled himself.  Patient denies any history of seizure activity or any LOC.  Patient denies any complaints at this time was ambulating to the bathroom at his baseline as per his wife.  Patient wants to go home discussed checking labs and EKG.

## 2023-08-11 NOTE — ED ADULT NURSE NOTE - OBJECTIVE STATEMENT
Pt is an 82 y/o male pmhx of ID90T (not on AC) presents to the ED BIBA s/p fall. States his L leg gave out (leg pain is chronic issue), he lowered himself to the ground but then fell forward. Denies LOC or head injury. Pt presents alert & oriented x 4, irritable, able to follow commands, speech clear. Breathing spontaneous & nonlabored. On cardiac monitor, aflutter. Strength 5/5 x 4 extremities, ambulates without assist. Small skin tear noted to L elbow. Softball like swelling noted to R elbow, pt states is always therre. Strong peripheral pulses noted b/l, no edema noted. Skin warm, clean, dry & intact. Denies chest pain, SOB, abdominal pain, back pain, n/v/d, fever, chills, changes in vision. Call bell within reach and pt instructed on how to use, bed in lowest position, side rails up, wheels locked.

## 2023-08-11 NOTE — ED PROVIDER NOTE - OBJECTIVE STATEMENT
83-year-old male with past medical history of diabetes, hypertension, A-fib, Watchman device, previous stents presents status post fall in bathroom.  Patient fell before going to the bathroom.  Patient usually ambulates with cane, use cane to walk to bathroom.  Unwitnessed fall, did not hit head or LOC.  Patient did soil himself.  Patient denies previous history of seizures.  Patient denies preceding symptoms.  History was collaborated by wife at bedside.  Patient denies any systemic signs or symptoms.  Patient does not know last time he got an echo, stress test. denies CP, SOB, abd pain.

## 2023-08-11 NOTE — ED ADULT NURSE NOTE - NSFALLRISKINTERV_ED_ALL_ED

## 2023-08-11 NOTE — ED PROVIDER NOTE - CHILD ABUSE FACILITY
Belinda Anne is a 31 y.o. female brought herself to the ER for eval of headache, urinary frequency, fatigue, and hyperglycemia. BG was 226 the patient has no supplies at home to manage her diabetes. The patient is alert and oriented with an open and patent airway with a normal respiratory effort.
[facility]

## 2023-08-11 NOTE — ED PROVIDER NOTE - INTERPRETATION
normal sinus rhythm, Normal axis, Normal MA interval and QRS complex. There are no acute ischemic ST or T-wave changes. abnormal
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
## 2023-08-12 DIAGNOSIS — D53.9 NUTRITIONAL ANEMIA, UNSPECIFIED: ICD-10-CM

## 2023-08-12 DIAGNOSIS — E11.9 TYPE 2 DIABETES MELLITUS WITHOUT COMPLICATIONS: ICD-10-CM

## 2023-08-12 DIAGNOSIS — N17.9 ACUTE KIDNEY FAILURE, UNSPECIFIED: ICD-10-CM

## 2023-08-12 DIAGNOSIS — I48.20 CHRONIC ATRIAL FIBRILLATION, UNSPECIFIED: ICD-10-CM

## 2023-08-12 DIAGNOSIS — Z29.9 ENCOUNTER FOR PROPHYLACTIC MEASURES, UNSPECIFIED: ICD-10-CM

## 2023-08-12 DIAGNOSIS — E87.1 HYPO-OSMOLALITY AND HYPONATREMIA: ICD-10-CM

## 2023-08-12 DIAGNOSIS — R77.8 OTHER SPECIFIED ABNORMALITIES OF PLASMA PROTEINS: ICD-10-CM

## 2023-08-12 DIAGNOSIS — I25.10 ATHEROSCLEROTIC HEART DISEASE OF NATIVE CORONARY ARTERY WITHOUT ANGINA PECTORIS: ICD-10-CM

## 2023-08-12 LAB
ANION GAP SERPL CALC-SCNC: 14 MMOL/L — SIGNIFICANT CHANGE UP (ref 5–17)
ANION GAP SERPL CALC-SCNC: 16 MMOL/L — SIGNIFICANT CHANGE UP (ref 5–17)
ANION GAP SERPL CALC-SCNC: 18 MMOL/L — HIGH (ref 5–17)
APPEARANCE UR: CLEAR — SIGNIFICANT CHANGE UP
BACTERIA # UR AUTO: ABNORMAL
BILIRUB UR-MCNC: NEGATIVE — SIGNIFICANT CHANGE UP
BUN SERPL-MCNC: 28 MG/DL — HIGH (ref 7–23)
BUN SERPL-MCNC: 29 MG/DL — HIGH (ref 7–23)
BUN SERPL-MCNC: 29 MG/DL — HIGH (ref 7–23)
CALCIUM SERPL-MCNC: 9.7 MG/DL — SIGNIFICANT CHANGE UP (ref 8.4–10.5)
CALCIUM SERPL-MCNC: 9.8 MG/DL — SIGNIFICANT CHANGE UP (ref 8.4–10.5)
CALCIUM SERPL-MCNC: 9.9 MG/DL — SIGNIFICANT CHANGE UP (ref 8.4–10.5)
CHLORIDE SERPL-SCNC: 92 MMOL/L — LOW (ref 96–108)
CHLORIDE SERPL-SCNC: 93 MMOL/L — LOW (ref 96–108)
CHLORIDE SERPL-SCNC: 94 MMOL/L — LOW (ref 96–108)
CO2 SERPL-SCNC: 21 MMOL/L — LOW (ref 22–31)
CO2 SERPL-SCNC: 22 MMOL/L — SIGNIFICANT CHANGE UP (ref 22–31)
CO2 SERPL-SCNC: 23 MMOL/L — SIGNIFICANT CHANGE UP (ref 22–31)
COLOR SPEC: SIGNIFICANT CHANGE UP
CREAT ?TM UR-MCNC: 34 MG/DL — SIGNIFICANT CHANGE UP
CREAT SERPL-MCNC: 1.02 MG/DL — SIGNIFICANT CHANGE UP (ref 0.5–1.3)
CREAT SERPL-MCNC: 1.05 MG/DL — SIGNIFICANT CHANGE UP (ref 0.5–1.3)
CREAT SERPL-MCNC: 1.12 MG/DL — SIGNIFICANT CHANGE UP (ref 0.5–1.3)
DIFF PNL FLD: NEGATIVE — SIGNIFICANT CHANGE UP
EGFR: 65 ML/MIN/1.73M2 — SIGNIFICANT CHANGE UP
EGFR: 70 ML/MIN/1.73M2 — SIGNIFICANT CHANGE UP
EGFR: 72 ML/MIN/1.73M2 — SIGNIFICANT CHANGE UP
EPI CELLS # UR: 0 /HPF — SIGNIFICANT CHANGE UP
FLUAV AG NPH QL: SIGNIFICANT CHANGE UP
FLUBV AG NPH QL: SIGNIFICANT CHANGE UP
FOLATE SERPL-MCNC: 6.9 NG/ML — SIGNIFICANT CHANGE UP
GLUCOSE SERPL-MCNC: 109 MG/DL — HIGH (ref 70–99)
GLUCOSE SERPL-MCNC: 93 MG/DL — SIGNIFICANT CHANGE UP (ref 70–99)
GLUCOSE SERPL-MCNC: 99 MG/DL — SIGNIFICANT CHANGE UP (ref 70–99)
GLUCOSE UR QL: NEGATIVE — SIGNIFICANT CHANGE UP
HCT VFR BLD CALC: 38.2 % — LOW (ref 39–50)
HGB BLD-MCNC: 12.7 G/DL — LOW (ref 13–17)
KETONES UR-MCNC: NEGATIVE — SIGNIFICANT CHANGE UP
LEUKOCYTE ESTERASE UR-ACNC: ABNORMAL
MAGNESIUM SERPL-MCNC: 1.9 MG/DL — SIGNIFICANT CHANGE UP (ref 1.6–2.6)
MCHC RBC-ENTMCNC: 33.2 GM/DL — SIGNIFICANT CHANGE UP (ref 32–36)
MCHC RBC-ENTMCNC: 33.4 PG — SIGNIFICANT CHANGE UP (ref 27–34)
MCV RBC AUTO: 100.5 FL — HIGH (ref 80–100)
NITRITE UR-MCNC: NEGATIVE — SIGNIFICANT CHANGE UP
NRBC # BLD: 0 /100 WBCS — SIGNIFICANT CHANGE UP (ref 0–0)
OSMOLALITY SERPL: 285 MOSMOL/KG — SIGNIFICANT CHANGE UP (ref 280–301)
OSMOLALITY UR: 286 MOS/KG — LOW (ref 300–900)
PH UR: 6 — SIGNIFICANT CHANGE UP (ref 5–8)
PHOSPHATE SERPL-MCNC: 3.4 MG/DL — SIGNIFICANT CHANGE UP (ref 2.5–4.5)
PLATELET # BLD AUTO: 210 K/UL — SIGNIFICANT CHANGE UP (ref 150–400)
POTASSIUM SERPL-MCNC: 4.4 MMOL/L — SIGNIFICANT CHANGE UP (ref 3.5–5.3)
POTASSIUM SERPL-MCNC: 4.7 MMOL/L — SIGNIFICANT CHANGE UP (ref 3.5–5.3)
POTASSIUM SERPL-MCNC: 5 MMOL/L — SIGNIFICANT CHANGE UP (ref 3.5–5.3)
POTASSIUM SERPL-SCNC: 4.4 MMOL/L — SIGNIFICANT CHANGE UP (ref 3.5–5.3)
POTASSIUM SERPL-SCNC: 4.7 MMOL/L — SIGNIFICANT CHANGE UP (ref 3.5–5.3)
POTASSIUM SERPL-SCNC: 5 MMOL/L — SIGNIFICANT CHANGE UP (ref 3.5–5.3)
PROT ?TM UR-MCNC: 9 MG/DL — SIGNIFICANT CHANGE UP (ref 0–12)
PROT UR-MCNC: NEGATIVE — SIGNIFICANT CHANGE UP
PROT/CREAT UR-RTO: 0.3 RATIO — HIGH (ref 0–0.2)
RBC # BLD: 3.8 M/UL — LOW (ref 4.2–5.8)
RBC # FLD: 12.4 % — SIGNIFICANT CHANGE UP (ref 10.3–14.5)
RBC CASTS # UR COMP ASSIST: 0 /HPF — SIGNIFICANT CHANGE UP (ref 0–4)
RSV RNA NPH QL NAA+NON-PROBE: SIGNIFICANT CHANGE UP
SARS-COV-2 RNA SPEC QL NAA+PROBE: DETECTED
SODIUM SERPL-SCNC: 130 MMOL/L — LOW (ref 135–145)
SODIUM SERPL-SCNC: 131 MMOL/L — LOW (ref 135–145)
SODIUM SERPL-SCNC: 132 MMOL/L — LOW (ref 135–145)
SODIUM UR-SCNC: 44 MMOL/L — SIGNIFICANT CHANGE UP
SP GR SPEC: 1.01 — LOW (ref 1.01–1.02)
T4 AB SER-ACNC: 4.9 UG/DL — SIGNIFICANT CHANGE UP (ref 4.6–12)
TROPONIN T, HIGH SENSITIVITY RESULT: 48 NG/L — SIGNIFICANT CHANGE UP (ref 0–51)
TROPONIN T, HIGH SENSITIVITY RESULT: 52 NG/L — HIGH (ref 0–51)
TSH SERPL-MCNC: 2.94 UIU/ML — SIGNIFICANT CHANGE UP (ref 0.27–4.2)
UROBILINOGEN FLD QL: NEGATIVE — SIGNIFICANT CHANGE UP
VIT B12 SERPL-MCNC: 916 PG/ML — SIGNIFICANT CHANGE UP (ref 232–1245)
WBC # BLD: 9.29 K/UL — SIGNIFICANT CHANGE UP (ref 3.8–10.5)
WBC # FLD AUTO: 9.29 K/UL — SIGNIFICANT CHANGE UP (ref 3.8–10.5)
WBC UR QL: 22 /HPF — HIGH (ref 0–5)

## 2023-08-12 PROCEDURE — 70450 CT HEAD/BRAIN W/O DYE: CPT | Mod: 26,MA

## 2023-08-12 PROCEDURE — 99221 1ST HOSP IP/OBS SF/LOW 40: CPT | Mod: GC

## 2023-08-12 PROCEDURE — 99222 1ST HOSP IP/OBS MODERATE 55: CPT

## 2023-08-12 RX ORDER — TAMSULOSIN HYDROCHLORIDE 0.4 MG/1
0.4 CAPSULE ORAL DAILY
Refills: 0 | Status: DISCONTINUED | OUTPATIENT
Start: 2023-08-12 | End: 2023-08-13

## 2023-08-12 RX ORDER — CHLORHEXIDINE GLUCONATE 213 G/1000ML
1 SOLUTION TOPICAL DAILY
Refills: 0 | Status: DISCONTINUED | OUTPATIENT
Start: 2023-08-12 | End: 2023-08-14

## 2023-08-12 RX ORDER — ATENOLOL 25 MG/1
50 TABLET ORAL DAILY
Refills: 0 | Status: DISCONTINUED | OUTPATIENT
Start: 2023-08-12 | End: 2023-08-14

## 2023-08-12 RX ORDER — FAMOTIDINE 10 MG/ML
20 INJECTION INTRAVENOUS DAILY
Refills: 0 | Status: DISCONTINUED | OUTPATIENT
Start: 2023-08-12 | End: 2023-08-14

## 2023-08-12 RX ORDER — ASPIRIN/CALCIUM CARB/MAGNESIUM 324 MG
81 TABLET ORAL DAILY
Refills: 0 | Status: DISCONTINUED | OUTPATIENT
Start: 2023-08-12 | End: 2023-08-14

## 2023-08-12 RX ORDER — SODIUM CHLORIDE 9 MG/ML
1000 INJECTION, SOLUTION INTRAVENOUS ONCE
Refills: 0 | Status: COMPLETED | OUTPATIENT
Start: 2023-08-12 | End: 2023-08-12

## 2023-08-12 RX ORDER — IPRATROPIUM BROMIDE 0.2 MG/ML
1 SOLUTION, NON-ORAL INHALATION EVERY 6 HOURS
Refills: 0 | Status: DISCONTINUED | OUTPATIENT
Start: 2023-08-12 | End: 2023-08-12

## 2023-08-12 RX ORDER — BUDESONIDE AND FORMOTEROL FUMARATE DIHYDRATE 160; 4.5 UG/1; UG/1
2 AEROSOL RESPIRATORY (INHALATION)
Refills: 0 | Status: DISCONTINUED | OUTPATIENT
Start: 2023-08-12 | End: 2023-08-14

## 2023-08-12 RX ORDER — IPRATROPIUM/ALBUTEROL SULFATE 18-103MCG
3 AEROSOL WITH ADAPTER (GRAM) INHALATION EVERY 6 HOURS
Refills: 0 | Status: DISCONTINUED | OUTPATIENT
Start: 2023-08-12 | End: 2023-08-14

## 2023-08-12 RX ORDER — LISINOPRIL 2.5 MG/1
10 TABLET ORAL DAILY
Refills: 0 | Status: DISCONTINUED | OUTPATIENT
Start: 2023-08-12 | End: 2023-08-12

## 2023-08-12 RX ORDER — HEPARIN SODIUM 5000 [USP'U]/ML
5000 INJECTION INTRAVENOUS; SUBCUTANEOUS EVERY 8 HOURS
Refills: 0 | Status: DISCONTINUED | OUTPATIENT
Start: 2023-08-12 | End: 2023-08-14

## 2023-08-12 RX ORDER — ATORVASTATIN CALCIUM 80 MG/1
10 TABLET, FILM COATED ORAL AT BEDTIME
Refills: 0 | Status: DISCONTINUED | OUTPATIENT
Start: 2023-08-12 | End: 2023-08-14

## 2023-08-12 RX ADMIN — HEPARIN SODIUM 5000 UNIT(S): 5000 INJECTION INTRAVENOUS; SUBCUTANEOUS at 13:57

## 2023-08-12 RX ADMIN — ATENOLOL 50 MILLIGRAM(S): 25 TABLET ORAL at 06:20

## 2023-08-12 RX ADMIN — HEPARIN SODIUM 5000 UNIT(S): 5000 INJECTION INTRAVENOUS; SUBCUTANEOUS at 06:20

## 2023-08-12 RX ADMIN — FAMOTIDINE 20 MILLIGRAM(S): 10 INJECTION INTRAVENOUS at 12:00

## 2023-08-12 RX ADMIN — SODIUM CHLORIDE 500 MILLILITER(S): 9 INJECTION, SOLUTION INTRAVENOUS at 13:50

## 2023-08-12 RX ADMIN — LISINOPRIL 10 MILLIGRAM(S): 2.5 TABLET ORAL at 06:20

## 2023-08-12 RX ADMIN — TAMSULOSIN HYDROCHLORIDE 0.4 MILLIGRAM(S): 0.4 CAPSULE ORAL at 11:59

## 2023-08-12 RX ADMIN — ATORVASTATIN CALCIUM 10 MILLIGRAM(S): 80 TABLET, FILM COATED ORAL at 21:23

## 2023-08-12 RX ADMIN — Medication 81 MILLIGRAM(S): at 11:59

## 2023-08-12 RX ADMIN — CHLORHEXIDINE GLUCONATE 1 APPLICATION(S): 213 SOLUTION TOPICAL at 11:15

## 2023-08-12 RX ADMIN — HEPARIN SODIUM 5000 UNIT(S): 5000 INJECTION INTRAVENOUS; SUBCUTANEOUS at 21:23

## 2023-08-12 NOTE — H&P ADULT - HISTORY OF PRESENT ILLNESS
83 M with hx of HTN, T2DM, Afib (with watchman device), CAD s/p stents, melanoma s/p excision presenting after fall at home.  83 M with hx of HTN, Afib (with watchman device), CAD, melanoma s/p excision presenting after fall at home. Patient says he lost his balance and fell. Denies loss of consciousness, no chest pain, SOB, abdominal pain, lightheadedness. Pt was seen in ED, but was not willing to participate in interview because he was sleeping; limited history obtained. Called patient's wife, Radha, who provided additional collateral. Per wife, patient went to use the restroom. When wife went to check on patient, he was found on the floor. Fall was unwitnessed and wife was unsure exactly how patient fell but knows that he did not lose consciousness. She says recently, patient has been having weakness in his legs and could not move them as well. Pt has been feeling more unbalanced but wife denies any other recent falls. Patient currently without any complaints of pain. Per wife, patient has one glass of scotch every night.     In ED, vitals stable, initially hypotensive to 92/59 but improved to 111/69 without intervention. Labs notable for hyponatremia to 128 and Scr 1.34.

## 2023-08-12 NOTE — H&P ADULT - PROBLEM SELECTOR PLAN 4
Afib, not on AC given has Watchman device.   - Continue atenolol with hold parameters  - Monitor on telemetry

## 2023-08-12 NOTE — H&P ADULT - PROBLEM SELECTOR PLAN 1
Unclear exact mechanism, but from history gather so far, sounds like mechanical fall. Pt without symptoms.   - Hx of B/L hip replacement, also with knee replacement. Gradual worsening deconditioning per wife. Ambulates with cane or walker at home.   - EKG personally reviewed. showing Afib with ventricular rate 78, qtc 453. Nonspecific T wave changes.  - No hx of seizures, No symptoms either that would suggest seizure event.   - No symptoms that would suggest vasovagal cause, no syncope either  - Possibly caused by electrolyte abnormality (hyponatremia)  - Fall precautions  - PT evaluation  - Hold spironolactone for now  - Check orthostatic vitals Unclear exact mechanism, but from history gather so far, sounds like mechanical fall. Pt without symptoms. Possibly caused by electrolyte abnormality (hyponatremia)  - Hx of B/L hip replacement, also with knee replacement. Gradual worsening deconditioning per wife. Ambulates with cane or walker at home.   - EKG personally reviewed. showing Afib with ventricular rate 78, qtc 453. Nonspecific T wave changes.  - No hx of seizures, No symptoms either that would suggest seizure event.   - No symptoms that would suggest vasovagal cause, no syncope either  - CTH findings reviewed - no intracranial hemorrhage or fracture  - Fall precautions  - PT evaluation  - Hold spironolactone for now  - Check orthostatic vitals

## 2023-08-12 NOTE — CHART NOTE - NSCHARTNOTEFT_GEN_A_CORE
Seen and evaluated at bedside. 83 M with hx of HTN, T2DM, Afib (with watchman device), CAD s/p stents, melanoma s/p excision presenting after fall at home. Pt admitted for physical therapy evaluation and for management of hyponatremia, RASHEEDA. Found this afternoon to be orthostatic. Will give 1L IVF and recheck orthostatics afterwards. Hold lisinopril and aldactone for now. Urine lytes, osm and serum osm reviewed. Likely has hypovolemic Hyponatremia from diuretics. Overall improving, will recheck in AM. Appreciate pulmonary recs, will restart inhalers. Updated wife Radha over the phone . Discussed with MEKA Chavez MD  [facility] Division of Hospital Medicine  Available via MS Teams  Pager: [phone]

## 2023-08-12 NOTE — H&P ADULT - PROBLEM SELECTOR PLAN 7
DVT ppx: heparin subq given renal function  Diet: Regular  Dispo: pending PT evaluation    * Of note, patient takes azithromycin 25mg 3x a week on Mon/Wed/Fri - unclear why patient takes this; would need to clarify in the AM.

## 2023-08-12 NOTE — H&P ADULT - PROBLEM SELECTOR PLAN 5
Hx of CAD, no stents placed per wife.  - C/w ASA 81  - On pravastatin 20mg at home, continue as atorvastatin 10mg here Hgb 11.5, .3. No signs of bleeding  - Check folate and vitamin B12 levels

## 2023-08-12 NOTE — H&P ADULT - NSHPSOCIALHISTORY_GEN_ALL_CORE
Social History:    Marital Status:  (   )    (   ) Single    (   )    (  )   Occupation:   Lives with: (  ) alone  (  ) children   (  ) spouse   (  ) parents  (  ) other    Substance Use (street drugs): (  ) never used  (  ) other:  Tobacco Usage:  (   ) never smoked   (   ) former smoker   (   ) current smoker  (     ) pack year  (        ) last cigarette date  Alcohol Usage:  Sexual History: Social History:    Marital Status:  ( X  )    (   ) Single    (   )    (  )   Occupation:   Lives with: (  ) alone  (  ) children   ( X ) spouse   (  ) parents  (  ) other    Substance Use (street drugs): ( X ) never used  (  ) other:  Tobacco Usage:  ( X  ) never smoked   (   ) former smoker   (   ) current smoker  (     ) pack year  (        ) last cigarette date  Alcohol Usage: Drinks 1 glass of scotch daily

## 2023-08-12 NOTE — H&P ADULT - PROBLEM SELECTOR PLAN 6
DVT ppx: heparin subq given renal function  Diet: Regular  Dispo: pending PT evaluation    * Of note, patient takes azithromycin 25mg 3x a week on Mon/Wed/Fri - unclear why patient takes this; would need to clarify in the AM. Hx of CAD, no stents placed per wife.  - C/w ASA 81  - On pravastatin 20mg at home, continue as atorvastatin 10mg here Hx of CAD, no stents placed per wife.  - Troponins flat 52 --> 52 --> 48; patient has no chest pain.  - C/w ASA 81  - On pravastatin 20mg at home, continue as atorvastatin 10mg here

## 2023-08-12 NOTE — H&P ADULT - ASSESSMENT
83 M with hx of HTN, T2DM, Afib (with watchman device), CAD s/p stents, melanoma s/p excision presenting after fall at home. Pt admitted for physical therapy evaluation and for management of hyponatremia, RASHEEDA.

## 2023-08-12 NOTE — CONSULT NOTE ADULT - ASSESSMENT
83 M with hx HTN, afib, CAD, melanoma, COPD, ?pneumonitis follows with Dr. Saul who presents after fall from home after losing balance. After he got up, he felt unsteady and wife called EMS. He was taken to [facility] and admitted for hyponatremia (128) and orthostatic hypotension. Pulmonary seeing patient per patient request.     # hx pneumonitis   # COPD  - continue home symbicort 160-4.5 BID   - continue home atrovent q6h  - on room air  - incentive spirometry, oob to chair, ambulation as tolerated    # orthostatic hypotension  # fall  # hyponatremia  - management by primary team  - orthostatics +, consider IVF and holding diuresis    - PT          Mita Cristobal MD  Pulmonary and Critical Care Fellow

## 2023-08-12 NOTE — H&P ADULT - PROBLEM SELECTOR PLAN 3
SCr 1.34, unclear baseline. However per wife, outpatient providers are aware of "kidney issue". Possible CKD?  - Monitor BMP and Scr  - Monitor I/Os  - Avoid nephrotoxic medications, dose renally

## 2023-08-12 NOTE — H&P ADULT - NSHPREVIEWOFSYSTEMS_GEN_ALL_CORE
Review of Systems:   CONSTITUTIONAL: No fever, weight loss  EYES: No eye pain, visual disturbances, or discharge  ENMT:  No difficulty hearing, tinnitus, vertigo; No sinus or throat pain  RESPIRATORY: No SOB. No cough, wheezing, chills or hemoptysis  CARDIOVASCULAR: No chest pain, palpitations, dizziness, or leg swelling  GASTROINTESTINAL: No abdominal or epigastric pain. No nausea, vomiting, or hematemesis; No diarrhea or constipation. No melena or hematochezia.  GENITOURINARY: No dysuria, frequency, hematuria, or incontinence  NEUROLOGICAL: No headaches, memory loss, loss of strength, numbness, or tremors  SKIN: No itching, burning, rashes, or lesions   LYMPH NODES: No enlarged glands  ENDOCRINE: No heat or cold intolerance; No hair loss  MUSCULOSKELETAL: No joint pain or swelling; No muscle, back pain  PSYCHIATRIC: No depression, anxiety, mood swings, or difficulty sleeping  HEME/LYMPH: No easy bruising, or bleeding gums Review of Systems:   CONSTITUTIONAL: No fever, weight loss  EYES: No eye pain, visual disturbances, or discharge  ENMT:  No difficulty hearing, tinnitus, vertigo; No sinus or throat pain  RESPIRATORY: No SOB. No cough, wheezing, chills or hemoptysis  CARDIOVASCULAR: No chest pain, palpitations, dizziness, or leg swelling  GASTROINTESTINAL: No abdominal or epigastric pain. No nausea, vomiting, or hematemesis; No diarrhea or constipation. No melena or hematochezia.  GENITOURINARY: No dysuria, frequency, hematuria, or incontinence  NEUROLOGICAL: No headaches, memory loss, numbness, or tremors; + leg weakness  SKIN: No itching, burning, rashes, or lesions   MUSCULOSKELETAL: No joint pain or swelling; No muscle, back pain  HEME/LYMPH: No easy bruising, or bleeding gums

## 2023-08-12 NOTE — PATIENT PROFILE ADULT - FALL HARM RISK - RISK INTERVENTIONS

## 2023-08-12 NOTE — PHYSICAL THERAPY INITIAL EVALUATION ADULT - PERTINENT HX OF CURRENT PROBLEM, REHAB EVAL
83 M with hx of HTN, Afib (with watchman device), CAD, melanoma s/p excision presenting after fall at home. Patient says he lost his balance and fell. Denies loss of consciousness, no chest pain, SOB, abdominal pain, lightheadedness. Pt was seen in ED, but was not willing to participate in interview because he was sleeping; limited history obtained. Called patient's wife, Radah, who provided additional collateral. Per wife, patient went to use the restroom. When wife went to check on patient, he was found on the floor. Fall was unwitnessed and wife was unsure exactly how patient fell but knows that he did not lose consciousness. She says recently, patient has been having weakness in his legs and could not move them as well. Pt has been feeling more unbalanced but wife denies any other recent falls. Patient currently without any complaints of pain. Per wife, patient has one glass of scotch every night. CT Head (-), CXR (-).

## 2023-08-12 NOTE — H&P ADULT - NSHPLABSRESULTS_GEN_ALL_CORE
08-11    128<L>  |  91<L>  |  33<H>  ----------------------------<  93  4.6   |  20<L>  |  1.34<H>    Ca    9.5      11 Aug 2023 22:48    TPro  6.9  /  Alb  4.0  /  TBili  0.3  /  DBili  x   /  AST  25  /  ALT  24  /  AlkPhos  46  08-11                  Urinalysis Basic - ( 11 Aug 2023 22:48 )    Color: x / Appearance: x / SG: x / pH: x  Gluc: 93 mg/dL / Ketone: x  / Bili: x / Urobili: x   Blood: x / Protein: x / Nitrite: x   Leuk Esterase: x / RBC: x / WBC x   Sq Epi: x / Non Sq Epi: x / Bacteria: x                              11.5   8.68  )-----------( 190      ( 11 Aug 2023 22:48 )             34.1     CAPILLARY BLOOD GLUCOSE    IMAGING    < from: CT Head No Cont (08.12.23 @ 00:31) >    FINDINGS:    There is no acute intracranial hemorrhage, mass effect, midline shift,   extra-axial collection, hydrocephalus, or evidence of acute vascular   territorial infarction. Mild patchy hypodensities within the   periventricular and subcortical white matter, although nonspecific,   likely reflect chronic microvascular disease. Cerebral volume loss   contributes to prominence of the ventricles and sulci.    The visualized paranasal sinuses and mastoid air cells are clear. Status   post bilateral ocular lens replacement.    IMPRESSION:    No acute intracranial hemorrhage or calvarial fracture.    < end of copied text >    < from: Xray Chest 1 View AP/PA (08.11.23 @ 23:29) >      FINDINGS:  The lungs are clear.  No pleural effusion or pneumothorax.  Cardiomediastinal silhouette size is within normal limits.  No acute osseous abnormality.  Left-sided loop recorder.    IMPRESSION:  No evidence of acute pulmonary disease.    < end of copied text >

## 2023-08-12 NOTE — H&P ADULT - PROBLEM SELECTOR PLAN 2
Na 128, unclear chronicity (acute vs chronic). Possibly medication induced (beta blocker). Pt appears euvolemic on exam.   - Check urine osm, serum osm, urine Na.  - Check TSH, T4  - Trend BMP q8h, avoid rapid correction, Goal Na 134-136 in 24 hrs.   - Fluid restrict for now pending urine studies.

## 2023-08-12 NOTE — H&P ADULT - NSHPPHYSICALEXAM_GEN_ALL_CORE
Vital Signs Last 24 Hrs  T(C): 36.6 (11 Aug 2023 20:47), Max: 36.6 (11 Aug 2023 20:47)  T(F): 97.9 (11 Aug 2023 20:47), Max: 97.9 (11 Aug 2023 20:47)  HR: 80 (11 Aug 2023 23:06) (80 - 82)  BP: 111/69 (11 Aug 2023 23:06) (92/59 - 111/69)  BP(mean): --  RR: 20 (11 Aug 2023 23:06) (20 - 20)  SpO2: 98% (11 Aug 2023 23:06) (94% - 98%)    Parameters below as of 11 Aug 2023 23:06  Patient On (Oxygen Delivery Method): room air        CONSTITUTIONAL: Well-groomed, in no apparent distress  EYES: No conjunctival or scleral injection, non-icteric; PERRLA and symmetric  ENMT: No external nasal lesions; nasal mucosa not inflamed; normal dentition; no pharyngeal injection or exudates, oral mucosa with moist membranes  NECK: Trachea midline without palpable neck mass; thyroid not enlarged and non-tender  RESPIRATORY: Breathing comfortably; no dullness to percussion; lungs CTA without wheeze/rhonchi/rales  CARDIOVASCULAR: +S1S2, RRR, no M/G/R; no carotid bruits; pedal pulses full and symmetric; no lower extremity edema  CHEST/BREAST: Breasts are symmetric in appearance; no palpable masses or lumps  GASTROINTESTINAL: No palpable masses or tenderness, +BS throughout, no rebound/guarding; no hepatosplenomegaly; no hernia palpated  GENITOURINARY:       MALE: Normal appearing external genitalia, no penile lesion; no palpable testicular or scrotal mass; prostate not enlarged and without palpable nodule       FEMALE: Normal appearing external genitalia, no vaginal discharge or lesion noted; examination of urethra WNL; bladder without fullness or tenderness on palpation; palpation of uterus and adnexa without tenderness or mass  LYMPHATIC: No cervical LAD or tenderness; no axillary LAD or tenderness; no inguinal LAD or tenderness  MUSCULOSKELETAL: Normal gait and station; no digital clubbing or cyanosis; no paraspinal tenderness; examination of the  (head/neck, spine/ribs/pelvis, RUE, LUE, RLE, LLE) without misalignment, normal strength and tone of extremities  SKIN: No rashes or ulcers noted; no subcutaneous nodules or induration palpable  NEUROLOGIC: CN II-XII intact; normal reflexes in upper and lower extremities; sensation intact in LEs b/l to light touch  PSYCHIATRIC: A+O x 3; mood and affect appropriate; appropriate insight and judgment Vital Signs Last 24 Hrs  T(C): 36.6 (11 Aug 2023 20:47), Max: 36.6 (11 Aug 2023 20:47)  T(F): 97.9 (11 Aug 2023 20:47), Max: 97.9 (11 Aug 2023 20:47)  HR: 80 (11 Aug 2023 23:06) (80 - 82)  BP: 111/69 (11 Aug 2023 23:06) (92/59 - 111/69)  BP(mean): --  RR: 20 (11 Aug 2023 23:06) (20 - 20)  SpO2: 98% (11 Aug 2023 23:06) (94% - 98%)    Parameters below as of 11 Aug 2023 23:06  Patient On (Oxygen Delivery Method): room air        CONSTITUTIONAL: Well-groomed, in no apparent distress  EYES: No conjunctival or scleral injection, non-icteric; PERRLA and symmetric  ENMT: Hard of hearing; oral mucosa with moist membranes  RESPIRATORY: Breathing comfortably; lungs CTA without wheeze/rhonchi/rales  CARDIOVASCULAR: Irregular rhythm, +S1S2, no M/G/R; no lower extremity edema  GASTROINTESTINAL: No palpable masses or tenderness, +BS throughout, no rebound/guarding; no hepatosplenomegaly; no hernia palpated  MUSCULOSKELETAL: No joint swelling or tenderness  SKIN: No rashes  NEUROLOGIC: Pt refused neuro exam  PSYCHIATRIC: A+O x 3

## 2023-08-12 NOTE — CONSULT NOTE ADULT - ATTENDING COMMENTS
84 y/o M w/COPD and possible pneumonitis presenting following fall. No pulmonary complaints. Orthostatic hypotension possibly secondary to overdiuresis.     - Continue home inhalers  - Consider fluid bolus  - Hold diuretics

## 2023-08-12 NOTE — PHYSICAL THERAPY INITIAL EVALUATION ADULT - ADDITIONAL COMMENTS
lives in apartment with elevator; uses Single Axis Cane for community ambulation. Owns rolling walker lives in apartment with elevator; uses Single Axis Cane for community ambulation for the past two years. Owns rolling walker and only use it occasionally, (I) with ADLs.

## 2023-08-12 NOTE — PROVIDER CONTACT NOTE (OTHER) - ACTION/TREATMENT ORDERED:
pt tested + for COVID on 8/2/23 and admitted 8/12/23 with no covid symptoms.  Isolation discontinued as per protocol

## 2023-08-13 LAB
ANION GAP SERPL CALC-SCNC: 13 MMOL/L — SIGNIFICANT CHANGE UP (ref 5–17)
BUN SERPL-MCNC: 23 MG/DL — SIGNIFICANT CHANGE UP (ref 7–23)
CALCIUM SERPL-MCNC: 9.4 MG/DL — SIGNIFICANT CHANGE UP (ref 8.4–10.5)
CHLORIDE SERPL-SCNC: 95 MMOL/L — LOW (ref 96–108)
CO2 SERPL-SCNC: 23 MMOL/L — SIGNIFICANT CHANGE UP (ref 22–31)
CREAT SERPL-MCNC: 1.14 MG/DL — SIGNIFICANT CHANGE UP (ref 0.5–1.3)
EGFR: 63 ML/MIN/1.73M2 — SIGNIFICANT CHANGE UP
GLUCOSE SERPL-MCNC: 116 MG/DL — HIGH (ref 70–99)
MRSA PCR RESULT.: SIGNIFICANT CHANGE UP
POTASSIUM SERPL-MCNC: 4.4 MMOL/L — SIGNIFICANT CHANGE UP (ref 3.5–5.3)
POTASSIUM SERPL-SCNC: 4.4 MMOL/L — SIGNIFICANT CHANGE UP (ref 3.5–5.3)
S AUREUS DNA NOSE QL NAA+PROBE: SIGNIFICANT CHANGE UP
SODIUM SERPL-SCNC: 131 MMOL/L — LOW (ref 135–145)

## 2023-08-13 PROCEDURE — 99233 SBSQ HOSP IP/OBS HIGH 50: CPT

## 2023-08-13 RX ORDER — SODIUM CHLORIDE 9 MG/ML
1000 INJECTION, SOLUTION INTRAVENOUS ONCE
Refills: 0 | Status: COMPLETED | OUTPATIENT
Start: 2023-08-13 | End: 2023-08-13

## 2023-08-13 RX ORDER — MIDODRINE HYDROCHLORIDE 2.5 MG/1
5 TABLET ORAL THREE TIMES A DAY
Refills: 0 | Status: DISCONTINUED | OUTPATIENT
Start: 2023-08-13 | End: 2023-08-14

## 2023-08-13 RX ADMIN — TAMSULOSIN HYDROCHLORIDE 0.4 MILLIGRAM(S): 0.4 CAPSULE ORAL at 11:56

## 2023-08-13 RX ADMIN — HEPARIN SODIUM 5000 UNIT(S): 5000 INJECTION INTRAVENOUS; SUBCUTANEOUS at 06:34

## 2023-08-13 RX ADMIN — ATENOLOL 50 MILLIGRAM(S): 25 TABLET ORAL at 06:34

## 2023-08-13 RX ADMIN — SODIUM CHLORIDE 1000 MILLILITER(S): 9 INJECTION, SOLUTION INTRAVENOUS at 11:58

## 2023-08-13 RX ADMIN — FAMOTIDINE 20 MILLIGRAM(S): 10 INJECTION INTRAVENOUS at 11:56

## 2023-08-13 RX ADMIN — HEPARIN SODIUM 5000 UNIT(S): 5000 INJECTION INTRAVENOUS; SUBCUTANEOUS at 22:32

## 2023-08-13 RX ADMIN — HEPARIN SODIUM 5000 UNIT(S): 5000 INJECTION INTRAVENOUS; SUBCUTANEOUS at 14:40

## 2023-08-13 RX ADMIN — ATORVASTATIN CALCIUM 10 MILLIGRAM(S): 80 TABLET, FILM COATED ORAL at 22:32

## 2023-08-13 RX ADMIN — MIDODRINE HYDROCHLORIDE 5 MILLIGRAM(S): 2.5 TABLET ORAL at 18:45

## 2023-08-13 NOTE — PROGRESS NOTE ADULT - PROBLEM SELECTOR PLAN 3
SCr 1.34, unclear baseline. However per wife, outpatient providers are aware of "kidney issue". Possible CKD?  - Monitor BMP and Scr  - Monitor I/Os  - Avoid nephrotoxic medications, dose renally  - improved with ivf

## 2023-08-13 NOTE — PROGRESS NOTE ADULT - PROBLEM SELECTOR PLAN 2
Na 128, unclear chronicity (acute vs chronic). Possibly medication induced (beta blocker). Pt appears euvolemic on exam.   - improving with IVF, now has been stable 131-132

## 2023-08-13 NOTE — PROGRESS NOTE ADULT - NSPROGADDITIONALINFOA_GEN_ALL_CORE
d/w acp    Jose Chavez MD  [facility] Division of Hospital Medicine  Available via MS Teams  Pager: [phone]

## 2023-08-13 NOTE — PROGRESS NOTE ADULT - PROBLEM SELECTOR PLAN 6
Hx of CAD, no stents placed per wife.  - Troponins flat 52 --> 52 --> 48; patient has no chest pain.  - C/w ASA 81  - On pravastatin 20mg at home, continue as atorvastatin 10mg here

## 2023-08-13 NOTE — PROGRESS NOTE ADULT - PROBLEM SELECTOR PLAN 7
DVT ppx: heparin subq given renal function  Diet: Regular  Dispo: pending PT evaluation    * Of note, patient takes azithromycin 25mg 3x a week on Mon/Wed/Fri - for COPD

## 2023-08-13 NOTE — PROGRESS NOTE ADULT - PROBLEM SELECTOR PLAN 1
Unclear exact mechanism, but from history gather so far, sounds like mechanical fall. Pt without symptoms. Possibly caused by electrolyte abnormality (hyponatremia)  - Hx of B/L hip replacement, also with knee replacement. Gradual worsening deconditioning per wife. Ambulates with cane or walker at home.   - EKG personally reviewed. showing Afib with ventricular rate 78, qtc 453. Nonspecific T wave changes.  - No hx of seizures, No symptoms either that would suggest seizure event.   - No symptoms that would suggest vasovagal cause, no syncope either  - CTH findings reviewed - no intracranial hemorrhage or fracture  - Fall precautions  - PT evaluation  - Hold spironolactone for now, tamsulosin and lisinopril   - Fall likely due to orthostatic hypotension  - still orthostatic this morning after IVF on 8/12. Giving another 1L LR  - discussed with Dr. Pretty on 8/13 - would give midodrine 2.5 -5mg TID for orthostatic hypotension, would not dc today  - f/u repeat orthostatics in AM

## 2023-08-14 ENCOUNTER — TRANSCRIPTION ENCOUNTER (OUTPATIENT)
Age: 83
End: 2023-08-14

## 2023-08-14 VITALS
HEART RATE: 83 BPM | TEMPERATURE: 98 F | OXYGEN SATURATION: 98 % | DIASTOLIC BLOOD PRESSURE: 69 MMHG | SYSTOLIC BLOOD PRESSURE: 131 MMHG | RESPIRATION RATE: 18 BRPM

## 2023-08-14 PROCEDURE — 84300 ASSAY OF URINE SODIUM: CPT

## 2023-08-14 PROCEDURE — 87637 SARSCOV2&INF A&B&RSV AMP PRB: CPT

## 2023-08-14 PROCEDURE — 80053 COMPREHEN METABOLIC PANEL: CPT

## 2023-08-14 PROCEDURE — 82746 ASSAY OF FOLIC ACID SERUM: CPT

## 2023-08-14 PROCEDURE — 84100 ASSAY OF PHOSPHORUS: CPT

## 2023-08-14 PROCEDURE — 70450 CT HEAD/BRAIN W/O DYE: CPT | Mod: MA

## 2023-08-14 PROCEDURE — 84156 ASSAY OF PROTEIN URINE: CPT

## 2023-08-14 PROCEDURE — 85027 COMPLETE CBC AUTOMATED: CPT

## 2023-08-14 PROCEDURE — 99239 HOSP IP/OBS DSCHRG MGMT >30: CPT

## 2023-08-14 PROCEDURE — 83735 ASSAY OF MAGNESIUM: CPT

## 2023-08-14 PROCEDURE — 97162 PT EVAL MOD COMPLEX 30 MIN: CPT

## 2023-08-14 PROCEDURE — 82607 VITAMIN B-12: CPT

## 2023-08-14 PROCEDURE — 97110 THERAPEUTIC EXERCISES: CPT

## 2023-08-14 PROCEDURE — 80048 BASIC METABOLIC PNL TOTAL CA: CPT

## 2023-08-14 PROCEDURE — 83930 ASSAY OF BLOOD OSMOLALITY: CPT

## 2023-08-14 PROCEDURE — 87640 STAPH A DNA AMP PROBE: CPT

## 2023-08-14 PROCEDURE — 99232 SBSQ HOSP IP/OBS MODERATE 35: CPT

## 2023-08-14 PROCEDURE — 87641 MR-STAPH DNA AMP PROBE: CPT

## 2023-08-14 PROCEDURE — 36415 COLL VENOUS BLD VENIPUNCTURE: CPT

## 2023-08-14 PROCEDURE — 84484 ASSAY OF TROPONIN QUANT: CPT

## 2023-08-14 PROCEDURE — 97116 GAIT TRAINING THERAPY: CPT

## 2023-08-14 PROCEDURE — 71045 X-RAY EXAM CHEST 1 VIEW: CPT

## 2023-08-14 PROCEDURE — 84443 ASSAY THYROID STIM HORMONE: CPT

## 2023-08-14 PROCEDURE — 82570 ASSAY OF URINE CREATININE: CPT

## 2023-08-14 PROCEDURE — 85025 COMPLETE CBC W/AUTO DIFF WBC: CPT

## 2023-08-14 PROCEDURE — 99285 EMERGENCY DEPT VISIT HI MDM: CPT

## 2023-08-14 PROCEDURE — 84436 ASSAY OF TOTAL THYROXINE: CPT

## 2023-08-14 PROCEDURE — 83935 ASSAY OF URINE OSMOLALITY: CPT

## 2023-08-14 PROCEDURE — 81001 URINALYSIS AUTO W/SCOPE: CPT

## 2023-08-14 RX ORDER — TAMSULOSIN HYDROCHLORIDE 0.4 MG/1
1 CAPSULE ORAL
Refills: 0 | DISCHARGE

## 2023-08-14 RX ORDER — MIDODRINE HYDROCHLORIDE 2.5 MG/1
1 TABLET ORAL
Qty: 90 | Refills: 0
Start: 2023-08-14 | End: 2023-09-12

## 2023-08-14 RX ADMIN — CHLORHEXIDINE GLUCONATE 1 APPLICATION(S): 213 SOLUTION TOPICAL at 11:38

## 2023-08-14 RX ADMIN — ATENOLOL 50 MILLIGRAM(S): 25 TABLET ORAL at 06:02

## 2023-08-14 RX ADMIN — HEPARIN SODIUM 5000 UNIT(S): 5000 INJECTION INTRAVENOUS; SUBCUTANEOUS at 14:38

## 2023-08-14 RX ADMIN — HEPARIN SODIUM 5000 UNIT(S): 5000 INJECTION INTRAVENOUS; SUBCUTANEOUS at 06:02

## 2023-08-14 RX ADMIN — FAMOTIDINE 20 MILLIGRAM(S): 10 INJECTION INTRAVENOUS at 12:11

## 2023-08-14 RX ADMIN — MIDODRINE HYDROCHLORIDE 5 MILLIGRAM(S): 2.5 TABLET ORAL at 06:02

## 2023-08-14 NOTE — PROGRESS NOTE ADULT - ASSESSMENT
83 M with hx of HTN, T2DM, Afib (with watchman device), CAD s/p stents, melanoma s/p excision presenting after fall at home. Pt admitted for physical therapy evaluation and for management of hyponatremia, RASHEEDA. 
82 yo M with PMhx HTN, afib, CAD, melanoma, COPD, ?pneumonitis follows with Dr. Saul who presents after fall from home after losing balance. After he got up, he felt unsteady and wife called EMS. He was taken to [facility] and admitted for hyponatremia (128) and orthostatic hypotension. Pulmonary seeing patient per patient request. PLanning for discharge today.  NOt interested in continuing Midodrine.  COVID19+ ~10 days ago.    #Hx pneumonitis   # COPD  #Orthostatic hypotension  #Hyponatremia  - continue home symbicort 160-4.5 BID   - continue home atrovent q6h  - on room air  - should f/u with outpatient Cardiologist, may require tilt table testing  - f/u with Dr Saul (Pul) as outpatient, has appt in 09/2023 per wife    Taylor Strong MD

## 2023-08-14 NOTE — DISCHARGE NOTE NURSING/CASE MANAGEMENT/SOCIAL WORK - NSDCPEFALRISK_GEN_ALL_CORE
For information on Fall & Injury Prevention, visit: [email or web address] OR  [email or web address] OR  https://www.cdc.gov/steadi/patient.html

## 2023-08-14 NOTE — DISCHARGE NOTE PROVIDER - NSDCCPCAREPLAN_GEN_ALL_CORE_FT
PRINCIPAL DISCHARGE DIAGNOSIS  Diagnosis: Fall at home  Assessment and Plan of Treatment: You presented after a fall at home. CT head negative for any hemorrhage or fracture. Continue your midodrine as needed. Follow up with your PCP Dr. Carlos in 1-2 weeks.         SECONDARY DISCHARGE DIAGNOSES  Diagnosis: RASHEEDA (acute kidney injury)  Assessment and Plan of Treatment: Avoid taking (NSAIDs) - (ex: Ibuprofen, Advil, Celebrex, Naprosyn)  Avoid taking any nephrotoxic agents (can harm kidneys) - Intravenous contrast for diagnostic testing, combination cold medications.  Have all medications adjusted for your renal function by your Health Care Provider.  Blood pressure control is important.  Take all medication as prescribed.      Diagnosis: CAD (coronary artery disease)  Assessment and Plan of Treatment: Coronary artery disease is a condition where the arteries the supply the heart muscle get clogges with fatty deposits & puts you at risk for a heart attack  Call your doctor if you have any new pain, pressure, or discomfort in the center of your chest, pain, tingling or discomfort in arms, back, neck, jaw, or stomach, shortness of breath, nausea, vomiting, burping or heartburn, sweating, cold and clammy skin, racing or abnormal heartbeat for more than 10 minutes or if they keep coming & going.  Call 911 and do not tr to get to hospital by care  You can help yourself with lefestyle changes (quitting smoking if you smoke), eat lots of fruits & vegetables & low fat dairy products, not a lot of meat & fatty foods, walk or some form of physical activity most days of the week, lose weight if you are overweight  Take your cardiac medication as prescribed to lower cholesterol, to lower blood pressure, aspirin to prevent blood clots, and diabetes control  Make sure to keep appointments with doctor for cardiac follow up care      Diagnosis: Macrocytic anemia  Assessment and Plan of Treatment: Anemia is when you have a low blood count of hemoglobin (HGB) and/or hematocrit (HCT), or RBC (red blood cells).If your hgb is <13 (women) or <12 (men), then you are considered to be anemic.Losing a moderate to large amount of blood cause anemia.Although, there are several different types of anemia that are not due to blood loss.You may have received a blood transfusion during your hospitalization. You will need to follow up with your healthcare provider in one week for a blood to check your count.Call for an appointment.The primary symptoms of anemia is difficulty breathing with exertion or at rest,fatigue,bounding pulses, and/or palpitations.If you are persistantly having these symptroms, you will need to seek help from your healthcare provider.      Diagnosis: Chronic atrial fibrillation  Assessment and Plan of Treatment: Atrial fibrillation is the most common heart rhythm problem.  The condition puts you at risk for has stroke and heart attack  It helps if you control your blood pressure, not drink more than 1-2 alcohol drinks per day, cut down on caffeine, getting treatment for over active thyroid gland, and get regular exercise  Call your doctor if you feel your heart racing or beating unusually, chest tightness or pain, lightheaded, faint, shortness of breath especially with exercise  It is important to take your heart medication as prescribed  You may be on anticoagulation which is very important to take as directed - you may need blood work to monitor drug levels      Diagnosis: Hyponatremia  Assessment and Plan of Treatment: Now resolved

## 2023-08-14 NOTE — DISCHARGE NOTE PROVIDER - NSDCMRMEDTOKEN_GEN_ALL_CORE_FT
aspirin 81 mg oral tablet: 1 tab(s) orally once a day (in the morning) LD 5/7/2023  atenolol 50 mg oral tablet: 1 tab(s) orally once a day (in the morning)  azithromycin 250 mg oral capsule: 1 cap(s) orally 3 times a week M-W- F  ezetimibe 10 mg oral tablet: 1 tab(s) orally once a day  famotidine 40 mg oral tablet: 1 tab(s) orally once a day (at bedtime)  ipratropium bromide nasal: 1 spray 2 times a day  magnesium: 1 tab orally once a day PM  midodrine 5 mg oral tablet: 1 tab(s) orally 3 times a day as needed for  orthostatic hypotension/dizziness  Outpt physical therapy: n/a  pravastatin 20 mg oral tablet: 1 tab(s) orally once a day (in the morning)  probiotic: 1 tab orally once a day PM  ramipril 2.5 mg oral tablet: 1 tab(s) orally once a day (at bedtime)  spironolactone 25 mg oral tablet: 1 tab(s) orally once a day (in the morning)  spironolactone 25 mg oral tablet: 0.5 tab(s) orally once a day (at bedtime)  Vitamin C: 1 tab orally 9once a day PM  Vitamin D3: 2000 int unit orally once a day  zinc citrate: 1 tab orally once a day PM

## 2023-08-14 NOTE — DISCHARGE NOTE PROVIDER - CARE PROVIDER_API CALL
Felice Carlos  Cardiovascular Disease  [address] [facility] [address]  [place], [place] [id number]  Phone: [phone]  Fax: [phone]  Follow Up Time: 1 week

## 2023-08-14 NOTE — CHART NOTE - NSCHARTNOTEFT_GEN_A_CORE
Medicine Attending - Discharge Day Note:  ================================================    # Fall at home  # Orthostatic hypotension  # hyponatremia  # CKD stage 3  # Chronic atrial fibrillation  # Macrocytic anemia  # CAD    The patient participated with PT this morning - ambulated in the hallway and up and down the stairs without symptoms. Not orthostatic. Will continue to hold flomax. Started Midodrine 5mg dose, but patient and family is very hesitant about taking it due to the potential side-effects. I explained that he should consider taking it on as needed basis if he feels lightheaded and/or has a low blood pressure at home. He'll follow up with Dr. Carlos within 1-2 days of discharge. All questions answered to the patient, spouse and daughter-in-law. Left a call-back request to Dr. Carlos's office.     Discharge time spent 40 minutes.     Scooter Stokes MD, TC  Available on [employer] Teams Medicine Attending - Discharge Day Note:  ================================================    # Fall at home  # Orthostatic hypotension  # hyponatremia  # CKD stage 3  # Chronic atrial fibrillation  # Macrocytic anemia  # CAD    The patient participated with PT this morning - ambulated in the hallway and up and down the stairs without symptoms. Not orthostatic. Will continue to hold flomax. Started Midodrine 5mg dose, but patient and family is very hesitant about taking it due to the potential side-effects. I explained that he should consider taking it on as needed basis if he feels lightheaded and/or has a low blood pressure at home. Of note, he also has a +UA sample, but has no UTI symptoms. Will not start antibiotics. He'll follow up with Dr. Carlos within 1-2 days of discharge. All questions answered to the patient, spouse and daughter-in-law. Left a call-back request to Dr. Carlos's office.     Discharge time spent 40 minutes.     Scooter Stokes MD, TC  Available on [employer] Teams

## 2023-08-14 NOTE — DISCHARGE NOTE NURSING/CASE MANAGEMENT/SOCIAL WORK - NSDCFUADDAPPT_GEN_ALL_CORE_FT
PRINCIPAL PROCEDURE  Procedure: Percutaneous cholecystostomy  Findings and Treatment: APPTS ARE READY TO BE MADE: [ X] YES    Best Family or Patient Contact (if needed):    Additional Information about above appointments (if needed):    1:   2:   3:     Other comments or requests:

## 2023-08-14 NOTE — DISCHARGE NOTE PROVIDER - HOSPITAL COURSE
83 M with hx of HTN, T2DM, Afib (with watchman device), CAD s/p stents, melanoma s/p excision presenting after fall at home. Pt admitted for physical therapy evaluation and for management of hyponatremia, RASHEEDA.     Fall at home  -Unclear exact mechanism, but from history gather so far, sounds like mechanical fall. Pt without symptoms. Possibly caused by electrolyte abnormality (hyponatremia)  - Hx of B/L hip replacement, also with knee replacement. Gradual worsening deconditioning per wife. Ambulates with cane or walker at home.   - EKG personally reviewed. showing Afib with ventricular rate 78, qtc 453. Nonspecific T wave changes.  - CTH findings reviewed - no intracranial hemorrhage or fracture  - Hold spironolactone for now, tamsulosin and lisinopril   - Fall likely due to orthostatic hypotension  - still orthostatic this morning after IVF on 8/12. S/p 1L LR  - started on midodrine for orthostatic hypotension      Hyponatremia  -Na 128, unclear chronicity (acute vs chronic). Possibly medication induced (beta blocker). Pt appears euvolemic on exam  - improving with IVF, now has been stable 131-132    RASHEEDA (acute kidney injury)  -SCr 1.34, unclear baseline. However per wife, outpatient providers are aware of "kidney issue"  -Possible CKD  -improved with IVF    Chronic atrial fibrillation  -Afib, not on AC given has Watchman device.   - Continue atenolol with hold parameters    Macrocytic anemia  -Hgb 11.5, .3. No signs of bleeding  - Check folate and vitamin B12 levels.    Hx CAD (coronary artery disease)  - Troponins flat 52 --> 52 --> 48  - C/w ASA 81  - On pravastatin 20mg at home, continue as atorvastatin 10mg here.      * Of note, patient takes azithromycin 25mg 3x a week on Mon/Wed/Fri - for COPD    Medically cleared to be dc'ed home with outpt PT on 8/14/23 per attending Dr. Stokes 83 M with hx of HTN, T2DM, Afib (with watchman device), CAD s/p stents, melanoma s/p excision presenting after fall at home. Pt admitted for physical therapy evaluation and for management of hyponatremia, RASHEEDA.     Fall at home  -Unclear exact mechanism, but from history gather so far, sounds like mechanical fall. Pt without symptoms. Possibly caused by electrolyte abnormality (hyponatremia)  - Hx of B/L hip replacement, also with knee replacement. Gradual worsening deconditioning per wife. Ambulates with cane or walker at home.   - EKG personally reviewed. showing Afib with ventricular rate 78, qtc 453. Nonspecific T wave changes.  - CTH findings reviewed - no intracranial hemorrhage or fracture  - Hold spironolactone for now, tamsulosin and lisinopril   - Fall likely due to orthostatic hypotension  - still orthostatic this morning after IVF on 8/12. S/p 1L LR  - started on midodrine for orthostatic hypotension      Hyponatremia  -Na 128, unclear chronicity (acute vs chronic). Possibly medication induced (beta blocker). Pt appears euvolemic on exam  - improving with IVF, now has been stable 131-132    RASHEEDA (acute kidney injury)  -SCr 1.34, unclear baseline. However per wife, outpatient providers are aware of "kidney issue"  -Possible CKD  -improved with IVF    Chronic atrial fibrillation  -Afib, not on AC given has Watchman device.   - Continue atenolol with hold parameters    Macrocytic anemia  -Hgb 11.5, .3. No signs of bleeding  - Check folate and vitamin B12 levels.    Hx CAD (coronary artery disease)  - Troponins flat 52 --> 52 --> 48  - C/w ASA 81  - On pravastatin 20mg at home, continue as atorvastatin 10mg here.      * Of note, patient takes azithromycin 25mg 3x a week on Mon/Wed/Fri - for COPD    Medically cleared to be dc'ed home with outpt PT on 8/14/23 per attending Dr. Stokes    Discharge Diagnoses:  # Fall at home  # Orthostatic hypotension  # hyponatremia	  # CKD stage 3  # Chronic atrial fibrillation  # Macrocytic anemia  # CAD    The patient participated with PT this morning - ambulated in the hallway and up and down the stairs without symptoms. Not orthostatic. Will continue to hold flomax. Started Midodrine 5mg dose, but patient and family is very hesitant about taking it due to the potential side-effects. I explained that he should consider taking it on as needed basis if he feels lightheaded and/or has a low blood pressure at home. Of note, he also has a +UA sample, but has no UTI symptoms. Will not start antibiotics. He'll follow up with Dr. Carlos within 1-2 days of discharge. All questions answered to the patient, spouse and daughter-in-law. Plan of care discussed with Dr. Carlos.

## 2023-08-14 NOTE — DISCHARGE NOTE PROVIDER - NSDCFUSCHEDAPPT_GEN_ALL_CORE_FT
[facility] [address] [facility]  Scheduled Appointment: 09/05/2023    Lele Saul  [facility] [address] [facility]  Scheduled Appointment: 09/05/2023

## 2023-08-14 NOTE — DISCHARGE NOTE NURSING/CASE MANAGEMENT/SOCIAL WORK - PATIENT PORTAL LINK FT
You can access the FollowMyHealth Patient Portal offered by [facility] by registering at the following website: [email or web address]. By joining [facility]’s FollowMyHealth portal, you will also be able to view your health information using other applications (apps) compatible with our system.

## 2023-08-14 NOTE — PROGRESS NOTE ADULT - SUBJECTIVE AND OBJECTIVE BOX
SUBJECTIVE / OVERNIGHT EVENTS:  No events overnight, says that he has no symptoms of orthostatics, states that he does not feel lightheaded or dizzy. Denies any cp, n/v/abd pain/sob. Good appetite.  Ready to go home.    MEDICATIONS  (STANDING):  albuterol/ipratropium for Nebulization 3 milliLiter(s) Nebulizer every 6 hours  aspirin enteric coated 81 milliGRAM(s) Oral daily  atenolol  Tablet 50 milliGRAM(s) Oral daily  atorvastatin 10 milliGRAM(s) Oral at bedtime  budesonide 160 MICROgram(s)/formoterol 4.5 MICROgram(s) Inhaler 2 Puff(s) Inhalation two times a day  chlorhexidine 2% Cloths 1 Application(s) Topical daily  famotidine    Tablet 20 milliGRAM(s) Oral daily  heparin   Injectable 5000 Unit(s) SubCutaneous every 8 hours  Ipratropium Bromide 0.06% Nasal 1 Spray(s) 1 Spray(s) Both Nostrils <User Schedule>  tamsulosin 0.4 milliGRAM(s) Oral daily    MEDICATIONS  (PRN):      I&O's Summary      PHYSICAL EXAM:  Vital Signs Last 24 Hrs  T(C): 36.3 (13 Aug 2023 11:12), Max: 36.8 (12 Aug 2023 20:46)  T(F): 97.3 (13 Aug 2023 11:12), Max: 98.3 (12 Aug 2023 20:46)  HR: 80 (13 Aug 2023 11:12) (74 - 87)  BP: 124/68 (13 Aug 2023 11:12) (119/67 - 124/72)  BP(mean): --  RR: 18 (13 Aug 2023 11:12) (18 - 18)  SpO2: 98% (13 Aug 2023 11:12) (98% - 99%)    Parameters below as of 13 Aug 2023 11:12  Patient On (Oxygen Delivery Method): room air      CONSTITUTIONAL: Well-groomed, in no apparent distress  EYES: No conjunctival or scleral injection, non-icteric; PERRLA and symmetric  ENMT: Hard of hearing; oral mucosa with moist membranes  RESPIRATORY: Breathing comfortably; lungs CTA without wheeze/rhonchi/rales  CARDIOVASCULAR: Irregular rhythm, +S1S2, no M/G/R; no lower extremity edema  GASTROINTESTINAL: No palpable masses or tenderness, +BS throughout, no rebound/guarding; no hepatosplenomegaly; no hernia palpated  MUSCULOSKELETAL: No joint swelling or tenderness  SKIN: No rashes  PSYCHIATRIC: A+O x 3    LABS:                        12.7   9.29  )-----------( 210      ( 12 Aug 2023 06:32 )             38.2     08-12    131<L>  |  94<L>  |  29<H>  ----------------------------<  109<H>  4.4   |  23  |  1.02    Ca    9.7      12 Aug 2023 22:46  Phos  3.4     08-12  Mg     1.9     08-12    TPro  6.9  /  Alb  4.0  /  TBili  0.3  /  DBili  x   /  AST  25  /  ALT  24  /  AlkPhos  46  08-11          Urinalysis Basic - ( 12 Aug 2023 22:46 )    Color: x / Appearance: x / SG: x / pH: x  Gluc: 109 mg/dL / Ketone: x  / Bili: x / Urobili: x   Blood: x / Protein: x / Nitrite: x   Leuk Esterase: x / RBC: x / WBC x   Sq Epi: x / Non Sq Epi: x / Bacteria: x          
[facility] Division of Hospital Medicine  Jose Chavez MD  Available via MS Teams  Pager: [phone]    SUBJECTIVE / OVERNIGHT EVENTS:  - no events overnight, says that he has no symptoms of orthostatics, states that he does not feel lightheaded or dizzy. Denies any cp, n/v/abd pain/sob.     MEDICATIONS  (STANDING):  albuterol/ipratropium for Nebulization 3 milliLiter(s) Nebulizer every 6 hours  aspirin enteric coated 81 milliGRAM(s) Oral daily  atenolol  Tablet 50 milliGRAM(s) Oral daily  atorvastatin 10 milliGRAM(s) Oral at bedtime  budesonide 160 MICROgram(s)/formoterol 4.5 MICROgram(s) Inhaler 2 Puff(s) Inhalation two times a day  chlorhexidine 2% Cloths 1 Application(s) Topical daily  famotidine    Tablet 20 milliGRAM(s) Oral daily  heparin   Injectable 5000 Unit(s) SubCutaneous every 8 hours  Ipratropium Bromide 0.06% Nasal 1 Spray(s) 1 Spray(s) Both Nostrils <User Schedule>  tamsulosin 0.4 milliGRAM(s) Oral daily    MEDICATIONS  (PRN):      I&O's Summary      PHYSICAL EXAM:  Vital Signs Last 24 Hrs  T(C): 36.3 (13 Aug 2023 11:12), Max: 36.8 (12 Aug 2023 20:46)  T(F): 97.3 (13 Aug 2023 11:12), Max: 98.3 (12 Aug 2023 20:46)  HR: 80 (13 Aug 2023 11:12) (74 - 87)  BP: 124/68 (13 Aug 2023 11:12) (119/67 - 124/72)  BP(mean): --  RR: 18 (13 Aug 2023 11:12) (18 - 18)  SpO2: 98% (13 Aug 2023 11:12) (98% - 99%)    Parameters below as of 13 Aug 2023 11:12  Patient On (Oxygen Delivery Method): room air      CONSTITUTIONAL: Well-groomed, in no apparent distress  EYES: No conjunctival or scleral injection, non-icteric; PERRLA and symmetric  ENMT: Hard of hearing; oral mucosa with moist membranes  RESPIRATORY: Breathing comfortably; lungs CTA without wheeze/rhonchi/rales  CARDIOVASCULAR: Irregular rhythm, +S1S2, no M/G/R; no lower extremity edema  GASTROINTESTINAL: No palpable masses or tenderness, +BS throughout, no rebound/guarding; no hepatosplenomegaly; no hernia palpated  MUSCULOSKELETAL: No joint swelling or tenderness  SKIN: No rashes  PSYCHIATRIC: A+O x 3    LABS:                        12.7   9.29  )-----------( 210      ( 12 Aug 2023 06:32 )             38.2     08-12    131<L>  |  94<L>  |  29<H>  ----------------------------<  109<H>  4.4   |  23  |  1.02    Ca    9.7      12 Aug 2023 22:46  Phos  3.4     08-12  Mg     1.9     08-12    TPro  6.9  /  Alb  4.0  /  TBili  0.3  /  DBili  x   /  AST  25  /  ALT  24  /  AlkPhos  46  08-11          Urinalysis Basic - ( 12 Aug 2023 22:46 )    Color: x / Appearance: x / SG: x / pH: x  Gluc: 109 mg/dL / Ketone: x  / Bili: x / Urobili: x   Blood: x / Protein: x / Nitrite: x   Leuk Esterase: x / RBC: x / WBC x   Sq Epi: x / Non Sq Epi: x / Bacteria: x            RADIOLOGY & ADDITIONAL TESTS:  New Results Reviewed Today: cbc bmp  New Imaging Personally Reviewed Today: n/a   New Electrocardiogram Personally Reviewed Today: n/a  Prior or Outpatient Records Reviewed Today: n/a     COMMUNICATION:  Care Discussed with Consultants/Other Providers and Details of Discussion: cardiology Dr. Pretty  PCP Communication: Dr. Pretty

## 2023-08-14 NOTE — DISCHARGE NOTE PROVIDER - NSDCFUADDAPPT_GEN_ALL_CORE_FT
APPTS ARE READY TO BE MADE: [ X] YES    Best Family or Patient Contact (if needed):    Additional Information about above appointments (if needed):    1:   2:   3:     Other comments or requests:    APPTS ARE READY TO BE MADE: [ X] YES    Best Family or Patient Contact (if needed):    Additional Information about above appointments (if needed):    1:   2:   3:     Other comments or requests:   Patient was previously scheduled for an appointment on 08/15 at [address] [place] [address], [address] [place] [address] with Felice Kan

## 2023-08-15 ENCOUNTER — RX RENEWAL (OUTPATIENT)
Age: 83
End: 2023-08-15

## 2023-08-15 ENCOUNTER — TRANSCRIPTION ENCOUNTER (OUTPATIENT)
Age: 83
End: 2023-08-15

## 2023-08-15 RX ORDER — IPRATROPIUM BROMIDE 42 UG/1
0.06 SPRAY NASAL 3 TIMES DAILY
Qty: 15 | Refills: 5 | Status: ACTIVE | COMMUNITY
Start: 2022-11-18 | End: 1900-01-01

## 2023-08-17 ENCOUNTER — TRANSCRIPTION ENCOUNTER (OUTPATIENT)
Age: 83
End: 2023-08-17

## 2023-08-18 ENCOUNTER — TRANSCRIPTION ENCOUNTER (OUTPATIENT)
Age: 83
End: 2023-08-18

## 2023-08-21 ENCOUNTER — TRANSCRIPTION ENCOUNTER (OUTPATIENT)
Age: 83
End: 2023-08-21

## 2023-08-22 ENCOUNTER — TRANSCRIPTION ENCOUNTER (OUTPATIENT)
Age: 83
End: 2023-08-22

## 2023-08-23 ENCOUNTER — TRANSCRIPTION ENCOUNTER (OUTPATIENT)
Age: 83
End: 2023-08-23

## 2023-09-05 ENCOUNTER — APPOINTMENT (OUTPATIENT)
Dept: PULMONOLOGY | Facility: CLINIC | Age: 83
End: 2023-09-05
Payer: MEDICARE

## 2023-09-05 VITALS
RESPIRATION RATE: 17 BRPM | SYSTOLIC BLOOD PRESSURE: 118 MMHG | BODY MASS INDEX: 29.2 KG/M2 | OXYGEN SATURATION: 98 % | WEIGHT: 204 LBS | HEART RATE: 115 BPM | HEIGHT: 70 IN | TEMPERATURE: 97.2 F | DIASTOLIC BLOOD PRESSURE: 68 MMHG

## 2023-09-05 DIAGNOSIS — Z72.820 SLEEP DEPRIVATION: ICD-10-CM

## 2023-09-05 DIAGNOSIS — J31.0 CHRONIC RHINITIS: ICD-10-CM

## 2023-09-05 DIAGNOSIS — I10 ESSENTIAL (PRIMARY) HYPERTENSION: ICD-10-CM

## 2023-09-05 PROCEDURE — 95012 NITRIC OXIDE EXP GAS DETER: CPT

## 2023-09-05 PROCEDURE — 94010 BREATHING CAPACITY TEST: CPT

## 2023-09-05 PROCEDURE — 94727 GAS DIL/WSHOT DETER LNG VOL: CPT

## 2023-09-05 PROCEDURE — 99214 OFFICE O/P EST MOD 30 MIN: CPT | Mod: 25

## 2023-09-05 PROCEDURE — 94729 DIFFUSING CAPACITY: CPT

## 2023-09-05 RX ORDER — NIRMATRELVIR AND RITONAVIR 300-100 MG
20 X 150 MG & KIT ORAL
Qty: 1 | Refills: 0 | Status: DISCONTINUED | COMMUNITY
Start: 2023-08-03 | End: 2023-09-05

## 2023-09-05 RX ORDER — AZITHROMYCIN 250 MG/1
250 TABLET, FILM COATED ORAL
Qty: 36 | Refills: 2 | Status: DISCONTINUED | COMMUNITY
Start: 2022-03-22 | End: 2023-09-05

## 2023-09-05 NOTE — REASON FOR VISIT
[Follow-Up] : a follow-up visit [Family Member] : family member [FreeTextEntry1] : amiodarone toxicity, BOOP, COVID-19 12/2021 former smoker, GERD, hypoxemia, LILO, OW, interstitial pneumonitis, poor sleep, snoring, and SOB

## 2023-09-05 NOTE — PHYSICAL EXAM
[No Acute Distress] : no acute distress [Normal Oropharynx] : normal oropharynx [III] : Mallampati Class: III [Normal Appearance] : normal appearance [No Neck Mass] : no neck mass [Normal S1, S2] : normal s1, s2 [No Resp Distress] : no resp distress [Clear to Auscultation Bilaterally] : clear to auscultation bilaterally [Benign] : benign [Normal Gait] : normal gait [No Clubbing] : no clubbing [No Cyanosis] : no cyanosis [FROM] : FROM [Normal Color/ Pigmentation] : normal color/ pigmentation [No Focal Deficits] : no focal deficits [Oriented x3] : oriented x3 [Normal Affect] : normal affect [TextBox_2] : OW [TextBox_54] : 2/6 systolic murmur, irregular heartbeat [TextBox_68] : I:E ratio 1:3; clear  [TextBox_80] : kyphotic [TextBox_105] : trace edema

## 2023-09-05 NOTE — PROCEDURE
[FreeTextEntry1] : Chest CT (8/23/2023) revealed the previously questioned 8 x 4 mm right lung nodule has resolved. Stable postsurgical changes in both lungs with continued mild improvement of groundglass geographic opacities and peripheral reticular opacities in the lateral right upper lobe. Bilateral gynecomastia, new since 9/15/2021. Stone-filled gallbladder noted.   Full PFT reveals normal flows; FEV1 was 2.66L which is 83% of predicted, with mild obstructive dysfunction at mid-low volumes; normal lung volumes; normal diffusion at 21.4, which is 104% of predicted; normal flow volume loop. PFTs were performed to evaluate for SOB  FENO was 23; a normal value being less than 25 Fractional exhaled nitric oxide (FENO) is regarded as a simple, noninvasive method for assessing eosinophilic airway inflammation. Produced by a variety of cells within the lung, nitric oxide (NO) concentrations are generally low in healthy individuals. However, high concentrations of NO appear to be involved in nonspecific host defense mechanisms and chronic inflammatory diseases such as asthma. The American Thoracic Society (ATS) therefore has recommended using FENO to aid in the diagnosis and monitoring of eosinophilic airway inflammation and asthma, and for identifying steroid responsive individuals whose chronic respiratory symptoms may be caused by airway inflammation.

## 2023-09-05 NOTE — ADDENDUM
[FreeTextEntry1] : Documented by Carrie Forrest acting as a scribe for Dr. Lele Saul on 09/05/2023. All medical record entries made by the Scribe were at my, Dr. Lele Saul's, direction and personally dictated by me on 09/05/2023. I have reviewed the chart and agree that the record accurately reflects my personal performance of the history, physical exam, assessment and plan. I have also personally directed, reviewed, and agree with the discharge instructions.

## 2023-09-05 NOTE — ASSESSMENT
[FreeTextEntry1] : Mr. 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 He is presents to the office and is currently stable from a pulmonary perspective - He is off anti-inflammatory / fibrotic - stable, resolved Afib / mild GRIMALDO - complains of voice issues -all controlled - improved - gustatory rhinitis - #1 issue is near sncope-? orthostatic hypotension.  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 Proper dietary restraint is necessary specifically salt containing foods, if any reaction may occur should be reported.  -Repeat CT - 9/2024  - Etiology will depend on the causative agent possibilities include: idiopathic pulmonary fibrosis (UIP), NSIP (nonspecific interstitial pneumonia) , respiratory bronchiolitis in someone who is a smoker, drug induced lung disease, hypersensitivity pneumonitis, BOOP, sarcoidosis, chronic congestive heart failure, rheumatologic disorder induced interstitial lung disease. Optimal diagnosing will include rheumatological panel which would include ESR, MATEO, ANCA, ARNP, CCP, rheumatoid factor, hypersensitivity panel, JOE1, scleroderma antibodies, sjogren's syndrome antibodies; biopsy will be necessary to definitively determine the etiology unless the CT scan findings are specific for UIP. Therapy will be based on diagnostics.  Problem 1A: Abnormal CT - New Nodule 8 x 4 mm - resolved -follow up CT 9/2024 CAT scans are the only radiological modality to identify abnormalities w/in the lungs with regards to nodules/masses/lymph nodes. Risks, benefits were reviewed in detail. The guidelines for abnormalities include follow up CT scans at various intervals which could range from 6 weeks to 1 year intervals. If there is a change for the worse then consideration for a biopsy will be considered if you are a candidate. Second opinion evaluation with a thoracic surgeon or an interventional radiologist could be offered.   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 These drugs require pulmonary function testing including DLCO regularly and possible CT/CXR if respiratory complaints develop. PFTs should be performed at 6 month intervals   problem 3: s/p PJP prophylaxis- completed went below 20 mg pred. dose per day (resolved)  -He is off Bactrim 1 tablet Monday, Wednesday, Friday  -He is s/p Rheumatologic Blood work to include: ESR panel (+) , ACE, panel (-) , HP panel (-) - s/p rheumatological evaluation - Dr. Torrez - PJP Pneumonia is a major opportunistic infection in immunocompromised patients and corticosteroid therapy is a risk factor. Therefore, patients receiving at least 4 weeks of therapy at 30 mg or more daily should be on prophylaxis.   problem 4: GERD -continue Protonix 40 mg QAM, pre-breakfast -Things to avoid including overeating, spicy foods, tight clothing, eating within three hours of bed, this list is not all inclusive.  -For treatment of reflux, possible options discussed including diet control, H2 blockers, PPIs, as well as coating motility agents discussed as treatment options. Timing of meals and proximity of last meal to sleep were discussed. If symptoms persist, a formal gastrointestinal evaluation is needed. -Rule of 2's: Avoid eating too late, too fast, too much, too spicy or within two hours of bedtime   problem 5: (+) OSAS (secondary to snoring, nonrestorative sleep and A-fib) - on Rx -refused prescription for sleep study (APAP) - refused -s/p oral appliance with Dr. Gabby Lopez Sleep apnea is associated with adverse clinical consequences which an affect most organ systems. Cardiovascular disease risk includes arrhythmias, atrial fibrillation, hypertension, coronary artery disease, and stroke. Metabolic disorders include diabetes type 2, non-alcoholic fatty liver disease. Mood disorder especially depression; and cognitive decline especially in the elderly. Associations with chronic reflux/Candelaria's esophagus some but not all inclusive.  -Reasons include arousal consistent with hypopnea; respiratory events most prominent in REM sleep or supine position; therefore sleep staging and body position are important for accurate diagnosis and estimation of AHI.   problem 6: smoking history/lung cancer screening (improved) - stable 9/2021 -follow up chest CT 9/2024  Lung cancer screening is recommended for people between the ages of 55 and 80 with prior 30+ pack year smoking histories. There is irrefutable evidence for realization of lung cancer screening based on two large randomized control trials demonstrating relative reduction in lung cancer mortality for patients undergoing low-dose CT scanning. Risks and benefits reviewed with the patient.  Problem 6A: COPD  -?quiet /asthma  -continue Symbicort 160 2 BID  -COPD is a progressive disease and although it can't be cured , appropriate management can slow its progression, reduce frequency and severity of exacerbations, and improve symptoms and the patient quality of life. Hospitalizations are the greatest contributor to the total COPD costs and account for up to 87% of total COPD related costs. Exacerbations are the main cause of admissions and subsequently account for the 40-75% of COPD costs. Inhaled maintenance therapy reduces the incidence of exacerbations in patients with stable COPD. Incorrect inhaler use and nonadherence are major obstacles to achieving COPD treatment goals. Many COPD patients have challenges (impaired inhalation, limited dexterity, reduced cognition: that limit their ability to correctly use their COPD treatment devices resulting in reduced symptom control. Of most importance is smoking cessation and early intervention with respiratory illnesses and contemplation for pulmonary rehab to enhance quality of life. -Inhaler technique reviewed as well as oral hygiene techniques reviewed with patient. Avoidance of cold air, extremes of temperature, rescue inhaler should be used before exercise. Order of medication reviewed with patient. Recommended use of a cool mist humidifier in the bedroom.  problem 7: poor breathing mechanics -Recommended Wimuna Hof and Buteyko breathing techniques  -Proper breathing techniques were reviewed with an emphasis of exhalation. Patient instructed to breath in for 1 second and out for four seconds. Patient was encouraged to not talk while walking.  problem 8: overweight/out of shape -recommended nutritional evaluation with Halie Lynn -Weight loss, exercise, and diet control were discussed and are highly encouraged. Treatment options were given such as, aqua therapy, and contacting a nutritionist. Recommended to use the elliptical, stationary bike, less use of treadmill. Mindful eating was explained to the patient. Obesity is associated with worsening asthma, shortness of breath, and potential for cardiac disease, diabetes, and other underlying medical conditions.  problem 9: CAD -recommended to follow up with cardiologist Dr. Carlos  Problem 9A: ?Orthostatic Hypotension -complete evaluation with nephrologist (Dr. Dean) -add Florinef   problem 10: poor balance -recommended to complete balance therapy and gait training  problem 11: allergies -continue Claritin 10 mg QAM Environmental measures for allergies were encouraged including mattress and pillow cover, air purifier, and environmental controls   problem 11A: Dysphagia ENT eval   prblem 11B: Gustatory rhinitis  -Atrovent 0.6% at 1 sniff/nostril, up to TID   Problem 12: Health Maintenance/COVID19 Precautions: - S/p Covid 19 vaccine (Moderna) x3 -Covid 19 vaccine discussed at length with patient; booster discussed and recommended to wait for vaccine containing new delta variant  - Clean your hands often. Wash your hands often with soap and water for at least 20 seconds, especially after blowing your nose, coughing, or sneezing, or having been in a public place. - If soap and water are not available, use a hand  that contains at least 60% alcohol. - To the extent possible, avoid touching high-touch surfaces in public places - elevator buttons, door handles, handrails, handshaking with people, etc. Use a tissue or your sleeve to cover your hand or finger if you must touch something. - Wash your hands after touching surfaces in public places. - Avoid touching your face, nose, eyes, etc. - Clean and disinfect your home to remove germs: practice routine cleaning of frequently touched surfaces (for example: tables, doorknobs, light switches, handles, desks, toilets, faucets, sinks & cell phones) - Avoid crowds, especially in poorly ventilated spaces. Your risk of exposure to respiratory viruses like COVID-19 may increase in crowded, closed-in settings with little air circulation if there are people in the crowd who are sick. All patients are recommended to practice social distancing and stay at least 6 feet away from others. - Avoid all non-essential travel including plane trips, and especially avoid embarking on cruise ships. -If COVID-19 is spreading in your community, take extra measures to put distance between yourself and other people to further reduce your risk of being exposed to this new virus. -Stay home as much as possible. - Consider ways of getting food brought to your house through family, social, or commercial networks -Be aware that the virus has been known to live in the air up to 3 hours post exposure, cardboard up to 24 hours post exposure, copper up to 4 hours post exposure, steel and plastic up to 2-3 days post exposure. Risk of transmission from these surfaces are affected by many variables. Immune Support Recommendations: -OTC Vitamin C 500mg BID  -OTC Quercetin 250-500mg BID  -OTC Zinc 75-100mg per day  -OTC Melatonin 1 or 2 mg a night  -OTC Vitamin D 1-4000mg per day  -OTC Tonic Water 8oz per day Asthma and COVID19: You need to make sure your asthma is under control. This often requires the use of inhaled corticosteroids (and sometimes oral corticosteroids). Inhaled corticosteroids do not likely reduce your immune system's ability to fight infections, but oral corticosteroids may. It is important to use the steps above to protect yourself to limit your exposure to any respiratory virus.  problem 13: health maintenance  - He is s/p consult with a neurologist  - Received influenza vaccine 2021 pending -recommended strep pneumonia vaccines: Prevnar-13 vaccine, followed by Pneumo vaccine 23 one year following -recommended early intervention for URIs -recommended regular osteoporosis evaluations -recommended early dermatological evaluations -recommended after the age of 50 to the age of 70, colonoscopy every 5 years     Follow-up in 3 months with Full PFTs /  CXR  Patient is encouraged to call with any changes, concerns or questions.

## 2023-09-05 NOTE — HISTORY OF PRESENT ILLNESS
[FreeTextEntry1] : Mr. Mccullough is a 83 year old male with a history of amiodarone toxicity, BOOP, former smoker, GERD, hypoxemia, LILO, OW, interstitial pneumonitis, poor sleep, snoring, COVID-19 12/2021 and SOB who presents for a pulmonary follow up evaluation.  -he notes feeling generally well -he notes falling last night after lifting the lid of the toilet -he notes low blood pressure -he notes weight is stable  -he notes appetite is low -he notes wanting to lose weight -he notes eating 400-500 calories per day -he notes hearing is stable -he notes his breathing is stable  -he notes nocturia -he notes needing to steady himself against his dresser every morning before walking to the bathroom   -he denies any headaches, nausea, emesis, fever, chills, sweats, chest pain, chest pressure, coughing, wheezing, palpitations, diarrhea, constipation, dysphagia, vertigo, arthralgias, myalgias, leg swelling, itchy eyes, itchy ears, heartburn, reflux, or sour taste in the mouth.

## 2023-09-06 ENCOUNTER — TRANSCRIPTION ENCOUNTER (OUTPATIENT)
Age: 83
End: 2023-09-06

## 2023-09-13 ENCOUNTER — TRANSCRIPTION ENCOUNTER (OUTPATIENT)
Age: 83
End: 2023-09-13

## 2023-09-26 ENCOUNTER — TRANSCRIPTION ENCOUNTER (OUTPATIENT)
Age: 83
End: 2023-09-26

## 2023-09-28 ENCOUNTER — TRANSCRIPTION ENCOUNTER (OUTPATIENT)
Age: 83
End: 2023-09-28

## 2023-10-02 ENCOUNTER — RX RENEWAL (OUTPATIENT)
Age: 83
End: 2023-10-02

## 2023-10-06 ENCOUNTER — APPOINTMENT (OUTPATIENT)
Dept: NEPHROLOGY | Facility: CLINIC | Age: 83
End: 2023-10-06

## 2023-10-09 ENCOUNTER — TRANSCRIPTION ENCOUNTER (OUTPATIENT)
Age: 83
End: 2023-10-09

## 2023-10-16 ENCOUNTER — APPOINTMENT (OUTPATIENT)
Dept: NEPHROLOGY | Facility: CLINIC | Age: 83
End: 2023-10-16

## 2023-10-30 ENCOUNTER — TRANSCRIPTION ENCOUNTER (OUTPATIENT)
Age: 83
End: 2023-10-30

## 2023-11-28 ENCOUNTER — TRANSCRIPTION ENCOUNTER (OUTPATIENT)
Age: 83
End: 2023-11-28

## 2023-12-05 ENCOUNTER — TRANSCRIPTION ENCOUNTER (OUTPATIENT)
Age: 83
End: 2023-12-05

## 2023-12-06 ENCOUNTER — TRANSCRIPTION ENCOUNTER (OUTPATIENT)
Age: 83
End: 2023-12-06

## 2024-01-05 ENCOUNTER — TRANSCRIPTION ENCOUNTER (OUTPATIENT)
Age: 84
End: 2024-01-05

## 2024-01-08 ENCOUNTER — TRANSCRIPTION ENCOUNTER (OUTPATIENT)
Age: 84
End: 2024-01-08

## 2024-01-09 ENCOUNTER — TRANSCRIPTION ENCOUNTER (OUTPATIENT)
Age: 84
End: 2024-01-09

## 2024-01-30 ENCOUNTER — TRANSCRIPTION ENCOUNTER (OUTPATIENT)
Age: 84
End: 2024-01-30

## 2024-03-04 ENCOUNTER — RX RENEWAL (OUTPATIENT)
Age: 84
End: 2024-03-04

## 2024-03-04 RX ORDER — AZITHROMYCIN 250 MG/1
250 TABLET, FILM COATED ORAL
Qty: 36 | Refills: 1 | Status: ACTIVE | COMMUNITY
Start: 2023-09-06 | End: 1900-01-01

## 2024-03-11 ENCOUNTER — TRANSCRIPTION ENCOUNTER (OUTPATIENT)
Age: 84
End: 2024-03-11

## 2024-03-18 ENCOUNTER — APPOINTMENT (OUTPATIENT)
Dept: PULMONOLOGY | Facility: CLINIC | Age: 84
End: 2024-03-18
Payer: MEDICARE

## 2024-03-18 VITALS
HEIGHT: 70 IN | DIASTOLIC BLOOD PRESSURE: 68 MMHG | OXYGEN SATURATION: 98 % | WEIGHT: 205 LBS | RESPIRATION RATE: 16 BRPM | TEMPERATURE: 97.3 F | SYSTOLIC BLOOD PRESSURE: 124 MMHG | BODY MASS INDEX: 29.35 KG/M2 | HEART RATE: 92 BPM

## 2024-03-18 DIAGNOSIS — T46.2X1A POISONING BY OTHER ANTIDYSRHYTHMIC DRUGS, ACCIDENTAL (UNINTENTIONAL), INITIAL ENCOUNTER: ICD-10-CM

## 2024-03-18 DIAGNOSIS — G47.33 OBSTRUCTIVE SLEEP APNEA (ADULT) (PEDIATRIC): ICD-10-CM

## 2024-03-18 DIAGNOSIS — K21.9 GASTRO-ESOPHAGEAL REFLUX DISEASE W/OUT ESOPHAGITIS: ICD-10-CM

## 2024-03-18 DIAGNOSIS — J84.89 OTHER SPECIFIED INTERSTITIAL PULMONARY DISEASES: ICD-10-CM

## 2024-03-18 DIAGNOSIS — J44.9 CHRONIC OBSTRUCTIVE PULMONARY DISEASE, UNSPECIFIED: ICD-10-CM

## 2024-03-18 DIAGNOSIS — E66.3 OVERWEIGHT: ICD-10-CM

## 2024-03-18 DIAGNOSIS — R93.89 ABNORMAL FINDINGS ON DIAGNOSTIC IMAGING OF OTHER SPECIFIED BODY STRUCTURES: ICD-10-CM

## 2024-03-18 DIAGNOSIS — R06.02 SHORTNESS OF BREATH: ICD-10-CM

## 2024-03-18 PROCEDURE — 99214 OFFICE O/P EST MOD 30 MIN: CPT | Mod: 25

## 2024-03-18 PROCEDURE — 94010 BREATHING CAPACITY TEST: CPT

## 2024-03-18 PROCEDURE — 95012 NITRIC OXIDE EXP GAS DETER: CPT

## 2024-03-18 NOTE — ASSESSMENT
[FreeTextEntry1] : Mr. 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 He is presents to the office and is currently stable from a pulmonary perspective - He is off anti-inflammatory / fibrotic - stable, resolved Afib / mild GRIMALDO - complains of voice issues -all controlled - improved - gustatory rhinitis - #1 issue is knee issue.  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 Proper dietary restraint is necessary specifically salt containing foods, if any reaction may occur should be reported.  -Repeat CT - 9/2024  - Etiology will depend on the causative agent possibilities include: idiopathic pulmonary fibrosis (UIP), NSIP (nonspecific interstitial pneumonia) , respiratory bronchiolitis in someone who is a smoker, drug induced lung disease, hypersensitivity pneumonitis, BOOP, sarcoidosis, chronic congestive heart failure, rheumatologic disorder induced interstitial lung disease. Optimal diagnosing will include rheumatological panel which would include ESR, MATEO, ANCA, ARNP, CCP, rheumatoid factor, hypersensitivity panel, JOE1, scleroderma antibodies, sjogren's syndrome antibodies; biopsy will be necessary to definitively determine the etiology unless the CT scan findings are specific for UIP. Therapy will be based on diagnostics.  Problem 1A: Abnormal CT - New Nodule 8 x 4 mm - resolved -follow up CT 9/2024 CAT scans are the only radiological modality to identify abnormalities w/in the lungs with regards to nodules/masses/lymph nodes. Risks, benefits were reviewed in detail. The guidelines for abnormalities include follow up CT scans at various intervals which could range from 6 weeks to 1 year intervals. If there is a change for the worse then consideration for a biopsy will be considered if you are a candidate. Second opinion evaluation with a thoracic surgeon or an interventional radiologist could be offered.   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 These drugs require pulmonary function testing including DLCO regularly and possible CT/CXR if respiratory complaints develop. PFTs should be performed at 6 month intervals   problem 3: s/p PJP prophylaxis-  (resolved)  -He is off Bactrim 1 tablet Monday, Wednesday, Friday  -He is s/p Rheumatologic Blood work to include: ESR panel (+) , ACE, panel (-) , HP panel (-) - s/p rheumatological evaluation - Dr. Torrez - PJP Pneumonia is a major opportunistic infection in immunocompromised patients and corticosteroid therapy is a risk factor. Therefore, patients receiving at least 4 weeks of therapy at 30 mg or more daily should be on prophylaxis.   problem 4: GERD -continue Protonix 40 mg QAM, pre-breakfast -Things to avoid including overeating, spicy foods, tight clothing, eating within three hours of bed, this list is not all inclusive.  -For treatment of reflux, possible options discussed including diet control, H2 blockers, PPIs, as well as coating motility agents discussed as treatment options. Timing of meals and proximity of last meal to sleep were discussed. If symptoms persist, a formal gastrointestinal evaluation is needed. -Rule of 2's: Avoid eating too late, too fast, too much, too spicy or within two hours of bedtime   problem 5: (+) OSAS (secondary to snoring, nonrestorative sleep and A-fib) - on Rx -refused prescription for sleep study (APAP) - refused -s/p oral appliance with Dr. Gabby Lopez Sleep apnea is associated with adverse clinical consequences which an affect most organ systems. Cardiovascular disease risk includes arrhythmias, atrial fibrillation, hypertension, coronary artery disease, and stroke. Metabolic disorders include diabetes type 2, non-alcoholic fatty liver disease. Mood disorder especially depression; and cognitive decline especially in the elderly. Associations with chronic reflux/Candelaria's esophagus some but not all inclusive.  -Reasons include arousal consistent with hypopnea; respiratory events most prominent in REM sleep or supine position; therefore sleep staging and body position are important for accurate diagnosis and estimation of AHI.   problem 6: smoking history/lung cancer screening (improved) - stable 9/2021 -follow up chest CT 9/2024  Lung cancer screening is recommended for people between the ages of 55 and 80 with prior 30+ pack year smoking histories. There is irrefutable evidence for realization of lung cancer screening based on two large randomized control trials demonstrating relative reduction in lung cancer mortality for patients undergoing low-dose CT scanning. Risks and benefits reviewed with the patient.  Problem 6A: COPD  -?quiet /asthma  -continue Symbicort 160 2 BID  -COPD is a progressive disease and although it can't be cured , appropriate management can slow its progression, reduce frequency and severity of exacerbations, and improve symptoms and the patient quality of life. Hospitalizations are the greatest contributor to the total COPD costs and account for up to 87% of total COPD related costs. Exacerbations are the main cause of admissions and subsequently account for the 40-75% of COPD costs. Inhaled maintenance therapy reduces the incidence of exacerbations in patients with stable COPD. Incorrect inhaler use and nonadherence are major obstacles to achieving COPD treatment goals. Many COPD patients have challenges (impaired inhalation, limited dexterity, reduced cognition: that limit their ability to correctly use their COPD treatment devices resulting in reduced symptom control. Of most importance is smoking cessation and early intervention with respiratory illnesses and contemplation for pulmonary rehab to enhance quality of life. -Inhaler technique reviewed as well as oral hygiene techniques reviewed with patient. Avoidance of cold air, extremes of temperature, rescue inhaler should be used before exercise. Order of medication reviewed with patient. Recommended use of a cool mist humidifier in the bedroom.  problem 7: poor breathing mechanics -Recommended Wimuna Hof and Buteyko breathing techniques  -Proper breathing techniques were reviewed with an emphasis of exhalation. Patient instructed to breath in for 1 second and out for four seconds. Patient was encouraged to not talk while walking.  problem 8: overweight/out of shape -recommended nutritional evaluation with Halie Lynn -Weight loss, exercise, and diet control were discussed and are highly encouraged. Treatment options were given such as, aqua therapy, and contacting a nutritionist. Recommended to use the elliptical, stationary bike, less use of treadmill. Mindful eating was explained to the patient. Obesity is associated with worsening asthma, shortness of breath, and potential for cardiac disease, diabetes, and other underlying medical conditions.  problem 9: CAD -recommended to follow up with cardiologist Dr. Carlos  Problem 9A: ?Orthostatic Hypotension -complete evaluation with nephrologist (Dr. Dean) -?Florinef   problem 10: poor balance -recommended to complete balance therapy and gait training  problem 11: allergies -continue Claritin 10 mg QAM Environmental measures for allergies were encouraged including mattress and pillow cover, air purifier, and environmental controls   problem 11A: Dysphagia ENT eval   prblem 11B: Gustatory rhinitis  -Atrovent 0.6% at 1 sniff/nostril, up to TID   Problem 12: Health Maintenance/COVID19 Precautions: - S/p Covid 19 vaccine (Moderna) x5 -Covid 19 vaccine discussed at length with patient; booster discussed and recommended to wait for vaccine containing new delta variant  - Clean your hands often. Wash your hands often with soap and water for at least 20 seconds, especially after blowing your nose, coughing, or sneezing, or having been in a public place. - If soap and water are not available, use a hand  that contains at least 60% alcohol. - To the extent possible, avoid touching high-touch surfaces in public places - elevator buttons, door handles, handrails, handshaking with people, etc. Use a tissue or your sleeve to cover your hand or finger if you must touch something. - Wash your hands after touching surfaces in public places. - Avoid touching your face, nose, eyes, etc. - Clean and disinfect your home to remove germs: practice routine cleaning of frequently touched surfaces (for example: tables, doorknobs, light switches, handles, desks, toilets, faucets, sinks & cell phones) - Avoid crowds, especially in poorly ventilated spaces. Your risk of exposure to respiratory viruses like COVID-19 may increase in crowded, closed-in settings with little air circulation if there are people in the crowd who are sick. All patients are recommended to practice social distancing and stay at least 6 feet away from others. - Avoid all non-essential travel including plane trips, and especially avoid embarking on cruise ships. -If COVID-19 is spreading in your community, take extra measures to put distance between yourself and other people to further reduce your risk of being exposed to this new virus. -Stay home as much as possible. - Consider ways of getting food brought to your house through family, social, or commercial networks -Be aware that the virus has been known to live in the air up to 3 hours post exposure, cardboard up to 24 hours post exposure, copper up to 4 hours post exposure, steel and plastic up to 2-3 days post exposure. Risk of transmission from these surfaces are affected by many variables. Immune Support Recommendations: -OTC Vitamin C 500mg BID  -OTC Quercetin 250-500mg BID  -OTC Zinc 75-100mg per day  -OTC Melatonin 1 or 2 mg a night  -OTC Vitamin D 1-4000mg per day  -OTC Tonic Water 8oz per day Asthma and COVID19: You need to make sure your asthma is under control. This often requires the use of inhaled corticosteroids (and sometimes oral corticosteroids). Inhaled corticosteroids do not likely reduce your immune system's ability to fight infections, but oral corticosteroids may. It is important to use the steps above to protect yourself to limit your exposure to any respiratory virus.  problem 13: health maintenance  -Recomend RSV vaccination - He is s/p consult with a neurologist  - Received influenza vaccine 2023 -recommended strep pneumonia vaccines: Prevnar-13 vaccine, followed by Pneumo vaccine 23 one year following (Completed) -recommended early intervention for URIs -recommended regular osteoporosis evaluations -recommended early dermatological evaluations -recommended after the age of 50 to the age of 70, colonoscopy every 5 years     Follow-up in 3 months with Full PFTs /  CXR  Patient is encouraged to call with any changes, concerns or questions.

## 2024-03-18 NOTE — ADDENDUM
[FreeTextEntry1] :  Documented by Bharti Malone acting as a scribe for Dr. Lele Saul on 03/18/2024 .   All medical record entries made by the Scribe were at my, Dr. Lele Saul's direction and personally dictated by me on 03/18/2024 . I have reviewed the chart and agree that the record accurately reflects my personal performance of the history, Physical exam, assessment, and plan. I have also personally directed, reviewed, and agree with the discharge instructions.

## 2024-03-18 NOTE — HISTORY OF PRESENT ILLNESS
[FreeTextEntry1] : Mr. Mccullough is a 83-year-old male with a history of amiodarone toxicity, BOOP, former smoker, GERD, hypoxemia, LILO, OW, interstitial pneumonitis, poor sleep, snoring, COVID-19 12/2021 and SOB who presents for a pulmonary follow up evaluation.   -he notes that his energy levels are well -he notes taking a nap for 30 min during the day -He notes sleeping for 8 hours but it is interrupted -He notes getting up 3-4x per night -he notes using reading glasses - he notes his bowels are regular... -he denies hoarseness -he notes taking prednisone, tapered from 5 mg to 2.5 mg.  -he notes having bladder issues sometimes possible due to being on prednisone    -Patient denies any headaches, nausea, vomiting, fever, chills, sweats, chest pain, chest pressure, palpitations, coughing, wheezing, fatigue, diarrhea, constipation dysphagia, myalgias, dizziness, leg swelling, leg pain, itchy eyes, itchy ears, heartburn, reflux or sour taste in mouth.

## 2024-03-18 NOTE — PHYSICAL EXAM
[No Acute Distress] : no acute distress [Normal Oropharynx] : normal oropharynx [Normal Appearance] : normal appearance [III] : Mallampati Class: III [No Neck Mass] : no neck mass [Normal S1, S2] : normal s1, s2 [No Resp Distress] : no resp distress [Clear to Auscultation Bilaterally] : clear to auscultation bilaterally [Benign] : benign [Normal Gait] : normal gait [No Cyanosis] : no cyanosis [FROM] : FROM [Normal Color/ Pigmentation] : normal color/ pigmentation [No Focal Deficits] : no focal deficits [Oriented x3] : oriented x3 [Normal Affect] : normal affect [Normal Rate/Rhythm] : normal rate/rhythm [No Murmurs] : no murmurs [No Clubbing] : no clubbing [No Edema] : no edema [Kyphosis] : kyphosis [TextBox_68] : I:E ratio 1:3; clear  [TextBox_80] : kyphotic

## 2024-03-18 NOTE — PROCEDURE
[FreeTextEntry1] : PFT reveals normal flows; FEV1 was 2.12 L which is 78 % of predicted with a normal flow volume loop. PFT's for performed to evaluate for SOB.   FENO was 11; a normal value being less than 25Fractional exhaled nitric oxide (FENO) is regarded as a simple, noninvasive method for assessing eosinophilic airway inflammation. Produced by a variety of cells within the lung, nitric oxide (NO) concentrations are generally low in healthy individuals. However, high concentrations of NO appear to be involved in nonspecific host defense mechanisms and chronic inflammatory diseases such as asthma. The American Thoracic Society (ATS) therefore has strongly recommended using FENO to aid in the assessment, management, and long-term monitoring of eosinophilic airway inflammation and asthma, and for identifying steroid responsive individuals whose chronic respiratory symptoms may be caused by airway inflammation. In their 2011 clinical practice guideline, the ATS emphasizes the importance of using FENO.

## 2024-03-19 ENCOUNTER — TRANSCRIPTION ENCOUNTER (OUTPATIENT)
Age: 84
End: 2024-03-19

## 2024-04-01 ENCOUNTER — RX RENEWAL (OUTPATIENT)
Age: 84
End: 2024-04-01

## 2024-04-01 RX ORDER — FAMOTIDINE 40 MG/1
40 TABLET, FILM COATED ORAL
Qty: 90 | Refills: 1 | Status: ACTIVE | COMMUNITY
Start: 2023-05-18 | End: 1900-01-01

## 2024-04-22 ENCOUNTER — TRANSCRIPTION ENCOUNTER (OUTPATIENT)
Age: 84
End: 2024-04-22

## 2024-05-06 ENCOUNTER — TRANSCRIPTION ENCOUNTER (OUTPATIENT)
Age: 84
End: 2024-05-06

## 2024-07-22 ENCOUNTER — TRANSCRIPTION ENCOUNTER (OUTPATIENT)
Age: 84
End: 2024-07-22

## 2024-07-24 ENCOUNTER — TRANSCRIPTION ENCOUNTER (OUTPATIENT)
Age: 84
End: 2024-07-24

## 2024-07-24 DIAGNOSIS — M25.511 PAIN IN RIGHT SHOULDER: ICD-10-CM

## 2024-07-24 DIAGNOSIS — M25.512 PAIN IN RIGHT SHOULDER: ICD-10-CM

## 2024-07-25 ENCOUNTER — TRANSCRIPTION ENCOUNTER (OUTPATIENT)
Age: 84
End: 2024-07-25

## 2024-08-19 ENCOUNTER — RX RENEWAL (OUTPATIENT)
Age: 84
End: 2024-08-19

## 2024-09-06 ENCOUNTER — APPOINTMENT (OUTPATIENT)
Dept: PULMONOLOGY | Facility: CLINIC | Age: 84
End: 2024-09-06
Payer: MEDICARE

## 2024-09-06 VITALS
TEMPERATURE: 98 F | RESPIRATION RATE: 16 BRPM | BODY MASS INDEX: 29.2 KG/M2 | DIASTOLIC BLOOD PRESSURE: 64 MMHG | SYSTOLIC BLOOD PRESSURE: 110 MMHG | HEART RATE: 104 BPM | HEIGHT: 70 IN | OXYGEN SATURATION: 99 % | WEIGHT: 204 LBS

## 2024-09-06 DIAGNOSIS — K21.9 GASTRO-ESOPHAGEAL REFLUX DISEASE W/OUT ESOPHAGITIS: ICD-10-CM

## 2024-09-06 DIAGNOSIS — R06.02 SHORTNESS OF BREATH: ICD-10-CM

## 2024-09-06 DIAGNOSIS — J84.89 OTHER SPECIFIED INTERSTITIAL PULMONARY DISEASES: ICD-10-CM

## 2024-09-06 DIAGNOSIS — J44.9 CHRONIC OBSTRUCTIVE PULMONARY DISEASE, UNSPECIFIED: ICD-10-CM

## 2024-09-06 DIAGNOSIS — G47.33 OBSTRUCTIVE SLEEP APNEA (ADULT) (PEDIATRIC): ICD-10-CM

## 2024-09-06 DIAGNOSIS — R93.89 ABNORMAL FINDINGS ON DIAGNOSTIC IMAGING OF OTHER SPECIFIED BODY STRUCTURES: ICD-10-CM

## 2024-09-06 DIAGNOSIS — T46.2X1A POISONING BY OTHER ANTIDYSRHYTHMIC DRUGS, ACCIDENTAL (UNINTENTIONAL), INITIAL ENCOUNTER: ICD-10-CM

## 2024-09-06 PROCEDURE — 94010 BREATHING CAPACITY TEST: CPT

## 2024-09-06 PROCEDURE — 99214 OFFICE O/P EST MOD 30 MIN: CPT | Mod: 25

## 2024-09-06 NOTE — PHYSICAL EXAM
[No Acute Distress] : no acute distress [Normal Oropharynx] : normal oropharynx [III] : Mallampati Class: III [Normal Appearance] : normal appearance [No Neck Mass] : no neck mass [Normal Rate/Rhythm] : normal rate/rhythm [Normal S1, S2] : normal s1, s2 [No Resp Distress] : no resp distress [Clear to Auscultation Bilaterally] : clear to auscultation bilaterally [Kyphosis] : kyphosis [Benign] : benign [Normal Gait] : normal gait [No Clubbing] : no clubbing [No Cyanosis] : no cyanosis [No Edema] : no edema [FROM] : FROM [Normal Color/ Pigmentation] : normal color/ pigmentation [No Focal Deficits] : no focal deficits [Oriented x3] : oriented x3 [Normal Affect] : normal affect [Murmur ___ / 6] : murmur [unfilled] / 6 [TextBox_68] : I:E ratio 1:3; clear  [TextBox_80] : kyphotic

## 2024-09-06 NOTE — PROCEDURE
[FreeTextEntry1] : PFTs revealed mild restrictive dysfunction; FEV1 was 1.86 L, which is 69.9% of predicted; normal flow volume loop. PFTs were performed to evaluate for SOB

## 2024-09-06 NOTE — HISTORY OF PRESENT ILLNESS
[FreeTextEntry1] : Mr. Mccullough is a 83-year-old male with a history of amiodarone toxicity, BOOP, former smoker, GERD, hypoxemia, LILO, OW, interstitial pneumonitis, poor sleep, snoring, COVID-19 12/2021 and SOB who presents for a pulmonary follow up evaluation.   -he notes declining walking ability (using cane) - balance issues -he notes persistent cough -he notes spending most of day in chair watching television  -he notes coughing in morning after waking up but clears during day - returns at night -he notes dry mouth induced cough  -he denies coughing after eating -he denies vertigo -he denies dysphagia -he notes feeling weaker - doing PT currently - laser therapy helps -he denies heartburn/reflux  -he notes stable breathing -he denies taking any new medications, vitamins, or supplements -he notes orthopedic issues are chief complaint   -he denies any headaches, nausea, emesis, fever, chills, sweats, chest pain, chest pressure, wheezing, palpitations, diarrhea, constipation, dysphagia, vertigo, leg swelling, itchy eyes, itchy ears, heartburn, reflux, or sour taste in the mouth.

## 2024-09-06 NOTE — ADDENDUM
[FreeTextEntry1] : Documented by Andrea Saravia acting as a scribe for Dr. Lele Saul on 09/06/2024 All medical record entries made by the Scribe were at my, Dr. Lele Saul's, direction and personally dictated by me on 09/06/2024 . I have reviewed the chart and agree that the record accurately reflects my personal performance of the history, physical exam, assessment and plan. I have also personally directed, reviewed, and agree with the discharge instructions.

## 2024-09-06 NOTE — ASSESSMENT
[FreeTextEntry1] : Mr. 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 He is presents to the office and is currently stable from a pulmonary perspective - He is off anti-inflammatory / fibrotic - stable, resolved Afib / mild GRIMALDO - complains of voice issues -all controlled - improved - gustatory rhinitis - #1 issue is knee issue / Orthopedic issues; dry mouth induced cough   His shortness of breath is multifactorial due to: -poor balance/poor balance -obesity/out of shape -?CAD -pulmonary diseases : ?COPD ; Pneumonitis,asthma   His Pneumonitis (controlled) with ?etiology could be due to: -BOOP - -sarcoidosis -hypersensitivity pneumonitis- possible -Amiodarone therapy (doubtful)  - NSIP  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 Proper dietary restraint is necessary specifically salt containing foods, if any reaction may occur should be reported.  -Repeat CT - 9/2024  - Etiology will depend on the causative agent possibilities include: idiopathic pulmonary fibrosis (UIP), NSIP (nonspecific interstitial pneumonia) , respiratory bronchiolitis in someone who is a smoker, drug induced lung disease, hypersensitivity pneumonitis, BOOP, sarcoidosis, chronic congestive heart failure, rheumatologic disorder induced interstitial lung disease. Optimal diagnosing will include rheumatological panel which would include ESR, MATEO, ANCA, ARNP, CCP, rheumatoid factor, hypersensitivity panel, JOE1, scleroderma antibodies, sjogren's syndrome antibodies; biopsy will be necessary to definitively determine the etiology unless the CT scan findings are specific for UIP. Therapy will be based on diagnostics.  Problem 1A: Abnormal CT - New Nodule 8 x 4 mm - resolved -follow up CT 9/2024 CAT scans are the only radiological modality to identify abnormalities w/in the lungs with regards to nodules/masses/lymph nodes. Risks, benefits were reviewed in detail. The guidelines for abnormalities include follow up CT scans at various intervals which could range from 6 weeks to 1 year intervals. If there is a change for the worse then consideration for a biopsy will be considered if you are a candidate. Second opinion evaluation with a thoracic surgeon or an interventional radiologist could be offered.   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 These drugs require pulmonary function testing including DLCO regularly and possible CT/CXR if respiratory complaints develop. PFTs should be performed at 6 month intervals   problem 3: s/p PJP prophylaxis-  (resolved)  -He is off Bactrim 1 tablet Monday, Wednesday, Friday  -He is s/p Rheumatologic Blood work to include: ESR panel (+) , ACE, panel (-) , HP panel (-) - s/p rheumatological evaluation - Dr. Torrez - PJP Pneumonia is a major opportunistic infection in immunocompromised patients and corticosteroid therapy is a risk factor. Therefore, patients receiving at least 4 weeks of therapy at 30 mg or more daily should be on prophylaxis.   problem 4: GERD -continue Protonix 40 mg QAM, pre-breakfast -Things to avoid including overeating, spicy foods, tight clothing, eating within three hours of bed, this list is not all inclusive.  -For treatment of reflux, possible options discussed including diet control, H2 blockers, PPIs, as well as coating motility agents discussed as treatment options. Timing of meals and proximity of last meal to sleep were discussed. If symptoms persist, a formal gastrointestinal evaluation is needed. -Rule of 2's: Avoid eating too late, too fast, too much, too spicy or within two hours of bedtime   problem 5: (+) OSAS (secondary to snoring, nonrestorative sleep and A-fib) - on Rx -refused prescription for sleep study (APAP) - refused -s/p oral appliance with Dr. Gabby Lopez Sleep apnea is associated with adverse clinical consequences which an affect most organ systems. Cardiovascular disease risk includes arrhythmias, atrial fibrillation, hypertension, coronary artery disease, and stroke. Metabolic disorders include diabetes type 2, non-alcoholic fatty liver disease. Mood disorder especially depression; and cognitive decline especially in the elderly. Associations with chronic reflux/Candelaria's esophagus some but not all inclusive.  -Reasons include arousal consistent with hypopnea; respiratory events most prominent in REM sleep or supine position; therefore sleep staging and body position are important for accurate diagnosis and estimation of AHI.   problem 6: smoking history/lung cancer screening (improved) - stable 9/2021 -follow up chest CT 9/2024  Lung cancer screening is recommended for people between the ages of 55 and 80 with prior 30+ pack year smoking histories. There is irrefutable evidence for realization of lung cancer screening based on two large randomized control trials demonstrating relative reduction in lung cancer mortality for patients undergoing low-dose CT scanning. Risks and benefits reviewed with the patient.  Problem 6A: COPD  -?quiet /asthma  -continue Symbicort 160 2 BID  -COPD is a progressive disease and although it can't be cured , appropriate management can slow its progression, reduce frequency and severity of exacerbations, and improve symptoms and the patient quality of life. Hospitalizations are the greatest contributor to the total COPD costs and account for up to 87% of total COPD related costs. Exacerbations are the main cause of admissions and subsequently account for the 40-75% of COPD costs. Inhaled maintenance therapy reduces the incidence of exacerbations in patients with stable COPD. Incorrect inhaler use and nonadherence are major obstacles to achieving COPD treatment goals. Many COPD patients have challenges (impaired inhalation, limited dexterity, reduced cognition: that limit their ability to correctly use their COPD treatment devices resulting in reduced symptom control. Of most importance is smoking cessation and early intervention with respiratory illnesses and contemplation for pulmonary rehab to enhance quality of life. -Inhaler technique reviewed as well as oral hygiene techniques reviewed with patient. Avoidance of cold air, extremes of temperature, rescue inhaler should be used before exercise. Order of medication reviewed with patient. Recommended use of a cool mist humidifier in the bedroom.  problem 7: poor breathing mechanics -Recommended Wim Hof and Buteyko breathing techniques  -Proper breathing techniques were reviewed with an emphasis of exhalation. Patient instructed to breath in for 1 second and out for four seconds. Patient was encouraged to not talk while walking.  problem 8: overweight/out of shape -recommended nutritional evaluation with Halie Lynn -Weight loss, exercise, and diet control were discussed and are highly encouraged. Treatment options were given such as, aqua therapy, and contacting a nutritionist. Recommended to use the elliptical, stationary bike, less use of treadmill. Mindful eating was explained to the patient. Obesity is associated with worsening asthma, shortness of breath, and potential for cardiac disease, diabetes, and other underlying medical conditions.  problem 9: CAD -recommended to follow up with cardiologist Dr. Carlos  Problem 9A: ?Orthostatic Hypotension -complete evaluation with nephrologist (Dr. Dean) -?Florinef   problem 10: poor balance -recommended to complete balance therapy and gait training  problem 11: allergies -continue Claritin 10 mg QAM Environmental measures for allergies were encouraged including mattress and pillow cover, air purifier, and environmental controls   problem 11A: Dysphagia ENT eval   prblem 11B: Gustatory rhinitis  -Atrovent 0.6% at 1 sniff/nostril, up to TID   Problem 12: Health Maintenance/COVID19 Precautions: - S/p Covid 19 vaccine (Moderna) x5 -Covid 19 vaccine discussed at length with patient; booster discussed and recommended to wait for vaccine containing new delta variant  - Clean your hands often. Wash your hands often with soap and water for at least 20 seconds, especially after blowing your nose, coughing, or sneezing, or having been in a public place. - If soap and water are not available, use a hand  that contains at least 60% alcohol. - To the extent possible, avoid touching high-touch surfaces in public places - elevator buttons, door handles, handrails, handshaking with people, etc. Use a tissue or your sleeve to cover your hand or finger if you must touch something. - Wash your hands after touching surfaces in public places. - Avoid touching your face, nose, eyes, etc. - Clean and disinfect your home to remove germs: practice routine cleaning of frequently touched surfaces (for example: tables, doorknobs, light switches, handles, desks, toilets, faucets, sinks & cell phones) - Avoid crowds, especially in poorly ventilated spaces. Your risk of exposure to respiratory viruses like COVID-19 may increase in crowded, closed-in settings with little air circulation if there are people in the crowd who are sick. All patients are recommended to practice social distancing and stay at least 6 feet away from others. - Avoid all non-essential travel including plane trips, and especially avoid embarking on cruise ships. -If COVID-19 is spreading in your community, take extra measures to put distance between yourself and other people to further reduce your risk of being exposed to this new virus. -Stay home as much as possible. - Consider ways of getting food brought to your house through family, social, or commercial networks -Be aware that the virus has been known to live in the air up to 3 hours post exposure, cardboard up to 24 hours post exposure, copper up to 4 hours post exposure, steel and plastic up to 2-3 days post exposure. Risk of transmission from these surfaces are affected by many variables. Immune Support Recommendations: -OTC Vitamin C 500mg BID  -OTC Quercetin 250-500mg BID  -OTC Zinc 75-100mg per day  -OTC Melatonin 1 or 2 mg a night  -OTC Vitamin D 1-4000mg per day  -OTC Tonic Water 8oz per day Asthma and COVID19: You need to make sure your asthma is under control. This often requires the use of inhaled corticosteroids (and sometimes oral corticosteroids). Inhaled corticosteroids do not likely reduce your immune system's ability to fight infections, but oral corticosteroids may. It is important to use the steps above to protect yourself to limit your exposure to any respiratory virus.  problem 13: health maintenance - "[place]" -Recomend RSV vaccination - He is s/p consult with a neurologist  - Received influenza vaccine 2023 -recommended strep pneumonia vaccines: Prevnar-13 vaccine, followed by Pneumo vaccine 23 one year following (Completed) -recommended early intervention for URIs -recommended regular osteoporosis evaluations -recommended early dermatological evaluations -recommended after the age of 50 to the age of 70, colonoscopy every 5 years     Follow-up in 3 months with Full PFTs /  CXR  Patient is encouraged to call with any changes, concerns or questions.

## 2024-09-10 NOTE — DISCHARGE NOTE NURSING/CASE MANAGEMENT/SOCIAL WORK - FLU SEASON?
What Type Of Note Output Would You Prefer (Optional)?: Standard Output How Severe Is Your Acne?: moderate Is This A New Presentation, Or A Follow-Up?: Acne Females Only: When Was Your Last Menstrual Period?: 08/07/2024 No

## 2024-09-16 ENCOUNTER — RX RENEWAL (OUTPATIENT)
Age: 84
End: 2024-09-16

## 2024-09-17 ENCOUNTER — APPOINTMENT (OUTPATIENT)
Dept: PULMONOLOGY | Facility: CLINIC | Age: 84
End: 2024-09-17

## 2024-09-18 ENCOUNTER — APPOINTMENT (OUTPATIENT)
Dept: CT IMAGING | Facility: CLINIC | Age: 84
End: 2024-09-18
Payer: MEDICARE

## 2024-09-18 PROCEDURE — 71250 CT THORAX DX C-: CPT | Mod: 26,MH

## 2024-09-26 ENCOUNTER — TRANSCRIPTION ENCOUNTER (OUTPATIENT)
Age: 84
End: 2024-09-26

## 2025-01-15 ENCOUNTER — APPOINTMENT (OUTPATIENT)
Dept: PULMONOLOGY | Facility: CLINIC | Age: 85
End: 2025-01-15

## 2025-02-11 NOTE — ASU PREOP CHECKLIST - SELECT TESTS ORDERED
02/11/25                            Linwood Smith  [address] [facility] [address]    To Whom It May Concern:    This is to certify Linwood Smith was evaluated with Calvin Avalos MD on 02/11/25 and can return to school on 2/12/2025.     RESTRICTIONS: None            Electronically signed by:  Calvin Avalos MD  [facility]--[address]  [address] [facility] [address]  Dept Phone: [phone]       
CBC/BMP/Type and Screen/Type and Cross/PT/PTT/INR/EKG

## 2025-03-02 NOTE — ASU PATIENT PROFILE, ADULT - NS PRO PASSIVE SMOKE EXP
Progress Note    Patient ID: Jose Francisco is a 58 year old male        SUBJECTIVE:     Chief Complaint   Patient presents with   • Annual Exam     Pt is here for a yearly exam.  Pt is feeling well. Weight up and pt not clear why as he has improved his carb intake.      History  Current Outpatient Medications   Medication Sig   • pantoprazole (PROTONIX) 40 MG tablet Take 1 tablet 7 days before the procedure and continue taking for 30 days after the procedure   • metoPROLOL succinate (TOPROL-XL) 50 MG 24 hr tablet Take 1 tablet by mouth 2 times daily.   • dapagliflozin (Farxiga) 10 MG tablet Take 1 tablet by mouth daily.   • apixaBAN (ELIQUIS) 5 MG Tab Take 1 tablet by mouth every 12 hours.   • losartan (COZAAR) 50 MG tablet Take 1 tablet by mouth daily. Do not start before May 5, 2022.   • spironolactone (ALDACTONE) 25 MG tablet Take 1 tablet by mouth daily. Do not start before May 5, 2022.   • furosemide (LASIX) 40 MG tablet Take 1 tablet by mouth daily. Do not start before May 5, 2022.   • Cholecalciferol (Vitamin D3) 125 mcg (5,000 units) tablet Take by mouth daily.     No current facility-administered medications for this visit.     ALLERGIES:  No Known Allergies  Family History   Problem Relation Age of Onset   • Cancer, Skin Mother    • Diabetes Neg Hx    • Coronary Artery Disease Neg Hx      Review of patient's social economics indicates:                  Alcohol Use: Not Current*       Comment: rare    Social History     Tobacco Use   Smoking Status Never Smoker   Smokeless Tobacco Never Used       Past Surgical History:   Procedure Laterality Date   • No past surgeries         Review of Systems   Constitutional: Negative for appetite change, fatigue and fever.   HENT: Negative for congestion, sinus pressure and sore throat.    Eyes: Negative for visual disturbance.   Respiratory: Negative for cough, shortness of breath and wheezing.    Cardiovascular: Negative for chest pain and palpitations.    Gastrointestinal: Negative for abdominal pain, blood in stool, constipation, diarrhea and nausea.   Endocrine: Negative.  Negative for cold intolerance, heat intolerance, polydipsia, polyphagia and polyuria.   Genitourinary: Negative for dysuria, frequency and hematuria.        Nocturia x 1. Stream ok   Musculoskeletal: Negative for back pain, gait problem and joint swelling.        Some stiffness hips- pt believes it makes his gait less stable No falls or near falls   Neurological: Negative for tremors, syncope, weakness, numbness and headaches.   Psychiatric/Behavioral: Negative for confusion, dysphoric mood and suicidal ideas. The patient is not nervous/anxious.          OBJECTIVE:     Visit Vitals  /62   Pulse 66   Temp 96.3 °F (35.7 °C)   Ht 5' 11\" (1.803 m)   Wt (!) 138.3 kg (305 lb)   BMI 42.54 kg/m²     Physical Exam  Constitutional:       General: He is not in acute distress.     Appearance: Normal appearance. He is well-developed.   HENT:      Mouth/Throat:      Pharynx: No oropharyngeal exudate.   Eyes:      Conjunctiva/sclera: Conjunctivae normal.      Pupils: Pupils are equal, round, and reactive to light.   Neck:      Thyroid: No thyromegaly.      Trachea: No tracheal deviation.   Cardiovascular:      Rate and Rhythm: Normal rate and regular rhythm.      Pulses:           Carotid pulses are 2+ on the right side and 2+ on the left side.       Dorsalis pedis pulses are 2+ on the right side and 2+ on the left side.        Posterior tibial pulses are 2+ on the right side and 2+ on the left side.      Heart sounds: Normal heart sounds, S1 normal and S2 normal. No murmur heard.   No systolic murmur is present.   No diastolic murmur is present.  Pulmonary:      Effort: No accessory muscle usage or respiratory distress.      Breath sounds: No decreased breath sounds, wheezing or rales.   Abdominal:      General: Bowel sounds are normal. There is no distension.      Palpations: Abdomen is soft. There is  no hepatomegaly, splenomegaly or mass.      Tenderness: There is no abdominal tenderness.      Hernia: No hernia is present.   Musculoskeletal:         General: Normal range of motion.   Lymphadenopathy:      Cervical: No cervical adenopathy.   Skin:     General: Skin is warm and dry.   Psychiatric:         Speech: Speech normal.           ASSESSMENT AND PLAN:       Jose Francisco was seen today for annual exam.    Diagnoses and all orders for this visit:    Routine adult health maintenance  -     COMPREHENSIVE METABOLIC PANEL  -     CBC WITH DIFFERENTIAL  -     GLYCOHEMOGLOBIN  -     LIPID PANEL WITHOUT REFLEX  -     THYROID STIMULATING HORMONE  -     VITAMIN D -25 HYDROXY  -     MICROALBUMIN URINE RANDOM  -     HEPATITIS C ANTIBODY WITH REFLEX  -     PNEUMOCOCCAL CONJUGATE 20 VALENT VACC (PREVNAR-20)    Benign essential hypertension    Atrial fibrillation with RVR (CMS/HCC)    Cardiomyopathy, unspecified type (CMS/HCC)  -     PNEUMOCOCCAL CONJUGATE 20 VALENT VACC (PREVNAR-20)    Class 3 severe obesity due to excess calories without serious comorbidity with body mass index (BMI) of 45.0 to 49.9 in adult (CMS/HCC)    Stage 2 chronic kidney disease  -     MICROALBUMIN URINE RANDOM    Vitamin D deficiency  -     VITAMIN D -25 HYDROXY    Problem List Items Addressed This Visit        Cardiac and Vasculature    Atrial fibrillation with RVR (CMS/HCC)     In NSR now- f/u Dr. Mullins- will be getting ablation         Benign essential hypertension     Good bp control         Cardiomyopathy (CMS/HCC)     Well compensated on current mediations- continue and f/u cardiology         Relevant Orders    PNEUMOCOCCAL CONJUGATE 20 VALENT VACC (PREVNAR-20)       Endocrine and Metabolic    Class 3 severe obesity due to excess calories without serious comorbidity with body mass index (BMI) of 45.0 to 49.9 in adult (CMS/HCC)     Urge low carb diet and more exercise.         Vitamin D deficiency     Check level on supplement         Relevant  Orders    VITAMIN D -25 HYDROXY       Genitourinary and Reproductive    Stage 2 chronic kidney disease     Avoid nsaids, stay hydrated, check renal function         Relevant Orders    MICROALBUMIN URINE RANDOM       Health Encounters    Routine adult health maintenance - Primary     Urge more exercise. Overdue for colonoscopy- pt will contact gi office. Prevnar 20 today         Relevant Orders    COMPREHENSIVE METABOLIC PANEL    CBC WITH DIFFERENTIAL    GLYCOHEMOGLOBIN    LIPID PANEL WITHOUT REFLEX    THYROID STIMULATING HORMONE    VITAMIN D -25 HYDROXY    MICROALBUMIN URINE RANDOM    HEPATITIS C ANTIBODY WITH REFLEX    PNEUMOCOCCAL CONJUGATE 20 VALENT VACC (PREVNAR-20)         Yes... Pt able to answer questions re: current hospital course and plan of care/intact

## 2025-03-11 NOTE — H&P PST ADULT - VENOUS THROMBOEMBOLISM CURRENT STATUS
[As Noted in HPI] : as noted in HPI [Numbness] : numbness [Negative] : Heme/Lymph (0) indicator not present

## (undated) DEVICE — DRSG STERISTRIPS 0.5 X 4"

## (undated) DEVICE — PREP BETADINE KIT

## (undated) DEVICE — SPECIMEN CONTAINER 100ML

## (undated) DEVICE — DRSG OPSITE 2.5 X 2"

## (undated) DEVICE — DRAPE LAPAROTOMY TRANSVERSE

## (undated) DEVICE — LIGASURE SMALL JAW

## (undated) DEVICE — DRAPE TOWEL BLUE 17" X 24"

## (undated) DEVICE — SUT VICRYL 2-0 27" SH UNDYED

## (undated) DEVICE — MARKING PEN W RULER

## (undated) DEVICE — Device

## (undated) DEVICE — FOLEY TRAY 16FR 5CC LTX UMETER CLOSED

## (undated) DEVICE — MEDICATION LABELS W MARKER

## (undated) DEVICE — WARMING BLANKET LOWER ADULT

## (undated) DEVICE — SUT POLYSORB 3-0 30" V-20 UNDYED

## (undated) DEVICE — DRSG TEGADERM 6"X8"

## (undated) DEVICE — SOL IRR POUR NS 0.9% 500ML

## (undated) DEVICE — PACK MINOR W TRANS LAP

## (undated) DEVICE — GOWN TRIMAX LG

## (undated) DEVICE — POSITIONER FOAM EGG CRATE ULNAR 2PCS (PINK)

## (undated) DEVICE — DRSG KLING 4"

## (undated) DEVICE — BLADE SCALPEL SAFETYLOCK #15

## (undated) DEVICE — SUT POLYSORB 2-0 30" GS-21 UNDYED

## (undated) DEVICE — ELCTR GROUNDING PAD ADULT COVIDIEN

## (undated) DEVICE — DRAPE 3/4 SHEET 52X76"

## (undated) DEVICE — PROTECTOR HEEL / ELBOW FLUFFY

## (undated) DEVICE — SOL IRR POUR H2O 250ML

## (undated) DEVICE — SYR LUER LOK 20CC

## (undated) DEVICE — PACK MINOR WITH LAP

## (undated) DEVICE — BASIN SET DOUBLE

## (undated) DEVICE — GAMMA SLEEVE DISPOSABLE

## (undated) DEVICE — DRAPE INSTRUMENT POUCH 6.75" X 11"

## (undated) DEVICE — VENODYNE/SCD SLEEVE CALF MEDIUM

## (undated) DEVICE — DRSG CURITY GAUZE SPONGE 4 X 4" 12-PLY

## (undated) DEVICE — LABELS BLANK W PEN

## (undated) DEVICE — GLV 7 PROTEXIS (WHITE)

## (undated) DEVICE — SUT MONOCRYL 4-0 18" P-3 UNDYED

## (undated) DEVICE — GLV 7.5 PROTEXIS (CREAM) NEU-THERA

## (undated) DEVICE — SUT MONOCRYL 4-0 27" PS-2 UNDYED

## (undated) DEVICE — SUT VICRYL 3-0 27" SH UNDYED

## (undated) DEVICE — POSITIONER STRAP ARMBOARD VELCRO TS-30